# Patient Record
Sex: FEMALE | Race: WHITE | NOT HISPANIC OR LATINO | Employment: FULL TIME | URBAN - METROPOLITAN AREA
[De-identification: names, ages, dates, MRNs, and addresses within clinical notes are randomized per-mention and may not be internally consistent; named-entity substitution may affect disease eponyms.]

---

## 2019-04-02 ENCOUNTER — TRANSFERRED RECORDS (OUTPATIENT)
Dept: HEALTH INFORMATION MANAGEMENT | Facility: CLINIC | Age: 29
End: 2019-04-02

## 2019-04-02 LAB
ALT SERPL-CCNC: 117 U/L (ref 5–25)
AST SERPL-CCNC: 262 U/L (ref 13–39)
CREAT SERPL-MCNC: 0.4 MG/DL (ref 0.6–1.2)
GFR SERPL CREATININE-BSD FRML MDRD: >=60 ML/MIN (ref 60–120)
GLUCOSE SERPL-MCNC: 134 MG/DL (ref 70–99)
POTASSIUM SERPL-SCNC: 2.4 MMOL/L (ref 3.5–5.1)

## 2019-04-04 ENCOUNTER — APPOINTMENT (OUTPATIENT)
Age: 29
Setting detail: DERMATOLOGY
End: 2019-04-15

## 2019-04-04 DIAGNOSIS — A63.0 ANOGENITAL (VENEREAL) WARTS: ICD-10-CM

## 2019-04-04 DIAGNOSIS — D22 MELANOCYTIC NEVI: ICD-10-CM

## 2019-04-04 PROBLEM — D48.5 NEOPLASM OF UNCERTAIN BEHAVIOR OF SKIN: Status: ACTIVE | Noted: 2019-04-04

## 2019-04-04 PROBLEM — D22.5 MELANOCYTIC NEVI OF TRUNK: Status: ACTIVE | Noted: 2019-04-04

## 2019-04-04 PROCEDURE — OTHER MIPS QUALITY: OTHER

## 2019-04-04 PROCEDURE — OTHER BIOPSY BY SHAVE METHOD: OTHER

## 2019-04-04 PROCEDURE — OTHER COUNSELING: OTHER

## 2019-04-04 PROCEDURE — 56605 BIOPSY OF VULVA/PERINEUM: CPT

## 2019-04-04 PROCEDURE — 99201: CPT | Mod: 25

## 2019-04-04 ASSESSMENT — LOCATION ZONE DERM: LOCATION ZONE: VULVA

## 2019-04-04 ASSESSMENT — LOCATION SIMPLE DESCRIPTION DERM: LOCATION SIMPLE: VULVA

## 2019-04-04 ASSESSMENT — LOCATION DETAILED DESCRIPTION DERM
LOCATION DETAILED: PREPUCE
LOCATION DETAILED: MONS

## 2019-04-04 NOTE — PROCEDURE: MIPS QUALITY
Quality 131: Pain Assessment And Follow-Up: Pain assessment using a standardized tool is documented as negative, no follow-up plan required
Detail Level: Detailed
Quality 130: Documentation Of Current Medications In The Medical Record: Current Medications Documented
Quality 265: Biopsy Follow-Up: Biopsy results reviewed, communicated, tracked, and documented
Quality 110: Preventive Care And Screening: Influenza Immunization: Influenza immunization was not ordered or administered, reason not given
Quality 431: Preventive Care And Screening: Unhealthy Alcohol Use - Screening: Patient screened for unhealthy alcohol use using a single question and scores less than 2 times per year
Quality 226: Preventive Care And Screening: Tobacco Use: Screening And Cessation Intervention: Patient screened for tobacco and is a smoker AND received Cessation Counseling

## 2019-04-04 NOTE — PROCEDURE: COUNSELING
Detail Level: Detailed
Patient Specific Counseling (Will Not Stick From Patient To Patient): Patient instructed to speak with sexual partner about self examination for potential sexually transmitted lesions and to follow up with primary care provider or our clinic.

## 2019-04-04 NOTE — PROCEDURE: BIOPSY BY SHAVE METHOD
Depth Of Biopsy: dermis
Type Of Destruction Used: Electrodesiccation
Electrodesiccation And Curettage Text: The wound bed was treated with electrodesiccation and curettage after the biopsy was performed.
Electrodesiccation Text: The wound bed was treated with electrodesiccation after the biopsy was performed.
Bill [id number] For Specimen Handling/Conveyance To Laboratory?: no
Biopsy Method: double edge Personna blade
Notification Instructions: Patient will be notified of biopsy results. However, patient instructed to call the office if not contacted within 2 weeks.
Post-Care Instructions: I verbally reviewed with the patient in detail post-care instructions. Patient is to keep the biopsy site dry overnight, and then apply H2O2, Vaseline and a bandage daily until healed.
Anesthesia Type: 1% lidocaine with epinephrine and a 1:10 solution of 8.4% sodium bicarbonate
Anesthesia Volume In Cc (Will Not Render If 0): 0.5
Additional Anesthesia Volume In Cc (Will Not Render If 0): 0
Detail Level: Detailed
Cryotherapy Text: The wound bed was treated with cryotherapy after the biopsy was performed.
Biopsy Type: H and E
Hemostasis: Electrocautery
Consent: Verbal consent was obtained and risks were reviewed including but not limited to scarring, infection, bleeding, scabbing, incomplete removal, nerve damage and allergy to anesthesia.
Curettage Text: The wound bed was treated with curettage after the biopsy was performed.
Wound Care: Petrolatum
Size Of Lesion In Cm: 0.6
Was A Bandage Applied: Yes
Silver Nitrate Text: The wound bed was treated with silver nitrate after the biopsy was performed.
Billing Type: Third-Party Bill
Dressing: no dressing applied
X Size Of Lesion In Cm: 0.3

## 2019-04-24 ENCOUNTER — APPOINTMENT (OUTPATIENT)
Age: 29
Setting detail: DERMATOLOGY
End: 2019-06-17

## 2019-04-24 PROBLEM — D07.1 CARCINOMA IN SITU OF VULVA: Status: ACTIVE | Noted: 2019-04-24

## 2019-04-24 PROCEDURE — OTHER MEDICATION COUNSELING: OTHER

## 2019-04-24 PROCEDURE — OTHER COUNSELING: OTHER

## 2019-04-24 PROCEDURE — OTHER MIPS QUALITY: OTHER

## 2019-04-24 PROCEDURE — OTHER PRESCRIPTION: OTHER

## 2019-04-24 PROCEDURE — 99214 OFFICE O/P EST MOD 30 MIN: CPT

## 2019-04-24 RX ORDER — IMIQUIMOD 50 MG/G
5% CREAM TOPICAL
Qty: 1 | Refills: 0 | Status: ERX | COMMUNITY
Start: 2019-04-24

## 2019-04-24 NOTE — PROCEDURE: MIPS QUALITY
Quality 226: Preventive Care And Screening: Tobacco Use: Screening And Cessation Intervention: Patient screened for tobacco and is a smoker AND received Cessation Counseling
Quality 110: Preventive Care And Screening: Influenza Immunization: Influenza immunization was not ordered or administered, reason not given
Quality 143: Oncology: Medical And Radiation- Pain Intensity Quantified: Pain severity quantified, no pain present
Detail Level: Detailed
Quality 130: Documentation Of Current Medications In The Medical Record: Current Medications Documented
Quality 431: Preventive Care And Screening: Unhealthy Alcohol Use - Screening: Patient screened for unhealthy alcohol use using a single question and scores less than 2 times per year
Quality 131: Pain Assessment And Follow-Up: Pain assessment using a standardized tool is documented as negative, no follow-up plan required

## 2019-04-24 NOTE — PROCEDURE: COUNSELING
Patient Specific Counseling (Will Not Stick From Patient To Patient): Discussed treatment options with the patient, including Mohs surgical excision versus topical destructive agents (Aldara versus Veregen).  She opts to begin a course of imiquimod and RTC for evaluation of her treatment response.  \\n\\nPatient counseled to avoid sexual intercourse while using Aldara. \\n\\nPrescription printed and GoodRx card given at appointment. Given information about [facility].  \\n\\n
Detail Level: Detailed

## 2019-04-24 NOTE — PROCEDURE: MEDICATION COUNSELING
Drysol Pregnancy And Lactation Text: This medication is considered safe during pregnancy and breast feeding.
Detail Level: Simple
Bactrim Pregnancy And Lactation Text: This medication is Pregnancy Category D and is known to cause fetal risk.  It is also excreted in breast milk.
Oxybutynin Pregnancy And Lactation Text: This medication is Pregnancy Category B and is considered safe during pregnancy. It is unknown if it is excreted in breast milk.
Gabapentin Counseling: I discussed with the patient the risks of gabapentin including but not limited to dizziness, somnolence, fatigue and ataxia.
Azithromycin Pregnancy And Lactation Text: This medication is considered safe during pregnancy and is also secreted in breast milk.
Benzoyl Peroxide Counseling: Patient counseled that medicine may cause skin irritation and bleach clothing.  In the event of skin irritation, the patient was advised to reduce the amount of the drug applied or use it less frequently.   The patient verbalized understanding of the proper use and possible adverse effects of benzoyl peroxide.  All of the patient's questions and concerns were addressed.
Dapsone Counseling: I discussed with the patient the risks of dapsone including but not limited to hemolytic anemia, agranulocytosis, rashes, methemoglobinemia, kidney failure, peripheral neuropathy, headaches, GI upset, and liver toxicity.  Patients who start dapsone require monitoring including baseline LFTs and weekly CBCs for the first month, then every month thereafter.  The patient verbalized understanding of the proper use and possible adverse effects of dapsone.  All of the patient's questions and concerns were addressed.
Azathioprine Counseling:  I discussed with the patient the risks of azathioprine including but not limited to myelosuppression, immunosuppression, hepatotoxicity, lymphoma, and infections.  The patient understands that monitoring is required including baseline LFTs, Creatinine, possible TPMP genotyping and weekly CBCs for the first month and then every 2 weeks thereafter.  The patient verbalized understanding of the proper use and possible adverse effects of azathioprine.  All of the patient's questions and concerns were addressed.
Methotrexate Pregnancy And Lactation Text: This medication is Pregnancy Category X and is known to cause fetal harm. This medication is excreted in breast milk.
Elidel Counseling: Patient may experience a mild burning sensation during topical application. Elidel is not approved in children less than 2 years of age. There have been case reports of hematologic and skin malignancies in patients using topical calcineurin inhibitors although causality is questionable.
Odomzo Pregnancy And Lactation Text: This medication is Pregnancy Category X and is absolutely contraindicated during pregnancy. It is unknown if it is excreted in breast milk.
Stelara Pregnancy And Lactation Text: This medication is Pregnancy Category B and is considered safe during pregnancy. It is unknown if this medication is excreted in breast milk.
Eucrisa Counseling: Patient may experience a mild burning sensation during topical application. Eucrisa is not approved in children less than 2 years of age.
Wartpeel Pregnancy And Lactation Text: This medication is Pregnancy Category X and contraindicated in pregnancy and in women who may become pregnant. It is unknown if this medication is excreted in breast milk.
Mirvaso Pregnancy And Lactation Text: This medication has not been assigned a Pregnancy Risk Category. It is unknown if the medication is excreted in breast milk.
Terbinafine Counseling: Patient counseling regarding adverse effects of terbinafine including but not limited to headache, diarrhea, rash, upset stomach, liver function test abnormalities, itching, taste/smell disturbance, nausea, abdominal pain, and flatulence.  There is a rare possibility of liver failure that can occur when taking terbinafine.  The patient understands that a baseline LFT and kidney function test may be required. The patient verbalized understanding of the proper use and possible adverse effects of terbinafine.  All of the patient's questions and concerns were addressed.
Xolair Pregnancy And Lactation Text: This medication is Pregnancy Category B and is considered safe during pregnancy. This medication is excreted in breast milk.
Griseofulvin Counseling:  I discussed with the patient the risks of griseofulvin including but not limited to photosensitivity, cytopenia, liver damage, nausea/vomiting and severe allergy.  The patient understands that this medication is best absorbed when taken with a fatty meal (e.g., ice cream or french fries).
Topical Sulfur Applications Pregnancy And Lactation Text: This medication is Pregnancy Category C and has an unknown safety profile during pregnancy. It is unknown if this topical medication is excreted in breast milk.
Acitretin Pregnancy And Lactation Text: This medication is Pregnancy Category X and should not be given to women who are pregnant or may become pregnant in the future. This medication is excreted in breast milk.
Taltz Counseling: I discussed with the patient the risks of ixekizumab including but not limited to immunosuppression, serious infections, worsening of inflammatory bowel disease and drug reactions.  The patient understands that monitoring is required including a PPD at baseline and must alert us or the primary physician if symptoms of infection or other concerning signs are noted.
Hydroquinone Counseling:  Patient advised that medication may result in skin irritation, lightening (hypopigmentation), dryness, and burning.  In the event of skin irritation, the patient was advised to reduce the amount of the drug applied or use it less frequently.  Rarely, spots that are treated with hydroquinone can become darker (pseudoochronosis).  Should this occur, patient instructed to stop medication and call the office. The patient verbalized understanding of the proper use and possible adverse effects of hydroquinone.  All of the patient's questions and concerns were addressed.
Niacinamide Counseling: I recommended taking niacin or niacinamide, also know as vitamin B3, twice daily. Recent evidence suggests that taking vitamin B3 (500 mg twice daily) can reduce the risk of actinic keratoses and non-melanoma skin cancers. Side effects of vitamin B3 include flushing and headache.
Thalidomide Counseling: I discussed with the patient the risks of thalidomide including but not limited to birth defects, anxiety, weakness, chest pain, dizziness, cough and severe allergy.
Terbinafine Pregnancy And Lactation Text: This medication is Pregnancy Category B and is considered safe during pregnancy. It is also excreted in breast milk and breast feeding isn't recommended.
Wartpeel Counseling:  I discussed with the patient the risks of Wartpeel including but not limited to erythema, scaling, itching, weeping, crusting, and pain.
Xelmistyz Pregnancy And Lactation Text: This medication is Pregnancy Category D and is not considered safe during pregnancy.  The risk during breast feeding is also uncertain.
Elidel Pregnancy And Lactation Text: This medication is Pregnancy Category C. It is unknown if this medication is excreted in breast milk.
Xeljanz Counseling: I discussed with the patient the risks of Xeljanz therapy including increased risk of infection, liver issues, headache, diarrhea, or cold symptoms. Live vaccines should be avoided. They were instructed to call if they have any problems.
Benzoyl Peroxide Pregnancy And Lactation Text: This medication is Pregnancy Category C. It is unknown if benzoyl peroxide is excreted in breast milk.
Fluconazole Pregnancy And Lactation Text: This medication is Pregnancy Category C and it isn't know if it is safe during pregnancy. It is also excreted in breast milk.
Taltz Pregnancy And Lactation Text: The risk during pregnancy and breastfeeding is uncertain with this medication.
Cimzia Pregnancy And Lactation Text: This medication crosses the placenta but can be considered safe in certain situations. Cimzia may be excreted in breast milk.
Fluconazole Counseling:  Patient counseled regarding adverse effects of fluconazole including but not limited to headache, diarrhea, nausea, upset stomach, liver function test abnormalities, taste disturbance, and stomach pain.  There is a rare possibility of liver failure that can occur when taking fluconazole.  The patient understands that monitoring of LFTs and kidney function test may be required, especially at baseline. The patient verbalized understanding of the proper use and possible adverse effects of fluconazole.  All of the patient's questions and concerns were addressed.
Carac Counseling:  I discussed with the patient the risks of Carac including but not limited to erythema, scaling, itching, weeping, crusting, and pain.
Albendazole Counseling:  I discussed with the patient the risks of albendazole including but not limited to cytopenia, kidney damage, nausea/vomiting and severe allergy.  The patient understands that this medication is being used in an off-label manner.
Otezla Pregnancy And Lactation Text: This medication is Pregnancy Category C and it isn't known if it is safe during pregnancy. It is unknown if it is excreted in breast milk.
Humira Counseling:  I discussed with the patient the risks of adalimumab including but not limited to myelosuppression, immunosuppression, autoimmune hepatitis, demyelinating diseases, lymphoma, and serious infections.  The patient understands that monitoring is required including a PPD at baseline and must alert us or the primary physician if symptoms of infection or other concerning signs are noted.
Cimzia Counseling:  I discussed with the patient the risks of Cimzia including but not limited to immunosuppression, allergic reactions and infections.  The patient understands that monitoring is required including a PPD at baseline and must alert us or the primary physician if symptoms of infection or other concerning signs are noted.
Topical Sulfur Applications Counseling: Topical Sulfur Counseling: Patient counseled that this medication may cause skin irritation or allergic reactions.  In the event of skin irritation, the patient was advised to reduce the amount of the drug applied or use it less frequently.   The patient verbalized understanding of the proper use and possible adverse effects of topical sulfur application.  All of the patient's questions and concerns were addressed.
Oxybutynin Counseling:  I discussed with the patient the risks of oxybutynin including but not limited to skin rash, drowsiness, dry mouth, difficulty urinating, and blurred vision.
Include Pregnancy/Lactation Warning?: No
Siliq Counseling:  I discussed with the patient the risks of Siliq including but not limited to new or worsening depression, suicidal thoughts and behavior, immunosuppression, malignancy, posterior leukoencephalopathy syndrome, and serious infections.  The patient understands that monitoring is required including a PPD at baseline and must alert us or the primary physician if symptoms of infection or other concerning signs are noted. There is also a special program designed to monitor depression which is required with Siliq.
Quinolones Counseling:  I discussed with the patient the risks of fluoroquinolones including but not limited to GI upset, allergic reaction, drug rash, diarrhea, dizziness, photosensitivity, yeast infections, liver function test abnormalities, tendonitis/tendon rupture.
Prednisone Counseling:  I discussed with the patient the risks of prolonged use of prednisone including but not limited to weight gain, insomnia, osteoporosis, mood changes, diabetes, susceptibility to infection, glaucoma and high blood pressure.  In cases where prednisone use is prolonged, patients should be monitored with blood pressure checks, serum glucose levels and an eye exam.  Additionally, the patient may need to be placed on GI prophylaxis, PCP prophylaxis, and calcium and vitamin D supplementation and/or a bisphosphonate.  The patient verbalized understanding of the proper use and the possible adverse effects of prednisone.  All of the patient's questions and concerns were addressed.
Minoxidil Counseling: Minoxidil is a topical medication which can increase blood flow where it is applied. It is uncertain how this medication increases hair growth. Side effects are uncommon and include stinging and allergic reactions.
Clofazimine Pregnancy And Lactation Text: This medication is Pregnancy Category C and isn't considered safe during pregnancy. It is excreted in breast milk.
Topical Clindamycin Counseling: Patient counseled that this medication may cause skin irritation or allergic reactions.  In the event of skin irritation, the patient was advised to reduce the amount of the drug applied or use it less frequently.   The patient verbalized understanding of the proper use and possible adverse effects of clindamycin.  All of the patient's questions and concerns were addressed.
Tetracycline Pregnancy And Lactation Text: This medication is Pregnancy Category D and not consider safe during pregnancy. It is also excreted in breast milk.
Ketoconazole Counseling:   Patient counseled regarding improving absorption with orange juice.  Adverse effects include but are not limited to breast enlargement, headache, diarrhea, nausea, upset stomach, liver function test abnormalities, taste disturbance, and stomach pain.  There is a rare possibility of liver failure that can occur when taking ketoconazole. The patient understands that monitoring of LFTs may be required, especially at baseline. The patient verbalized understanding of the proper use and possible adverse effects of ketoconazole.  All of the patient's questions and concerns were addressed.
Minocycline Counseling: Patient advised regarding possible photosensitivity and discoloration of the teeth, skin, lips, tongue and gums.  Patient instructed to avoid sunlight, if possible.  When exposed to sunlight, patients should wear protective clothing, sunglasses, and sunscreen.  The patient was instructed to call the office immediately if the following severe adverse effects occur:  hearing changes, easy bruising/bleeding, severe headache, or vision changes.  The patient verbalized understanding of the proper use and possible adverse effects of minocycline.  All of the patient's questions and concerns were addressed.
Odomzo Counseling- I discussed with the patient the risks of Odomzo including but not limited to nausea, vomiting, diarrhea, constipation, weight loss, changes in the sense of taste, decreased appetite, muscle spasms, and hair loss.  The patient verbalized understanding of the proper use and possible adverse effects of Odomzo.  All of the patient's questions and concerns were addressed.
Hydroquinone Pregnancy And Lactation Text: This medication has not been assigned a Pregnancy Risk Category but animal studies failed to show danger with the topical medication. It is unknown if the medication is excreted in breast milk.
Mirvaso Counseling: Mirvaso is a topical medication which can decrease superficial blood flow where applied. Side effects are uncommon and include stinging, redness and allergic reactions.
Niacinamide Pregnancy And Lactation Text: These medications are considered safe during pregnancy.
Hydroxychloroquine Counseling:  I discussed with the patient that a baseline ophthalmologic exam is needed at the start of therapy and every year thereafter while on therapy. A CBC may also be warranted for monitoring.  The side effects of this medication were discussed with the patient, including but not limited to agranulocytosis, aplastic anemia, seizures, rashes, retinopathy, and liver toxicity. Patient instructed to call the office should any adverse effect occur.  The patient verbalized understanding of the proper use and possible adverse effects of Plaquenil.  All the patient's questions and concerns were addressed.
Rifampin Pregnancy And Lactation Text: This medication is Pregnancy Category C and it isn't know if it is safe during pregnancy. It is also excreted in breast milk and should not be used if you are breast feeding.
Dupixent Counseling: I discussed with the patient the risks of dupilumab including but not limited to eye infection and irritation, cold sores, injection site reactions, worsening of asthma, allergic reactions and increased risk of parasitic infection.  Live vaccines should be avoided while taking dupilumab. Dupilumab will also interact with certain medications such as warfarin and cyclosporine. The patient understands that monitoring is required and they must alert us or the primary physician if symptoms of infection or other concerning signs are noted.
Enbrel Counseling:  I discussed with the patient the risks of etanercept including but not limited to myelosuppression, immunosuppression, autoimmune hepatitis, demyelinating diseases, lymphoma, and infections.  The patient understands that monitoring is required including a PPD at baseline and must alert us or the primary physician if symptoms of infection or other concerning signs are noted.
Dapsone Pregnancy And Lactation Text: This medication is Pregnancy Category C and is not considered safe during pregnancy or breast feeding.
Isotretinoin Counseling: Patient should get monthly blood tests, not donate blood, not drive at night if vision affected, not share medication, and not undergo elective surgery for 6 months after tx completed. Side effects reviewed, pt to contact office should one occur.
Drysol Counseling:  I discussed with the patient the risks of drysol/aluminum chloride including but not limited to skin rash, itching, irritation, burning.
Bexarotene Pregnancy And Lactation Text: This medication is Pregnancy Category X and should not be given to women who are pregnant or may become pregnant. This medication should not be used if you are breast feeding.
Bactrim Counseling:  I discussed with the patient the risks of sulfa antibiotics including but not limited to GI upset, allergic reaction, drug rash, diarrhea, dizziness, photosensitivity, and yeast infections.  Rarely, more serious reactions can occur including but not limited to aplastic anemia, agranulocytosis, methemoglobinemia, blood dyscrasias, liver or kidney failure, lung infiltrates or desquamative/blistering drug rashes.
Spironolactone Counseling: Patient advised regarding risks of diarrhea, abdominal pain, hyperkalemia, birth defects (for female patients), liver toxicity and renal toxicity. The patient may need blood work to monitor liver and kidney function and potassium levels while on therapy. The patient verbalized understanding of the proper use and possible adverse effects of spironolactone.  All of the patient's questions and concerns were addressed.
Birth Control Pills Pregnancy And Lactation Text: This medication should be avoided if pregnant and for the first 30 days post-partum.
Doxepin Counseling:  Patient advised that the medication is sedating and not to drive a car after taking this medication. Patient informed of potential adverse effects including but not limited to dry mouth, urinary retention, and blurry vision.  The patient verbalized understanding of the proper use and possible adverse effects of doxepin.  All of the patient's questions and concerns were addressed.
Spironolactone Pregnancy And Lactation Text: This medication can cause feminization of the male fetus and should be avoided during pregnancy. The active metabolite is also found in breast milk.
Cellcept Pregnancy And Lactation Text: This medication is Pregnancy Category D and isn't considered safe during pregnancy. It is unknown if this medication is excreted in breast milk.
Tetracycline Counseling: Patient counseled regarding possible photosensitivity and increased risk for sunburn.  Patient instructed to avoid sunlight, if possible.  When exposed to sunlight, patients should wear protective clothing, sunglasses, and sunscreen.  The patient was instructed to call the office immediately if the following severe adverse effects occur:  hearing changes, easy bruising/bleeding, severe headache, or vision changes.  The patient verbalized understanding of the proper use and possible adverse effects of tetracycline.  All of the patient's questions and concerns were addressed. Patient understands to avoid pregnancy while on therapy due to potential birth defects.
Glycopyrrolate Counseling:  I discussed with the patient the risks of glycopyrrolate including but not limited to skin rash, drowsiness, dry mouth, difficulty urinating, and blurred vision.
Cimetidine Counseling:  I discussed with the patient the risks of Cimetidine including but not limited to gynecomastia, headache, diarrhea, nausea, drowsiness, arrhythmias, pancreatitis, skin rashes, psychosis, bone marrow suppression and kidney toxicity.
Doxycycline Counseling:  Patient counseled regarding possible photosensitivity and increased risk for sunburn.  Patient instructed to avoid sunlight, if possible.  When exposed to sunlight, patients should wear protective clothing, sunglasses, and sunscreen.  The patient was instructed to call the office immediately if the following severe adverse effects occur:  hearing changes, easy bruising/bleeding, severe headache, or vision changes.  The patient verbalized understanding of the proper use and possible adverse effects of doxycycline.  All of the patient's questions and concerns were addressed.
Cyclophosphamide Pregnancy And Lactation Text: This medication is Pregnancy Category D and it isn't considered safe during pregnancy. This medication is excreted in breast milk.
Zyclara Counseling:  I discussed with the patient the risks of imiquimod including but not limited to erythema, scaling, itching, weeping, crusting, and pain.  Patient understands that the inflammatory response to imiquimod is variable from person to person and was educated regarded proper titration schedule.  If flu-like symptoms develop, patient knows to discontinue the medication and contact us.
Ivermectin Pregnancy And Lactation Text: This medication is Pregnancy Category C and it isn't known if it is safe during pregnancy. It is also excreted in breast milk.
Hydroxyzine Counseling: Patient advised that the medication is sedating and not to drive a car after taking this medication.  Patient informed of potential adverse effects including but not limited to dry mouth, urinary retention, and blurry vision.  The patient verbalized understanding of the proper use and possible adverse effects of hydroxyzine.  All of the patient's questions and concerns were addressed.
High Dose Vitamin A Pregnancy And Lactation Text: High dose vitamin A therapy is contraindicated during pregnancy and breast feeding.
Tazorac Counseling:  Patient advised that medication is irritating and drying.  Patient may need to apply sparingly and wash off after an hour before eventually leaving it on overnight.  The patient verbalized understanding of the proper use and possible adverse effects of tazorac.  All of the patient's questions and concerns were addressed.
Cyclosporine Counseling:  I discussed with the patient the risks of cyclosporine including but not limited to hypertension, gingival hyperplasia,myelosuppression, immunosuppression, liver damage, kidney damage, neurotoxicity, lymphoma, and serious infections. The patient understands that monitoring is required including baseline blood pressure, CBC, CMP, lipid panel and uric acid, and then 1-2 times monthly CMP and blood pressure.
Birth Control Pills Counseling: Birth Control Pill Counseling: I discussed with the patient the potential side effects of OCPs including but not limited to increased risk of stroke, heart attack, thrombophlebitis, deep venous thrombosis, hepatic adenomas, breast changes, GI upset, headaches, and depression.  The patient verbalized understanding of the proper use and possible adverse effects of OCPs. All of the patient's questions and concerns were addressed.
Xolair Counseling:  Patient informed of potential adverse effects including but not limited to fever, muscle aches, rash and allergic reactions.  The patient verbalized understanding of the proper use and possible adverse effects of Xolair.  All of the patient's questions and concerns were addressed.
Erythromycin Pregnancy And Lactation Text: This medication is Pregnancy Category B and is considered safe during pregnancy. It is also excreted in breast milk.
Nsaids Pregnancy And Lactation Text: These medications are considered safe up to 30 weeks gestation. It is excreted in breast milk.
Methotrexate Counseling:  Patient counseled regarding adverse effects of methotrexate including but not limited to nausea, vomiting, abnormalities in liver function tests. Patients may develop mouth sores, rash, diarrhea, and abnormalities in blood counts. The patient understands that monitoring is required including LFT's and blood counts.  There is a rare possibility of scarring of the liver and lung problems that can occur when taking methotrexate. Persistent nausea, loss of appetite, pale stools, dark urine, cough, and shortness of breath should be reported immediately. Patient advised to discontinue methotrexate treatment at least three months before attempting to become pregnant.  I discussed the need for folate supplements while taking methotrexate.  These supplements can decrease side effects during methotrexate treatment. The patient verbalized understanding of the proper use and possible adverse effects of methotrexate.  All of the patient's questions and concerns were addressed.
Topical Retinoid counseling:  Patient advised to apply a pea-sized amount only at bedtime and wait 30 minutes after washing their face before applying.  If too drying, patient may add a non-comedogenic moisturizer. The patient verbalized understanding of the proper use and possible adverse effects of retinoids.  All of the patient's questions and concerns were addressed.
Colchicine Counseling:  Patient counseled regarding adverse effects including but not limited to stomach upset (nausea, vomiting, stomach pain, or diarrhea).  Patient instructed to limit alcohol consumption while taking this medication.  Colchicine may reduce blood counts especially with prolonged use.  The patient understands that monitoring of kidney function and blood counts may be required, especially at baseline. The patient verbalized understanding of the proper use and possible adverse effects of colchicine.  All of the patient's questions and concerns were addressed.
Tremfya Counseling: I discussed with the patient the risks of guselkumab including but not limited to immunosuppression, serious infections, worsening of inflammatory bowel disease and drug reactions.  The patient understands that monitoring is required including a PPD at baseline and must alert us or the primary physician if symptoms of infection or other concerning signs are noted.
Rituxan Counseling:  I discussed with the patient the risks of Rituxan infusions. Side effects can include infusion reactions, severe drug rashes including mucocutaneous reactions, reactivation of latent hepatitis and other infections and rarely progressive multifocal leukoencephalopathy.  All of the patient's questions and concerns were addressed.
Cellcept Counseling:  I discussed with the patient the risks of mycophenolate mofetil including but not limited to infection/immunosuppression, GI upset, hypokalemia, hypercholesterolemia, bone marrow suppression, lymphoproliferative disorders, malignancy, GI ulceration/bleed/perforation, colitis, interstitial lung disease, kidney failure, progressive multifocal leukoencephalopathy, and birth defects.  The patient understands that monitoring is required including a baseline creatinine and regular CBC testing. In addition, patient must alert us immediately if symptoms of infection or other concerning signs are noted.
Cosentyx Counseling:  I discussed with the patient the risks of Cosentyx including but not limited to worsening of Crohn's disease, immunosuppression, allergic reactions and infections.  The patient understands that monitoring is required including a PPD at baseline and must alert us or the primary physician if symptoms of infection or other concerning signs are noted.
Infliximab Counseling:  I discussed with the patient the risks of infliximab including but not limited to myelosuppression, immunosuppression, autoimmune hepatitis, demyelinating diseases, lymphoma, and serious infections.  The patient understands that monitoring is required including a PPD at baseline and must alert us or the primary physician if symptoms of infection or other concerning signs are noted.
Clindamycin Counseling: I counseled the patient regarding use of clindamycin as an antibiotic for prophylactic and/or therapeutic purposes. Clindamycin is active against numerous classes of bacteria, including skin bacteria. Side effects may include nausea, diarrhea, gastrointestinal upset, rash, hives, yeast infections, and in rare cases, colitis.
Cyclophosphamide Counseling:  I discussed with the patient the risks of cyclophosphamide including but not limited to hair loss, hormonal abnormalities, decreased fertility, abdominal pain, diarrhea, nausea and vomiting, bone marrow suppression and infection. The patient understands that monitoring is required while taking this medication.
Valtrex Counseling: I discussed with the patient the risks of valacyclovir including but not limited to kidney damage, nausea, vomiting and severe allergy.  The patient understands that if the infection seems to be worsening or is not improving, they are to call.
Hydroxychloroquine Pregnancy And Lactation Text: This medication has been shown to cause fetal harm but it isn't assigned a Pregnancy Risk Category. There are small amounts excreted in breast milk.
Protopic Pregnancy And Lactation Text: This medication is Pregnancy Category C. It is unknown if this medication is excreted in breast milk when applied topically.
Imiquimod Counseling:  I discussed with the patient the risks of imiquimod including but not limited to erythema, scaling, itching, weeping, crusting, and pain.  Patient understands that the inflammatory response to imiquimod is variable from person to person and was educated regarded proper titration schedule.  If flu-like symptoms develop, patient knows to discontinue the medication and contact us.
SSKI Counseling:  I discussed with the patient the risks of SSKI including but not limited to thyroid abnormalities, metallic taste, GI upset, fever, headache, acne, arthralgias, paraesthesias, lymphadenopathy, easy bleeding, arrhythmias, and allergic reaction.
Tazorac Pregnancy And Lactation Text: This medication is not safe during pregnancy. It is unknown if this medication is excreted in breast milk.
Griseofulvin Pregnancy And Lactation Text: This medication is Pregnancy Category X and is known to cause serious birth defects. It is unknown if this medication is excreted in breast milk but breast feeding should be avoided.
Rhofade Counseling: Rhofade is a topical medication which can decrease superficial blood flow where applied. Side effects are uncommon and include stinging, redness and allergic reactions.
Dupixent Pregnancy And Lactation Text: This medication likely crosses the placenta but the risk for the fetus is uncertain. This medication is excreted in breast milk.
Opioid Counseling: I discussed with the patient the potential side effects of opioids including but not limited to addiction, altered mental status, and depression. I stressed avoiding alcohol, benzodiazepines, muscle relaxants and sleep aids unless specifically okayed by a physician. The patient verbalized understanding of the proper use and possible adverse effects of opioids. All of the patient's questions and concerns were addressed. They were instructed to flush the remaining pills down the toilet if they did not need them for pain.
5-Fu Counseling: 5-Fluorouracil Counseling:  I discussed with the patient the risks of 5-fluorouracil including but not limited to erythema, scaling, itching, weeping, crusting, and pain.
Simponi Counseling:  I discussed with the patient the risks of golimumab including but not limited to myelosuppression, immunosuppression, autoimmune hepatitis, demyelinating diseases, lymphoma, and serious infections.  The patient understands that monitoring is required including a PPD at baseline and must alert us or the primary physician if symptoms of infection or other concerning signs are noted.
Opioid Pregnancy And Lactation Text: These medications can lead to premature delivery and should be avoided during pregnancy. These medications are also present in breast milk in small amounts.
Metronidazole Counseling:  I discussed with the patient the risks of metronidazole including but not limited to seizures, nausea/vomiting, a metallic taste in the mouth, nausea/vomiting and severe allergy.
Stelara Counseling:  I discussed with the patient the risks of ustekinumab including but not limited to immunosuppression, malignancy, posterior leukoencephalopathy syndrome, and serious infections.  The patient understands that monitoring is required including a PPD at baseline and must alert us or the primary physician if symptoms of infection or other concerning signs are noted.
Ivermectin Counseling:  Patient instructed to take medication on an empty stomach with a full glass of water.  Patient informed of potential adverse effects including but not limited to nausea, diarrhea, dizziness, itching, and swelling of the extremities or lymph nodes.  The patient verbalized understanding of the proper use and possible adverse effects of ivermectin.  All of the patient's questions and concerns were addressed.
Rifampin Counseling: I discussed with the patient the risks of rifampin including but not limited to liver damage, kidney damage, red-orange body fluids, nausea/vomiting and severe allergy.
Bexarotene Counseling:  I discussed with the patient the risks of bexarotene including but not limited to hair loss, dry lips/skin/eyes, liver abnormalities, hyperlipidemia, pancreatitis, depression/suicidal ideation, photosensitivity, drug rash/allergic reactions, hypothyroidism, anemia, leukopenia, infection, cataracts, and teratogenicity.  Patient understands that they will need regular blood tests to check lipid profile, liver function tests, white blood cell count, thyroid function tests and pregnancy test if applicable.
Glycopyrrolate Pregnancy And Lactation Text: This medication is Pregnancy Category B and is considered safe during pregnancy. It is unknown if it is excreted breast milk.
Hydroxyzine Pregnancy And Lactation Text: This medication is not safe during pregnancy and should not be taken. It is also excreted in breast milk and breast feeding isn't recommended.
Valtrex Pregnancy And Lactation Text: this medication is Pregnancy Category B and is considered safe during pregnancy. This medication is not directly found in breast milk but it's metabolite acyclovir is present.
Clofazimine Counseling:  I discussed with the patient the risks of clofazimine including but not limited to skin and eye pigmentation, liver damage, nausea/vomiting, gastrointestinal bleeding and allergy.
Erivedge Counseling- I discussed with the patient the risks of Erivedge including but not limited to nausea, vomiting, diarrhea, constipation, weight loss, changes in the sense of taste, decreased appetite, muscle spasms, and hair loss.  The patient verbalized understanding of the proper use and possible adverse effects of Erivedge.  All of the patient's questions and concerns were addressed.
Solaraze Pregnancy And Lactation Text: This medication is Pregnancy Category B and is considered safe. There is some data to suggest avoiding during the third trimester. It is unknown if this medication is excreted in breast milk.
Ketoconazole Pregnancy And Lactation Text: This medication is Pregnancy Category C and it isn't know if it is safe during pregnancy. It is also excreted in breast milk and breast feeding isn't recommended.
Nsaids Counseling: NSAID Counseling: I discussed with the patient that NSAIDs should be taken with food. Prolonged use of NSAIDs can result in the development of stomach ulcers.  Patient advised to stop taking NSAIDs if abdominal pain occurs.  The patient verbalized understanding of the proper use and possible adverse effects of NSAIDs.  All of the patient's questions and concerns were addressed.
Acitretin Counseling:  I discussed with the patient the risks of acitretin including but not limited to hair loss, dry lips/skin/eyes, liver damage, hyperlipidemia, depression/suicidal ideation, photosensitivity.  Serious rare side effects can include but are not limited to pancreatitis, pseudotumor cerebri, bony changes, clot formation/stroke/heart attack.  Patient understands that alcohol is contraindicated since it can result in liver toxicity and significantly prolong the elimination of the drug by many years.
Azithromycin Counseling:  I discussed with the patient the risks of azithromycin including but not limited to GI upset, allergic reaction, drug rash, diarrhea, and yeast infections.
Clindamycin Pregnancy And Lactation Text: This medication can be used in pregnancy if certain situations. Clindamycin is also present in breast milk.
Doxepin Pregnancy And Lactation Text: This medication is Pregnancy Category C and it isn't known if it is safe during pregnancy. It is also excreted in breast milk and breast feeding isn't recommended.
High Dose Vitamin A Counseling: Side effects reviewed, pt to contact office should one occur.
Cyclosporine Pregnancy And Lactation Text: This medication is Pregnancy Category C and it isn't know if it is safe during pregnancy. This medication is excreted in breast milk.
Ilumya Counseling: I discussed with the patient the risks of tildrakizumab including but not limited to immunosuppression, malignancy, posterior leukoencephalopathy syndrome, and serious infections.  The patient understands that monitoring is required including a PPD at baseline and must alert us or the primary physician if symptoms of infection or other concerning signs are noted.
Rituxan Pregnancy And Lactation Text: This medication is Pregnancy Category C and it isn't know if it is safe during pregnancy. It is unknown if this medication is excreted in breast milk but similar antibodies are known to be excreted.
Solaraze Counseling:  I discussed with the patient the risks of Solaraze including but not limited to erythema, scaling, itching, weeping, crusting, and pain.
Protopic Counseling: Patient may experience a mild burning sensation during topical application. Protopic is not approved in children less than 2 years of age. There have been case reports of hematologic and skin malignancies in patients using topical calcineurin inhibitors although causality is questionable.
Metronidazole Pregnancy And Lactation Text: This medication is Pregnancy Category B and considered safe during pregnancy.  It is also excreted in breast milk.
Erythromycin Counseling:  I discussed with the patient the risks of erythromycin including but not limited to GI upset, allergic reaction, drug rash, diarrhea, increase in liver enzymes, and yeast infections.
Isotretinoin Pregnancy And Lactation Text: This medication is Pregnancy Category X and is considered extremely dangerous during pregnancy. It is unknown if it is excreted in breast milk.
Itraconazole Counseling:  I discussed with the patient the risks of itraconazole including but not limited to liver damage, nausea/vomiting, neuropathy, and severe allergy.  The patient understands that this medication is best absorbed when taken with acidic beverages such as non-diet cola or ginger ale.  The patient understands that monitoring is required including baseline LFTs and repeat LFTs at intervals.  The patient understands that they are to contact us or the primary physician if concerning signs are noted.
Doxycycline Pregnancy And Lactation Text: This medication is Pregnancy Category D and not consider safe during pregnancy. It is also excreted in breast milk but is considered safe for shorter treatment courses.
Picato Counseling:  I discussed with the patient the risks of Picato including but not limited to erythema, scaling, itching, weeping, crusting, and pain.
Arava Counseling:  Patient counseled regarding adverse effects of Arava including but not limited to nausea, vomiting, abnormalities in liver function tests. Patients may develop mouth sores, rash, diarrhea, and abnormalities in blood counts. The patient understands that monitoring is required including LFTs and blood counts.  There is a rare possibility of scarring of the liver and lung problems that can occur when taking methotrexate. Persistent nausea, loss of appetite, pale stools, dark urine, cough, and shortness of breath should be reported immediately. Patient advised to discontinue Arava treatment and consult with a physician prior to attempting conception. The patient will have to undergo a treatment to eliminate Arava from the body prior to conception.
Cephalexin Pregnancy And Lactation Text: This medication is Pregnancy Category B and considered safe during pregnancy.  It is also excreted in breast milk but can be used safely for shorter doses.
Sski Pregnancy And Lactation Text: This medication is Pregnancy Category D and isn't considered safe during pregnancy. It is excreted in breast milk.
Cephalexin Counseling: I counseled the patient regarding use of cephalexin as an antibiotic for prophylactic and/or therapeutic purposes. Cephalexin (commonly prescribed under brand name Keflex) is a cephalosporin antibiotic which is active against numerous classes of bacteria, including most skin bacteria. Side effects may include nausea, diarrhea, gastrointestinal upset, rash, hives, yeast infections, and in rare cases, hepatitis, kidney disease, seizures, fever, confusion, neurologic symptoms, and others. Patients with severe allergies to penicillin medications are cautioned that there is about a 10% incidence of cross-reactivity with cephalosporins. When possible, patients with penicillin allergies should use alternatives to cephalosporins for antibiotic therapy.
Otezla Counseling: The side effects of Otezla were discussed with the patient, including but not limited to worsening or new depression, weight loss, diarrhea, nausea, upper respiratory tract infection, and headache. Patient instructed to call the office should any adverse effect occur.  The patient verbalized understanding of the proper use and possible adverse effects of Otezla.  All the patient's questions and concerns were addressed.

## 2019-05-08 ENCOUNTER — HOSPITAL ENCOUNTER (INPATIENT)
Facility: CLINIC | Age: 29
LOS: 1 days | Discharge: HOME | DRG: 897 | End: 2019-05-09
Attending: EMERGENCY MEDICINE | Admitting: INTERNAL MEDICINE
Payer: COMMERCIAL

## 2019-05-08 ENCOUNTER — APPOINTMENT (OUTPATIENT)
Dept: CT IMAGING | Facility: CLINIC | Age: 29
DRG: 897 | End: 2019-05-08
Attending: EMERGENCY MEDICINE
Payer: COMMERCIAL

## 2019-05-08 DIAGNOSIS — E87.6 HYPOKALEMIA: ICD-10-CM

## 2019-05-08 DIAGNOSIS — F10.930 ALCOHOL WITHDRAWAL SYNDROME WITHOUT COMPLICATION (H): ICD-10-CM

## 2019-05-08 DIAGNOSIS — E87.20 LACTIC ACIDOSIS: ICD-10-CM

## 2019-05-08 DIAGNOSIS — R41.82 ALTERED MENTAL STATUS: Primary | ICD-10-CM

## 2019-05-08 DIAGNOSIS — F10.221 ALCOHOL DEPENDENCE WITH INTOXICATION DELIRIUM (H): ICD-10-CM

## 2019-05-08 DIAGNOSIS — R56.9 ALCOHOL RELATED SEIZURE (H): ICD-10-CM

## 2019-05-08 PROBLEM — F10.939 ALCOHOL WITHDRAWAL (H): Status: ACTIVE | Noted: 2019-05-08

## 2019-05-08 LAB
ALBUMIN SERPL-MCNC: 4 G/DL (ref 3.4–5)
ALP SERPL-CCNC: 115 U/L (ref 40–150)
ALT SERPL W P-5'-P-CCNC: 28 U/L (ref 0–50)
AMPHETAMINES UR QL SCN: NEGATIVE
ANION GAP SERPL CALCULATED.3IONS-SCNC: 11 MMOL/L (ref 3–14)
APAP SERPL-MCNC: <2 MG/L (ref 10–20)
AST SERPL W P-5'-P-CCNC: 28 U/L (ref 0–45)
BARBITURATES UR QL: NEGATIVE
BASE EXCESS BLDV CALC-SCNC: 5.1 MMOL/L
BASOPHILS # BLD AUTO: 0 10E9/L (ref 0–0.2)
BASOPHILS NFR BLD AUTO: 0.7 %
BENZODIAZ UR QL: NEGATIVE
BILIRUB SERPL-MCNC: 0.4 MG/DL (ref 0.2–1.3)
BUN SERPL-MCNC: 6 MG/DL (ref 7–30)
CALCIUM SERPL-MCNC: 9.2 MG/DL (ref 8.5–10.1)
CANNABINOIDS UR QL SCN: NEGATIVE
CHLORIDE SERPL-SCNC: 100 MMOL/L (ref 94–109)
CO2 SERPL-SCNC: 30 MMOL/L (ref 20–32)
COCAINE UR QL: NEGATIVE
CREAT SERPL-MCNC: 0.49 MG/DL (ref 0.52–1.04)
DIFFERENTIAL METHOD BLD: NORMAL
EOSINOPHIL # BLD AUTO: 0 10E9/L (ref 0–0.7)
EOSINOPHIL NFR BLD AUTO: 0.2 %
ERYTHROCYTE [DISTWIDTH] IN BLOOD BY AUTOMATED COUNT: 12.8 % (ref 10–15)
ETHANOL SERPL-MCNC: 0.15 G/DL
GFR SERPL CREATININE-BSD FRML MDRD: >90 ML/MIN/{1.73_M2}
GLUCOSE SERPL-MCNC: 130 MG/DL (ref 70–99)
HCO3 BLDV-SCNC: 30 MMOL/L (ref 21–28)
HCT VFR BLD AUTO: 38.5 % (ref 35–47)
HGB BLD-MCNC: 13.5 G/DL (ref 11.7–15.7)
IMM GRANULOCYTES # BLD: 0 10E9/L (ref 0–0.4)
IMM GRANULOCYTES NFR BLD: 0.2 %
INTERPRETATION ECG - MUSE: NORMAL
INTERPRETATION ECG - MUSE: NORMAL
LACTATE BLD-SCNC: 3.2 MMOL/L (ref 0.7–2)
LACTATE SERPL-SCNC: 2.8 MMOL/L (ref 0.4–2)
LYMPHOCYTES # BLD AUTO: 2.1 10E9/L (ref 0.8–5.3)
LYMPHOCYTES NFR BLD AUTO: 45 %
MAGNESIUM SERPL-MCNC: 2.4 MG/DL (ref 1.6–2.3)
MCH RBC QN AUTO: 32.7 PG (ref 26.5–33)
MCHC RBC AUTO-ENTMCNC: 35.1 G/DL (ref 31.5–36.5)
MCV RBC AUTO: 93 FL (ref 78–100)
MONOCYTES # BLD AUTO: 0.4 10E9/L (ref 0–1.3)
MONOCYTES NFR BLD AUTO: 9.1 %
NEUTROPHILS # BLD AUTO: 2.1 10E9/L (ref 1.6–8.3)
NEUTROPHILS NFR BLD AUTO: 44.8 %
NRBC # BLD AUTO: 0 10*3/UL
NRBC BLD AUTO-RTO: 0 /100
OPIATES UR QL SCN: NEGATIVE
PCO2 BLDV: 44 MM HG (ref 40–50)
PCP UR QL SCN: NEGATIVE
PH BLDV: 7.44 PH (ref 7.32–7.43)
PLATELET # BLD AUTO: 198 10E9/L (ref 150–450)
PO2 BLDV: 42 MM HG (ref 25–47)
POTASSIUM SERPL-SCNC: 2.7 MMOL/L (ref 3.4–5.3)
PROT SERPL-MCNC: 7.2 G/DL (ref 6.8–8.8)
RBC # BLD AUTO: 4.13 10E12/L (ref 3.8–5.2)
SALICYLATES SERPL-MCNC: <2 MG/DL
SODIUM SERPL-SCNC: 141 MMOL/L (ref 133–144)
WBC # BLD AUTO: 4.6 10E9/L (ref 4–11)

## 2019-05-08 PROCEDURE — 25800030 ZZH RX IP 258 OP 636: Performed by: INTERNAL MEDICINE

## 2019-05-08 PROCEDURE — 25000125 ZZHC RX 250: Performed by: EMERGENCY MEDICINE

## 2019-05-08 PROCEDURE — 80053 COMPREHEN METABOLIC PANEL: CPT | Performed by: EMERGENCY MEDICINE

## 2019-05-08 PROCEDURE — 99285 EMERGENCY DEPT VISIT HI MDM: CPT | Mod: 25

## 2019-05-08 PROCEDURE — 25800030 ZZH RX IP 258 OP 636: Performed by: EMERGENCY MEDICINE

## 2019-05-08 PROCEDURE — 25000128 H RX IP 250 OP 636: Performed by: EMERGENCY MEDICINE

## 2019-05-08 PROCEDURE — 80329 ANALGESICS NON-OPIOID 1 OR 2: CPT | Performed by: EMERGENCY MEDICINE

## 2019-05-08 PROCEDURE — 96375 TX/PRO/DX INJ NEW DRUG ADDON: CPT

## 2019-05-08 PROCEDURE — 82803 BLOOD GASES ANY COMBINATION: CPT | Performed by: EMERGENCY MEDICINE

## 2019-05-08 PROCEDURE — 96365 THER/PROPH/DIAG IV INF INIT: CPT

## 2019-05-08 PROCEDURE — 93005 ELECTROCARDIOGRAM TRACING: CPT | Mod: 76

## 2019-05-08 PROCEDURE — 12000000 ZZH R&B MED SURG/OB

## 2019-05-08 PROCEDURE — 25000128 H RX IP 250 OP 636

## 2019-05-08 PROCEDURE — 96361 HYDRATE IV INFUSION ADD-ON: CPT

## 2019-05-08 PROCEDURE — 96376 TX/PRO/DX INJ SAME DRUG ADON: CPT

## 2019-05-08 PROCEDURE — 85025 COMPLETE CBC W/AUTO DIFF WBC: CPT | Performed by: EMERGENCY MEDICINE

## 2019-05-08 PROCEDURE — 80320 DRUG SCREEN QUANTALCOHOLS: CPT | Performed by: EMERGENCY MEDICINE

## 2019-05-08 PROCEDURE — 25000128 H RX IP 250 OP 636: Performed by: INTERNAL MEDICINE

## 2019-05-08 PROCEDURE — 93005 ELECTROCARDIOGRAM TRACING: CPT

## 2019-05-08 PROCEDURE — 99223 1ST HOSP IP/OBS HIGH 75: CPT | Mod: AI | Performed by: INTERNAL MEDICINE

## 2019-05-08 PROCEDURE — 83605 ASSAY OF LACTIC ACID: CPT | Performed by: EMERGENCY MEDICINE

## 2019-05-08 PROCEDURE — 80307 DRUG TEST PRSMV CHEM ANLYZR: CPT | Performed by: EMERGENCY MEDICINE

## 2019-05-08 PROCEDURE — 96367 TX/PROPH/DG ADDL SEQ IV INF: CPT

## 2019-05-08 PROCEDURE — 70450 CT HEAD/BRAIN W/O DYE: CPT

## 2019-05-08 PROCEDURE — 83735 ASSAY OF MAGNESIUM: CPT | Performed by: EMERGENCY MEDICINE

## 2019-05-08 PROCEDURE — HZ2ZZZZ DETOXIFICATION SERVICES FOR SUBSTANCE ABUSE TREATMENT: ICD-10-PCS | Performed by: INTERNAL MEDICINE

## 2019-05-08 RX ORDER — POTASSIUM CL/LIDO/0.9 % NACL 10MEQ/0.1L
10 INTRAVENOUS SOLUTION, PIGGYBACK (ML) INTRAVENOUS ONCE
Status: COMPLETED | OUTPATIENT
Start: 2019-05-08 | End: 2019-05-08

## 2019-05-08 RX ORDER — LORAZEPAM 2 MG/ML
INJECTION INTRAMUSCULAR
Status: COMPLETED
Start: 2019-05-08 | End: 2019-05-08

## 2019-05-08 RX ORDER — LORAZEPAM 2 MG/ML
2 INJECTION INTRAMUSCULAR
Status: DISCONTINUED | OUTPATIENT
Start: 2019-05-08 | End: 2019-05-09 | Stop reason: HOSPADM

## 2019-05-08 RX ORDER — AMOXICILLIN 250 MG
2 CAPSULE ORAL 2 TIMES DAILY PRN
Status: DISCONTINUED | OUTPATIENT
Start: 2019-05-08 | End: 2019-05-09 | Stop reason: HOSPADM

## 2019-05-08 RX ORDER — LORAZEPAM 2 MG/ML
0.5 INJECTION INTRAMUSCULAR
Status: COMPLETED | OUTPATIENT
Start: 2019-05-08 | End: 2019-05-08

## 2019-05-08 RX ORDER — LORAZEPAM 1 MG/1
1-2 TABLET ORAL EVERY 30 MIN PRN
Status: DISCONTINUED | OUTPATIENT
Start: 2019-05-08 | End: 2019-05-09 | Stop reason: HOSPADM

## 2019-05-08 RX ORDER — BISACODYL 10 MG
10 SUPPOSITORY, RECTAL RECTAL DAILY PRN
Status: DISCONTINUED | OUTPATIENT
Start: 2019-05-08 | End: 2019-05-09 | Stop reason: HOSPADM

## 2019-05-08 RX ORDER — LANOLIN ALCOHOL/MO/W.PET/CERES
100 CREAM (GRAM) TOPICAL DAILY
Status: DISCONTINUED | OUTPATIENT
Start: 2019-05-09 | End: 2019-05-09 | Stop reason: HOSPADM

## 2019-05-08 RX ORDER — LORAZEPAM 2 MG/ML
1-2 INJECTION INTRAMUSCULAR EVERY 30 MIN PRN
Status: DISCONTINUED | OUTPATIENT
Start: 2019-05-08 | End: 2019-05-09 | Stop reason: HOSPADM

## 2019-05-08 RX ORDER — POTASSIUM CHLORIDE 7.45 MG/ML
10 INJECTION INTRAVENOUS
Status: DISCONTINUED | OUTPATIENT
Start: 2019-05-08 | End: 2019-05-09 | Stop reason: HOSPADM

## 2019-05-08 RX ORDER — QUETIAPINE FUMARATE 25 MG/1
25-50 TABLET, FILM COATED ORAL EVERY 6 HOURS PRN
Status: DISCONTINUED | OUTPATIENT
Start: 2019-05-08 | End: 2019-05-09 | Stop reason: HOSPADM

## 2019-05-08 RX ORDER — FOLIC ACID 1 MG/1
1 TABLET ORAL DAILY
Status: DISCONTINUED | OUTPATIENT
Start: 2019-05-09 | End: 2019-05-09 | Stop reason: HOSPADM

## 2019-05-08 RX ORDER — POTASSIUM CL/LIDO/0.9 % NACL 10MEQ/0.1L
10 INTRAVENOUS SOLUTION, PIGGYBACK (ML) INTRAVENOUS
Status: DISCONTINUED | OUTPATIENT
Start: 2019-05-08 | End: 2019-05-09 | Stop reason: HOSPADM

## 2019-05-08 RX ORDER — SODIUM CHLORIDE 9 MG/ML
INJECTION, SOLUTION INTRAVENOUS CONTINUOUS
Status: DISCONTINUED | OUTPATIENT
Start: 2019-05-08 | End: 2019-05-09

## 2019-05-08 RX ORDER — LORAZEPAM 2 MG/ML
0.5 INJECTION INTRAMUSCULAR ONCE
Status: COMPLETED | OUTPATIENT
Start: 2019-05-08 | End: 2019-05-08

## 2019-05-08 RX ORDER — NALOXONE HYDROCHLORIDE 0.4 MG/ML
.1-.4 INJECTION, SOLUTION INTRAMUSCULAR; INTRAVENOUS; SUBCUTANEOUS
Status: DISCONTINUED | OUTPATIENT
Start: 2019-05-08 | End: 2019-05-09 | Stop reason: HOSPADM

## 2019-05-08 RX ORDER — MULTIPLE VITAMINS W/ MINERALS TAB 9MG-400MCG
1 TAB ORAL DAILY
Status: DISCONTINUED | OUTPATIENT
Start: 2019-05-09 | End: 2019-05-09 | Stop reason: HOSPADM

## 2019-05-08 RX ORDER — AMOXICILLIN 250 MG
1 CAPSULE ORAL 2 TIMES DAILY PRN
Status: DISCONTINUED | OUTPATIENT
Start: 2019-05-08 | End: 2019-05-09 | Stop reason: HOSPADM

## 2019-05-08 RX ORDER — POTASSIUM CHLORIDE 1.5 G/1.58G
20-40 POWDER, FOR SOLUTION ORAL
Status: DISCONTINUED | OUTPATIENT
Start: 2019-05-08 | End: 2019-05-09 | Stop reason: HOSPADM

## 2019-05-08 RX ORDER — MAGNESIUM SULFATE HEPTAHYDRATE 40 MG/ML
4 INJECTION, SOLUTION INTRAVENOUS EVERY 4 HOURS PRN
Status: DISCONTINUED | OUTPATIENT
Start: 2019-05-08 | End: 2019-05-09 | Stop reason: HOSPADM

## 2019-05-08 RX ORDER — POTASSIUM CHLORIDE 29.8 MG/ML
20 INJECTION INTRAVENOUS
Status: DISCONTINUED | OUTPATIENT
Start: 2019-05-08 | End: 2019-05-09 | Stop reason: HOSPADM

## 2019-05-08 RX ORDER — POTASSIUM CHLORIDE 1500 MG/1
20-40 TABLET, EXTENDED RELEASE ORAL
Status: DISCONTINUED | OUTPATIENT
Start: 2019-05-08 | End: 2019-05-09 | Stop reason: HOSPADM

## 2019-05-08 RX ORDER — POLYETHYLENE GLYCOL 3350 17 G/17G
17 POWDER, FOR SOLUTION ORAL DAILY PRN
Status: DISCONTINUED | OUTPATIENT
Start: 2019-05-08 | End: 2019-05-09 | Stop reason: HOSPADM

## 2019-05-08 RX ADMIN — LORAZEPAM 0.5 MG: 2 INJECTION INTRAMUSCULAR at 14:41

## 2019-05-08 RX ADMIN — Medication 10 MEQ: at 23:46

## 2019-05-08 RX ADMIN — Medication 10 MEQ: at 15:14

## 2019-05-08 RX ADMIN — SODIUM CHLORIDE 1000 ML: 9 INJECTION, SOLUTION INTRAVENOUS at 12:00

## 2019-05-08 RX ADMIN — Medication 10 MEQ: at 14:05

## 2019-05-08 RX ADMIN — LORAZEPAM 0.5 MG: 2 INJECTION INTRAMUSCULAR; INTRAVENOUS at 14:05

## 2019-05-08 RX ADMIN — SODIUM CHLORIDE: 9 INJECTION, SOLUTION INTRAVENOUS at 18:06

## 2019-05-08 RX ADMIN — LORAZEPAM 0.5 MG: 2 INJECTION INTRAMUSCULAR; INTRAVENOUS at 14:41

## 2019-05-08 RX ADMIN — SODIUM CHLORIDE 1000 ML: 9 INJECTION, SOLUTION INTRAVENOUS at 14:02

## 2019-05-08 RX ADMIN — Medication 10 MEQ: at 21:18

## 2019-05-08 RX ADMIN — Medication 10 MEQ: at 22:29

## 2019-05-08 RX ADMIN — FOLIC ACID: 5 INJECTION, SOLUTION INTRAMUSCULAR; INTRAVENOUS; SUBCUTANEOUS at 12:55

## 2019-05-08 RX ADMIN — Medication 10 MEQ: at 19:45

## 2019-05-08 ASSESSMENT — MIFFLIN-ST. JEOR: SCORE: 1286.06

## 2019-05-08 ASSESSMENT — ACTIVITIES OF DAILY LIVING (ADL)
ADLS_ACUITY_SCORE: 13
FALL_HISTORY_WITHIN_LAST_SIX_MONTHS: NO

## 2019-05-08 NOTE — ED NOTES
"[facility]  ED Nurse Handoff Report    ED Chief complaint: Altered Mental Status      ED Diagnosis:   Final diagnoses:   Altered mental status       Code Status: Full Code    Allergies: Allergies not on file    Activity level - Baseline/Home:  Independent    Activity Level - Current:   Stand with Assist     Needed?: No    Isolation: No  Infection: Not Applicable  Bariatric?: No    Vital Signs:   Vitals:    05/08/19 1400 05/08/19 1415 05/08/19 1445 05/08/19 1456   BP: (!) 159/114 (!) 143/102 (!) 156/100    Pulse: 137 106 122    Resp:    22   Temp:       TempSrc:       SpO2: 93% 98%         Cardiac Rhythm: ,   Cardiac  Cardiac Rhythm: Normal sinus rhythm    Pain level: 0-10 Pain Scale: 0    Is this patient confused?: Yes   Does this patient have a guardian?  Yes         If yes, is there guardianship documents in the Epic \"Code/ACP\" activity?  N/A         Guardian Notified?  N/A  [place] - Suicide Severity Rating Scale Completed?  No, secondary to alterered level of consciousness  If yes, what color did the patient score?  N/A    Patient Report: Initial Complaint: Unresponsive  Focused Assessment: Pt comes from apartment with known alcoholism. Pt was noted to be hallucinating per boyfriend and was incontinent of bowel and bladder at home. She was barely responsive to painful stimuli but was protecting her airway. She was initially rhythmically twitching off and on. Her limbs and jaw were still. Pt became more agitated and combative and was given 2 doses of Ativan. She has since used the bedside commode with assist x1 after being incontinent of urine x2. Her boyfriend states similar episode 1 year ago in [place].   Tests Performed:   Labs Ordered and Resulted from Time of ED Arrival Up to the Time of Departure from the ED   BLOOD GAS VENOUS - Abnormal; Notable for the following components:       Result Value    Ph Venous 7.44 (*)     Bicarbonate Venous 30 (*)     All other components " within normal limits   LACTIC ACID WHOLE BLOOD - Abnormal; Notable for the following components:    Lactic Acid 3.2 (*)     All other components within normal limits   COMPREHENSIVE METABOLIC PANEL - Abnormal; Notable for the following components:    Potassium 2.7 (*)     Glucose 130 (*)     Urea Nitrogen 6 (*)     Creatinine 0.49 (*)     All other components within normal limits   ALCOHOL ETHYL - Abnormal; Notable for the following components:    Ethanol g/dL 0.15 (*)     All other components within normal limits   MAGNESIUM - Abnormal; Notable for the following components:    Magnesium 2.4 (*)     All other components within normal limits   LACTIC ACID - Abnormal; Notable for the following components:    Lactic Acid 2.8 (*)     All other components within normal limits   DRUG ABUSE SCREEN 77 URINE (FL, RH, SH)   CBC WITH PLATELETS DIFFERENTIAL   ACETAMINOPHEN LEVEL   SALICYLATE LEVEL   CARDIAC CONTINUOUS MONITORING   PERIPHERAL IV CATHETER   PULSE OXIMETRY NURSING         Treatments provided: Potassium/banana bag/2 L NS    Family Comments: Boyfriend at bedside    OBS brochure/video discussed/provided to patient/family: N/A              Name of person given brochure if not patient: NA      ED Medications:   Medications   potassium chloride 10 mEq in 100 mL intermittent infusion with 10 mg lidocaine (10 mEq Intravenous New Bag 5/8/19 1514)   0.9% sodium chloride BOLUS (0 mLs Intravenous Stopped 5/8/19 1255)   sodium chloride 0.9 % 1,000 mL with INFUVITE ADULT 10 mL, thiamine 100 mg, folic acid 1 mg infusion ( Intravenous Stopped 5/8/19 1351)   potassium chloride 10 mEq in 100 mL intermittent infusion with 10 mg lidocaine (0 mEq Intravenous Stopped 5/8/19 1514)   0.9% sodium chloride BOLUS (0 mLs Intravenous Stopped 5/8/19 1513)   LORazepam (ATIVAN) injection 0.5 mg (0.5 mg Intravenous Given 5/8/19 1441)   LORazepam (ATIVAN) injection 0.5 mg (0.5 mg Intravenous Given 5/8/19 1405)       Drips infusing?:  Yes    For  the majority of the shift this patient was Yellow.   Interventions performed were NA - pt becoming more alert.    Severe Sepsis OR Septic Shock Diagnosis Present: No    To be done/followed up on inpatient unit:  Repeat lactic acid?    ED NURSE PHONE NUMBER: [phone]

## 2019-05-08 NOTE — PROGRESS NOTES
RECEIVING UNIT ED HANDOFF REVIEW    ED Nurse Handoff Report was reviewed by: Tiff Trevino on May 8, 2019 at 4:27 PM

## 2019-05-08 NOTE — ED NOTES
Bed: ED17  Expected date:   Expected time:   Means of arrival:   Comments:  [facility] [place] - 448 - 30 F etoh eta 1138

## 2019-05-08 NOTE — PHARMACY-ADMISSION MEDICATION HISTORY
Admission medication history interview status for the 5/8/2019  admission is complete. See EPIC admission navigator for prior to admission medications     Medication history source reliability:Moderate - List per significant other who lives with the patient.     Actions taken by pharmacist (provider contacted, etc):None     Additional medication history information not noted on PTA med list :None    Medication reconciliation/reorder completed by provider prior to medication history? No    Time spent in this activity: 10 min    Prior to Admission medications    Medication Sig Last Dose Taking? Auth Provider   ranitidine (ZANTAC) 75 MG tablet Take 75 mg by mouth 2 times daily as needed for heartburn  Yes Unknown, Entered By History     '

## 2019-05-08 NOTE — ED TRIAGE NOTES
Pt lives with boyfriend in apartment. States pt acting strange with facial twitch and hallucinating. He states she is an alcoholic but has not drank since last night. He was getting ready to take her to the hospital when she laid on the floor and would not get up. He feels she was hallucinating and deficated on the living room floor. Also vomited at home. 4 mg zofran given by medics.

## 2019-05-08 NOTE — ED PROVIDER NOTES
History     Chief Complaint:  Altered Mental Status      The history is provided by the EMS personnel.      Maddie Orourke is a 29 year old female who presents via EMS from her home where she lives with her boyfriend due to altered mental status. Per boyfriend's report, the patient drinks alcohol daily with last drink sometime last night. She was altered at home this morning, seemingly hallucinating and agitated. She defecated on the floor so her boyfriend tried to get her to the shower to clean her up. She has had vomiting as well. However, he reports she laid on the bathroom floor and then he could not rouse her, prompting call to EMS. There are no known falls or witnessed generalized seizure per boyfriend.     Notably, patient has had a single alcohol withdrawal seizure about 1 year ago in [place] per boyfriend.     Allergies:  No known drug allergies.     Medications:    The patient is currently on no regular medications.     Past Medical History:    Alcohol abuse    Past Surgical History:    History reviewed. No pertinent past surgical history.     Family History:    History reviewed. No pertinent family history.     Social History:  The patient lives with her boyfriend but previously lived in [place].  She drinks daily.     Review of Systems   Unable to perform ROS: Patient unresponsive   Gastrointestinal: Positive for vomiting.   Neurological: Negative for seizures.   Psychiatric/Behavioral: Positive for agitation, confusion and hallucinations (per boyfriend).     Physical Exam     Patient Vitals for the past 24 hrs:   BP Temp Temp src Pulse Heart Rate Resp SpO2   05/08/19 1300 127/86 -- -- 84 101 15 97 %   05/08/19 1245 133/84 -- -- 85 79 16 99 %   05/08/19 1230 -- -- -- -- 102 (!) 31 99 %   05/08/19 1215 (!) 146/95 -- -- 101 104 17 100 %   05/08/19 1208 135/90 97.7  F (36.5  C) Oral -- 102 16 99 %      Physical Exam  General: Well-developed and well-nourished. Ill appearing young   woman. Unresponsive.  Head:  Atraumatic.  Eyes:  Conjunctivae, lids, and sclerae are normal.  ENT:    Normal nose.   Neck:  Supple. Normal range of motion.  CV:  Regular rate and rhythm. Normal heart sounds with no murmurs, rubs, or gallops detected.  Resp:  No respiratory distress. Clear to auscultation bilaterally without decreased breath sounds, wheezing, rales, or rhonchi.  GI:  Soft. Non-distended. Non-tender.    MS:  Normal ROM. No bilateral lower extremity edema.  Skin:  Warm. Non-diaphoretic. No pallor.  Neuro: Unresponsive to verbal or physical stimuli, comatose.    No tremor or seizure activity.    No facial droop or discernible focal weakness.  Psych:  Unable to assess.  Vitals reviewed.    Emergency Department Course     EKG #1  Indication: Altered Mental Status  Time: 1231  Rate 82 bpm. WI interval 146. QRS duration 94. QT/QTc 426/497.   Normal sinus rhythm with sinus arrhythmia. Cannot rule out anterior infarct age undetermined. Abnormal EKG.    No acute ST changes.   No prior EKG for comparison.     EKG #2  Indication: Altered mental status   Time: 1340  Rate 154 bpm. QRS duration 96. QT/QTc 336/538.   Supraventricular tachycardia. Inferior infarct, age undetermined. Anterolateral infarct, age undetermined.   No acute ST changes.  Tachycardia replaces sinus rhythm as compared to prior from today.     Imaging:  CT head w/o contrast:  No acute pathology. No bleed, mass, or infarcts are seen.  Results as read by radiology.     Laboratory:  Blood gas venous: pH Venous 7.44, PCO2 Venous 44, PO2 Venous 42, Bicarbonate 30, Base Deficit 5.1.    Drug abuse screen 77 urine: Negative  CMP: K 2.7, Glucose 130, BUN 6, Creatinine 0.49, otherwise normal   CBC: WNL   Alcohol ethyl 0.15 @ 1210  Magnesium 2.4 @ 1210   Lactate 3.2 (H) @ 1200  Lactate 2.8 (H) @  1440   Acetaminophen level: <2  Salicylate: <2    Interventions:  1200: NS 1L IV Bolus  Banana bag 1L  1402: NS 1L IV Bolus   1405: Ativan 0.5 mg IV  1405:  Potassium chloride 10 mEq in 100 mL intermittent infusion with 10 mg lidocaine IV infusion  1441: Ativan 0.5 mg IV  1514: Potassium chloride 10 mEq in 100 mL intermittent infusion with 10 mg lidocaine IV infusion    Emergency Department Course:  Past medical records, nursing notes, and vitals reviewed.  1207: I performed an exam of the patient and obtained history, as documented above.     1210: IV was inserted and blood was drawn for laboratory testing, results above.  1222: The patient was sent for a CT while in the emergency department, results above.   1323: I rechecked the patient. She is unchanged from when I last saw her, unresponsive. Her boyfriend at bedside.   1402: I recheck the patient. She is awake, confused, agitated, and tachycardic in the 140s.  1412: The patient has received Ativan and is no longer as agitated. HR improved to 100s. Boyfriend understands plan for admission.   1436: The patient slightly more agitated. She will receive more Ativan.   Discussed the patient's case with Dr. Inman who accepts admission.     Impression & Plan      CMS Diagnoses: The Lactic acid level is elevated due to Alcohol, possible seizure, at this time there is no sign of severe sepsis or septic shock.    Medical Decision Making:  Maddie is a 29-year-old female daily drinker who presents with altered mental status.  Patient appears ill on arrival as she is comatose and not responsive to physical or verbal stimuli.  She has no external signs of trauma and no discernible focal neurologic deficit.  Initial EKG is reassuring without acute ST changes or arrhythmias.  CT of the head reveals no acute intracranial pathology including hemorrhage or skull fracture.  Initial lactic acid is elevated at 3.2.  I have some suspicion she may have had seizure at home which may be causing lactic acidosis although this could simply be related to her alcohol use and dehydration alone.  After 1 L of IV fluids and a banana bag this  improved to 2.8.  Alcohol level is 0.15 and there is no evidence of coingestions with a negative salicylate, acetaminophen, and urine drug screen.  Fortunately, she has no leukocytosis or anemia.  She has no transaminitis or hyperbilirubinemia and no kidney injury.  She does have hypokalemia to 2.7 and repletion was initiated with 20 mEq of potassium chloride IV.    While completing her work-up, the patient awakened such that she was agitated and confused.  Her boyfriend was at bedside and provided history that she was agitated and hallucinating at home prior to onset of her unresponsive status.  She did not have witnessed seizure or trauma.  Boyfriend does report the patient has a single prior episode of alcohol withdrawal seizure in April 2018.  Records from this hospital stay in [place] were obtained.  It appears she had a witnessed tonic-clonic seizure at that time and hypokalemia.  Today she became very tachycardic when she was agitated with heart rates in the 150s.  EKG was performed as above which shows either sinus tachycardia or possibly SVT.  I favor sinus tachycardia.  Another liter of fluids were initiated. She was given Ativan and had resolution of her agitation and tachycardia though she did require a second dose.  Symptoms at this time are most consistent with alcohol related delirium, possibly post-ictal state, and likely early withdrawal.  She would clearly require admission for further close monitoring and treatment of probable seizure as well as encephalopathy and hypokalemia. I suspect alcohol withdrawal-type symptoms will worsen.  I have discussed the plan for admission with the patient's boyfriend and answered all his questions.  He verbalized understanding and is amenable.  I discussed patient's case with Dr. Inman, hospitalist, who accepts admission and has no further orders.    Diagnosis:    ICD-10-CM    1. Altered mental status R41.82 Acetaminophen level   2. Alcohol dependence with  intoxication delirium (H) F10.221    3. Hypokalemia E87.6    4. Lactic acidosis E87.2      Disposition:  Admitted    I, Ania Sunshine, am serving as a scribe at 12:14 PM on 5/8/2019 to document services personally performed by Asiya Pacheco MD based on my observations and the provider's statements to me.       EMERGENCY DEPARTMENT       Asiya Pacheco MD  05/09/19 3205

## 2019-05-08 NOTE — ED NOTES
Pt became restless and somewhat agitated. Her HR jumped to 157 bpm and blood pressure also sybil. Repeat EKG done and MD aware. Attends were changed due to urinary incontinence. SO at bedside talking with pt.

## 2019-05-08 NOTE — H&P
Admitted:     05/08/2019      PRIMARY CARE PHYSICIAN:  No one at this point.      CHIEF COMPLAINT:  Confusion and intoxication.        HISTORY OF PRESENT ILLNESS:  Ms. Orourke is a 29-year-old female with a past medical history significant for possible seizure disorder, alcohol abuse and heartburn who presents to the Emergency Department with her boyfriend for concerns of possible seizure, as well as alcohol intoxication.  History is obtained through discussion with the ED physician, the patient's boyfriend and the patient somewhat, though she is currently unable to participate heavily in the conversation.  Per report, the patient woke up this morning with her boyfriend at approximately 6:00, and the boyfriend noted that she was confused, asking why he was not at work yet.  He noted he was not supposed to go to work until approximately 6 hours later at noon.  She seemed to be really unsure what time it was and not herself.  Approximately 30 minutes later he had noted that she was having some shaking and apparently had loss of control of her bowels.  She was also having some sort of confusion, trying to shut a door that was not there.  He tried to get her into the shower, but she became agitated and belligerent.  Eventually, she became unresponsive and so EMS was summoned.  The boyfriend notes that she had a similar episode at some point in the recent past.  We did obtain medical records from [place].  She presented to the ED in 04/2018 with a witnessed seizure, thought to be due to alcohol, but refused admission or further workup at that point.      The patient presented to the Emergency Department where she has been at times altered, agitated and awake.  She did have a head CT which was negative.  Ethanol level noted to be 0.15.  She is hypokalemic.  At some point during her ED stay she went from being somnolent to all of a sudden awake and agitated with a heart rate in the 150s and became quite tremulous.  It  was thought that she was potentially going through alcohol withdrawal, so received some Ativan and actually became much improved with better heart rate, though did become a little somnolent.  All drug of abuse screen is negative.  Acetaminophen and salicylate levels also negative.  Admission is requested for further treatment.  The patient did receive a dose of IV vitamins.      In discussion with the boyfriend, the patient leading up to this has actually been in a decent state of health without any complaints recently of a cough, chest pain, shortness of breath, fever or any infectious type symptoms.  She is from [place] but currently lives here with her boyfriend.  They have known each other for approximately 3 years and the boyfriend notes that she has been drinking heavily ever since he has known her.  He describes this as typically at least 7 shots of alcohol per day.  I did note in the previous records that we obtained from [place], the family, namely her parents have been quite concerned at that point about her level of drinking.  The last known drink was sometime early this morning at approximately 2 o'clock.      PAST MEDICAL HISTORY:   1.  Alcohol abuse.   2.  Possible seizure, possibly from alcohol use or withdrawal.   3.  GERD.      MEDICATIONS:  Zantac p.r.n.      SOCIAL HISTORY:  The patient lives with her boyfriend, smokes about half pack cigarettes per day and has been drinking reported 7 shots daily for many years.      FAMILY HISTORY:  Father with hypertension.  Mother had hyperthyroidism.      ALLERGIES:  No known drug allergies.      REVIEW OF SYSTEMS:  The complete review of systems reviewed and negative, save for the pertinent positives, which are recorded in the HPI.      PHYSICAL EXAMINATION:   VITAL SIGNS:  Show blood pressure of 156/100, heart rate in the 120s-140s, respirations 20, satting 98% on room air, temperature 97.7 degrees Fahrenheit.   GENERAL:  The patient is lying in  bed and will open eyes to voice and intermittently participate in the conversation and exam.   HEENT:  Pupils equal and reactive to light, extraocular muscle function intact.  No scleral icterus.  Oropharynx is clear.   NECK:  No lymphadenopathy or thyromegaly.   CARDIOVASCULAR:  Heart is regular rate and rhythm without any murmur, rub or gallop.   PULMONARY:  Lungs are clear to auscultation bilaterally without wheeze or rhonchi.   GASTROINTESTINAL:  Positive bowel sounds, soft, nontender and nondistended.   SKIN:  No rashes or lesions.   LYMPHATICS:  No peripheral edema.   PSYCHIATRIC:  The patient is somnolent but arouses to voice.  She does follow commands, albeit somewhat grudgingly and slowly.   NEUROLOGIC:  The patient moves all extremities, but is unable to fully participate in neuro exam.  She does appear possibly slightly postictal.      LABORATORY DATA:  CBC unremarkable.  BMP and LFTs are unremarkable save for a potassium of 2.7.  Lactic acid was 3.2, improving to 2.8 on repeat.  Drug abuse screen negative and ethanol level was 0.15.      Head CT is negative.      EKG shows what appears to be sinus rhythm.      ASSESSMENT AND PLAN:  Ms. Orourke is a 29-year-old female with a past medical history significant for alcohol abuse, possible alcohol associated seizure who presents to the Emergency Department with possible seizure activity, altered mental status, and alcohol intoxication.   1.  Toxic encephalopathy:  Due to alcohol use and possibly postictal state.  Head CT is negative, and will need to monitor neuros and watch for any recurrent seizure.  Will optimize her electrolytes and monitor for alcohol withdrawal.  Neurology consult has been ordered.   2.  Possible seizure activity:  Probably not an alcohol-related seizure, given that her alcohol level was 0.15, though unclear what her baseline is.  She has had this at least once before, again under uncertain circumstances.  I am going to obtain an EEG,  have on seizure precautions and Neurology consultation to determine if any further workup may be warranted at this point.   3.  Alcohol abuse with intoxication:  She is at high risk of going through alcohol withdrawal and may have already begun.  Will have on CIWA protocol with Ativan as needed.  Will continue with telemetry, multivitamin, thiamine and folate as well as IV fluids.  When she wakes up, may need to consider chemical dependency consult versus Psychiatry consult.   4.  Severe hypokalemia:  Will be monitored on telemetry and will be on replacement protocol.   5.  Gastroesophageal reflux disease.  Continue with p.r.n.  Zantac.   6.  Elevated lactic acid:  Likely due to alcohol intoxication, possibly dehydration, and if she did have a seizure this could certainly cause elevated lactate.  Continue with IV fluids.  No evidence of infection at this point.   7.  Deep venous thrombosis prophylaxis:  SCDs.         NENA GRIMALDO DO             D: 2019   T: 2019   MT: CRISTIAN      Name:     ALFONZO MARITN   MRN:      [id number]        Account:      [id number]   :      1990        Admitted:     2019                   Document: [id number]

## 2019-05-09 VITALS
HEART RATE: 122 BPM | WEIGHT: 117.6 LBS | SYSTOLIC BLOOD PRESSURE: 146 MMHG | RESPIRATION RATE: 16 BRPM | DIASTOLIC BLOOD PRESSURE: 104 MMHG | TEMPERATURE: 98.8 F | BODY MASS INDEX: 18.46 KG/M2 | OXYGEN SATURATION: 99 % | HEIGHT: 67 IN

## 2019-05-09 LAB
ANION GAP SERPL CALCULATED.3IONS-SCNC: 9 MMOL/L (ref 3–14)
BUN SERPL-MCNC: 2 MG/DL (ref 7–30)
CALCIUM SERPL-MCNC: 8.3 MG/DL (ref 8.5–10.1)
CHLORIDE SERPL-SCNC: 109 MMOL/L (ref 94–109)
CO2 SERPL-SCNC: 24 MMOL/L (ref 20–32)
CREAT SERPL-MCNC: 0.5 MG/DL (ref 0.52–1.04)
ERYTHROCYTE [DISTWIDTH] IN BLOOD BY AUTOMATED COUNT: 13 % (ref 10–15)
GFR SERPL CREATININE-BSD FRML MDRD: >90 ML/MIN/{1.73_M2}
GLUCOSE SERPL-MCNC: 89 MG/DL (ref 70–99)
HCT VFR BLD AUTO: 36.2 % (ref 35–47)
HGB BLD-MCNC: 12 G/DL (ref 11.7–15.7)
MCH RBC QN AUTO: 31.5 PG (ref 26.5–33)
MCHC RBC AUTO-ENTMCNC: 33.1 G/DL (ref 31.5–36.5)
MCV RBC AUTO: 95 FL (ref 78–100)
PLATELET # BLD AUTO: 256 10E9/L (ref 150–450)
POTASSIUM SERPL-SCNC: 2.5 MMOL/L (ref 3.4–5.3)
POTASSIUM SERPL-SCNC: 2.8 MMOL/L (ref 3.4–5.3)
RBC # BLD AUTO: 3.81 10E12/L (ref 3.8–5.2)
SODIUM SERPL-SCNC: 142 MMOL/L (ref 133–144)
WBC # BLD AUTO: 5.4 10E9/L (ref 4–11)

## 2019-05-09 PROCEDURE — 99239 HOSP IP/OBS DSCHRG MGMT >30: CPT | Performed by: INTERNAL MEDICINE

## 2019-05-09 PROCEDURE — 99221 1ST HOSP IP/OBS SF/LOW 40: CPT | Performed by: NURSE PRACTITIONER

## 2019-05-09 PROCEDURE — 80048 BASIC METABOLIC PNL TOTAL CA: CPT | Performed by: INTERNAL MEDICINE

## 2019-05-09 PROCEDURE — 25000132 ZZH RX MED GY IP 250 OP 250 PS 637: Performed by: INTERNAL MEDICINE

## 2019-05-09 PROCEDURE — 85027 COMPLETE CBC AUTOMATED: CPT | Performed by: INTERNAL MEDICINE

## 2019-05-09 PROCEDURE — 25800030 ZZH RX IP 258 OP 636: Performed by: INTERNAL MEDICINE

## 2019-05-09 PROCEDURE — 84132 ASSAY OF SERUM POTASSIUM: CPT | Performed by: INTERNAL MEDICINE

## 2019-05-09 PROCEDURE — 95816 EEG AWAKE AND DROWSY: CPT

## 2019-05-09 PROCEDURE — 25000128 H RX IP 250 OP 636: Performed by: INTERNAL MEDICINE

## 2019-05-09 PROCEDURE — 40000061 ZZH STATISTIC EEG TIME EA 10 MIN

## 2019-05-09 PROCEDURE — 36415 COLL VENOUS BLD VENIPUNCTURE: CPT | Performed by: INTERNAL MEDICINE

## 2019-05-09 PROCEDURE — 25000125 ZZHC RX 250: Performed by: INTERNAL MEDICINE

## 2019-05-09 RX ORDER — SODIUM CHLORIDE AND POTASSIUM CHLORIDE 150; 900 MG/100ML; MG/100ML
INJECTION, SOLUTION INTRAVENOUS CONTINUOUS
Status: DISCONTINUED | OUTPATIENT
Start: 2019-05-09 | End: 2019-05-09 | Stop reason: HOSPADM

## 2019-05-09 RX ORDER — MULTIPLE VITAMINS W/ MINERALS TAB 9MG-400MCG
1 TAB ORAL DAILY
Status: ON HOLD | COMMUNITY
Start: 2019-05-10 | End: 2020-06-24

## 2019-05-09 RX ORDER — LANOLIN ALCOHOL/MO/W.PET/CERES
100 CREAM (GRAM) TOPICAL DAILY
Qty: 30 TABLET | Refills: 0 | Status: ON HOLD | OUTPATIENT
Start: 2019-05-10 | End: 2020-06-24

## 2019-05-09 RX ORDER — CLONIDINE HYDROCHLORIDE 0.1 MG/1
0.1 TABLET ORAL EVERY 4 HOURS PRN
Status: DISCONTINUED | OUTPATIENT
Start: 2019-05-09 | End: 2019-05-09 | Stop reason: HOSPADM

## 2019-05-09 RX ORDER — FOLIC ACID 1 MG/1
1 TABLET ORAL DAILY
Qty: 30 TABLET | Refills: 0 | Status: SHIPPED | OUTPATIENT
Start: 2019-05-10 | End: 2020-08-22

## 2019-05-09 RX ADMIN — MULTIPLE VITAMINS W/ MINERALS TAB 1 TABLET: TAB at 08:11

## 2019-05-09 RX ADMIN — POTASSIUM CHLORIDE 40 MEQ: 1500 TABLET, EXTENDED RELEASE ORAL at 10:09

## 2019-05-09 RX ADMIN — Medication 10 MEQ: at 04:38

## 2019-05-09 RX ADMIN — Medication 10 MEQ: at 07:30

## 2019-05-09 RX ADMIN — POTASSIUM CHLORIDE AND SODIUM CHLORIDE: 900; 150 INJECTION, SOLUTION INTRAVENOUS at 04:26

## 2019-05-09 RX ADMIN — Medication 100 MG: at 08:11

## 2019-05-09 RX ADMIN — FOLIC ACID 1 MG: 1 TABLET ORAL at 08:11

## 2019-05-09 RX ADMIN — POTASSIUM CHLORIDE 40 MEQ: 1500 TABLET, EXTENDED RELEASE ORAL at 12:12

## 2019-05-09 RX ADMIN — Medication 10 MEQ: at 05:44

## 2019-05-09 ASSESSMENT — ENCOUNTER SYMPTOMS
VOMITING: 1
AGITATION: 1
CONFUSION: 1
HALLUCINATIONS: 1
SEIZURES: 0

## 2019-05-09 ASSESSMENT — ACTIVITIES OF DAILY LIVING (ADL)
ADLS_ACUITY_SCORE: 13
ADLS_ACUITY_SCORE: 12

## 2019-05-09 NOTE — PLAN OF CARE
Discharge    Patient discharged to home via Boyfriend  Care plan note    Listed belongings gathered and returned to patient. Yes  Care Plan and Patient education resolved: Yes  Prescriptions if needed, hard copies sent with patient  Yes  Home and hospital acquired medications returned to patient: NA  Medication Bin checked and emptied on discharge Yes  Follow up appointment made for patient: No

## 2019-05-09 NOTE — PROGRESS NOTES
Routine EEG  completed at patient's bedside. Procedure explained to patient prior to testing.   Ordered by Dr Inman

## 2019-05-09 NOTE — PROGRESS NOTES
MD Notification    Notified Person: MD Buck    Notified Person Name: MD Buck    Notification Date/Time: 05/08/2019 at 2040    Notification Interaction: Face to face    Purpose of Notification: Diet order    Orders Received: Clear liq diet, advance as tolerated.    Comments:

## 2019-05-09 NOTE — PROGRESS NOTES
MD Notification    Notified Person: MD    Notified Person Name: Dr. Rivas    Notification Date/Time: 5/9/19 1320    Notification Interaction: Webpage system     Purpose of Notification: High BP     Orders Received: PRN medication >160

## 2019-05-09 NOTE — PROCEDURES
Procedure Date: 2019      PORTABLE ELECTROENCEPHALOGRAM       EEG # [id number]      DATE OF STUDY:  2019      CLINICAL SUMMARY:  The patient is a 29-year-old female with history of possible epilepsy and alcohol abuse who presented with seizure and alcohol intoxication.  EEG was performed to evaluate for seizures.     TECHNICAL SUMMARY:  This EEG monitoring was performed with 23 scalp electrodes in the 10-20 system of placement and additional scalp, precordial and other surface electrodes were used for electrical referencing and artifact detection.      INTERICTAL ACTIVITY:  During quiet wakefulness, there was 9 Hz alpha activity over the posterior head regions which was symmetric and attenuated by eye opening.  Drowsiness was manifested as dropout of the posterior dominant rhythm and diffuse theta activity and horizontal eye movements.  Excess fast beta activity was present throughout the recording.  No epileptiform discharges were present.      ACTIVATION MANEUVERS: Photic stimulation induced symmetric photic driving response at 10-25 Hz.  Hyperventilation induced diffuse theta, delta slowing, but no epileptiform discharges.      CLINICAL AND ICTAL EVENTS:  No electrographic or clinical seizures were recorded.      IMPRESSION:  Normal portable EEG.  No epileptiform discharges, electrographic seizures or target clinical events were recorded.  Clinical correlation advised.         MARY MCKINNON MD             D: 2019   T: 2019   MT: BIANKA      Name:     ALFONZO MARTIN   MRN:      [id number]        Account:        [id number]   :      1990           Procedure Date: 2019      Document: [id number]

## 2019-05-09 NOTE — PLAN OF CARE
DATE & TIME: 5/08/2019 3524-2338  ORIENTATION: A&OX3, disoriented to time.  BEHAVIOR & AGGRESSION TOOL COLOR: Green  CIWA SCORE: 1, 0  ABNL VS/O2: VSS on RA, elevated /93  MOBILITY: SBA  PAIN MANAGMENT: Denies  DIET: Clear, advance as tolerated.  BOWEL/BLADDER: Continent  ABNL LAB/BG: Potassium 2.7, being replaced. Lactic 2.8, MD aware.   DRAIN/DEVICES: IVF  TELEMETRY RHYTHM: NSR  SKIN: Intact, bruised.  TESTS/PROCEDURES:  D/C DAY/GOALS/PLACE: Pending   OTHER IMPORTANT INFO: Seizure precautions. q4 neuro checks.

## 2019-05-09 NOTE — DISCHARGE SUMMARY
[facility]    Discharge Summary  Hospitalist    Date of Admission:  5/8/2019  Date of Discharge:  5/9/2019  Discharging Provider: Ashanti Rivsa MD    Discharge Diagnoses   Possible withdrawal seizures   Alcoholism with withdrawal     History of Present Illness   Ms. Orourke is a 29-year-old female with a past medical history significant for possible seizure disorder, alcohol abuse and heartburn who presents to the Emergency Department with her boyfriend for concerns of possible seizure, as well as alcohol intoxication.  History is obtained through discussion with the ED physician, the patient's boyfriend and the patient somewhat, though she is currently unable to participate heavily in the conversation.  Per report, the patient woke up this morning with her boyfriend at approximately 6:00, and the boyfriend noted that she was confused, asking why he was not at work yet.  He noted he was not supposed to go to work until approximately 6 hours later at noon.  She seemed to be really unsure what time it was and not herself.  Approximately 30 minutes later he had noted that she was having some shaking and apparently had loss of control of her bowels.  She was also having some sort of confusion, trying to shut a door that was not there.  He tried to get her into the shower, but she became agitated and belligerent.  Eventually, she became unresponsive and so EMS was summoned.  The boyfriend notes that she had a similar episode at some point in the recent past.  We did obtain medical records from [place].  She presented to the ED in 04/2018 with a witnessed seizure, thought to be due to alcohol, but refused admission or further workup at that point.      The patient presented to the Emergency Department where she has been at times altered, agitated and awake.  She did have a head CT which was negative.  Ethanol level noted to be 0.15.  She is hypokalemic.  At some point during her ED stay she went  from being somnolent to all of a sudden awake and agitated with a heart rate in the 150s and became quite tremulous.  It was thought that she was potentially going through alcohol withdrawal, so received some Ativan and actually became much improved with better heart rate, though did become a little somnolent.  All drug of abuse screen is negative.  Acetaminophen and salicylate levels also negative.  Admission is requested for further treatment.  The patient did receive a dose of IV vitamins.         Hospital Course   Maddie Orourke was admitted on 5/8/2019.  The following problems were addressed during her hospitalization:    Active Problems:    Alcohol withdrawal (H)  Following admission the patient was monitored on telemetry floor, she was placed on CIWA protocol.  She was also placed on seizure precautions, she did not have any seizures on since admission.  She was seen by neurology the next day and an EEG was obtained.  She also returned to her baseline mental status the next day morning.  She had a CT head which was negative and as per neurology recommendations they thought her prior seizure was also related to alcoholism.  She was notified not to drive for 3 months as per [place] state law, no AEDs was initiated as it was considered secondary to alcohol withdrawal.  She was recommended to abstain from alcohol and resources were provided for her to pursue alcohol Anonymous and choice was given whether she would like to do chemical dependency treatment and she suggested that she can stop drinking alcohol by herself.      Ashanti Rivas MD    Significant Results and Procedures       Pending Results     Unresulted Labs Ordered in the Past 30 Days of this Admission     No orders found for last 61 day(s).          Code Status   Full code        Primary Care Physician   Physician No Ref-Primary    Physical Exam   Temp: 98.8  F (37.1  C) Temp src: Oral BP: (!) 146/104 Pulse: 122 Heart Rate: 89 Resp: 16 SpO2: 99  % O2 Device: None (Room air)    Vitals:    05/08/19 1700   Weight: 53.3 kg (117 lb 9.6 oz)     Vital Signs with Ranges  Temp:  [97.5  F (36.4  C)-99.2  F (37.3  C)] 98.8  F (37.1  C)  Pulse:  [106-122] 122  Heart Rate:  [63-91] 89  Resp:  [16-22] 16  BP: (129-158)/() 146/104  SpO2:  [98 %-99 %] 99 %  I/O last 3 completed shifts:  In: 100 [IV Piggyback:100]  Out: -     The patient was examined on the day of discharge.    Discharge Disposition   Discharged to home  Condition at discharge: Stable    Consultations This Hospital Stay   NEUROLOGY IP CONSULT    Time Spent on this Encounter   IAshanti, personally saw the patient today and spent greater than 30 minutes discharging this patient.    Discharge Orders      Reason for your hospital stay    Seizure following alcohol withdrawal     Follow-up and recommended labs and tests     Follow up with primary care provider, Physician No Ref-Primary, within 7 days for hospital follow- up.  No follow up labs or test are needed.     Activity    Your activity upon discharge: activity as tolerated     Discharge Instructions    Follow up with primary care physician, alcohol cessation, driving restrictions for 3 months due to seizure .     Diet    Follow this diet upon discharge: Orders Placed This Encounter      Regular Diet Adult     Discharge Medications   Current Discharge Medication List      START taking these medications    Details   folic acid (FOLVITE) 1 MG tablet Take 1 tablet (1 mg) by mouth daily  Qty: 30 tablet, Refills: 0    Associated Diagnoses: Alcohol withdrawal syndrome without complication (H)      multivitamin w/minerals (THERA-VIT-M) tablet Take 1 tablet by mouth daily      vitamin B1 (THIAMINE) 100 MG tablet Take 1 tablet (100 mg) by mouth daily  Qty: 30 tablet, Refills: 0    Associated Diagnoses: Alcohol withdrawal syndrome without complication (H)         CONTINUE these medications which have NOT CHANGED    Details   ranitidine (ZANTAC) 75 MG  tablet Take 75 mg by mouth 2 times daily as needed for heartburn            Allergies   No Known Allergies  Data   Most Recent 3 CBC's:  Recent Labs   Lab Test 05/09/19  0934 05/08/19  1210   WBC 5.4 4.6   HGB 12.0 13.5   MCV 95 93    198      Most Recent 3 BMP's:  Recent Labs   Lab Test 05/09/19  0934 05/09/19  0317 05/08/19  1210     --  141   POTASSIUM 2.8* 2.5* 2.7*   CHLORIDE 109  --  100   CO2 24  --  30   BUN 2*  --  6*   CR 0.50*  --  0.49*   ANIONGAP 9  --  11   NISHI 8.3*  --  9.2   GLC 89  --  130*     Most Recent 2 LFT's:  Recent Labs   Lab Test 05/08/19  1210   AST 28   ALT 28   ALKPHOS 115   BILITOTAL 0.4     Most Recent INR's and Anticoagulation Dosing History:  Anticoagulation Dose History     There is no flowsheet data to display.        Most Recent 3 Troponin's:No lab results found.  Most Recent Cholesterol Panel:No lab results found.  Most Recent 6 Bacteria Isolates From Any Culture (See EPIC Reports for Culture Details):No lab results found.  Most Recent TSH, T4 and A1c Labs:No lab results found.  Results for orders placed or performed during the hospital encounter of 05/08/19   CT Head w/o Contrast    Narrative    CT SCAN OF THE HEAD WITHOUT CONTRAST   5/8/2019 12:26 PM     HISTORY: Altered mental status.    TECHNIQUE:  Axial images of the head and coronal reformations without  IV contrast material. Radiation dose for this scan was reduced using  automated exposure control, adjustment of the mA and/or kV according  to patient size, or iterative reconstruction technique.    COMPARISON: None.    FINDINGS:     Intracranial contents: The ventricles are normal in size, shape and  configuration.  The brain parenchyma and subarachnoid spaces are  normal. There is no evidence of intracranial hemorrhage, mass, acute  infarct or anomaly.    Visualized orbits/sinuses/mastoids:  The visualized portions of the  sinuses and mastoids appear normal.    Osseous structures/soft tissues:  There is no  evidence of trauma.      Impression    IMPRESSION: No acute pathology. No bleed, mass, or infarcts are seen.      AIME ACOSTA MD

## 2019-05-09 NOTE — PROGRESS NOTES
Admission    Patient arrives to room 635-2 via cart from ED.  Care plan note: Patient not responding to staff. VSS on RA, elevated BP. NPO. Up with SBA    Inpatient nursing criteria listed below were met:    PCD's Documented: Yes  Skin issues/needs documented :NA  Isolation education started/completed NA  Patient allergies verified with patient: Yes  Verified completion of [facility] Risk Assessment Tool:  Yes  Verified completion of Guardianship screening tool: Yes  Fall Prevention: Care plan updated, Education given and documented Yes  Care Plan initiated: Yes  Home medications documented in belongings flowsheet: NA  Patient belongings documented in belongings flowsheet: Yes  Reminder note (belongings/ medications) placed in discharge instructions:NA  Admission profile/ required documentation complete: Yes

## 2019-05-09 NOTE — PLAN OF CARE
DATE & TIME: 5/09/2019 0600  ORIENTATION: A&OX4.  BEHAVIOR & AGGRESSION TOOL COLOR: Green  CIWA SCORE: 1, 1  ABNL VS/O2: VSS on RA  MOBILITY: SBA  PAIN MANAGMENT: Denies  DIET: Clear, advance as tolerated.  BOWEL/BLADDER: Continent  ABNL LAB/BG: Potassium 2.7, replaced, recheck at 0300 was 2.5 replacing again. Lactic 2.8, MD aware.   DRAIN/DEVICES: IVF  TELEMETRY RHYTHM: NSR  SKIN: Intact, bruised.  TESTS/PROCEDURES: Possible EEG today  D/C DAY/GOALS/PLACE: Pending   OTHER IMPORTANT INFO: Seizure precautions. q4 neuro checks.

## 2019-05-09 NOTE — PROVIDER NOTIFICATION
MD Notification    Notified Person: MD    Notified Person Name: Dr Olivares    Notification Date/Time: 5/9/19 0400    Notification Interaction: Phone    Purpose of Notification: K+ 2.5 after replacement    Orders Received: Continue with potassium replacement and fluids switched to NS +20KCl @ 125mL/hr    Comments:

## 2019-05-09 NOTE — PLAN OF CARE
DATE & TIME: 5/9/19 0390-7812   ORIENTATION: A/O x 4  BEHAVIOR & AGGRESSION TOOL COLOR: Green   CIWA SCORE: 0/0  ABNL VS/O2: BP elevated, MD notified, PRN available.   MOBILITY: Independent   PAIN MANAGMENT: Pt denies pain, N/V and SOB.   DIET: Regular, tolerating, good appetite   BOWEL/BLADDER: +BS passing flatus, voiding adequately.   ABNL LAB/BG: K+ 2.8. Spoke with Hospitalist, begin PO K+ and recheck per MAR. Ok to not finish hanging K+ bags per MD.   DRAIN/DEVICES: PIV infusing   TELEMETRY RHYTHM: NSR   SKIN: Intact  TESTS/PROCEDURES: EEG completed, pending results.   D/C DAY/GOALS/PLACE: Pending EEG results.

## 2019-05-09 NOTE — CONSULTS
[facility]    Neurology Consultation Note     Maddie Orourke MRN# [id number]   YOB: 1990 Age: 29 year old    Code Status:Full Code   Date of Admission: 5/8/2019  Date of Consult: 05/09/2019    _________________________________   Primary Care Physician   Physician No Ref-Primary      ______________________________________________         Assessment & Plan     Reason for consult: I was asked by Dr. Inman to evaluate this patient for seizure        ______________________________________________  #. (R41.82) Altered mental status  (primary encounter diagnosis)  --likely related to seizure  --previous episode of probable alcohol related seizure  --CT head negative for acute abnormality   #. (R56.9) Alcohol related seizure (H)  --previous episode of probable alcohol related seizure in [place]  --EEG negative for seizure activity  --recent alcohol intake would not suggest alcohol withdrawal seizure, but instead alcohol related seizure  ------long discussion with patient regarding the importance of alcohol abstinence to prevent further seizures and injury  ------NO DRIVING for 3 months per [place] state law, discussed with patient and boyfriend  ------no AED at this time due to alcohol related seizures - if she abstains from alcohol, she will likely not have another seizure  #. (F10.221) Alcohol dependence with intoxication delirium (H)  --management per primary service  #. DVT Prophylaxis  --per primary service  #. PT/OT/Speech  --evaluations as able  #. Nutrition / GI Prophylaxis  --Per recommendations of speech therapy      #. Code Status: Full Code  No further neurological workup recommended at this time. Will sign off.     Chief Complaint   ______________________________________________  Confusion and intoxication   History is obtained from the patient and electronic health record    History of Present Illness   ______________________________________________  Maddie Orourke is a  "29 year old female who presented with confusion and intoxication, with concern for possible seizure. History of possible seizure disorder and alcohol abuse. Patient woke up confused, unsure of what time it was, not acting herself. Approximately 30 minutes later noted to be having some shaking and loss of control of bowels and had some sort of confusion, trying to shut a door that was not there. She became unresponsive and EMS was called. She had a recent episode in [place] with a witnessed seizure, thought to be related to alcohol, but refused admission or further workup at that point. Per patient's boyfriend, patient has approximately 7 shots of alcohol daily.    On exam, no focal neurological deficit. She reports mild headache. She doesn't remember much about yesterday until the evening. She reports she remembers more about the previous episode in [place]. She starts drinking when she gets home from work around 8pm until about 2am every night. She reports \"heavy\" drinking but does not give a number of drinks. She reports that she stopped drinking after the [place] episode for a week and a half and when asked if she is willing to quit again states \"sure\".  Past Medical History    ______________________________________________  No past medical history on file.  Past Surgical History   ______________________________________________  No past surgical history on file.  Prior to Admission Medications   ______________________________________________  Prior to Admission Medications   Prescriptions Last Dose Informant Patient Reported? Taking?   ranitidine (ZANTAC) 75 MG tablet  Spouse/Significant Other Yes Yes   Sig: Take 75 mg by mouth 2 times daily as needed for heartburn       Facility-Administered Medications: None     Allergies   No Known Allergies    Social History   ______________________________________________  Social History     Socioeconomic History     Marital status: Single     Spouse name: Not " on file     Number of children: Not on file     Years of education: Not on file     Highest education level: Not on file   Occupational History     Not on file   Social Needs     Financial resource strain: Not on file     Food insecurity:     Worry: Not on file     Inability: Not on file     Transportation needs:     Medical: Not on file     Non-medical: Not on file   Tobacco Use     Smoking status: Not on file   Substance and Sexual Activity     Alcohol use: Yes     Drug use: Not on file     Sexual activity: Not on file   Lifestyle     Physical activity:     Days per week: Not on file     Minutes per session: Not on file     Stress: Not on file   Relationships     Social connections:     Talks on phone: Not on file     Gets together: Not on file     Attends [employer] service: Not on file     Active member of club or organization: Not on file     Attends meetings of clubs or organizations: Not on file     Relationship status: Not on file     Intimate partner violence:     Fear of current or ex partner: Not on file     Emotionally abused: Not on file     Physically abused: Not on file     Forced sexual activity: Not on file   Other Topics Concern     Not on file   Social History Narrative     Not on file     Reports she smokes 1/4 pack of cigarettes daily, heavy alcohol use daily for years    Family History   ______________________________________________  Reviewed and not felt to be contributory.     Review of Systems   ______________________________________________  A comprehensive review of  10 systems was performed and found to be negative except as described in this note  CONSTITUTIONAL: negative for fever, chills, change in weight  INTEGUMENTARY/SKIN: no rash or obvious new lesions  ENT/MOUTH: no sore throat, new sinus pain or nasal drainage, no neck mass noted  RESP: No pleuretic pain, No cough, no hemoptysis, No SOB   CV: negative for chest pain, palpitations or peripheral edema  GI: no nausea, vomiting,  "change in stools  : no dysuria or hematuria  MUSCULOSKELETAL: no myalgias, arthralgias or join efffusion  ENDOCRINE: no history of polyuria, polydyspsia or symptoms of thyroid dysfunction  PSYCHIATRIC: no change in mood stable  LYMPHATIC: no new lymphadenopathy  HEME: no bleeding or easy bruisability  NEURO: see HPI    Physical Exam   ______________________________________________  Weight:117 lbs 9.6 oz; Height:5' 7\"  Temp: 98.8  F (37.1  C) Temp src: Oral BP: (!) 144/111 Pulse: 122 Heart Rate: 89 Resp: 16 SpO2: 99 % O2 Device: None (Room air)    General Appearance:  No acute distress  Neuro:       Mental Status Exam:   Awake, alert, oriented X3. Speech and language are intact. Mental status is normal       Cranial Nerves:  Pupils 3 mm, reactive. EOMI. Face sensation is normal. Face is symmetric. Tongue and uvula are midline. Other CN are normal           Motor:  5/5 X 4. Tone and bulk are normal           Reflexes:  Normal DTR. Toes downgoing.        Sensory:  Normal to light touch               Coordination:   Intact finger-to-nose        Gait:  No significant difficulties  Neck: no nuchal rigidity, normal thyroid. No carotid bruits.    Cardiovascular: Regular rate and rhythm, no m/r/g  Lungs: Clear to auscultation  Abdomen: Soft, not tender, not distended  Extremities: No clubbing, no cyanosis, no edema    Data   ______________________________________________  All Data personally reviewed:       Labs:   CBC RESULTS:     Recent Labs   Lab 05/09/19  0934 05/08/19  1210   WBC 5.4 4.6   RBC 3.81 4.13   HGB 12.0 13.5   HCT 36.2 38.5    198     Basic Metabolic Panel:   Recent Labs   Lab Test 05/09/19  0934 05/09/19  0317 05/08/19  1210     --  141   POTASSIUM 2.8* 2.5* 2.7*   CHLORIDE 109  --  100   CO2 24  --  30   BUN 2*  --  6*   CR 0.50*  --  0.49*   GLC 89  --  130*   NISHI 8.3*  --  9.2     Liver panel:  Recent Labs   Lab Test 05/08/19  1210   PROTTOTAL 7.2   ALBUMIN 4.0   BILITOTAL 0.4   ALKPHOS 115 "   AST 28   ALT 28     Drug Screen:   Recent Labs   Lab Test 05/08/19  1243   UAMP Negative   UBARB Negative   BENZODIAZEUR Negative   UCANN Negative   UCOC Negative   OPIT Negative   UPCP Negative     Alcohol level:  Recent Labs   Lab Test 05/08/19  1210   ETOH 0.15*          Cardiac US:   --       Neurophysiology:   EEG:  This is a normal portable EEG.  No epileptiform discharges, electrographic seizures or target clinical events were recorded.  Clinical correlation advised.          Imaging:   All imaging studies were reviewed personally  CT head 5/8/19:   No acute pathology. No bleed, mass, or infarcts are seen.        Text Page    Sangeetha Felder, MEGAN, FNP-BC, RN CNRN SCRN

## 2020-06-23 ENCOUNTER — TELEPHONE (OUTPATIENT)
Dept: BEHAVIORAL HEALTH | Facility: CLINIC | Age: 30
End: 2020-06-23

## 2020-06-23 ENCOUNTER — HOSPITAL ENCOUNTER (INPATIENT)
Facility: CLINIC | Age: 30
LOS: 1 days | Discharge: HOME | DRG: 897 | End: 2020-06-24
Attending: EMERGENCY MEDICINE | Admitting: PSYCHIATRY & NEUROLOGY

## 2020-06-23 DIAGNOSIS — R45.851 SUICIDAL IDEATION: ICD-10-CM

## 2020-06-23 DIAGNOSIS — F10.929 ALCOHOLIC INTOXICATION WITH COMPLICATION (H): ICD-10-CM

## 2020-06-23 LAB
ALBUMIN SERPL-MCNC: 3.7 G/DL (ref 3.4–5)
ALP SERPL-CCNC: 285 U/L (ref 40–150)
ALT SERPL W P-5'-P-CCNC: 154 U/L (ref 0–50)
AMPHETAMINES UR QL SCN: NEGATIVE
ANION GAP SERPL CALCULATED.3IONS-SCNC: 12 MMOL/L (ref 3–14)
AST SERPL W P-5'-P-CCNC: 196 U/L (ref 0–45)
BARBITURATES UR QL: NEGATIVE
BASOPHILS # BLD AUTO: 0 10E9/L (ref 0–0.2)
BASOPHILS NFR BLD AUTO: 0.4 %
BENZODIAZ UR QL: POSITIVE
BILIRUB SERPL-MCNC: 0.5 MG/DL (ref 0.2–1.3)
BUN SERPL-MCNC: 6 MG/DL (ref 7–30)
CALCIUM SERPL-MCNC: 8.1 MG/DL (ref 8.5–10.1)
CANNABINOIDS UR QL SCN: NEGATIVE
CHLORIDE SERPL-SCNC: 102 MMOL/L (ref 94–109)
CO2 SERPL-SCNC: 25 MMOL/L (ref 20–32)
COCAINE UR QL: NEGATIVE
CREAT SERPL-MCNC: 0.38 MG/DL (ref 0.52–1.04)
DIFFERENTIAL METHOD BLD: NORMAL
EOSINOPHIL # BLD AUTO: 0 10E9/L (ref 0–0.7)
EOSINOPHIL NFR BLD AUTO: 0 %
ERYTHROCYTE [DISTWIDTH] IN BLOOD BY AUTOMATED COUNT: 14.2 % (ref 10–15)
GFR SERPL CREATININE-BSD FRML MDRD: >90 ML/MIN/{1.73_M2}
GLUCOSE SERPL-MCNC: 115 MG/DL (ref 70–99)
HCT VFR BLD AUTO: 42.1 % (ref 35–47)
HGB BLD-MCNC: 14.5 G/DL (ref 11.7–15.7)
IMM GRANULOCYTES # BLD: 0 10E9/L (ref 0–0.4)
IMM GRANULOCYTES NFR BLD: 0 %
LYMPHOCYTES # BLD AUTO: 2 10E9/L (ref 0.8–5.3)
LYMPHOCYTES NFR BLD AUTO: 42.7 %
MCH RBC QN AUTO: 32.4 PG (ref 26.5–33)
MCHC RBC AUTO-ENTMCNC: 34.4 G/DL (ref 31.5–36.5)
MCV RBC AUTO: 94 FL (ref 78–100)
MONOCYTES # BLD AUTO: 0.3 10E9/L (ref 0–1.3)
MONOCYTES NFR BLD AUTO: 6.7 %
NEUTROPHILS # BLD AUTO: 2.3 10E9/L (ref 1.6–8.3)
NEUTROPHILS NFR BLD AUTO: 50.2 %
NRBC # BLD AUTO: 0 10*3/UL
NRBC BLD AUTO-RTO: 0 /100
OPIATES UR QL SCN: NEGATIVE
PCP UR QL SCN: NEGATIVE
PLATELET # BLD AUTO: 266 10E9/L (ref 150–450)
POTASSIUM SERPL-SCNC: 2.6 MMOL/L (ref 3.4–5.3)
POTASSIUM SERPL-SCNC: 2.8 MMOL/L (ref 3.4–5.3)
POTASSIUM SERPL-SCNC: 3.4 MMOL/L (ref 3.4–5.3)
PROT SERPL-MCNC: 7.1 G/DL (ref 6.8–8.8)
RBC # BLD AUTO: 4.47 10E12/L (ref 3.8–5.2)
SARS-COV-2 PCR COMMENT: NORMAL
SARS-COV-2 RNA SPEC QL NAA+PROBE: NEGATIVE
SARS-COV-2 RNA SPEC QL NAA+PROBE: NORMAL
SODIUM SERPL-SCNC: 139 MMOL/L (ref 133–144)
SPECIMEN SOURCE: NORMAL
SPECIMEN SOURCE: NORMAL
TSH SERPL DL<=0.005 MIU/L-ACNC: 3.64 MU/L (ref 0.4–4)
WBC # BLD AUTO: 4.7 10E9/L (ref 4–11)

## 2020-06-23 PROCEDURE — 84443 ASSAY THYROID STIM HORMONE: CPT | Performed by: PSYCHIATRY & NEUROLOGY

## 2020-06-23 PROCEDURE — 84132 ASSAY OF SERUM POTASSIUM: CPT | Performed by: PSYCHIATRY & NEUROLOGY

## 2020-06-23 PROCEDURE — 84443 ASSAY THYROID STIM HORMONE: CPT | Performed by: EMERGENCY MEDICINE

## 2020-06-23 PROCEDURE — 80307 DRUG TEST PRSMV CHEM ANLYZR: CPT | Performed by: EMERGENCY MEDICINE

## 2020-06-23 PROCEDURE — 36415 COLL VENOUS BLD VENIPUNCTURE: CPT | Performed by: PSYCHIATRY & NEUROLOGY

## 2020-06-23 PROCEDURE — 25000132 ZZH RX MED GY IP 250 OP 250 PS 637: Performed by: PSYCHIATRY & NEUROLOGY

## 2020-06-23 PROCEDURE — 25000132 ZZH RX MED GY IP 250 OP 250 PS 637: Performed by: EMERGENCY MEDICINE

## 2020-06-23 PROCEDURE — 85025 COMPLETE CBC W/AUTO DIFF WBC: CPT | Performed by: EMERGENCY MEDICINE

## 2020-06-23 PROCEDURE — 90791 PSYCH DIAGNOSTIC EVALUATION: CPT

## 2020-06-23 PROCEDURE — 99285 EMERGENCY DEPT VISIT HI MDM: CPT | Mod: 25

## 2020-06-23 PROCEDURE — 80053 COMPREHEN METABOLIC PANEL: CPT | Performed by: EMERGENCY MEDICINE

## 2020-06-23 PROCEDURE — U0003 INFECTIOUS AGENT DETECTION BY NUCLEIC ACID (DNA OR RNA); SEVERE ACUTE RESPIRATORY SYNDROME CORONAVIRUS 2 (SARS-COV-2) (CORONAVIRUS DISEASE [COVID-19]), AMPLIFIED PROBE TECHNIQUE, MAKING USE OF HIGH THROUGHPUT TECHNOLOGIES AS DESCRIBED BY CMS-2020-01-R: HCPCS | Performed by: EMERGENCY MEDICINE

## 2020-06-23 PROCEDURE — 12400000 ZZH R&B MH

## 2020-06-23 RX ORDER — DIAZEPAM 5 MG
10 TABLET ORAL EVERY 30 MIN PRN
Status: DISCONTINUED | OUTPATIENT
Start: 2020-06-23 | End: 2020-06-24 | Stop reason: HOSPADM

## 2020-06-23 RX ORDER — ACETAMINOPHEN 325 MG/1
650 TABLET ORAL EVERY 4 HOURS PRN
Status: DISCONTINUED | OUTPATIENT
Start: 2020-06-23 | End: 2020-06-24 | Stop reason: HOSPADM

## 2020-06-23 RX ORDER — FOLIC ACID 1 MG/1
1 TABLET ORAL DAILY
Status: DISCONTINUED | OUTPATIENT
Start: 2020-06-23 | End: 2020-06-24 | Stop reason: HOSPADM

## 2020-06-23 RX ORDER — TRAZODONE HYDROCHLORIDE 50 MG/1
50 TABLET, FILM COATED ORAL
Status: DISCONTINUED | OUTPATIENT
Start: 2020-06-23 | End: 2020-06-24 | Stop reason: HOSPADM

## 2020-06-23 RX ORDER — DIAZEPAM 10 MG/2ML
5-10 INJECTION, SOLUTION INTRAMUSCULAR; INTRAVENOUS EVERY 30 MIN PRN
Status: DISCONTINUED | OUTPATIENT
Start: 2020-06-23 | End: 2020-06-24 | Stop reason: HOSPADM

## 2020-06-23 RX ORDER — OLANZAPINE 10 MG/2ML
10 INJECTION, POWDER, FOR SOLUTION INTRAMUSCULAR
Status: DISCONTINUED | OUTPATIENT
Start: 2020-06-23 | End: 2020-06-24 | Stop reason: HOSPADM

## 2020-06-23 RX ORDER — POTASSIUM CHLORIDE 1500 MG/1
20-40 TABLET, EXTENDED RELEASE ORAL
Status: DISCONTINUED | OUTPATIENT
Start: 2020-06-23 | End: 2020-06-24

## 2020-06-23 RX ORDER — OLANZAPINE 10 MG/1
10 TABLET ORAL
Status: DISCONTINUED | OUTPATIENT
Start: 2020-06-23 | End: 2020-06-24 | Stop reason: HOSPADM

## 2020-06-23 RX ORDER — POTASSIUM CHLORIDE 29.8 MG/ML
20 INJECTION INTRAVENOUS
Status: DISCONTINUED | OUTPATIENT
Start: 2020-06-23 | End: 2020-06-23 | Stop reason: CLARIF

## 2020-06-23 RX ORDER — POTASSIUM CHLORIDE 1.5 G/1.58G
20-40 POWDER, FOR SOLUTION ORAL
Status: DISCONTINUED | OUTPATIENT
Start: 2020-06-23 | End: 2020-06-24

## 2020-06-23 RX ORDER — HYDROXYZINE HYDROCHLORIDE 25 MG/1
25 TABLET, FILM COATED ORAL EVERY 4 HOURS PRN
Status: DISCONTINUED | OUTPATIENT
Start: 2020-06-23 | End: 2020-06-24 | Stop reason: HOSPADM

## 2020-06-23 RX ORDER — MULTIPLE VITAMINS W/ MINERALS TAB 9MG-400MCG
1 TAB ORAL DAILY
Status: DISCONTINUED | OUTPATIENT
Start: 2020-06-23 | End: 2020-06-24 | Stop reason: HOSPADM

## 2020-06-23 RX ORDER — BISACODYL 10 MG
10 SUPPOSITORY, RECTAL RECTAL DAILY PRN
Status: DISCONTINUED | OUTPATIENT
Start: 2020-06-23 | End: 2020-06-24 | Stop reason: HOSPADM

## 2020-06-23 RX ORDER — POTASSIUM CL/LIDO/0.9 % NACL 10MEQ/0.1L
10 INTRAVENOUS SOLUTION, PIGGYBACK (ML) INTRAVENOUS
Status: DISCONTINUED | OUTPATIENT
Start: 2020-06-23 | End: 2020-06-24

## 2020-06-23 RX ORDER — POTASSIUM CHLORIDE 1.5 G/1.58G
40 POWDER, FOR SOLUTION ORAL ONCE
Status: COMPLETED | OUTPATIENT
Start: 2020-06-23 | End: 2020-06-23

## 2020-06-23 RX ORDER — LANOLIN ALCOHOL/MO/W.PET/CERES
100 CREAM (GRAM) TOPICAL DAILY
Status: DISCONTINUED | OUTPATIENT
Start: 2020-06-23 | End: 2020-06-24 | Stop reason: HOSPADM

## 2020-06-23 RX ORDER — ALUMINA, MAGNESIA, AND SIMETHICONE 2400; 2400; 240 MG/30ML; MG/30ML; MG/30ML
30 SUSPENSION ORAL EVERY 4 HOURS PRN
Status: DISCONTINUED | OUTPATIENT
Start: 2020-06-23 | End: 2020-06-24 | Stop reason: HOSPADM

## 2020-06-23 RX ORDER — POTASSIUM CHLORIDE 1500 MG/1
40 TABLET, EXTENDED RELEASE ORAL ONCE
Status: DISCONTINUED | OUTPATIENT
Start: 2020-06-23 | End: 2020-06-23

## 2020-06-23 RX ORDER — POTASSIUM CHLORIDE 7.45 MG/ML
10 INJECTION INTRAVENOUS
Status: DISCONTINUED | OUTPATIENT
Start: 2020-06-23 | End: 2020-06-24

## 2020-06-23 RX ADMIN — POTASSIUM CHLORIDE 40 MEQ: 1.5 POWDER, FOR SOLUTION ORAL at 11:33

## 2020-06-23 RX ADMIN — MULTIPLE VITAMINS W/ MINERALS TAB 1 TABLET: TAB at 13:25

## 2020-06-23 RX ADMIN — DIAZEPAM 10 MG: 5 TABLET ORAL at 14:40

## 2020-06-23 RX ADMIN — POTASSIUM CHLORIDE 40 MEQ: 1.5 POWDER, FOR SOLUTION ORAL at 17:28

## 2020-06-23 RX ADMIN — FOLIC ACID 1 MG: 1 TABLET ORAL at 13:25

## 2020-06-23 RX ADMIN — Medication 100 MG: at 13:25

## 2020-06-23 ASSESSMENT — ACTIVITIES OF DAILY LIVING (ADL)
SWALLOWING: 0-->SWALLOWS FOODS/LIQUIDS WITHOUT DIFFICULTY
AMBULATION: 0-->INDEPENDENT
RETIRED_COMMUNICATION: 0-->UNDERSTANDS/COMMUNICATES WITHOUT DIFFICULTY
DRESS: SCRUBS (BEHAVIORAL HEALTH)
DRESS: 0-->INDEPENDENT
BATHING: 0-->INDEPENDENT
ORAL_HYGIENE: INDEPENDENT
LAUNDRY: UNABLE TO COMPLETE
DRESS: SCRUBS (BEHAVIORAL HEALTH)
TOILETING: 0-->INDEPENDENT
ORAL_HYGIENE: INDEPENDENT
COGNITION: 0 - NO COGNITION ISSUES REPORTED
RETIRED_EATING: 0-->INDEPENDENT
HYGIENE/GROOMING: INDEPENDENT
TRANSFERRING: 0-->INDEPENDENT
FALL_HISTORY_WITHIN_LAST_SIX_MONTHS: NO
HYGIENE/GROOMING: INDEPENDENT

## 2020-06-23 ASSESSMENT — ENCOUNTER SYMPTOMS
SHORTNESS OF BREATH: 0
VOMITING: 0
FEVER: 0
ABDOMINAL PAIN: 0

## 2020-06-23 ASSESSMENT — MIFFLIN-ST. JEOR
SCORE: 1291.04
SCORE: 1337.31

## 2020-06-23 NOTE — ED NOTES
Pt remains hypertensive in ED. Pt tachycardic in the 100s. Pt not tremulous or diaphoretic. Pt denies hallucinations. Pt is not agitated. Pt denies alcohol withdrawal seizures. MD aware of vital signs.

## 2020-06-23 NOTE — ED NOTES
Tele-DEC in progress at this time.  Stacy Alvarado RN,.......................................... 6/23/2020   5:45 AM

## 2020-06-23 NOTE — ED NOTES
Pt more calm and cooperative with nursing staff now. covid swab done and sent to lab. Will continue to monitor.

## 2020-06-23 NOTE — ED PROVIDER NOTES
"  History     Chief Complaint:  Suicidal    HPI   Maddie Orourke is a 30 year old female who presents with suicidal ideations. The patient lives with her ex-boyfriend, they broke up a few months ago but still have a lease together. Today the patient had been drinking when she got into an argument with her ex-boyfriend. When he tried to leave she got on the outside railing of their balcony and threatened to jump, which she denies doing.       Allergies:  No Known Drug Allergies     Medications:    Folvite  Zantac  Thiamine    Past Medical History:    Alcohol withdrawal    Past Surgical History:    History reviewed. No pertinent surgical history.    Family History:    History reviewed. No pertinent family history.     Social History:  Smoking status: No  Alcohol use: Yes  Drug use: No  Patient presents with alone.  PCP: No Ref-Primary, Physician   Marital Status:  Single      Review of Systems   Constitutional: Negative for fever.   Respiratory: Negative for shortness of breath.    Cardiovascular: Negative for chest pain.   Gastrointestinal: Negative for abdominal pain and vomiting.   Psychiatric/Behavioral: Positive for suicidal ideas.   All other systems reviewed and are negative.    Physical Exam     Patient Vitals for the past 24 hrs:   BP Temp Temp src Heart Rate Resp SpO2 Height Weight   06/23/20 0408 (!) 153/141 98.6  F (37  C) Oral 111 20 99 % 1.702 m (5' 7\") 59 kg (130 lb)     Physical Exam  General: Appears somewhat intoxicated  Head: No signs of trauma.   CV: Normal rate and regular rhythm.    Resp: Effort normal and breath sounds normal. No respiratory distress.   GI: Soft. There is no tenderness.  No rebound or guarding.  Normal bowel sounds.  No CVA tenderness.  MSK: Normal range of motion.   Neuro: The patient is alert and oriented. Sensation normal. Speech normal.  GCS 15  Skin: Skin is warm and dry. No rash noted.   Psych: tearful      Emergency Department Course   Emergency Department " Course:  0407 Nursing notes and vitals reviewed. I performed an exam of the patient as documented above.     Findings and plan explained to the Patient. Patient will be signed out to my partner Dr. Penaloza.    Impression & Plan    Medical Decision Making:  Maddie Orourke is a 30-year-old woman who presents due to concern for suicidal ideation.  Patient apparently is living with her ex-boyfriend until they can get out of the lease.  They had an argument this evening.  Patient denies any thoughts of self-harm or any behaviors that would lead to this, but apparently the ex-boyfriend called EMS as the patient was on the balcony saying that she was going to jump off.  EMS did report that the patient was on the outside of the railing and did make these statements.  Patient did endorse alcohol use and apparently drinks fairly regularly.  Patient will be signed out to Dr. Penaloza with DEC evaluation pending.    Diagnosis:    ICD-10-CM    1. Suicidal ideation  R45.851 Comprehensive metabolic panel   2. Alcoholic intoxication with complication (H)  F10.929        Disposition:  Signed out to Dr. Penaloza.    Sindy Capone  6/23/2020    EMERGENCY DEPARTMENT    Scribe Disclosure:  I, Sindy Capone, am serving as a scribe at 4:09 AM on 6/23/2020 to document services personally performed by Suleman Dia MD based on my observations and the provider's statements to me.                    Suleman Dia MD  06/23/20 1983

## 2020-06-23 NOTE — ED NOTES
"[facility] ED Behavioral Health Handoff Note:       Brief HPI:  This is a 30 year old female signed out to me by Dr. Campos .  See initial ED Provider note for details of the presentation. I spoke to DEC regarding patient per the boyfriend of the patient reports she is not telling the truth and is having extremely risky behavior while drinking. Concerning for successful completion of suicidal behavior while drunk.     Patient is medically cleared for admission to a Behavioral Health unit.      The patient is on a hold.  The type of hold is BETH.      The patient has not required medication for agitation.      Exam:   Patient Vitals for the past 24 hrs:   BP Temp Temp src Heart Rate Resp SpO2 Height Weight   06/23/20 0631 (!) 146/114 -- -- -- 16 98 % -- --   06/23/20 0408 (!) 153/141 98.6  F (37  C) Oral 111 20 99 % 1.702 m (5' 7\") 59 kg (130 lb)     ED Course:  The patient is denying suicidal ideation during my evaluation.  DEC feels the patient is not telling a truth regarding previous situations and is recommending admission.  The patient denied remembering anything to do climbing over the [employer] railing.       Impression:    ICD-10-CM    1. Suicidal ideation  R45.851 Drug abuse screen urine     Asymptomatic COVID-19 Virus (Coronavirus) by PCR     Comprehensive metabolic panel     SARS-CoV-2 COVID-19 Virus (Coronavirus) RT-PCR     SARS-CoV-2 COVID-19 Virus (Coronavirus) RT-PCR Nasopharyngeal     Potassium     TSH with free T4 reflex     TSH with free T4 reflex     Potassium     Comprehensive metabolic panel     HCG qualitative     HCG qualitative     Magnesium     Magnesium     CANCELED: TSH with free T4 reflex and/or T3 as indicated     CANCELED: Potassium     CANCELED: HCG qualitative Blood     CANCELED: Magnesium   2. Alcoholic intoxication with complication (H)  F10.929        Plan:    1. Await Transfer to Mental Health Facility      RESULTS:   Results for orders placed or performed during the hospital encounter " of 06/23/20 (from the past 24 hour(s))   CBC with platelets + differential     Status: None    Collection Time: 06/23/20  6:44 AM   Result Value Ref Range    WBC 4.7 4.0 - 11.0 10e9/L    RBC Count 4.47 3.8 - 5.2 10e12/L    Hemoglobin 14.5 11.7 - 15.7 g/dL    Hematocrit 42.1 35.0 - 47.0 %    MCV 94 78 - 100 fl    MCH 32.4 26.5 - 33.0 pg    MCHC 34.4 31.5 - 36.5 g/dL    RDW 14.2 10.0 - 15.0 %    Platelet Count 266 150 - 450 10e9/L    Diff Method Automated Method     % Neutrophils 50.2 %    % Lymphocytes 42.7 %    % Monocytes 6.7 %    % Eosinophils 0.0 %    % Basophils 0.4 %    % Immature Granulocytes 0.0 %    Nucleated RBCs 0 0 /100    Absolute Neutrophil 2.3 1.6 - 8.3 10e9/L    Absolute Lymphocytes 2.0 0.8 - 5.3 10e9/L    Absolute Monocytes 0.3 0.0 - 1.3 10e9/L    Absolute Eosinophils 0.0 0.0 - 0.7 10e9/L    Absolute Basophils 0.0 0.0 - 0.2 10e9/L    Abs Immature Granulocytes 0.0 0 - 0.4 10e9/L    Absolute Nucleated RBC 0.0        Luis Antonio Penaloza, Luis Antonio García MD  06/24/20 101     Verbalized Understanding/Simple: Patient demonstrates quick and easy understanding

## 2020-06-23 NOTE — TELEPHONE ENCOUNTER
S: 31 y/o female presented to the [facility] ED with SI and intoxication.    B: Hx alcohol withdrawal, intoxication delirium.  Pt made suicidal statements to her boyfriend and asked him to kill her.  When he left the apartment, he saw the pt on the balcony attempting to jump and had to pull her to safety.  Per , pt does not recall the incident as her boyfriend described.  Pt also denied any hx of MH, current MH sx or CD hx.  Pt was admitted to medical in May 2019 with altered mental status and alcohol withdrawal and declined CD tx at that time.  Pt reported she drinks 5 shots daily but boyfriend reported pt is underreporting how much she actually drinks and minimizing her behavior.  Denied psychosis or HI.  Pt admits to using marijuana.    A: [place]  No acute medical concerns.  COVID in process  Drug screen and ABT have been ordered.  CBC results unremarkable    R: Additional labs pending

## 2020-06-23 NOTE — TELEPHONE ENCOUNTER
R: author called Joanna in dec at 7:54 am and asked if pt wants detox. She said she will check and will call back. aleksandr  Per Joanna, she spoke with the ED MD who feels pt is unsafe to be d/c'ed and wants pt admitted saying he is concerned pt is not telling the truth about her SI. aleksandr  Paged Maria C at 8:05 am. aleksandr  Author discussed w/ Maria C who said she does not feel pt is appropriate for [facility] and recommends pt admit to a mh unit. aleksandr  Paged Jaime at 8:41 am. aleksandr  #77/alexandra; jaime accepted for himself on stepdown                Unit informed at 9:18 am, er informed at 9:19 am                   aleksandr

## 2020-06-23 NOTE — PROGRESS NOTES
"Pt was admitted on a health officer hold for suicidal ideation. Per chart review, \"drinking when she got into an argument with her ex-boyfriend. When he tried to leave she got on the outside railing of their balcony and threatened to jump, which she denies doing.\" Upon admission pt denies being suicidal, denies ever being suicidal or a history of suicide attempts.  Reports that her ex boyfriend made it up. Verbally contracts for safety while on the unit.  Pt is hopeful to discharge soon.  Primary stressor is that she moved to [place] for her boyfriend 2 years ago and he broke up with her when the pandemic started.  Pleasant and cooperative. Withdrawn. Sad, anxious, depressed.    Reports drinking daily approx 5 drinks per day.  CIWA ordered.  Slight tremor felt in her finger tips with arms extended. Pt was shaking but says it was because she is cold and it when away with a blanket.  HA rated 3/10 that she describes as \"annoying.\" Denies sweating, nausea, vomiting. Will continue to monitor.  Hypertensive /98. HR was down to 89.  K+ was 2.6 at 0644, replaced with 40 at 1115.  Pt was still drinking her K+ upon admission. K+ replacement protocol order, recheck ordered for 1600 and in the morning.    Pt denies any other physical complaints and states she is normally positive. Per chart review, boyfriend reports that patient is minimizing.    Nursing assessment complete including patient and medication profiles. Risk assessments completed addressing suicide,fall,skin,nutrition and safety issues. Care plan initiated. Assessments reviewed with physician and admit orders received. Video monitoring in progress, Patient Informed.  Welcome packet reviewed with patient. Information reviewed includes getting emergency help, preventing infections, understanding your care, using medication safely, reducing falls, preventing pressure ulcers, smoking cessation, powerful choices and Patients Bill of Rights. Pt. given tour of the unit " and instruction on use of facility including emergency call light. Program schedule reviewed with patient. Questions regarding the unit addressed. Pt. Search completed and belongings inventoried.

## 2020-06-23 NOTE — ED TRIAGE NOTES
pt biba from home - got into fight with boyfriend and made suicidal comments, threatened to jump off [employer] and stood on the outside part of [employer]. pt is cooperative here in ED

## 2020-06-23 NOTE — ED NOTES
"RN introducing self to pt. Pt requesting ice water. RN gave pt fresh ice water. Pt requesting to speak with MD. Pt reports, \"I just want to go home.\" MD aware. Pt remains on BETH and security watch. Will continue to monitor.   "

## 2020-06-23 NOTE — ED NOTES
Bed: BH3  Expected date:   Expected time:   Means of arrival:   Comments:  E2  30F Suicidal/Hold/Uncooperative  0353

## 2020-06-23 NOTE — PROGRESS NOTES
Initial Psychosocial Assessment    I have reviewed the chart, met with the patient, and developed Care Plan. Information for assessment was obtained from: Pt, medical record    *This assessment was not completed because patient was here less than 24 hours, at time of discharge.    Presenting Problem:  Admitted to [address], [facility] due to abusing alcohol and threatening to jump from the balcony of her 3rd story apartment.      History of Mental Health and Chemical Dependency:  This is pt's first psych admit within the [facility] system.  Pt was hospitalized here at [facility] May of 2019, on a medical floor due to alcohol abuse/intoxication/withdrawal.  She declined CD tx at that time.      Significant Life Events   (Illness, Abuse, Trauma, Death):      Living Situation:   Pt lives in an apartment in [place]    Educational Background:      Financial Status: no medical insurance    Legal Issues:      Ethnic/Cultural Issues:      Spiritual Orientation:       Service History:    Social Functioning (organization, interests):    Current Treatment Providers are:  None known      Social Service Assessment/Plan:   -Offer Rule 25 evaluation and subsequent chemical dependency treatment, as recommended by the .  -Contact the business office and request they assist in helping patient with completing and submitting a Medical Assistance application.

## 2020-06-23 NOTE — PROGRESS NOTES
06/23/20 1206   Valuables   Patient Belongings locker   Patient Belongings Put in Hospital Secure Location (Security or Locker, etc.) other (see comments)   Did you bring any home meds/supplements to the hospital?  No     Hoodie with strings  Leggings  Bra  Shirt with strings  Pack of cigarettes   Keys x 2  Lighters x 2    A               Admission:  I am responsible for any personal items that are not sent to the safe or pharmacy.  [facility] is not responsible for loss, theft or damage of any property in my possession.    Signature:  _________________________________ Date: _______  Time: _____                                              Staff Signature:  ____________________________ Date: ________  Time: _____      2nd Staff person, if patient is unable/unwilling to sign:    Signature: ________________________________ Date: ________  Time: _____     Discharge:  [facility] has returned all of my personal belongings:    Signature: _________________________________ Date: ________  Time: _____                                          Staff Signature:  ____________________________ Date: ________  Time: _____

## 2020-06-23 NOTE — ED NOTES
Pt has had an emesis episode on the floor. Writer asked pt if she was feeling unwell. Pt declined to answer or look at writer.  Stacy Alvarado RN,.......................................... 6/23/2020   5:30 AM

## 2020-06-24 VITALS
SYSTOLIC BLOOD PRESSURE: 154 MMHG | HEIGHT: 67 IN | WEIGHT: 119.8 LBS | HEART RATE: 100 BPM | RESPIRATION RATE: 16 BRPM | DIASTOLIC BLOOD PRESSURE: 104 MMHG | OXYGEN SATURATION: 100 % | TEMPERATURE: 98.4 F | BODY MASS INDEX: 18.8 KG/M2

## 2020-06-24 LAB
ALBUMIN SERPL-MCNC: 4.1 G/DL (ref 3.4–5)
ALP SERPL-CCNC: 280 U/L (ref 40–150)
ALT SERPL W P-5'-P-CCNC: 124 U/L (ref 0–50)
ANION GAP SERPL CALCULATED.3IONS-SCNC: 8 MMOL/L (ref 3–14)
AST SERPL W P-5'-P-CCNC: 154 U/L (ref 0–45)
BILIRUB SERPL-MCNC: 1.7 MG/DL (ref 0.2–1.3)
BUN SERPL-MCNC: 4 MG/DL (ref 7–30)
CALCIUM SERPL-MCNC: 9 MG/DL (ref 8.5–10.1)
CHLORIDE SERPL-SCNC: 100 MMOL/L (ref 94–109)
CO2 SERPL-SCNC: 29 MMOL/L (ref 20–32)
CREAT SERPL-MCNC: 0.49 MG/DL (ref 0.52–1.04)
GFR SERPL CREATININE-BSD FRML MDRD: >90 ML/MIN/{1.73_M2}
GLUCOSE SERPL-MCNC: 111 MG/DL (ref 70–99)
HCG SERPL QL: NEGATIVE
MAGNESIUM SERPL-MCNC: 1.8 MG/DL (ref 1.6–2.3)
POTASSIUM SERPL-SCNC: 2.7 MMOL/L (ref 3.4–5.3)
PROT SERPL-MCNC: 7.4 G/DL (ref 6.8–8.8)
SODIUM SERPL-SCNC: 137 MMOL/L (ref 133–144)

## 2020-06-24 PROCEDURE — 84703 CHORIONIC GONADOTROPIN ASSAY: CPT | Performed by: PHYSICIAN ASSISTANT

## 2020-06-24 PROCEDURE — 83735 ASSAY OF MAGNESIUM: CPT | Performed by: PHYSICIAN ASSISTANT

## 2020-06-24 PROCEDURE — 99236 HOSP IP/OBS SAME DATE HI 85: CPT | Mod: 95 | Performed by: PSYCHIATRY & NEUROLOGY

## 2020-06-24 PROCEDURE — 99222 1ST HOSP IP/OBS MODERATE 55: CPT | Performed by: PHYSICIAN ASSISTANT

## 2020-06-24 PROCEDURE — 80053 COMPREHEN METABOLIC PANEL: CPT | Performed by: PHYSICIAN ASSISTANT

## 2020-06-24 PROCEDURE — 25000132 ZZH RX MED GY IP 250 OP 250 PS 637: Performed by: PHYSICIAN ASSISTANT

## 2020-06-24 PROCEDURE — 25000132 ZZH RX MED GY IP 250 OP 250 PS 637: Performed by: PSYCHIATRY & NEUROLOGY

## 2020-06-24 PROCEDURE — 99207 ZZC CONSULT E&M CHANGED TO INITIAL LEVEL: CPT | Performed by: PHYSICIAN ASSISTANT

## 2020-06-24 PROCEDURE — 36415 COLL VENOUS BLD VENIPUNCTURE: CPT | Performed by: PHYSICIAN ASSISTANT

## 2020-06-24 RX ORDER — POTASSIUM CHLORIDE 7.45 MG/ML
10 INJECTION INTRAVENOUS
Status: DISCONTINUED | OUTPATIENT
Start: 2020-06-24 | End: 2020-06-24 | Stop reason: HOSPADM

## 2020-06-24 RX ORDER — POTASSIUM CHLORIDE 1.5 G/1.58G
20-40 POWDER, FOR SOLUTION ORAL
Status: DISCONTINUED | OUTPATIENT
Start: 2020-06-24 | End: 2020-06-24 | Stop reason: HOSPADM

## 2020-06-24 RX ORDER — MULTIVITAMIN WITH IRON
1 TABLET ORAL DAILY
COMMUNITY
End: 2020-08-22

## 2020-06-24 RX ORDER — POTASSIUM CL/LIDO/0.9 % NACL 10MEQ/0.1L
10 INTRAVENOUS SOLUTION, PIGGYBACK (ML) INTRAVENOUS
Status: DISCONTINUED | OUTPATIENT
Start: 2020-06-24 | End: 2020-06-24 | Stop reason: HOSPADM

## 2020-06-24 RX ORDER — POTASSIUM CHLORIDE 1500 MG/1
20-40 TABLET, EXTENDED RELEASE ORAL
Status: DISCONTINUED | OUTPATIENT
Start: 2020-06-24 | End: 2020-06-24 | Stop reason: HOSPADM

## 2020-06-24 RX ORDER — POTASSIUM CHLORIDE 29.8 MG/ML
20 INJECTION INTRAVENOUS
Status: DISCONTINUED | OUTPATIENT
Start: 2020-06-24 | End: 2020-06-24 | Stop reason: CLARIF

## 2020-06-24 RX ADMIN — POTASSIUM CHLORIDE 40 MEQ: 1500 TABLET, EXTENDED RELEASE ORAL at 14:06

## 2020-06-24 RX ADMIN — MULTIPLE VITAMINS W/ MINERALS TAB 1 TABLET: TAB at 08:23

## 2020-06-24 RX ADMIN — FOLIC ACID 1 MG: 1 TABLET ORAL at 08:23

## 2020-06-24 RX ADMIN — Medication 100 MG: at 08:23

## 2020-06-24 RX ADMIN — POTASSIUM CHLORIDE 40 MEQ: 1500 TABLET, EXTENDED RELEASE ORAL at 12:33

## 2020-06-24 NOTE — DISCHARGE INSTRUCTIONS
"Behavioral Discharge Planning and Instructions      Summary:  You were admitted to [address], [facility] due to abusing alcohol and threatening to jump from the balcony of your apartment.   You acknowledged chronic alcohol abuse.  You were under the care of Dr Augustine.  You are requesting to leave so Dr. Augustine is discharging you home today, to your apartment in [place].    Diagnoses:    Alcohol Use Disorder.    Mental Health Follow-Up:   No psychiatry appointment; no psych medication.  --------------------------------------------------    We do recommend you follow up with getting a Rule 25 evaluation and subsequent chemical dependency treatment, to help you deal with your alcohol addiction.    There are not many options regarding getting a no-charge Rule 25 evaluation (you have no medical insurance at this time.)    So, here are two places to try:    1) Michelle at [facility] in [place].   Phone:   [phone].    They do not schedule appointments.    This is a walk- in eval, first come, first served.  You should show up at 6:45am because they start seeing people at 7am.    2)  [facility] in [facility].       Phone:  [phone].      This would be a telehealth visit, not an in-person visit.   Their Rule 25  recently left the job so you should try calling them the week of Mon June 29th. To ask if they now have a new  who can do your Rule 25 evaluation.    ----------------------------------------------    We also referred your case to our business office at [facility], to assist you in applying for Medical Assistance through [facility].      We have asked them to call you at home.    If you do not receive a call by Friday June 19th, call \"Glen\" at [phone].      Attend all scheduled appointments with your outpatient providers. Call at least 24 hours in advance if you need to reschedule an appointment to ensure continued access to your " outpatient providers.     Major Treatments, Procedures and Findings:  You were provided with a psychiatric assessment, assessed for medical stability, medication evaluation and/or management and group therapy.    Symptoms to Report: feeling more aggressive, increased confusion, losing more sleep, mood getting worse or thoughts of suicide.    Early warning signs can include: increased depression or anxiety, sleep disturbances, increased thoughts or behaviors of suicide or self-harm,  increased unusual thinking such as paranoia or hearing voices.    Safety and Wellness:  Take all medicines as directed.  Make no changes unless your doctor suggests them.   Follow treatment recommendations.  Refrain from alcohol and non-prescribed drugs.  If there is a concern for safety, call 911.    Resources:   COPE (Community Outreach for Psychiatric Emergencies): 318.833.2151.  Available 24-hours a day, 7 days a week.  COPE professionals are available to go where you are. Telephone consultations are also available.    Crisis Intervention: 674.613.6113 or [phone] (TTY: 879.190.1347).  Call anytime for help.  Alcoholics Anonymous (www.alcoholics-anonymous.org): Check your phone book for your local chapter.  Suicide Awareness Voices of Education (SAVE) (www.save.org): 191-696-KZHA (8201)  National Suicide Prevention Line (www.mentalhealthmn.org): 953-018-QYXH (9637)     Thank you for choosing [facility].  The treatment team has appreciated the opportunity to work with you. If you have any questions or concerns, our unit number is [phone].

## 2020-06-24 NOTE — PHARMACY-ADMISSION MEDICATION HISTORY
Pharmacy Medication History  Admission medication history interview status for the 6/23/2020  admission is complete. See EPIC admission navigator for prior to admission medications     Medication history sources: Patient  Medication history source reliability: Good  Adherence assessment: Good    Significant changes made to the medication list:  Deleted thiamine, zantac, MVI. Added potassium, magnesium      Additional medication history information:   none    Medication reconciliation completed by provider prior to medication history? No    Time spent in this activity: 5 minutes      Prior to Admission medications    Medication Sig Last Dose Taking? Auth Provider   folic acid (FOLVITE) 1 MG tablet Take 1 tablet (1 mg) by mouth daily 6/23/2020 at Unknown time Yes Ashanti Rivas MD   magnesium 250 MG tablet Take 1 tablet by mouth daily 6/23/2020 at Unknown time Yes Unknown, Entered By History   potassium 99 MG TABS Take 1 tablet by mouth daily 6/23/2020 at Unknown time Yes Unknown, Entered By History

## 2020-06-24 NOTE — CONSULTS
"BEHAVIORAL HEALTH NUTRITION ASSESSMENT    REASON FOR ASSESSMENT:  Nutrition Admission Risk Screen - Unintentional weight loss of 10# or more in past 2 months  CURRENT DIET AND NOURISHMENT ORDER:  Information obtained from Chart Review.   - H/o alcohol dependence, alcohol related seizure, GERD.   - Recent social stressors likely impacting her ability to care for self.   - Noted diarrhea for 3 days PTA.     Diet: Regular     Current Intake/Tolerance: No intake recorded on the doc flowsheet. Patient not available/not appropriate to visit with at this time. Patient ordering adequate amounts of food and balanced meals as per review of menus.    ANTHROPOMETRICS:  Height: 5' 7\"  Weight: 54.3 kg  BMI: 18.76 kg/m2  IBW: 61.4 kg  %IBW: 88%  Weight History: No significant weight loss indicated in the past 2 months.   Wt Readings from Last 10 Encounters:   06/23/20 54.3 kg (119 lb 12.8 oz)   05/08/19 53.3 kg (117 lb 9.6 oz)     LABS:  Reviewed  K 2.7 (L)  BUN 4 (L), Cr 0.49 (L)    NUTRITION STATUS VALIDATION:  Weight status: Normal BMI --> Borderline Underweight   Unable to fully assess for malnutrition at this time.     INTERVENTION:    Nutrition Diagnosis:  No nutrition diagnosis at this time.    Implementation:   Nutrition education: Per MD order. Not appropriate at this time due to patient condition.     Follow Up/Monitoring:   No need for further follow-up unless another consult received.    Chana Vegas RD, LD  Pager: [phone]    "

## 2020-06-24 NOTE — PLAN OF CARE
"Pleasant, cooperative, she is teary eyed because she is here, she said, I have never being suicidal, my boyfriend is mean, with out a job and controlling and she called the  on me.  \" He is with out a job and gets angry very easily\"   She called her parents and plans to go back to [place].  She is glad she is going home.   Potassium is low 2.7 and she is being replaced.   Will discharge later this afternoon.  "

## 2020-06-24 NOTE — PLAN OF CARE
BEHAVIORAL TEAM DISCUSSION    Participants: dr sandoval, RN's, OT (gold strauss), CTC (nolan woods)  Progress: none yet; new admission.   Pt is here due to chronic alcohol abuse.    Just prior to admission, she and her ex-boyfriend argued and she threatened to jump from her 3rd story apartment.    Pt now denies feeling suicidal and says she was not suicidal the evening she argued with her boyfriend.    Anticipated length of stay: brief  Continued Stay Criteria/Rationale: offer aftercare assistance in relation to CD tx.  Medical/Physical: no new issues.  Precautions:   Behavioral Orders   Procedures    Code 1 - Restrict to Unit    Routine Programming     As clinically indicated    Status 15     Every 15 minutes.    Withdrawal precautions     Plan:  meds per dr sandoval.    Offer assistance with Rule 25 eval, CD treatment, and pt needs to complete an MA application.  Rationale for change in precautions or plan:    no change at this time.

## 2020-06-24 NOTE — PLAN OF CARE
"Patient has been visible on the unit and social with peers. Did not attend any unit programing, but spent the majority of the evening working on puzzles in the [place]. Patient's mood is sad and depressed with flat, but reactive affect. Mood brightened as evening progressed. Insight is appropriate. Patient denies any suicidal ideation and hallucinations. Scored a 4 and 1 on the CIWA protocol. K+ level was 2.6 at  1647. 40 mEq replacement was given per K+ replacement protocol. Patient states she is \"feeling better overall.\" Nursing will continue to monitor.  "

## 2020-06-24 NOTE — DISCHARGE SUMMARY
"[facility]  Combination psychiatric H&P and discharge summary      Patient name: Maddie Orourke   MRN: [id number]    Age: 30 year old    YOB: 1990    Identifying information:   The patient is a 30 year old  female who is employed and resides independently.    Chief complaint:  \" I would never do anything to hurt myself.  I was just drinking too much and said some things I did not mean.  I am not suicidal.\"    History of present illness: The patient has a history of alcohol use disorder who presented to the emergency room by EMS who were contacted by her boyfriend who was reporting concern for suicidal ideation.  She was placed on a health officer hold and transferred to our behavioral health unit.  In the emergency room, her breathalyzer test was 0.  She has not demonstrated alcohol withdrawal symptoms since being admitted to the behavioral health unit.  On examination today, the patient announced any likelihood that she was planning on committing suicide.  She admits to the likelihood of making comments that may have been interpreted that way in the context of alcohol intoxication while in the midst of a verbal conflict with her ex-boyfriend.  She goes on to explain that her ex-boyfriend broke up with her in March of this year however they continue to spend time with one another casually.  On the day of her admission, she was off of work and therefore began to consume alcohol earlier than she typically does, estimated to begin drinking around 3 PM.  She may have had somewhere between 5-8 cocktails, typically mixed with whiskey or vodka, and recalls her last alcoholic beverage a little after midnight.  She and her boyfriend were spending the evening with one another until he abruptly decided to leave at around 2 AM.  She questioned why he was leaving and was not satisfied with his answer which progressed to a verbal conflict and emotional reaction.  She recalls " that he had left the apartment and was downstairs in the parking lot at which point she was standing on the balcony to maintain conversation with him.  She denies any accusation that she was standing over the rails or attempting to climb up the rails.  She does admit to making comments indicating that she may jump however attributes these comments to alcohol intoxication and reports no actual intent to do so.  Her ex-boyfriend was concerned enough to call the police leading to her hospitalization today.  She adds that over the past few years, she has been consuming alcohol excessively.  She has had moments of sobriety however typically reintroduces alcohol and finds it difficult to reduce or stop her usage.  She drinks approximately 3-5 mixed beverages on a weekday and slightly more if she is not working.  She has noticed mild withdrawal symptoms such as shakes, nausea and mild malaise.  She recalls being hospitalized briefly 1 year ago to rule out the possibility of a withdrawal seizure.  She does admit that her alcohol use has been problematic and expressed some interest in pursuing treatment.  She denies any preceding mental health symptoms or history of mental health treatment.  Today, her treatment goals are to be discharged from the hospital so that she may return home to her kitten and resume her work schedule.  She does not desire continuing hospitalization voluntarily for any treatment related reason.    Psychiatric Review of Systems:    -- Depressive episode: Denied symptoms.  Denies suicidal thoughts.  Denies homicidal thoughts.  No vegetative symptoms.  Denied anhedonia.  -- Miriam:  denies symptoms  -- Psychosis:  denies symptoms  -- Anxiety: denies symptoms corresponding to ROSETTE or panic disorder  -- PTSD: denies symptoms  -- OCD: denies  symptoms  -- Eating disorder: denies symptoms    Psychiatric History:    No history of treatment for mental illness.  No prior suicide attempts.  No prior inpatient  "hospitalizations.    Substance Use History:    Drug of choice is alcohol with pattern of usage corresponding to dependence further reporting tolerance, progressive usage, loss of control over usage, and various negative consequences stemming from her usage.  She has been hospitalized to rule out withdrawal seizures and eventually determined that she did not experience a seizure and likely a mild withdrawal delirium in 2019.  She recalls briefly attending court ordered treatment in her early 20s after a public intoxication charge.  No other history of formal chemical dependency treatment reported.  She denied illicit substance usage.    Medical History: No active issues;    No current facility-administered medications on file prior to encounter.   folic acid (FOLVITE) 1 MG tablet, Take 1 tablet (1 mg) by mouth daily  magnesium 250 MG tablet, Take 1 tablet by mouth daily  potassium 99 MG TABS, Take 1 tablet by mouth daily         Social History:  Refer to the psychosocial assessment completed by our .  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  \"Fine\"     Family History:    The patient denied a family history of mental illnesses or suicide attempts    Medical review of systems: 10 systems were reviewed and positive for psychiatric symptoms as noted above otherwise negative    Physical Exam:    B/P: 154/104, T: 98.4, P: 100, R: 16  Estimated body mass index is 18.76 kg/m  as calculated from the following:    Height as of this encounter: 1.702 m (5' 7\").    Weight as of this encounter: 54.3 kg (119 lb 12.8 oz).    The rest of the physical examination was reviewed in the emergency room note completed by the emergency room physician.      Mental status examination:  Appearance:  Alert, fair hygiene, no acute distress  Attitude:  Attempts to be cooperative  Eye Contact: Fair  Mood: \"Fine\"  Affect: Mood congruent, full, appropriate.  She did not appear overtly depressed or " anxious.  Speech:  Normal rates, tone, latency, volume. Not pushed or pressured.  Psychomotor Behavior:  No psychomotor abnormalities noted  Thought Process: Linear and logical; not tangential or circumstantial or disorganized  Associations:  Logical; no loose associations Noted  Thought Content:  No obvious paranoia, delusions, ideas of reference, or grandiosity noted. Denies auditory or visual hallucinations. Denies suicidal Ideations. Denies homicidal ideations.  Insight:  Fair  Judgment:  Fair  Oriented to:  time, person, and place  Attention Span and Concentration:  Intact  Recent and Remote Memory: Intact based on interviewing and details provided  Language: Appropriate based on interviewing  Fund of Knowledge: Appropriate based on interviewing  Muscle Strength and Tone: Normal upon visual inspection  Gait and Station: Normal upon visual inspection            Diagnoses:    Substance-induced mood disorder (alcohol-related)  Alcohol use disorder, moderate  Relational conflict with intimate partner.  Hypokalemia    Hospital course (Refer to H&P, progress notes, and consult notes for details)  The patient was admitted to our behavioral health unit under a health officer hold for evaluation of her acute suicide risk.  After meeting with the patient, she did not demonstrate symptoms corresponding to a depressive episode and described the situation where she may have been experiencing a substance-induced mood disorder in the context of a conflict with her ex-boyfriend.  Now that she has regain sobriety, her affect appeared fairly normal and she adamantly denied suicidal thoughts or intent.  She was future oriented and expressed a desire to pursue sobriety from alcohol.  Barriers to doing so involved lack of health insurance and concern regarding the potential cost of treatment interventions.  She had the opportunity to meet with our  to gain resources on applying for medical assistance as well as  obtaining a rule 25 assessment to address these barriers.  The patient was determined not to meet criteria to be placed under an involuntary hold.  She was not interested in continuing her hospitalization voluntarily and was requesting to be discharged home.  Noting that the patient maintain the right to request discharge from the hospital, her request was granted.    Additional interventions involved opportunity to attend therapeutic programming on the unit, meeting with the internal medicine team for consultation who assisted in managing hypokalemia, in addition to utilizing social work consultation to gather additional resources to optimize her outpatient care plan.    Condition at discharge: Improved.  The patients acute suicide risk is low due to the following factors:  improved mood/anxiety symptoms.  Denies suicidal ideations. Denies psychotic symptoms.  Not actively intoxicated and plans to abstain from illicit substances and alcohol.  Denies access to guns.  Denies feeling hopeless or helpless. At the time of discharge Maddie Orourke was determined to not be an immediate danger to herself or others. The patient's acute risk will be higher if noncompliant with treatment plan, medications, follow-up or using illicit substances or alcohol.  These findings along with the risks of noncompliance with medications and treatment plan, which could potentially cause decompensation and increase the risk for suicide, were discussed with the patient.  The patients chronic suicide risk is moderate given the following factors:  white race; history of chemical dependency; Denied a family history of suicide.  Preventative factors include: social supports, stable housing, employment     Disposition: The patient is discharged home     Follow-up appointments: ( per social workers notes and after visit summary)   We do recommend you follow up with getting a Rule 25 evaluation and subsequent chemical dependency treatment, to  help you deal with your alcohol addiction.     There are not many options regarding getting a no-charge Rule 25 evaluation (you have no medical insurance at this time.)    So, here are two places to try:     1) Michelle at [facility] in [place].   Phone:   [phone].    They do not schedule appointments.    This is a walk- in eval, first come, first served.  You should show up at 6:45am because they start seeing people at 7am.     2)  [facility] in [facility].       Phone:  [phone].      This would be a telehealth visit, not an in-person visit.   Their Rule 25  recently left the job so you should try calling them the week of Mon June 29th. To ask if they now have a new  who can do your Rule 25 evaluation.      DISCHARGE MEDICATIONS:   Current Discharge Medication List      CONTINUE these medications which have NOT CHANGED    Details   folic acid (FOLVITE) 1 MG tablet Take 1 tablet (1 mg) by mouth daily  Qty: 30 tablet, Refills: 0    Associated Diagnoses: Alcohol withdrawal syndrome without complication (H)      magnesium 250 MG tablet Take 1 tablet by mouth daily      potassium 99 MG TABS Take 1 tablet by mouth daily              LABORATORY RESULTS: (past 14 days)  Recent Results (from the past 336 hour(s))   Asymptomatic COVID-19 Virus (Coronavirus) by PCR    Collection Time: 06/23/20  6:30 AM    Specimen: Nasopharyngeal   Result Value Ref Range    COVID-19 Virus PCR to [facility] - Source Nasopharyngeal     COVID-19 Virus PCR to [facility] - Result       Test received-See reflex to IDDL test SARS CoV2 (COVID-19) Virus RT-PCR   SARS-CoV-2 COVID-19 Virus (Coronavirus) RT-PCR Nasopharyngeal    Collection Time: 06/23/20  6:30 AM    Specimen: Nasopharyngeal   Result Value Ref Range    SARS-CoV-2 Virus Specimen Source Nasopharyngeal     SARS-CoV-2 PCR Result NEGATIVE     SARS-CoV-2 PCR Comment       Testing was performed using the [employer] COVID-19 Direct Assay on the [employer]  Satnam Klutch   instrument. Additional information about this Emergency Use Authorization (EUA) assay can   be found via the Lab Guide.     CBC with platelets + differential    Collection Time: 06/23/20  6:44 AM   Result Value Ref Range    WBC 4.7 4.0 - 11.0 10e9/L    RBC Count 4.47 3.8 - 5.2 10e12/L    Hemoglobin 14.5 11.7 - 15.7 g/dL    Hematocrit 42.1 35.0 - 47.0 %    MCV 94 78 - 100 fl    MCH 32.4 26.5 - 33.0 pg    MCHC 34.4 31.5 - 36.5 g/dL    RDW 14.2 10.0 - 15.0 %    Platelet Count 266 150 - 450 10e9/L    Diff Method Automated Method     % Neutrophils 50.2 %    % Lymphocytes 42.7 %    % Monocytes 6.7 %    % Eosinophils 0.0 %    % Basophils 0.4 %    % Immature Granulocytes 0.0 %    Nucleated RBCs 0 0 /100    Absolute Neutrophil 2.3 1.6 - 8.3 10e9/L    Absolute Lymphocytes 2.0 0.8 - 5.3 10e9/L    Absolute Monocytes 0.3 0.0 - 1.3 10e9/L    Absolute Eosinophils 0.0 0.0 - 0.7 10e9/L    Absolute Basophils 0.0 0.0 - 0.2 10e9/L    Abs Immature Granulocytes 0.0 0 - 0.4 10e9/L    Absolute Nucleated RBC 0.0    Comprehensive metabolic panel    Collection Time: 06/23/20  6:44 AM   Result Value Ref Range    Sodium 139 133 - 144 mmol/L    Potassium 2.6 (LL) 3.4 - 5.3 mmol/L    Chloride 102 94 - 109 mmol/L    Carbon Dioxide 25 20 - 32 mmol/L    Anion Gap 12 3 - 14 mmol/L    Glucose 115 (H) 70 - 99 mg/dL    Urea Nitrogen 6 (L) 7 - 30 mg/dL    Creatinine 0.38 (L) 0.52 - 1.04 mg/dL    GFR Estimate >90 >60 mL/min/[1.73_m2]    GFR Estimate If Black >90 >60 mL/min/[1.73_m2]    Calcium 8.1 (L) 8.5 - 10.1 mg/dL    Bilirubin Total 0.5 0.2 - 1.3 mg/dL    Albumin 3.7 3.4 - 5.0 g/dL    Protein Total 7.1 6.8 - 8.8 g/dL    Alkaline Phosphatase 285 (H) 40 - 150 U/L     (H) 0 - 50 U/L     (H) 0 - 45 U/L   TSH with free T4 reflex    Collection Time: 06/23/20  6:44 AM   Result Value Ref Range    TSH 3.64 0.40 - 4.00 mU/L   Potassium    Collection Time: 06/23/20  4:47 PM   Result Value Ref Range    Potassium 2.8 (L) 3.4 - 5.3  mmol/L   Drug abuse screen urine    Collection Time: 06/23/20  8:10 PM   Result Value Ref Range    Amphetamine Qual Urine Negative NEG^Negative    Barbiturates Qual Urine Negative NEG^Negative    Benzodiazepine Qual Urine Positive (A) NEG^Negative    Cannabinoids Qual Urine Negative NEG^Negative    Cocaine Qual Urine Negative NEG^Negative    Opiates Qualitative Urine Negative NEG^Negative    PCP Qual Urine Negative NEG^Negative   Potassium    Collection Time: 06/23/20  9:31 PM   Result Value Ref Range    Potassium 3.4 3.4 - 5.3 mmol/L   Comprehensive metabolic panel    Collection Time: 06/24/20  7:59 AM   Result Value Ref Range    Sodium 137 133 - 144 mmol/L    Potassium 2.7 (L) 3.4 - 5.3 mmol/L    Chloride 100 94 - 109 mmol/L    Carbon Dioxide 29 20 - 32 mmol/L    Anion Gap 8 3 - 14 mmol/L    Glucose 111 (H) 70 - 99 mg/dL    Urea Nitrogen 4 (L) 7 - 30 mg/dL    Creatinine 0.49 (L) 0.52 - 1.04 mg/dL    GFR Estimate >90 >60 mL/min/[1.73_m2]    GFR Estimate If Black >90 >60 mL/min/[1.73_m2]    Calcium 9.0 8.5 - 10.1 mg/dL    Bilirubin Total 1.7 (H) 0.2 - 1.3 mg/dL    Albumin 4.1 3.4 - 5.0 g/dL    Protein Total 7.4 6.8 - 8.8 g/dL    Alkaline Phosphatase 280 (H) 40 - 150 U/L     (H) 0 - 50 U/L     (H) 0 - 45 U/L   HCG qualitative    Collection Time: 06/24/20  7:59 AM   Result Value Ref Range    HCG Qualitative Serum Negative NEG^Negative   Magnesium    Collection Time: 06/24/20  7:59 AM   Result Value Ref Range    Magnesium 1.8 1.6 - 2.3 mg/dL           Visit/Communication Style   VIRTUAL (VIDEO) communication was used to evaluate Maddie due to the COVID pandemic.    Maddie consented to the use of video communication: yes  Video START time: 12 PM, 6/24/2020  Video STOP time: 12:30 PM 6/24/2020   Patient's location: [facility]   Provider's location during the visit: Home

## 2020-06-24 NOTE — CONSULTS
[facility]  Consult Note - Hospitalist Service     Date of Admission:  6/23/2020  Consult Requested by: Dr. Augustine  Reason for Consult: Medical H&P    PRIMARY CARE PROVIDER:    No Ref-Primary, Physician    Assessment & Plan   Maddie Orourke is a 30 year old female admitted on 6/23/2020.    Past medical history significant for alcohol dependence, alcohol related seizure and GERD who was admitted to inpatient psych due to suicidal ideation.      Suicidal ideation:  Patient broke up with her boyfriend a few months ago and continues to live with him.  On 6/22-23, patient had been drinking and got into an argument with her ex-boyfriend.  When [facility] to leave [facility] and threatened to jump.    TSH level WNL (3.64), HCG negative.    --Management per psych.      Alcohol dependence:  Patient consumes 5 alcohol beverages per day.  Previous admission in 5/2019 patient was counseled regarding cessation of alcohol use due to alcohol related seizures.    -Urine tox positive for benzo's but suspect this is due to patient receiving Valium based of CIWA prior to collection of urine.    --Multivitamin, thiamine and folic acid replacement daily.    --CIWA monitoring.    --Defer to psych, possible chem dep referral.      Alcohol induced seizure:  Patient with a history of seizure that occurred in [place] and thought to be related to alcohol use.  Previously admitted 5/2019 and underwent Neuro work-up in the setting of altered mental status.      Severe hypokalemia in setting of chronic hypokalemia:  Potassium level of 2.6 upon admit.  Appears patient takes potassium 99 mg tablet daily and reported a 3 day history of diarrhea.    -Magnesium level WNL at 1.8.    Recent Labs   Lab 06/24/20  0759 06/23/20  2131 06/23/20  1647 06/23/20  0644   POTASSIUM 2.7* 3.4 2.8* 2.6*   --Monitor.    --Hold PTA replacement.    --Replace per protocol, high intensity.         Suspected alcohol induced hepatitis:  Patient admits to drinking and has elevated LFT's (Alk Phos of 285, , ).  -Repeat labs shows improvement in LFT's.    --Monitor.    GERD:  Patient had previously been taking Zantac but stopped this medication on her own.    --Monitor.      Diet: Regular Diet Adult     DVT Prophylaxis: Ambulate every shift   Escamilla Catheter: not present  Code Status: Full Code     Disposition Plan    Expected discharge: Per Psych  Entered: Brian Ledesma PA-C 06/24/2020, 6:42 AM        The patient's care was discussed with the Patient.    The patient has been discussed with Dr. Ybarra, who agrees with the assessment and plan at this time. Dr. Ybarra will evaluate the patient independently.     At this time, the Hospitalist service will sign off.  Please reconsult if any questions or concerns arise.        Brian Ledesma PA-C  [facility]    ______________________________________________________________________    Chief Complaint   Suicidal Ideation.    History is obtained from the patient and EMR.      History of Present Illness   Maddie Orourke is a 30 year old female with a past medical history significant for alcohol dependence, alcohol related seizure and GERD who was admitted to inpatient psych due to suicidal ideation.      Patient was brought into [facility] ED via EMS after threatening to jump off her balcony.  She denied this but EMS reported that patient was found on the outside of [place] [employer] [place] and did hear the patient make the statements about jumping.      Patient broke up with her boyfriend a few months ago and continues to live with him.  On 6/22-23, patient had been drinking and got into an argument with her ex-boyfriend.  When he attempted to leave she went out to the balcony and climbed over the railing and threatened to jump.      Dr. Dia and Ca evaluated the patient.  Asymptomatic  COVID-19 PCR negative.  CMP with potassium of 2.6, creatinine of 0.38, GFR >90, BUN of 6, Calcium of 8.1, Alk Phos of 285, , , glucose of 115.  TSH within normal limits at 115.      Patient was seen in her hospital room.  She indicated that she occasionally has palpitations.  She has had diarrhea for the past 3 days.  She feels she bruises easily.  When asked about her mood she said it is up and down due to COVID.  Patient's last seizure was about a year ago.  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      She indicated that her boyfriend only comes to the apartment once a week to shower and get clothes.  She mentioned she has a 10-month old puppy.  When asked about alcohol use she was binge drinking over the weekend.  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  Attempted to quantify her alcohol use and she said it was variable.  Previous notes per ED and psych noted at least 5 drinks per day.  Patient was asked about her relationship and she said it was strained due to COVID.      Review of Systems   The 10 point Review of Systems is negative other than noted in the HPI.    Past Medical History    I have reviewed this patient's medical history and updated it with pertinent information if needed.   History reviewed. No pertinent past medical history.   Alcohol dependence  Alcohol related sseizure  GERD    Past Surgical History   I have reviewed this patient's surgical history and updated it with pertinent information if needed.  History reviewed. No pertinent surgical history.   None.      Social History   I have reviewed this patient's social history and updated it with pertinent information if needed.  Patient resides with her ex-boyfriend/boyfriend.  She smokes a quarter pack of cigarettes per day.  She consumes 5 alcohol drinks per day, usually 5 shots per previous notes.  Upon  questioning, she stated that she at least consumes 3 drinks per day.  She denied use of illicit drugs.  She does not utilize a cane or walker.    Social History     Tobacco Use     Smoking status: None   Substance Use Topics     Alcohol use: Yes     Drug use: None       Family History   I have reviewed this patient's family history and updated it with pertinent information if needed.   No family history on file.   Father:  HTN.    Mother:  Hyperthyroidism.      Medications   Prior to Admission Medications   Prescriptions Last Dose Informant Patient Reported? Taking?   folic acid (FOLVITE) 1 MG tablet   No No   Sig: Take 1 tablet (1 mg) by mouth daily   multivitamin w/minerals (THERA-VIT-M) tablet   Yes No   Sig: Take 1 tablet by mouth daily   ranitidine (ZANTAC) 75 MG tablet  Spouse/Significant Other Yes No   Sig: Take 75 mg by mouth 2 times daily as needed for heartburn    vitamin B1 (THIAMINE) 100 MG tablet   No No   Sig: Take 1 tablet (100 mg) by mouth daily      Facility-Administered Medications: None     Allergies   No Known Allergies    Physical Exam   Vital Signs: Temp: 98.2  F (36.8  C) Temp src: Oral BP: 133/89 Pulse: 101 Heart Rate: 99 Resp: 16 SpO2: 99 % O2 Device: None (Room air)    Weight: 119 lbs 12.8 oz    Constitutional: Awake, alert, cooperative, no apparent distress.    ENT: Normocephalic, without obvious abnormality, atraumatic, oral pharynx with moist mucus membranes, tonsils without erythema or exudates.  Eyes pupils are equal, round and reactive to light; extra occular movements intact.  Normal sclera.    Neck: Supple, symmetrical, trachea midline, no adenopathy.  Pulmonary: No increased work of breathing, good air exchange, clear to auscultation bilaterally, no crackles or wheezing.  Cardiovascular: Regular rate and rhythm, slightly tachy, normal S1 and S2, no S3 or S4, and no murmur noted.  GI: Normal bowel sounds, soft, non-distended, non-tender.    Skin/Integumen: Clear.  Neuro: CN II-XII  grossly intact.  Upper and lower extremities strength, coordination and sensation intact bilaterally.    Psych:  Alert and oriented x 3. Normal affect.  Extremities: No lower extremity edema noted, and calves are non-tender to palpation bilaterally.      Data   Results for orders placed or performed during the hospital encounter of 06/23/20 (from the past 24 hour(s))   Potassium   Result Value Ref Range    Potassium 2.8 (L) 3.4 - 5.3 mmol/L   Drug abuse screen urine   Result Value Ref Range    Amphetamine Qual Urine Negative NEG^Negative    Barbiturates Qual Urine Negative NEG^Negative    Benzodiazepine Qual Urine Positive (A) NEG^Negative    Cannabinoids Qual Urine Negative NEG^Negative    Cocaine Qual Urine Negative NEG^Negative    Opiates Qualitative Urine Negative NEG^Negative    PCP Qual Urine Negative NEG^Negative   Potassium   Result Value Ref Range    Potassium 3.4 3.4 - 5.3 mmol/L   Comprehensive metabolic panel   Result Value Ref Range    Sodium 137 133 - 144 mmol/L    Potassium 2.7 (L) 3.4 - 5.3 mmol/L    Chloride 100 94 - 109 mmol/L    Carbon Dioxide 29 20 - 32 mmol/L    Anion Gap 8 3 - 14 mmol/L    Glucose 111 (H) 70 - 99 mg/dL    Urea Nitrogen 4 (L) 7 - 30 mg/dL    Creatinine 0.49 (L) 0.52 - 1.04 mg/dL    GFR Estimate >90 >60 mL/min/[1.73_m2]    GFR Estimate If Black >90 >60 mL/min/[1.73_m2]    Calcium 9.0 8.5 - 10.1 mg/dL    Bilirubin Total 1.7 (H) 0.2 - 1.3 mg/dL    Albumin 4.1 3.4 - 5.0 g/dL    Protein Total 7.4 6.8 - 8.8 g/dL    Alkaline Phosphatase 280 (H) 40 - 150 U/L     (H) 0 - 50 U/L     (H) 0 - 45 U/L   HCG qualitative   Result Value Ref Range    HCG Qualitative Serum Negative NEG^Negative   Magnesium   Result Value Ref Range    Magnesium 1.8 1.6 - 2.3 mg/dL

## 2020-08-22 ENCOUNTER — HOSPITAL ENCOUNTER (INPATIENT)
Facility: CLINIC | Age: 30
LOS: 5 days | Discharge: HOME | DRG: 897 | End: 2020-08-27
Attending: STUDENT IN AN ORGANIZED HEALTH CARE EDUCATION/TRAINING PROGRAM | Admitting: INTERNAL MEDICINE
Payer: MEDICAID

## 2020-08-22 ENCOUNTER — APPOINTMENT (OUTPATIENT)
Dept: ULTRASOUND IMAGING | Facility: CLINIC | Age: 30
DRG: 897 | End: 2020-08-22
Attending: INTERNAL MEDICINE

## 2020-08-22 DIAGNOSIS — Z20.822 2019-NCOV NOT DETECTED: ICD-10-CM

## 2020-08-22 DIAGNOSIS — R62.7 FAILURE TO THRIVE IN ADULT: ICD-10-CM

## 2020-08-22 DIAGNOSIS — E87.6 HYPOKALEMIA: ICD-10-CM

## 2020-08-22 LAB
ALBUMIN SERPL-MCNC: 3.4 G/DL (ref 3.4–5)
ALCOHOL BREATH TEST: 0.2 (ref 0–0.01)
ALP SERPL-CCNC: 443 U/L (ref 40–150)
ALT SERPL W P-5'-P-CCNC: 193 U/L (ref 0–50)
ANION GAP SERPL CALCULATED.3IONS-SCNC: 12 MMOL/L (ref 3–14)
APAP SERPL-MCNC: <2 MG/L (ref 10–20)
AST SERPL W P-5'-P-CCNC: 361 U/L (ref 0–45)
BASOPHILS # BLD AUTO: 0 10E9/L (ref 0–0.2)
BASOPHILS NFR BLD AUTO: 0.3 %
BILIRUB SERPL-MCNC: 0.8 MG/DL (ref 0.2–1.3)
BUN SERPL-MCNC: 5 MG/DL (ref 7–30)
CALCIUM SERPL-MCNC: 6.5 MG/DL (ref 8.5–10.1)
CHLORIDE SERPL-SCNC: 99 MMOL/L (ref 94–109)
CO2 SERPL-SCNC: 31 MMOL/L (ref 20–32)
CREAT SERPL-MCNC: 0.42 MG/DL (ref 0.52–1.04)
DIFFERENTIAL METHOD BLD: ABNORMAL
EOSINOPHIL # BLD AUTO: 0 10E9/L (ref 0–0.7)
EOSINOPHIL NFR BLD AUTO: 0.2 %
ERYTHROCYTE [DISTWIDTH] IN BLOOD BY AUTOMATED COUNT: 14.4 % (ref 10–15)
GFR SERPL CREATININE-BSD FRML MDRD: >90 ML/MIN/{1.73_M2}
GLUCOSE SERPL-MCNC: 137 MG/DL (ref 70–99)
HCG SERPL QL: NEGATIVE
HCT VFR BLD AUTO: 39.8 % (ref 35–47)
HGB BLD-MCNC: 14.2 G/DL (ref 11.7–15.7)
IMM GRANULOCYTES # BLD: 0 10E9/L (ref 0–0.4)
IMM GRANULOCYTES NFR BLD: 0.3 %
LYMPHOCYTES # BLD AUTO: 3.4 10E9/L (ref 0.8–5.3)
LYMPHOCYTES NFR BLD AUTO: 58 %
MAGNESIUM SERPL-MCNC: 2 MG/DL (ref 1.6–2.3)
MCH RBC QN AUTO: 35.1 PG (ref 26.5–33)
MCHC RBC AUTO-ENTMCNC: 35.7 G/DL (ref 31.5–36.5)
MCV RBC AUTO: 99 FL (ref 78–100)
MONOCYTES # BLD AUTO: 0.4 10E9/L (ref 0–1.3)
MONOCYTES NFR BLD AUTO: 6.3 %
NEUTROPHILS # BLD AUTO: 2 10E9/L (ref 1.6–8.3)
NEUTROPHILS NFR BLD AUTO: 34.9 %
NRBC # BLD AUTO: 0 10*3/UL
NRBC BLD AUTO-RTO: 0 /100
PHOSPHATE SERPL-MCNC: 3.7 MG/DL (ref 2.5–4.5)
PLATELET # BLD AUTO: 264 10E9/L (ref 150–450)
POTASSIUM SERPL-SCNC: 2.1 MMOL/L (ref 3.4–5.3)
POTASSIUM SERPL-SCNC: 2.4 MMOL/L (ref 3.4–5.3)
PROT SERPL-MCNC: 6.6 G/DL (ref 6.8–8.8)
RBC # BLD AUTO: 4.04 10E12/L (ref 3.8–5.2)
SALICYLATES SERPL-MCNC: <2 MG/DL
SODIUM SERPL-SCNC: 142 MMOL/L (ref 133–144)
WBC # BLD AUTO: 5.8 10E9/L (ref 4–11)

## 2020-08-22 PROCEDURE — 80329 ANALGESICS NON-OPIOID 1 OR 2: CPT | Performed by: STUDENT IN AN ORGANIZED HEALTH CARE EDUCATION/TRAINING PROGRAM

## 2020-08-22 PROCEDURE — HZ2ZZZZ DETOXIFICATION SERVICES FOR SUBSTANCE ABUSE TREATMENT: ICD-10-PCS | Performed by: INTERNAL MEDICINE

## 2020-08-22 PROCEDURE — 96376 TX/PRO/DX INJ SAME DRUG ADON: CPT | Performed by: STUDENT IN AN ORGANIZED HEALTH CARE EDUCATION/TRAINING PROGRAM

## 2020-08-22 PROCEDURE — 25000128 H RX IP 250 OP 636: Performed by: INTERNAL MEDICINE

## 2020-08-22 PROCEDURE — 96361 HYDRATE IV INFUSION ADD-ON: CPT | Performed by: STUDENT IN AN ORGANIZED HEALTH CARE EDUCATION/TRAINING PROGRAM

## 2020-08-22 PROCEDURE — 76705 ECHO EXAM OF ABDOMEN: CPT

## 2020-08-22 PROCEDURE — 83735 ASSAY OF MAGNESIUM: CPT | Performed by: STUDENT IN AN ORGANIZED HEALTH CARE EDUCATION/TRAINING PROGRAM

## 2020-08-22 PROCEDURE — 25000132 ZZH RX MED GY IP 250 OP 250 PS 637: Performed by: INTERNAL MEDICINE

## 2020-08-22 PROCEDURE — 93005 ELECTROCARDIOGRAM TRACING: CPT | Performed by: STUDENT IN AN ORGANIZED HEALTH CARE EDUCATION/TRAINING PROGRAM

## 2020-08-22 PROCEDURE — 25000132 ZZH RX MED GY IP 250 OP 250 PS 637: Performed by: STUDENT IN AN ORGANIZED HEALTH CARE EDUCATION/TRAINING PROGRAM

## 2020-08-22 PROCEDURE — 99285 EMERGENCY DEPT VISIT HI MDM: CPT | Mod: 25 | Performed by: STUDENT IN AN ORGANIZED HEALTH CARE EDUCATION/TRAINING PROGRAM

## 2020-08-22 PROCEDURE — 90791 PSYCH DIAGNOSTIC EVALUATION: CPT

## 2020-08-22 PROCEDURE — 25000128 H RX IP 250 OP 636: Performed by: STUDENT IN AN ORGANIZED HEALTH CARE EDUCATION/TRAINING PROGRAM

## 2020-08-22 PROCEDURE — 36415 COLL VENOUS BLD VENIPUNCTURE: CPT | Performed by: INTERNAL MEDICINE

## 2020-08-22 PROCEDURE — U0003 INFECTIOUS AGENT DETECTION BY NUCLEIC ACID (DNA OR RNA); SEVERE ACUTE RESPIRATORY SYNDROME CORONAVIRUS 2 (SARS-COV-2) (CORONAVIRUS DISEASE [COVID-19]), AMPLIFIED PROBE TECHNIQUE, MAKING USE OF HIGH THROUGHPUT TECHNOLOGIES AS DESCRIBED BY CMS-2020-01-R: HCPCS | Performed by: STUDENT IN AN ORGANIZED HEALTH CARE EDUCATION/TRAINING PROGRAM

## 2020-08-22 PROCEDURE — 80053 COMPREHEN METABOLIC PANEL: CPT | Performed by: STUDENT IN AN ORGANIZED HEALTH CARE EDUCATION/TRAINING PROGRAM

## 2020-08-22 PROCEDURE — 25800030 ZZH RX IP 258 OP 636: Performed by: STUDENT IN AN ORGANIZED HEALTH CARE EDUCATION/TRAINING PROGRAM

## 2020-08-22 PROCEDURE — 25800030 ZZH RX IP 258 OP 636: Performed by: INTERNAL MEDICINE

## 2020-08-22 PROCEDURE — 85025 COMPLETE CBC W/AUTO DIFF WBC: CPT | Performed by: STUDENT IN AN ORGANIZED HEALTH CARE EDUCATION/TRAINING PROGRAM

## 2020-08-22 PROCEDURE — C9803 HOPD COVID-19 SPEC COLLECT: HCPCS | Performed by: STUDENT IN AN ORGANIZED HEALTH CARE EDUCATION/TRAINING PROGRAM

## 2020-08-22 PROCEDURE — 25000125 ZZHC RX 250: Performed by: INTERNAL MEDICINE

## 2020-08-22 PROCEDURE — 93010 ELECTROCARDIOGRAM REPORT: CPT | Mod: Z6 | Performed by: STUDENT IN AN ORGANIZED HEALTH CARE EDUCATION/TRAINING PROGRAM

## 2020-08-22 PROCEDURE — 84132 ASSAY OF SERUM POTASSIUM: CPT | Performed by: INTERNAL MEDICINE

## 2020-08-22 PROCEDURE — 84100 ASSAY OF PHOSPHORUS: CPT | Performed by: INTERNAL MEDICINE

## 2020-08-22 PROCEDURE — 96365 THER/PROPH/DIAG IV INF INIT: CPT | Performed by: STUDENT IN AN ORGANIZED HEALTH CARE EDUCATION/TRAINING PROGRAM

## 2020-08-22 PROCEDURE — 84703 CHORIONIC GONADOTROPIN ASSAY: CPT | Performed by: STUDENT IN AN ORGANIZED HEALTH CARE EDUCATION/TRAINING PROGRAM

## 2020-08-22 PROCEDURE — 82075 ASSAY OF BREATH ETHANOL: CPT | Performed by: STUDENT IN AN ORGANIZED HEALTH CARE EDUCATION/TRAINING PROGRAM

## 2020-08-22 PROCEDURE — 12000001 ZZH R&B MED SURG/OB UMMC

## 2020-08-22 PROCEDURE — 99223 1ST HOSP IP/OBS HIGH 75: CPT | Mod: AI | Performed by: INTERNAL MEDICINE

## 2020-08-22 PROCEDURE — 96375 TX/PRO/DX INJ NEW DRUG ADDON: CPT | Performed by: STUDENT IN AN ORGANIZED HEALTH CARE EDUCATION/TRAINING PROGRAM

## 2020-08-22 RX ORDER — POTASSIUM CHLORIDE 7.45 MG/ML
10 INJECTION INTRAVENOUS CONTINUOUS
Status: DISCONTINUED | OUTPATIENT
Start: 2020-08-22 | End: 2020-08-22 | Stop reason: ALTCHOICE

## 2020-08-22 RX ORDER — ONDANSETRON 2 MG/ML
4 INJECTION INTRAMUSCULAR; INTRAVENOUS ONCE
Status: COMPLETED | OUTPATIENT
Start: 2020-08-22 | End: 2020-08-22

## 2020-08-22 RX ORDER — LIDOCAINE 40 MG/G
CREAM TOPICAL
Status: DISCONTINUED | OUTPATIENT
Start: 2020-08-22 | End: 2020-08-27 | Stop reason: HOSPADM

## 2020-08-22 RX ORDER — POTASSIUM CHLORIDE 1.5 G/1.58G
20-40 POWDER, FOR SOLUTION ORAL
Status: DISCONTINUED | OUTPATIENT
Start: 2020-08-22 | End: 2020-08-23

## 2020-08-22 RX ORDER — NALOXONE HYDROCHLORIDE 0.4 MG/ML
.1-.4 INJECTION, SOLUTION INTRAMUSCULAR; INTRAVENOUS; SUBCUTANEOUS
Status: DISCONTINUED | OUTPATIENT
Start: 2020-08-22 | End: 2020-08-27 | Stop reason: HOSPADM

## 2020-08-22 RX ORDER — MAGNESIUM SULFATE HEPTAHYDRATE 40 MG/ML
2 INJECTION, SOLUTION INTRAVENOUS ONCE
Status: COMPLETED | OUTPATIENT
Start: 2020-08-22 | End: 2020-08-22

## 2020-08-22 RX ORDER — CALCIUM CARBONATE 500 MG/1
1000 TABLET, CHEWABLE ORAL 4 TIMES DAILY PRN
Status: DISCONTINUED | OUTPATIENT
Start: 2020-08-22 | End: 2020-08-27 | Stop reason: HOSPADM

## 2020-08-22 RX ORDER — POTASSIUM CL/LIDO/0.9 % NACL 10MEQ/0.1L
10 INTRAVENOUS SOLUTION, PIGGYBACK (ML) INTRAVENOUS
Status: DISCONTINUED | OUTPATIENT
Start: 2020-08-22 | End: 2020-08-23

## 2020-08-22 RX ORDER — POTASSIUM CHLORIDE 7.45 MG/ML
10 INJECTION INTRAVENOUS
Status: DISCONTINUED | OUTPATIENT
Start: 2020-08-22 | End: 2020-08-23

## 2020-08-22 RX ORDER — POTASSIUM CL/LIDO/0.9 % NACL 10MEQ/0.1L
10 INTRAVENOUS SOLUTION, PIGGYBACK (ML) INTRAVENOUS ONCE
Status: COMPLETED | OUTPATIENT
Start: 2020-08-22 | End: 2020-08-22

## 2020-08-22 RX ORDER — AMOXICILLIN 250 MG
2 CAPSULE ORAL 2 TIMES DAILY PRN
Status: DISCONTINUED | OUTPATIENT
Start: 2020-08-22 | End: 2020-08-27 | Stop reason: HOSPADM

## 2020-08-22 RX ORDER — ONDANSETRON 4 MG/1
4 TABLET, ORALLY DISINTEGRATING ORAL EVERY 6 HOURS PRN
Status: DISCONTINUED | OUTPATIENT
Start: 2020-08-22 | End: 2020-08-23

## 2020-08-22 RX ORDER — MAGNESIUM SULFATE HEPTAHYDRATE 40 MG/ML
2 INJECTION, SOLUTION INTRAVENOUS DAILY PRN
Status: DISCONTINUED | OUTPATIENT
Start: 2020-08-22 | End: 2020-08-26

## 2020-08-22 RX ORDER — ONDANSETRON 2 MG/ML
4 INJECTION INTRAMUSCULAR; INTRAVENOUS EVERY 6 HOURS PRN
Status: DISCONTINUED | OUTPATIENT
Start: 2020-08-22 | End: 2020-08-23

## 2020-08-22 RX ORDER — PROCHLORPERAZINE 25 MG
25 SUPPOSITORY, RECTAL RECTAL EVERY 12 HOURS PRN
Status: DISCONTINUED | OUTPATIENT
Start: 2020-08-22 | End: 2020-08-23

## 2020-08-22 RX ORDER — POTASSIUM CHLORIDE 750 MG/1
20-40 TABLET, EXTENDED RELEASE ORAL
Status: DISCONTINUED | OUTPATIENT
Start: 2020-08-22 | End: 2020-08-23

## 2020-08-22 RX ORDER — LANOLIN ALCOHOL/MO/W.PET/CERES
100 CREAM (GRAM) TOPICAL DAILY
Status: DISCONTINUED | OUTPATIENT
Start: 2020-08-23 | End: 2020-08-23

## 2020-08-22 RX ORDER — MULTIPLE VITAMINS W/ MINERALS TAB 9MG-400MCG
1 TAB ORAL DAILY
Status: DISCONTINUED | OUTPATIENT
Start: 2020-08-23 | End: 2020-08-27 | Stop reason: HOSPADM

## 2020-08-22 RX ORDER — AMOXICILLIN 250 MG
1 CAPSULE ORAL 2 TIMES DAILY PRN
Status: DISCONTINUED | OUTPATIENT
Start: 2020-08-22 | End: 2020-08-27 | Stop reason: HOSPADM

## 2020-08-22 RX ORDER — SODIUM CHLORIDE 9 MG/ML
INJECTION, SOLUTION INTRAVENOUS CONTINUOUS
Status: DISCONTINUED | OUTPATIENT
Start: 2020-08-22 | End: 2020-08-22

## 2020-08-22 RX ORDER — ATENOLOL 50 MG/1
50 TABLET ORAL DAILY PRN
Status: DISCONTINUED | OUTPATIENT
Start: 2020-08-22 | End: 2020-08-23

## 2020-08-22 RX ORDER — DIAZEPAM 5 MG
5-20 TABLET ORAL EVERY 30 MIN PRN
Status: DISCONTINUED | OUTPATIENT
Start: 2020-08-22 | End: 2020-08-26

## 2020-08-22 RX ORDER — PROCHLORPERAZINE MALEATE 5 MG
10 TABLET ORAL EVERY 6 HOURS PRN
Status: DISCONTINUED | OUTPATIENT
Start: 2020-08-22 | End: 2020-08-23

## 2020-08-22 RX ORDER — FOLIC ACID 1 MG/1
1 TABLET ORAL DAILY
Status: DISCONTINUED | OUTPATIENT
Start: 2020-08-23 | End: 2020-08-27 | Stop reason: HOSPADM

## 2020-08-22 RX ORDER — MAGNESIUM SULFATE HEPTAHYDRATE 40 MG/ML
4 INJECTION, SOLUTION INTRAVENOUS EVERY 4 HOURS PRN
Status: DISCONTINUED | OUTPATIENT
Start: 2020-08-22 | End: 2020-08-26

## 2020-08-22 RX ORDER — DIAZEPAM 10 MG
10 TABLET ORAL ONCE
Status: COMPLETED | OUTPATIENT
Start: 2020-08-22 | End: 2020-08-22

## 2020-08-22 RX ORDER — FAMOTIDINE 20 MG/1
20 TABLET, FILM COATED ORAL 2 TIMES DAILY
Status: DISCONTINUED | OUTPATIENT
Start: 2020-08-22 | End: 2020-08-26

## 2020-08-22 RX ORDER — POTASSIUM CHLORIDE 29.8 MG/ML
20 INJECTION INTRAVENOUS
Status: DISCONTINUED | OUTPATIENT
Start: 2020-08-22 | End: 2020-08-23

## 2020-08-22 RX ORDER — SODIUM CHLORIDE, SODIUM LACTATE, POTASSIUM CHLORIDE, CALCIUM CHLORIDE 600; 310; 30; 20 MG/100ML; MG/100ML; MG/100ML; MG/100ML
INJECTION, SOLUTION INTRAVENOUS CONTINUOUS
Status: DISCONTINUED | OUTPATIENT
Start: 2020-08-22 | End: 2020-08-24

## 2020-08-22 RX ADMIN — POTASSIUM BICARBONATE 25 MEQ: 978 TABLET, EFFERVESCENT ORAL at 15:51

## 2020-08-22 RX ADMIN — FAMOTIDINE 20 MG: 20 TABLET ORAL at 23:12

## 2020-08-22 RX ADMIN — CALCIUM GLUCONATE 2 G: 98 INJECTION, SOLUTION INTRAVENOUS at 21:24

## 2020-08-22 RX ADMIN — CALCIUM CARBONATE (ANTACID) CHEW TAB 500 MG 1000 MG: 500 CHEW TAB at 23:12

## 2020-08-22 RX ADMIN — SODIUM CHLORIDE 1000 ML: 9 INJECTION, SOLUTION INTRAVENOUS at 14:47

## 2020-08-22 RX ADMIN — MAGNESIUM SULFATE 2 G: 2 INJECTION INTRAVENOUS at 16:34

## 2020-08-22 RX ADMIN — ONDANSETRON 4 MG: 2 INJECTION INTRAMUSCULAR; INTRAVENOUS at 16:56

## 2020-08-22 RX ADMIN — PROCHLORPERAZINE EDISYLATE 10 MG: 5 INJECTION INTRAMUSCULAR; INTRAVENOUS at 23:18

## 2020-08-22 RX ADMIN — DIAZEPAM 10 MG: 10 TABLET ORAL at 19:03

## 2020-08-22 RX ADMIN — MAGNESIUM SULFATE 2 G: 2 INJECTION INTRAVENOUS at 23:11

## 2020-08-22 RX ADMIN — POTASSIUM CHLORIDE 10 MEQ: 7.46 INJECTION, SOLUTION INTRAVENOUS at 15:41

## 2020-08-22 RX ADMIN — SODIUM CHLORIDE 1000 ML: 9 INJECTION, SOLUTION INTRAVENOUS at 16:27

## 2020-08-22 RX ADMIN — SODIUM CHLORIDE, POTASSIUM CHLORIDE, SODIUM LACTATE AND CALCIUM CHLORIDE: 600; 310; 30; 20 INJECTION, SOLUTION INTRAVENOUS at 23:12

## 2020-08-22 RX ADMIN — FOLIC ACID: 5 INJECTION, SOLUTION INTRAMUSCULAR; INTRAVENOUS; SUBCUTANEOUS at 21:24

## 2020-08-22 RX ADMIN — ONDANSETRON 4 MG: 2 INJECTION INTRAMUSCULAR; INTRAVENOUS at 18:47

## 2020-08-22 RX ADMIN — DIAZEPAM 20 MG: 5 TABLET ORAL at 23:29

## 2020-08-22 RX ADMIN — Medication 10 MEQ: at 16:40

## 2020-08-22 ASSESSMENT — ENCOUNTER SYMPTOMS
ABDOMINAL PAIN: 0
HEADACHES: 0
BACK PAIN: 0
EYE PAIN: 0
CONSTIPATION: 0
NAUSEA: 0
DYSPHORIC MOOD: 1
WEAKNESS: 0
NECK PAIN: 0
DYSURIA: 0
AGITATION: 1
CHILLS: 0
EYE REDNESS: 0
WHEEZING: 0
NERVOUS/ANXIOUS: 1
HEMATURIA: 0
HYPERACTIVE: 0
WOUND: 0
SHORTNESS OF BREATH: 0
FLANK PAIN: 0
DIARRHEA: 0
DIZZINESS: 0
FACIAL SWELLING: 0
VOMITING: 0
SLEEP DISTURBANCE: 1
NUMBNESS: 0
EYE DISCHARGE: 0
FEVER: 0
SORE THROAT: 0
SEIZURES: 0
HALLUCINATIONS: 0
RHINORRHEA: 0

## 2020-08-22 NOTE — PHARMACY-ADMISSION MEDICATION HISTORY
Admission Medication History Completed by Pharmacy    See [facility] Admission Navigator for allergy information, preferred outpatient pharmacy, prior to admission medications and immunization status.     Medication History Sources:     Patient, Dispense report    Changes made to PTA medication list (reason):    Added: None    Deleted:   o Folic Acid (FOLVITE) 1 mg tablet: take 1 tab po daily  o Magnesium 250 mg tablet: take 1 tab po daily    Changed: None    Additional Information:    Patient was an okay historian.     Patient seemed a bit agitated, but she reports that the only thing she takes is Potassium.     Patient has not filled anything recently per dispense report    Prior to Admission medications    Medication Sig Last Dose Taking? Auth Provider   potassium 99 MG TABS Take 1 tablet by mouth daily Past Week at Unknown time Yes Unknown, Entered By History         Date completed: 08/22/20    Medication history completed by: Mary Day

## 2020-08-22 NOTE — ED PROVIDER NOTES
ED Provider Note  [facility]      History     Chief Complaint   Patient presents with     Altered Mental Status     police called by family: welfare check: not eating: drinking alot: told police: not eating in 5 days: dogs feces on floor: last month same stuff: ETOH abuse:      The history is provided by the patient.     Maddie Orourke is a 30 year old female with a medical history significant for alcohol abuse who presents to the Emergency Department via EMS for evaluation of alcohol abuse and a recent fall.  The patient's family called the police to the patient's house for a welfare check.  The patient has not eaten for the past 5 days and has not been drinking a lot of fluids besides alcohol.  The police put the patient on a hold and EMS brought the patient here for evaluation and management.    The patient reports that she had a fall on 8/15/2020, she denies any loss of consciousness during the fall.  She reports that this was a mechanical fall.  She denies being medically evaluated at that time.  The patient reports that since the fall she has been very tired and she has had a few episodes of vomiting.  She denies any headache and she reports that she denies any difficulty with walking.  The patient reports that she has been drinking alcohol, she is unable to tell us how much she is drinking daily, but she reports that she is drinking whiskey.  She denies any suicidal or homicidal ideation.  The patient notes a history of hypokalemia and that she is on a potassium supplement.  The patient reports that she has not eaten in the last 5 days because she does not feel that she has energy to get up and make herself food, which she reports is abnormal for her.  The patient states that she is not feeling depressed, but reports that she has had new stressors in her life recently.    The [facility]  saw the patient.    Past Medical History  History reviewed. No pertinent past medical  history.  History reviewed. No pertinent surgical history.  potassium 99 MG TABS  folic acid (FOLVITE) 1 MG tablet  magnesium 250 MG tablet      No Known Allergies  Family History  No family history on file.  Social History   Social History     Tobacco Use     Smoking status: None   Substance Use Topics     Alcohol use: Yes     Drug use: None      Past medical history, past surgical history, medications, allergies, family history, and social history were reviewed with the patient. No additional pertinent items.       Review of Systems   Constitutional: Negative for chills and fever.   HENT: Negative for ear pain, facial swelling, rhinorrhea and sore throat.    Eyes: Negative for pain, discharge and redness.   Respiratory: Negative for shortness of breath and wheezing.    Cardiovascular: Negative for chest pain.   Gastrointestinal: Negative for abdominal pain, constipation, diarrhea, nausea and vomiting.   Genitourinary: Negative for dysuria, flank pain, hematuria, vaginal bleeding and vaginal discharge.   Musculoskeletal: Negative for back pain and neck pain.   Skin: Negative for rash and wound.   Neurological: Negative for dizziness, seizures, weakness, numbness and headaches.   Psychiatric/Behavioral: Positive for agitation, behavioral problems, dysphoric mood and sleep disturbance. Negative for hallucinations, self-injury and suicidal ideas. The patient is nervous/anxious. The patient is not hyperactive.      A complete review of systems was performed with pertinent positives and negatives noted in the HPI, and all other systems negative.    Physical Exam   Pulse: 124  Temp: 97.6  F (36.4  C)  SpO2: 98 %  Physical Exam  Constitutional:       General: She is not in acute distress.     Appearance: Normal appearance. She is not diaphoretic.   HENT:      Head: Normocephalic and atraumatic.      Nose: Nose normal.      Mouth/Throat:      Mouth: Mucous membranes are moist.      Pharynx: Oropharynx is clear. No  oropharyngeal exudate.   Eyes:      General: Lids are normal. No scleral icterus.     Extraocular Movements: Extraocular movements intact.      Conjunctiva/sclera: Conjunctivae normal.      Pupils: Pupils are equal, round, and reactive to light.   Neck:      Musculoskeletal: Full passive range of motion without pain, normal range of motion and neck supple.   Cardiovascular:      Rate and Rhythm: Normal rate and regular rhythm.      Pulses: Normal pulses.      Heart sounds: Normal heart sounds. No murmur. No friction rub. No gallop.    Pulmonary:      Effort: Pulmonary effort is normal. No respiratory distress.      Breath sounds: Normal breath sounds. No stridor. No wheezing, rhonchi or rales.   Abdominal:      General: Bowel sounds are normal.      Palpations: Abdomen is soft.      Tenderness: There is no abdominal tenderness.   Musculoskeletal: Normal range of motion.         General: No tenderness.   Skin:     General: Skin is warm and dry.      Capillary Refill: Capillary refill takes less than 2 seconds.      Findings: No rash.   Neurological:      General: No focal deficit present.      Mental Status: She is oriented to person, place, and time. Mental status is at baseline.      GCS: GCS eye subscore is 4. GCS verbal subscore is 5. GCS motor subscore is 6.   Psychiatric:         Attention and Perception: Attention normal.         Mood and Affect: Mood normal.         Speech: Speech normal.         Behavior: Behavior is agitated.         Thought Content: Thought content does not include homicidal or suicidal ideation. Thought content does not include homicidal or suicidal plan.         Judgment: Judgment is impulsive and inappropriate.       ED Course     ED Course as of Aug 22 1854   Sat Aug 22, 2020   1531 Elevated   Alcohol Breath Test(!): 0.197   1531 Hypokalemic   Potassium(!!): 2.1     Procedures     2:22 PM  The patient was seen and examined by Darrick Shine MD in Room ED12.          EKG  Interpretation:      Interpreted by Darrick Shine MD  Time reviewed: 19:35  Symptoms at time of EKG: None  Rhythm: normal sinus   Rate: normal  Axis: normal  Ectopy: none  Conduction: normal  ST Segments/ T Waves: No ST-T wave changes  Q Waves: none  Comparison to prior: Resolution of prior tachycardia, anterior changes are not new. QTC prolongation is also not new    Clinical Impression: normal sinus rhythm, noted prolonged QTC             Results for orders placed or performed during the hospital encounter of 08/22/20   Alcohol breath test POCT     Status: Abnormal   Result Value Ref Range    Alcohol Breath Test 0.197 (A) 0.00 - 0.01     Medications - No data to display     Assessments & Plan (with Medical Decision Making)   This is a 30 year old female who comes to the ED for evaluation of failure to thrive and mental health concerns, PD was called by family and a  hold was signed. At present, given her history and my examination I do feel an acute emergency medical condition could be present and contributing to the symptoms of concern today. Considerations included delirium, infection, metabolic and endocrine abnormalities, medication effect, substance abuse or withdrawal or cute neurological process. The patient does qualify for a psychiatric hold, and is currently on a hold.     Given concern for metabolic etiology as well as possible suicidal attempt, CBC, CMP, magnesium, acetaminophen, salicylates, beta hCG were ordered.  Breath alcohol as well as COVID swab was also obtained.    COVID was obtained as an asymptomatic screening exam to help facilitate admission.    Breath alcohol was 0.197.  Patient does have significant history of alcohol abuse.    Patient has normal renal function, mild hyperglycemia, critical hypokalemia of 2.1.  This will require both oral and IV repletion, as well as repletion of magnesium.  Patient has significant elevated alk phos as well as ALT and AST, AST is  elevated greater than ALT which is concerning for ethanol etiology.  Patient has no significant abdominal exam findings.  She has no noted leukocytosis or anemia.  Both acetaminophen and salicylates are negative.  Patient is not currently pregnant.    I spoke with patient in an effort to convince her to come in willingly to the hospital, however she was unwilling to stay, more importantly after having her findings explained in plain and simple language, she was not able to explain in a cogent fashion why she did not wish to have her hypokalemia treated, given that she will require ongoing telemetry as well as repletion of her potassium and magnesium, she will be admitted for further care.  Help facilitate I placed her on a 72-hour hold until such time as it is felt that she is safe to discharge or the hold expires which ever occurs first.    I have reviewed the nursing notes. I have reviewed the findings, diagnosis, plan and need for follow up with the patient.    New Prescriptions    No medications on file       Final diagnoses:   None       --  I, Rico Bains, am serving as a trained medical scribe to document services personally performed by Darrick Shine MD, based on the provider's statements to me.     IDarrick MD, was physically present and have reviewed and verified the accuracy of this note documented by Rico Bains.    Darrick Shine MD  [facility], [place], EMERGENCY DEPARTMENT  8/22/2020     Darrick Shine MD  08/22/20 1948

## 2020-08-22 NOTE — ED NOTES
Bed: ED12  Expected date:   Expected time:   Means of arrival:   Comments:  [facility] 30F on a hold, failure to thrive.

## 2020-08-23 ENCOUNTER — APPOINTMENT (OUTPATIENT)
Dept: CT IMAGING | Facility: CLINIC | Age: 30
DRG: 897 | End: 2020-08-23
Attending: INTERNAL MEDICINE

## 2020-08-23 LAB
ALBUMIN SERPL-MCNC: 2.9 G/DL (ref 3.4–5)
ALBUMIN UR-MCNC: NEGATIVE MG/DL
ALP SERPL-CCNC: 416 U/L (ref 40–150)
ALT SERPL W P-5'-P-CCNC: 159 U/L (ref 0–50)
AMPHETAMINES UR QL SCN: NEGATIVE
ANION GAP SERPL CALCULATED.3IONS-SCNC: 9 MMOL/L (ref 3–14)
APPEARANCE UR: CLEAR
AST SERPL W P-5'-P-CCNC: 445 U/L (ref 0–45)
BACTERIA #/AREA URNS HPF: ABNORMAL /HPF
BARBITURATES UR QL: NEGATIVE
BASOPHILS # BLD AUTO: 0 10E9/L (ref 0–0.2)
BASOPHILS NFR BLD AUTO: 0.3 %
BENZODIAZ UR QL: POSITIVE
BILIRUB SERPL-MCNC: 1.6 MG/DL (ref 0.2–1.3)
BILIRUB UR QL STRIP: NEGATIVE
BUN SERPL-MCNC: 2 MG/DL (ref 7–30)
CALCIUM SERPL-MCNC: 6.2 MG/DL (ref 8.5–10.1)
CANNABINOIDS UR QL SCN: NEGATIVE
CHLORIDE SERPL-SCNC: 106 MMOL/L (ref 94–109)
CO2 SERPL-SCNC: 26 MMOL/L (ref 20–32)
COCAINE UR QL: NEGATIVE
COLOR UR AUTO: ABNORMAL
CREAT SERPL-MCNC: 0.38 MG/DL (ref 0.52–1.04)
DIFFERENTIAL METHOD BLD: ABNORMAL
EOSINOPHIL # BLD AUTO: 0 10E9/L (ref 0–0.7)
EOSINOPHIL NFR BLD AUTO: 0.1 %
ERYTHROCYTE [DISTWIDTH] IN BLOOD BY AUTOMATED COUNT: 14.6 % (ref 10–15)
ETHANOL UR QL SCN: NEGATIVE
GFR SERPL CREATININE-BSD FRML MDRD: >90 ML/MIN/{1.73_M2}
GLUCOSE SERPL-MCNC: 81 MG/DL (ref 70–99)
GLUCOSE UR STRIP-MCNC: NEGATIVE MG/DL
HCT VFR BLD AUTO: 32.4 % (ref 35–47)
HGB BLD-MCNC: 11 G/DL (ref 11.7–15.7)
HGB UR QL STRIP: NEGATIVE
IMM GRANULOCYTES # BLD: 0 10E9/L (ref 0–0.4)
IMM GRANULOCYTES NFR BLD: 0.1 %
KETONES UR STRIP-MCNC: NEGATIVE MG/DL
LEUKOCYTE ESTERASE UR QL STRIP: NEGATIVE
LYMPHOCYTES # BLD AUTO: 2.5 10E9/L (ref 0.8–5.3)
LYMPHOCYTES NFR BLD AUTO: 32.6 %
MAGNESIUM SERPL-MCNC: 1.7 MG/DL (ref 1.6–2.3)
MCH RBC QN AUTO: 34.3 PG (ref 26.5–33)
MCHC RBC AUTO-ENTMCNC: 34 G/DL (ref 31.5–36.5)
MCV RBC AUTO: 101 FL (ref 78–100)
MONOCYTES # BLD AUTO: 0.4 10E9/L (ref 0–1.3)
MONOCYTES NFR BLD AUTO: 5.4 %
MUCOUS THREADS #/AREA URNS LPF: PRESENT /LPF
NEUTROPHILS # BLD AUTO: 4.7 10E9/L (ref 1.6–8.3)
NEUTROPHILS NFR BLD AUTO: 61.5 %
NITRATE UR QL: NEGATIVE
NRBC # BLD AUTO: 0 10*3/UL
NRBC BLD AUTO-RTO: 0 /100
OPIATES UR QL SCN: NEGATIVE
PCP UR QL SCN: NEGATIVE
PH UR STRIP: 8 PH (ref 5–7)
PHOSPHATE SERPL-MCNC: 3.1 MG/DL (ref 2.5–4.5)
PLATELET # BLD AUTO: 181 10E9/L (ref 150–450)
POTASSIUM SERPL-SCNC: 3.1 MMOL/L (ref 3.4–5.3)
POTASSIUM SERPL-SCNC: 3.2 MMOL/L (ref 3.4–5.3)
PROT SERPL-MCNC: 5.5 G/DL (ref 6.8–8.8)
RBC # BLD AUTO: 3.21 10E12/L (ref 3.8–5.2)
RBC #/AREA URNS AUTO: 1 /HPF (ref 0–2)
SODIUM SERPL-SCNC: 141 MMOL/L (ref 133–144)
SOURCE: ABNORMAL
SP GR UR STRIP: 1.01 (ref 1–1.03)
SQUAMOUS #/AREA URNS AUTO: 1 /HPF (ref 0–1)
UROBILINOGEN UR STRIP-MCNC: NORMAL MG/DL (ref 0–2)
WBC # BLD AUTO: 7.6 10E9/L (ref 4–11)
WBC #/AREA URNS AUTO: 1 /HPF (ref 0–5)

## 2020-08-23 PROCEDURE — 25800030 ZZH RX IP 258 OP 636: Performed by: INTERNAL MEDICINE

## 2020-08-23 PROCEDURE — 83735 ASSAY OF MAGNESIUM: CPT | Performed by: INTERNAL MEDICINE

## 2020-08-23 PROCEDURE — 93010 ELECTROCARDIOGRAM REPORT: CPT | Performed by: INTERNAL MEDICINE

## 2020-08-23 PROCEDURE — 70450 CT HEAD/BRAIN W/O DYE: CPT

## 2020-08-23 PROCEDURE — 84100 ASSAY OF PHOSPHORUS: CPT | Performed by: INTERNAL MEDICINE

## 2020-08-23 PROCEDURE — 25000128 H RX IP 250 OP 636: Performed by: INTERNAL MEDICINE

## 2020-08-23 PROCEDURE — 36415 COLL VENOUS BLD VENIPUNCTURE: CPT | Performed by: INTERNAL MEDICINE

## 2020-08-23 PROCEDURE — 84132 ASSAY OF SERUM POTASSIUM: CPT | Performed by: INTERNAL MEDICINE

## 2020-08-23 PROCEDURE — 80307 DRUG TEST PRSMV CHEM ANLYZR: CPT | Performed by: INTERNAL MEDICINE

## 2020-08-23 PROCEDURE — 93005 ELECTROCARDIOGRAM TRACING: CPT

## 2020-08-23 PROCEDURE — 25000132 ZZH RX MED GY IP 250 OP 250 PS 637: Performed by: INTERNAL MEDICINE

## 2020-08-23 PROCEDURE — 80053 COMPREHEN METABOLIC PANEL: CPT | Performed by: INTERNAL MEDICINE

## 2020-08-23 PROCEDURE — 12000001 ZZH R&B MED SURG/OB UMMC

## 2020-08-23 PROCEDURE — 81001 URINALYSIS AUTO W/SCOPE: CPT | Performed by: INTERNAL MEDICINE

## 2020-08-23 PROCEDURE — 99221 1ST HOSP IP/OBS SF/LOW 40: CPT | Performed by: PSYCHIATRY & NEUROLOGY

## 2020-08-23 PROCEDURE — 85025 COMPLETE CBC W/AUTO DIFF WBC: CPT | Performed by: INTERNAL MEDICINE

## 2020-08-23 PROCEDURE — 99233 SBSQ HOSP IP/OBS HIGH 50: CPT | Performed by: INTERNAL MEDICINE

## 2020-08-23 PROCEDURE — 80320 DRUG SCREEN QUANTALCOHOLS: CPT | Performed by: INTERNAL MEDICINE

## 2020-08-23 RX ORDER — POTASSIUM CL/LIDO/0.9 % NACL 10MEQ/0.1L
10 INTRAVENOUS SOLUTION, PIGGYBACK (ML) INTRAVENOUS
Status: DISCONTINUED | OUTPATIENT
Start: 2020-08-23 | End: 2020-08-26

## 2020-08-23 RX ORDER — CLONIDINE HYDROCHLORIDE 0.1 MG/1
0.1 TABLET ORAL 2 TIMES DAILY
Status: DISCONTINUED | OUTPATIENT
Start: 2020-08-23 | End: 2020-08-26

## 2020-08-23 RX ORDER — POTASSIUM CHLORIDE 29.8 MG/ML
20 INJECTION INTRAVENOUS
Status: DISCONTINUED | OUTPATIENT
Start: 2020-08-23 | End: 2020-08-26

## 2020-08-23 RX ORDER — MAGNESIUM SULFATE HEPTAHYDRATE 40 MG/ML
4 INJECTION, SOLUTION INTRAVENOUS EVERY 4 HOURS PRN
Status: DISCONTINUED | OUTPATIENT
Start: 2020-08-23 | End: 2020-08-27 | Stop reason: HOSPADM

## 2020-08-23 RX ORDER — GABAPENTIN 300 MG/1
300 CAPSULE ORAL 3 TIMES DAILY
Status: DISCONTINUED | OUTPATIENT
Start: 2020-08-23 | End: 2020-08-27 | Stop reason: HOSPADM

## 2020-08-23 RX ORDER — MAGNESIUM SULFATE HEPTAHYDRATE 40 MG/ML
2 INJECTION, SOLUTION INTRAVENOUS DAILY PRN
Status: DISCONTINUED | OUTPATIENT
Start: 2020-08-23 | End: 2020-08-26

## 2020-08-23 RX ORDER — POTASSIUM CHLORIDE 750 MG/1
20-40 TABLET, EXTENDED RELEASE ORAL
Status: DISCONTINUED | OUTPATIENT
Start: 2020-08-23 | End: 2020-08-26

## 2020-08-23 RX ORDER — POTASSIUM CHLORIDE 7.45 MG/ML
10 INJECTION INTRAVENOUS
Status: DISCONTINUED | OUTPATIENT
Start: 2020-08-23 | End: 2020-08-26

## 2020-08-23 RX ORDER — POTASSIUM CHLORIDE 1.5 G/1.58G
20-40 POWDER, FOR SOLUTION ORAL
Status: DISCONTINUED | OUTPATIENT
Start: 2020-08-23 | End: 2020-08-26

## 2020-08-23 RX ADMIN — DIAZEPAM 10 MG: 5 TABLET ORAL at 08:56

## 2020-08-23 RX ADMIN — DIAZEPAM 5 MG: 5 TABLET ORAL at 15:50

## 2020-08-23 RX ADMIN — MAGNESIUM SULFATE 2 G: 2 INJECTION INTRAVENOUS at 08:27

## 2020-08-23 RX ADMIN — THIAMINE HYDROCHLORIDE 500 MG: 100 INJECTION, SOLUTION INTRAMUSCULAR; INTRAVENOUS at 19:18

## 2020-08-23 RX ADMIN — POTASSIUM CHLORIDE 20 MEQ: 750 TABLET, EXTENDED RELEASE ORAL at 11:11

## 2020-08-23 RX ADMIN — DIAZEPAM 10 MG: 5 TABLET ORAL at 02:23

## 2020-08-23 RX ADMIN — Medication 10 MEQ: at 21:17

## 2020-08-23 RX ADMIN — CLONIDINE HYDROCHLORIDE 0.1 MG: 0.1 TABLET ORAL at 19:18

## 2020-08-23 RX ADMIN — Medication 10 MEQ: at 00:55

## 2020-08-23 RX ADMIN — CLONIDINE HYDROCHLORIDE 0.1 MG: 0.1 TABLET ORAL at 09:52

## 2020-08-23 RX ADMIN — Medication 10 MEQ: at 02:05

## 2020-08-23 RX ADMIN — ONDANSETRON 4 MG: 2 INJECTION INTRAMUSCULAR; INTRAVENOUS at 02:10

## 2020-08-23 RX ADMIN — CALCIUM GLUCONATE 1 G: 98 INJECTION, SOLUTION INTRAVENOUS at 11:12

## 2020-08-23 RX ADMIN — Medication 10 MEQ: at 04:08

## 2020-08-23 RX ADMIN — Medication 10 MEQ: at 00:00

## 2020-08-23 RX ADMIN — Medication 10 MEQ: at 03:02

## 2020-08-23 RX ADMIN — Medication 10 MEQ: at 20:00

## 2020-08-23 RX ADMIN — FOLIC ACID 1 MG: 1 TABLET ORAL at 08:27

## 2020-08-23 RX ADMIN — THIAMINE HCL TAB 100 MG 100 MG: 100 TAB at 08:27

## 2020-08-23 RX ADMIN — GABAPENTIN 300 MG: 300 CAPSULE ORAL at 19:18

## 2020-08-23 RX ADMIN — GABAPENTIN 300 MG: 300 CAPSULE ORAL at 09:52

## 2020-08-23 RX ADMIN — GABAPENTIN 300 MG: 300 CAPSULE ORAL at 15:50

## 2020-08-23 RX ADMIN — POTASSIUM CHLORIDE 40 MEQ: 750 TABLET, EXTENDED RELEASE ORAL at 08:41

## 2020-08-23 RX ADMIN — FAMOTIDINE 20 MG: 20 TABLET ORAL at 19:18

## 2020-08-23 RX ADMIN — FAMOTIDINE 20 MG: 20 TABLET ORAL at 08:27

## 2020-08-23 RX ADMIN — Medication 10 MEQ: at 23:46

## 2020-08-23 RX ADMIN — MULTIPLE VITAMINS W/ MINERALS TAB 1 TABLET: TAB at 08:27

## 2020-08-23 RX ADMIN — Medication 10 MEQ: at 05:16

## 2020-08-23 RX ADMIN — SODIUM CHLORIDE, POTASSIUM CHLORIDE, SODIUM LACTATE AND CALCIUM CHLORIDE: 600; 310; 30; 20 INJECTION, SOLUTION INTRAVENOUS at 08:27

## 2020-08-23 NOTE — PLAN OF CARE
VS:    BP (!) 144/106   Pulse 91   Temp 98.7  F (37.1  C) (Oral)   Resp 18   Wt 56.4 kg (124 lb 4.8 oz)   SpO2 95%   BMI 19.47 kg/m      Output:    Voids spontaneously without difficulty into commode and into bathroom. Pt has had some loose stools today.    Activity:     Pt is SBA with gait belt. Up to commode multiple times today and up to bathroom x1.   Skin: Intact.    Pain:    Denies.   Neuro/CMS:    A&Ox4. CMS intact. Denies numbness/tingling. Denied SOB. K+, Mg, and Ca replaced today, and awaiting results from lab.    Dressing(s):     None   Diet:    Regular. Pt had intermittent nausea in morning but has since improved. Tolerated 30% of breakfast and lunch   Equipment:     IV pole.   IV's/Drains:    PIV in L antecube SL, PIV in r antecube infusing.    Plan:    Pt is on 72 hour hold beginning 08/24/20. Sitter at bedside for safety. Continue to monitor. Continue POC. Pt is able to make needs known.    Additional Info:

## 2020-08-23 NOTE — PROGRESS NOTES
[facility], [facility] Progress Note - Hospitalist Service       Date of Admission:  8/22/2020  Assessment & Plan   Maddie Orourke is a 30 year old female with a past medical history significant for alcohol dependence, alcohol related seizure and GERD who presented to the Emergency Department via EMS for evaluation of alcohol dependency, failure to thrive. Hx of recent fall.      # Alcohol dependency  # Alcohol intoxication  # Alcohol withdrawal -   # Hx of alcohol withdrawal seizure  # Elevated transaminases AST, ALT, ALP- suspect alcohol use related. ? Alcoholic hepatitis.   # Nausea, vomiting. ? Acute alcoholic gastropathy     - MSSA withdrawal protocol with diazepam. (preferred lorazepam 2.2 elevated lft. However pt prefers diazepam, monitor for sedation. Consider switching to lorazepam if e.o increased sedation)  - IV banana bag -> MVI, thiamine, folic acid daily.    -- Started gabapentin 8/23  -- Started clonidine 8/23      - RUQ US for elevated transaminases 8/22  1. Increased echogenicity of the liver likely related to hepatic  steatosis.  2. No cholelithiasis or cholecystitis.  3. Probable gallbladder polyps.  - Follow-up tranasminases  - Famotidine 20 po bid  - Fall, seizure precautions.  -  Psych consult  SW consult   Chem dep consult      # Severe hypokalemia in setting of chronic hypokalemia:  # hypo magnesemia   # hypocalcemia   # hypo phosphatemia    # QTc prolongation on EKG   Stopped Zofran   Stopped Compazine  Pharmacy consulted   Correct Electrolytes  Repeat EKG in am       Started on high intensity protocol with potassium and magnesium and phosphorus and IV calcium gluconate given       # Failure to thrive :  Hypoalbuminemia  Started boost shakes  Risk of refeeding syndrome    Nutrition consulted        # Hx of Fall ? Timing unclear   Mild head trauma . No focal deficit.   - Fall precautions  - PT consult.   Obtain CT head                Diet: Combination  Diet Regular Diet Adult    DVT Prophylaxis: Anti-embolisim stockings (TEDs)  Escamilla Catheter: not present  Code Status: Full Code               ______________________________________________________________________    Interval History   Feels better   Does not want to stay in hospital   Poor insight   No cp   No sob   No headache      Data reviewed today: I reviewed all medications, new labs and imaging results over the last 24 hours.   Physical Exam   Vital Signs: Temp: 98.4  F (36.9  C) Temp src: Oral BP: (!) 130/90 Pulse: 91   Resp: 18 SpO2: 95 % O2 Device: None (Room air)    Weight: 124 lbs 4.8 oz  Constitutional: dishelved , poor eye contact , looks older then stated age   Hematologic / Lymphatic: no cervical lymphadenopathy  Respiratory: No increased work of breathing, good air exchange, clear to auscultation bilaterally, no crackles or wheezing  Cardiovascular: Normal apical impulse, regular rate and rhythm, normal S1 and S2, no S3 or S4, and no murmur noted  GI: No scars, normal bowel sounds, soft, non-distended, non-tender, no masses palpated, no hepatosplenomegally    Data   Recent Labs   Lab 08/23/20  0620 08/22/20  2303 08/22/20  1438   WBC 7.6  --  5.8   HGB 11.0*  --  14.2   *  --  99     --  264     --  142   POTASSIUM 3.2* 2.4* 2.1*   CHLORIDE 106  --  99   CO2 26  --  31   BUN 2*  --  5*   CR 0.38*  --  0.42*   ANIONGAP 9  --  12   NISHI 6.2*  --  6.5*   GLC 81  --  137*   ALBUMIN 2.9*  --  3.4   PROTTOTAL 5.5*  --  6.6*   BILITOTAL 1.6*  --  0.8   ALKPHOS 416*  --  443*   *  --  193*   *  --  361*

## 2020-08-23 NOTE — PLAN OF CARE
Pt arrived on unit around 2230 and transferred to the bed. At the time alert and oriented x3. C/o nausea and vomiting. Given Compazine with good results. MSSA 22 and 16. Slept for most of the night, able to get up when using the bathroom and transferred to commode. Given valium and able to keep down both times. Experiencing soft BM's. Unable to get urine sample due to contamination from bowels. Pt tremulous with movements, and slow to respond. Tele NSR with tachycardic episodes when up to the commode. Prolonged QT. Pt c/o chest pain around 0600. Paged the moonlighter and they ordered an ECG. Came back unremarkable from previous ECG and chest pain resolved on its own after 10 min. Waiting for results of K+ AM draw to reassess replacement protocol.

## 2020-08-23 NOTE — CONSULTS
Consult Date:  08/23/2020      REQUESTING SOURCE:  Moe Garibay MD.      IDENTIFYING DATA:  This 30-year-old woman is seen for psychiatric consultation in regard to her alcohol use problems and screening for other psychiatric conditions.      HISTORY OF PRESENT ILLNESS:  Last Sunday, on 08/16, she had slipped in the shower and then hit her head.  She is a little vague about whether alcohol was involved.  Regardless, the next day due to a headache and some vomiting and diffuse muscle aches.  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  She was rather bored since she has started working on her 1.75 liter bottle of fireball whiskey.  She had completed this by the time the police could come to do a safety check.  The chiropractor had checked on her and were concerned so they called her parents, who in turn called the police.  She was brought in on a transport hold and is now on a 72-hour hold.      From a psychiatric perspective, she says she has never had episodes of significant depression and does not suffer from anxiety or panic attacks.  No significant mood swings, no manic or psychotic symptoms.  Never had DTs during alcohol withdrawals.      PAST PSYCHIATRIC HISTORY:  No medications or psychotherapy.  I see that she was seen by Dr. Augustine at [facility] in 06/23/2020; he provides a nice history.  She was referred to have a Rule 25 evaluation, but she did not follow through.      SUBSTANCE USE HISTORY:  She has a history of relatively heavy drinking since she was working as a  for about 10 years.  She escalated her use in March after the onset of COVID and then she and her boyfriend were having troubles to the extent that he will be moving out of the apartment at the end of the month.  She says she was having 4-5 shots of fireball per day over the weekends.  Her alcohol level on admission was 0.197.  She does have a history of mild alcohol related seizure and has  withdrawal symptoms when she abstains during the week.  No treatments, but at this point she intends to figure out a way to stop drinking.      PAST MEDICAL HISTORY:  History of alcohol-related seizure, GERD, and hypokalemia this admission.      PAST SURGICAL HISTORY:  She had surgery, she had repair of a deviated septum.      REVIEW OF SYSTEMS:  Per admission H and P.      OUTPATIENT MEDICATIONS:  None.  Here in the hospital, she is receivin.  Gabapentin.    2.  Some Valium under the CIWA protocol.   3.  Clonidine 0.1 mg.  It has just been started.      FAMILY HISTORY:  Negative for psychiatric or chemical use problems.      SOCIAL HISTORY:  She is a native of [place], [place].  Following high school graduation, she did complete a 4-year degree in biology.  She had moved from [place], [place] 2 years ago to be with her current boyfriend who is soon to be her ex-boyfriend.  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      MENTAL STATUS EXAMINATION:  She is sitting up quietly in bed and moves her arms without difficulty.  She is reasonably well groomed and looks her stated age.  She is pleasant and cooperative during my visit.  Speech is fluent.  Thought process is directed.  Associations tight.  Denies delusions or hallucinations.  Mood is described as okay.  Affect is congruent.  She is oriented x 3.  Recent and remote memory, attention span and concentration are grossly intact.  Use of language and fund of knowledge appear within normal limits.  Insight and judgment are fair to poor.      VITAL SIGNS:  Blood pressure 126/94, temperature 98.5, weight 124 pounds.      DIAGNOSIS:  Alcohol use disorder.      IMPRESSION:  Aside from her alcohol use, she does not really have any psychiatric problems that need to be addressed.  It appears that she is motivated to attain sobriety, but has an unclear plan about that at this point.  I think it  would be best to keep her in the hospital on a hold, at least until she can meet with our  and clarify what her discharge plan is for a safe disposition.      RECOMMENDATIONS:  We will continue on a 72-hour hold at least until tomorrow when discharge plan is clarified. Page me at [phone], or re-consult psychiatry as needed.       BRIGIDO MIMS MD             D: 2020   T: 2020   MT: FREDDY      Name:     ALFONZO MARTIN   MRN:      [id number]        Account:       [id number]   :      1990           Consult Date:  2020      Document: [id number]

## 2020-08-23 NOTE — H&P
[facility], [place]    History and Physical - Hospitalist Service       Date of Admission:  8/22/2020    Assessment & Plan   Maddie Orourke is a 30 year old female admitted on 8/22/2020.     Maddie Orourke is a 30 year old female with a past medical history significant for alcohol dependence, alcohol related seizure and GERD who presents to the Emergency Department via EMS for evaluation of alcohol dependency, failure to thrive. Hx of recent fall.     # Alcohol dependency  # Alcohol intoxication  # Alcohol withdrawal -   # Hx of alcohol withdrawal seizure  # Elevated transaminases AST, ALT, ALP- suspect alcohol use related. ? Alcoholic hepatitis.   # Nausea, vomiting. ? Acute alcoholic gastropathy    - MSSA withdrawal protocol with diazepam. (preferred lorazepam 2.2 elevated lft. However pt prefers diazepam, monitor for sedation. Consider switching to lorazepam if e.o increased sedation)  - IV banana bag -> MVI, thiamine, folic acid daily  - RUQ US for elevated transaminases  - Follow-up tranasminases  - Check Urine tox screen. UA.  - Antiemetics prn, tums prn.   - Famotidine 20 po bid  - Fall, seizure precautions.  - Sw consult. CD consult  - Psychiatry consult in am.   - 72 hour hold.     # Severe hypokalemia in setting of chronic hypokalemia:  Potassium level of 2.1 upon admit.  Appears patient takes potassium 99 mg tablet daily.  Mg 2.0     - hold pta k supplement  - replace K, mg per K, Mg, phos protocol.   - Tele monitor.     # Failure to thrive    - Nutrition consult   - Sw consult.      # Hx of Fall  Mild head trauma but no significant injury. No focal deficit.   - Fall precautions  - PT consult.              Diet: regular diet.   DVT Prophylaxis: Pneumatic Compression Devices  Escamilla Catheter: not present  Code Status: full code.          Disposition Plan   Expected discharge:  2 - 3 days, recommended to tbd.  once detoxed, safe dispo planning. pending sw. psychiatry  input. .  Entered: Moe Garibay MD 08/22/2020, 8:17 PM     The patient's care was discussed with the Patient and RN..    Moe Garibay MD  [facility], [place]    ______________________________________________________________________    Chief Complaint      Alcohol use disorder  Failure to thrive.     History is obtained from the patient, electronic health record and emergency department physician    History of Present Illness      Maddie Orourke is a 30 year old female with a past medical history significant for alcohol dependence, alcohol related seizure and GERD who presents to the Emergency Department via EMS for evaluation of alcohol dependency, failure to thrive. Hx of recent fall.      The patient's family called the police to the patient's house for a welfare check.  The patient has not eaten for the past 5 days and has not been drinking a lot of fluids besides alcohol. The police put the patient on a hold and EMS brought the patient to ED for further evaluation and management.     The patient reports that she had a fall on 8/15/2020, she denies any loss of consciousness during the fall. She reports that this was a mechanical fall.  She denies being medically evaluated at that time. The patient reports that since the fall she has been very tired and she has had a few episodes of vomiting. She denies any headache and she reports that she denies any difficulty with walking.   The patient reports that she has been drinking alcohol, she is unable to tell us how much she is drinking daily, but she reports that she is drinking whiskey.    The patient notes a history of hypokalemia and that she is on a potassium supplement. The patient reports that she has not eaten in the last 5 days because she does not feel that she has energy to get up and make herself food.  The patient states that she is not feeling depressed, but reports that she has had new stressors in her life  recently.  She denies any suicidal or homicidal ideation.   The [facility]  saw the patient in ED.     Patient put on 72 hour hold in ED.     To me, Denies fever or chills. No cough or cp or sob. No LH or dizziness. Nausea and couple non bloody emesis. No dysuria or bowel complaint. No new sensory or motor complaint. No new rash. No other concern.     Review of Systems    The 10 point Review of Systems is negative other than noted in the HPI or here.     Past Medical History    I have reviewed this patient's medical history and updated it with pertinent information if needed.     Alcohol dependence  Alcohol related sseizure  GERD  Hypokalemia.     Past Surgical History   I have reviewed this patient's surgical history and updated it with pertinent information if needed.  Past Surgical History:   Procedure Laterality Date     HEAD & NECK SURGERY      repair of deviated septum       Social History   I have reviewed this patient's social history and updated it with pertinent information if needed.  Social History     Tobacco Use     Smoking status: Current Every Day Smoker     Smokeless tobacco: Never Used   Substance Use Topics     Alcohol use: Yes     Comment: terry-pt states not that much     Drug use: Not Currently       Family History     Father:  HTN.    Mother:  Hyperthyroidism.        Prior to Admission Medications   Prior to Admission Medications   Prescriptions Last Dose Informant Patient Reported? Taking?   potassium 99 MG TABS Past Week at Unknown time Self Yes Yes   Sig: Take 1 tablet by mouth daily      Facility-Administered Medications: None     Allergies   No Known Allergies    Physical Exam   Vital Signs: Temp: 97.6  F (36.4  C) Temp src: Axillary BP: 131/84 Pulse: 117     SpO2: 98 % O2 Device: None (Room air)    Weight: 0 lbs 0 oz      General: alert, interactive, tremulous   HEENT: AT/NC, Anicteric, Moist MM  Neck: Supple.    Respi/Chest: Non labored, CTA BL.  CVS/Heart: S1S2 regular, no murmur.    GI/Abd: Soft, non tender, non distended, BS +, No g/r.  MSK/Extremities: Distally warm, well perfused.     Neuro: AO x 4, Grossly non focal. Tremors both UE+  Psychiatry: Stable mood.   Skin: no rash on exposed areas.         Data   Data reviewed today: I reviewed all medications, new labs and imaging results over the last 24 hours. I personally reviewed no images or EKG's today.    Recent Labs   Lab 08/22/20  1438   WBC 5.8   HGB 14.2   MCV 99         POTASSIUM 2.1*   CHLORIDE 99   CO2 31   BUN 5*   CR 0.42*   ANIONGAP 12   NISHI 6.5*   *   ALBUMIN 3.4   PROTTOTAL 6.6*   BILITOTAL 0.8   ALKPHOS 443*   *   *     No results found for this or any previous visit (from the past 24 hour(s)).

## 2020-08-23 NOTE — PROGRESS NOTES
"CLINICAL NUTRITION SERVICES - ASSESSMENT NOTE     Nutrition Prescription    RECOMMENDATIONS FOR MDs/PROVIDERS TO ORDER:  -Monitor nausea control and need for intervention.    Malnutrition Status:    -Unable to determine due to incomplete nutrition status validation.    Recommendations already ordered by Registered Dietitian (RD):  -Continue Boost Shake between meals-send vanilla flavor.    Future/Additional Recommendations:  -Monitor diet tolerance, po intakes and weight trends.  -Follow up for supplement acceptance and adjust as indicated.     REASON FOR ASSESSMENT  Maddie Orourke is a/an 30 year old female assessed by the dietitian for Provider Order -   \"failure to thrive, malnutrition.\"    Per chart review:  PMH: significant for alcohol dependence, alcohol related seizure and GERD who presents to the Emergency Department via EMS for evaluation of alcohol dependency, failure to thrive. Hx of recent fall.     NUTRITION HISTORY  Per chart review, pt was assessed by RD during previous hospital admission for Nutrition Admission Risk Screen - Unintentional weight loss of 10# or more in past 2 months.  Per RD note 6/24/20:    \"Information obtained from Chart Review.   - H/o alcohol dependence, alcohol related seizure, GERD.   - Recent social stressors likely impacting her ability to care for self.   - Noted diarrhea for 3 days PTA.\"  Pt was not available/appropriate to visit.  On a Regular Diet and ordering adequate amounts of food and balanced meals per review of menus.      Per chart review this admission:  \"The patient notes a history of hypokalemia and that she is on a potassium supplement.  The patient reports that she has not eaten in the last 5 days because she does not feel that she has energy to get up and make herself food, which she reports is abnormal for her.  The patient states that she is not feeling depressed, but reports that she has had new stressors in her life recently.\"     Pt visit by phone " "today due to RD workng remotely.  Per pt usual intake of 2 meals per day until the past 5 days PTA.  She reports decreased appetite in addition to inability to prepare meals since fall.  She states used to take Zantac at home, but heard it was bad for her so discontinued.      CURRENT NUTRITION ORDERS  Diet: Regular  Boost Shake between meals    Intake/Tolerance:   Complaints of nausea and vomiting per nursing notes last evening.  Given Compazine with good results.  RN today reported pt has not had vomiting so far.    Per pt this am has had some grapes and a Boost Shake.  She states tolerated the shake well and prefers vanilla flavor.  Denies nausea currently.  Per pt can order meals on her own.    LABS  Labs reviewed  (8/23)  K 3.2 (L)  BUN 2 (L)  Cr 0.38 (L)-may indicate decreased protein intake and muscle mass. Calcium 6.2 (L)  Mg 1.7  Phos 3.1   LFTs increased.    MEDICATIONS  Medications reviewed  Calcium Gluconate  MVI with minerals  Thiamine  Folic Acid  Sodium Phosphate, Magnesium Sulfate and Potassium Chloride replacement protocols.  Zofran and Compazine prn discontinued per Pharmacy consult.  Pepcid bid  Calcium Carbonate (TUMS) 4x daily prn  LR at 125 mL/hr    ANTHROPOMETRICS  Height: 0 cm (Data Unavailable)5'7\"  Most Recent Weight: 56.4 kg (124 lb 4.8 oz)    IBW: 135 lbs  % IBW:  91%  BMI: Normal BMI  Weight History:  Limited weight hx available per chart review.  Current weight is increased about 4 lbs from 2 months ago.  Per pt her weight fluctuates between 120-130 lbs.  She is not sure of weight hx over the past 2 months.    Wt Readings from Last 20 Encounters:   08/22/20 56.4 kg (124 lb 4.8 oz)   06/23/20 54.3 kg (119 lb 12.8 oz)   05/08/19 53.3 kg (117 lb 9.6 oz)       Dosing Weight: 56 kg (admission weight)    ASSESSED NUTRITION NEEDS  Estimated Energy Needs: 5595-4032+ kcals/day (25-30+ kcals/kg )  Justification: Maintenance and Repletion-low end IBW weight range.  Estimated Protein Needs: 56-67+ " grams protein/day (1-1.2+ grams of pro/kg)  Justification: Maintenance and Repletion  Estimated Fluid Needs:  (1 mL/kcal)   Justification: Per provider pending fluid status    PHYSICAL FINDINGS  See malnutrition section below.    MALNUTRITION  % Intake: </= 50% for >/= 5 days (severe)  % Weight Loss: Unable to assess  Subcutaneous Fat Loss: Unable to assess  Muscle Loss: Unable to assess  Fluid Accumulation/Edema: None noted  Malnutrition Diagnosis: Unable to determine due to incomplete nutrition status validation.    NUTRITION DIAGNOSIS  Inadequate oral intake related to decreased appetite, self care deficit and gi symptoms as evidenced by report has not eaten in the last 5 days, nausea and vomiting.    INTERVENTIONS  Implementation  Nutrition Education: Contacted pt by phone to discuss po intakes and nutrition hx.      Adjusted Boost Shake to vanilla flavor per pt request.     Collaboration with other providers-contacted RN by phone to discuss po intakes and diet tolerance.      Goals  Patient to consume % of nutritionally adequate meal trays TID, or the equivalent with supplements/snacks.     Monitoring/Evaluation  Progress toward goals will be monitored and evaluated per protocol.    Ivonne Gruber RD, LD

## 2020-08-23 NOTE — PROGRESS NOTES
"Brief Cross Cover note     Called to assess Ms. Orourke after she complained of chest pain - she describes acute, random onset of pain in the mid-sternum while lying down, \"like her heart was beating out of her chest\" that \"made her breath heavy\". Sitting up improved the pain and it resolved completely in <10 minutes before any evaluation. She describes having similar episodes at home over several months, also resolved with sitting up/repositioning, no clear trigger and distinct from her known reflux. Now without any chest pain or shortness of breath, note multiple episodes of emesis overnight.   Exam notable for tearful woman, disheveled, cooperative, lungs CTAB, RRR without murmurs, mild TTP in center of chest but not reproducing pain, abdomen soft. EKG reviewed, prolonged QTc but otherwise no acute changes. Tele during episode reportedly unremarkable, unable to visualize personally.   Unclear etiology, arrhythmia such as SVT in the context of hypokalemia a distinct possibility (am K+ pending), certainly reflux vs MSK in ddx. AM team signed out to, will follow-up am labs, tele.     Yuly Cristina MD   Med-Peds hospitalist   "

## 2020-08-23 NOTE — PHARMACY-CONSULT NOTE
Pharmacy was consulted to assess patient's medications for risk of QT prolongation. The following medications were found to have risk of QT prolongation.    Ondansetron -- Currently ordered prn for nausea. Ondansetron has documented risk of QT prolongation. Though this is typically seen in larger doses used for chemotherapy induced nausea (~32mg), there is still some risk at lower doses. Would recommend avoiding this in patient with already prolonged QT and hypokalemia     Prochlorperazine -- Currently ordered prn for nausea. Though the risk of QT prolongation is lower in prochlorperazine than other anti-psychotics, would recommend avoiding in patient with QT prolongation.      Recommendations  1. Discontinue prochlorperazine prn and ondansetron prn for nausea.   2. If patient requires anti-emetic, metoclopramide is an option that does not have risk of QT prolongation.    Thanks for this consult.    Chun Cosby, PharmD

## 2020-08-23 NOTE — PROGRESS NOTES
Ordered thiamine 500 mg iv daily- per psychiatry recommendations. See Psychiatry note.     Cross Cover.

## 2020-08-24 ENCOUNTER — APPOINTMENT (OUTPATIENT)
Dept: PHYSICAL THERAPY | Facility: CLINIC | Age: 30
DRG: 897 | End: 2020-08-24

## 2020-08-24 LAB
ALBUMIN SERPL-MCNC: 2.7 G/DL (ref 3.4–5)
ALP SERPL-CCNC: 402 U/L (ref 40–150)
ALT SERPL W P-5'-P-CCNC: 148 U/L (ref 0–50)
ANION GAP SERPL CALCULATED.3IONS-SCNC: 7 MMOL/L (ref 3–14)
AST SERPL W P-5'-P-CCNC: 350 U/L (ref 0–45)
BILIRUB SERPL-MCNC: 1.4 MG/DL (ref 0.2–1.3)
BUN SERPL-MCNC: 1 MG/DL (ref 7–30)
CA-I BLD-MCNC: 3.9 MG/DL (ref 4.4–5.2)
CALCIUM SERPL-MCNC: 6.8 MG/DL (ref 8.5–10.1)
CHLORIDE SERPL-SCNC: 109 MMOL/L (ref 94–109)
CO2 SERPL-SCNC: 26 MMOL/L (ref 20–32)
CREAT SERPL-MCNC: 0.36 MG/DL (ref 0.52–1.04)
ERYTHROCYTE [DISTWIDTH] IN BLOOD BY AUTOMATED COUNT: 14.4 % (ref 10–15)
GFR SERPL CREATININE-BSD FRML MDRD: >90 ML/MIN/{1.73_M2}
GLUCOSE SERPL-MCNC: 101 MG/DL (ref 70–99)
HCT VFR BLD AUTO: 31.7 % (ref 35–47)
HGB BLD-MCNC: 10.6 G/DL (ref 11.7–15.7)
INTERPRETATION ECG - MUSE: NORMAL
MAGNESIUM SERPL-MCNC: 1.4 MG/DL (ref 1.6–2.3)
MAGNESIUM SERPL-MCNC: 2.8 MG/DL (ref 1.6–2.3)
MCH RBC QN AUTO: 34.8 PG (ref 26.5–33)
MCHC RBC AUTO-ENTMCNC: 33.4 G/DL (ref 31.5–36.5)
MCV RBC AUTO: 104 FL (ref 78–100)
PLATELET # BLD AUTO: 134 10E9/L (ref 150–450)
POTASSIUM SERPL-SCNC: 3.7 MMOL/L (ref 3.4–5.3)
PROT SERPL-MCNC: 5.4 G/DL (ref 6.8–8.8)
RBC # BLD AUTO: 3.05 10E12/L (ref 3.8–5.2)
SARS-COV-2 RNA SPEC QL NAA+PROBE: NOT DETECTED
SODIUM SERPL-SCNC: 142 MMOL/L (ref 133–144)
SPECIMEN SOURCE: NORMAL
WBC # BLD AUTO: 3.7 10E9/L (ref 4–11)

## 2020-08-24 PROCEDURE — 40000893 ZZH STATISTIC PT IP EVAL DEFER: Performed by: PHYSICAL THERAPIST

## 2020-08-24 PROCEDURE — 85027 COMPLETE CBC AUTOMATED: CPT | Performed by: INTERNAL MEDICINE

## 2020-08-24 PROCEDURE — 25000132 ZZH RX MED GY IP 250 OP 250 PS 637: Performed by: INTERNAL MEDICINE

## 2020-08-24 PROCEDURE — 82330 ASSAY OF CALCIUM: CPT | Performed by: INTERNAL MEDICINE

## 2020-08-24 PROCEDURE — 80053 COMPREHEN METABOLIC PANEL: CPT | Performed by: INTERNAL MEDICINE

## 2020-08-24 PROCEDURE — 25800030 ZZH RX IP 258 OP 636: Performed by: INTERNAL MEDICINE

## 2020-08-24 PROCEDURE — 36415 COLL VENOUS BLD VENIPUNCTURE: CPT | Performed by: INTERNAL MEDICINE

## 2020-08-24 PROCEDURE — 84132 ASSAY OF SERUM POTASSIUM: CPT | Performed by: INTERNAL MEDICINE

## 2020-08-24 PROCEDURE — 93010 ELECTROCARDIOGRAM REPORT: CPT | Performed by: INTERNAL MEDICINE

## 2020-08-24 PROCEDURE — 93005 ELECTROCARDIOGRAM TRACING: CPT

## 2020-08-24 PROCEDURE — 99233 SBSQ HOSP IP/OBS HIGH 50: CPT | Performed by: INTERNAL MEDICINE

## 2020-08-24 PROCEDURE — 25000128 H RX IP 250 OP 636: Performed by: INTERNAL MEDICINE

## 2020-08-24 PROCEDURE — 12000001 ZZH R&B MED SURG/OB UMMC

## 2020-08-24 PROCEDURE — 99233 SBSQ HOSP IP/OBS HIGH 50: CPT | Performed by: PSYCHIATRY & NEUROLOGY

## 2020-08-24 PROCEDURE — 83735 ASSAY OF MAGNESIUM: CPT | Performed by: INTERNAL MEDICINE

## 2020-08-24 RX ADMIN — GABAPENTIN 300 MG: 300 CAPSULE ORAL at 21:41

## 2020-08-24 RX ADMIN — FOLIC ACID 1 MG: 1 TABLET ORAL at 08:53

## 2020-08-24 RX ADMIN — Medication 2 G: at 09:01

## 2020-08-24 RX ADMIN — CLONIDINE HYDROCHLORIDE 0.1 MG: 0.1 TABLET ORAL at 08:53

## 2020-08-24 RX ADMIN — GABAPENTIN 300 MG: 300 CAPSULE ORAL at 08:53

## 2020-08-24 RX ADMIN — POTASSIUM CHLORIDE 20 MEQ: 750 TABLET, EXTENDED RELEASE ORAL at 10:17

## 2020-08-24 RX ADMIN — FAMOTIDINE 20 MG: 20 TABLET ORAL at 21:41

## 2020-08-24 RX ADMIN — CALCIUM CARBONATE (ANTACID) CHEW TAB 500 MG 1000 MG: 500 CHEW TAB at 08:53

## 2020-08-24 RX ADMIN — MULTIPLE VITAMINS W/ MINERALS TAB 1 TABLET: TAB at 08:53

## 2020-08-24 RX ADMIN — CLONIDINE HYDROCHLORIDE 0.1 MG: 0.1 TABLET ORAL at 21:42

## 2020-08-24 RX ADMIN — Medication 10 MEQ: at 00:41

## 2020-08-24 RX ADMIN — POTASSIUM CHLORIDE 20 MEQ: 750 TABLET, EXTENDED RELEASE ORAL at 05:19

## 2020-08-24 RX ADMIN — SODIUM CHLORIDE, POTASSIUM CHLORIDE, SODIUM LACTATE AND CALCIUM CHLORIDE: 600; 310; 30; 20 INJECTION, SOLUTION INTRAVENOUS at 03:28

## 2020-08-24 RX ADMIN — FAMOTIDINE 20 MG: 20 TABLET ORAL at 08:53

## 2020-08-24 RX ADMIN — THIAMINE HYDROCHLORIDE 500 MG: 100 INJECTION, SOLUTION INTRAMUSCULAR; INTRAVENOUS at 16:27

## 2020-08-24 RX ADMIN — GABAPENTIN 300 MG: 300 CAPSULE ORAL at 15:01

## 2020-08-24 RX ADMIN — CALCIUM CARBONATE (ANTACID) CHEW TAB 500 MG 1000 MG: 500 CHEW TAB at 21:40

## 2020-08-24 RX ADMIN — MAGNESIUM SULFATE IN WATER 4 G: 40 INJECTION, SOLUTION INTRAVENOUS at 05:58

## 2020-08-24 NOTE — CONSULTS
8/23/20 CD consult acknowledged.     Per chart review, pt does not have active insurance listed. Patient will need Rule 25 funding through [facility]. The writer spoke with unit  and e-mailed her Rule 25 application for [facility] for the patient to complete. Unit  will follow up with chem dep with the completed application and at that time consult will be completed if patient is appropriate for consult.     Sharron Alvarez, [facility], [facility]  [phone]

## 2020-08-24 NOTE — PLAN OF CARE
VS:    /94 monitor R ARM, RR 18, Sp02 100% RA, T 98.9 oral,  Tachy, Denies CP/SOB- pt reports getting better.     Output:    Voids spontaneously in bathroom. LBM 8/23/20 loose stool  X1 this shift. Passing flatus.     Activity:     Pt is SBA. Ambulating in room.    Skin: Intact   Pain:    Denies    Neuro/CMS:    A&Ox4. CMS intact. Denies numbness/tingling.     Dressing(s):     NA   Diet:    Regular. Good appetite this evening. No N/V    Equipment:     IV pole, 1:1 sitter @ bedside for safety    IV's/Drains:    PIV in L antecubital infusing, PIV in R antecubital SL    Plan:    Pt is on 72 hour hold beginning 08/24/20. Sitter at bedside for safety. Continue to monitor. Continue POC. Pt is able to make needs known.   MSSA- 5mg given X1 per protocol   Additional Info:     Pt in good spirits this evening, making jokes and laughing with staff. Denies SI.     Psych consulted with Pt this evening.     CT completed this afternoon.     K+ replacement started (beginning result 3.1)

## 2020-08-24 NOTE — PLAN OF CARE
PT: Orders acknowledged, chart reviewed, and patient screened. She is independence with bed mobility, transfers, and ambulation with excellent balance. PT not appropriate - orders completed.

## 2020-08-24 NOTE — PLAN OF CARE
"  VS:   /86 (BP Location: Right arm)   Pulse 93   Temp 98.7  F (37.1  C) (Oral)   Resp 18   Ht 1.702 m (5' 7\")   Wt 56.4 kg (124 lb 4.8 oz)   SpO2 96%   BMI 19.47 kg/m    Room air   Output:   Pt voiding spontaneously without difficulty, LBM 8/23, loose.   Lungs Lung sounds clear   Activity:   Pt up independently, steady on feet   Skin: WDL   Pain:   Denies pain, nausea   Neuro/CMS:   CMS intact, denies N/T, A&)x4   Dressing(s):   None present   Diet:   Regular diet - tolerating well   LDA:   PIV saline locked   Equipment:   Call light, IV pole, television, tele   Plan:   Meeting with SW, Psych and medical team persuing commitment for treatment   Additional Info:   IV mag replaced, recheck came back at 2.8  Potassium replaced with 20mg oral, recheck tomorrow in the am  Calcium replaced per one time order, recheck for tomorrow am    Tele monitoring - prolonged QT interval       "

## 2020-08-24 NOTE — CONSULTS
Social Work Services Progress Note    Hospital Day: 3  Collaborated with:  Medical team, psychiatry, pt, [facility] Pre Petition Screening    Data:  Pt is a 30 year old female who presented to the ED for alcohol use and evaluation after a recent fall.    Pt was brought to the ED by EMS after a welfare check was completed. Per report, pt was on the ground covered in her dog's feces. Pt had not eaten or had other fluids in five days. Per handoff from the provider, pt is not agreeable to any type of CD treatment and feels she can manage it on her own.     Medical team and psych pursuing civil commitment for CD treatment. SW and psychiatry completed all forms necessary to [facility] Pre Petition Screening (Phone: [phone], Fax: [phone]). SW connected Pre Petition Screen notifying them of the paperwork.     SW connected with pt to discuss questions she may have about the commitment process. Per pt she does not feel she needs treatment. Pt reports not meeting with psychiatry (pt met with psychiatry twice in the last 24 hours). Pt frustrated that the [facility] could 'make her' go to treatment. Pt denies any danger to herself, says her fall was NOT related to alcohol use. Pt frustrated during SW call. SW explained process and possible outcomes. No additional questions by the pt at this time-SW to update pt when more information is received regarding her assessment with [facility].     Intervention:  Civil commitment    Assessment:  Pt does not have insight to her alcohol use. Pt does not feel treatment is necessary.    Plan:    Anticipated Disposition:  CD treatment or other, as recommended by [facility]    Barriers to d/c plan:  Medical stability, commitment outcome    Follow Up:  SW to continue to follow    HILLARY Munoz  Unit 5/Unit 8 Ortho/Med/Surg & [facility] Adult ED  Phone: [phone] Pager: [phone]

## 2020-08-24 NOTE — CONSULTS
"Consult Date:  08/24/2020      PSYCHIATRIC CONSULTATION      IDENTIFICATION:  Ms. Orourke is a 30-year-old white female who is hospitalized with alcohol withdrawal, marked fluid and electrolyte abnormalities as well as elevated liver function tests, bone marrow suppression and pronounced atrophy on CT scan.  I am asked to evaluate this patient for commitment by Dr. Singh.      HISTORY OF PRESENT ILLNESS:  Prior to interviewing this patient, I had an opportunity to review the initial psychiatric consultation completed by Dr. Antony Arambula on 08/23/2020, and I also discussed the case with Dr. Arambula on the telephone, as well as discussing the case with Dr. Singh and Social Service.  This patient was admitted with life threatening fluid and electrolyte abnormalities.  EMS said that she was covered with dog feces and really had been unable to take care of herself for the last 5 days, had not been eating and had  failure to thrive.  She does have a history of prior admissions for alcohol use.  One was in June of this year and one was in the past on a medicine unit at [place].  We do not have previous inpatient notes from [facility], and I do not know whether she has been admitted there in the past.  From what I understand, her parents were concerned about her as was her employer.  They called EMS for a welfare check.  EMS found the patient covered in dog feces and unable to care for herself.  Initial laboratory results were quite concerning with multiple abnormalities in potassium, calcium, magnesium, and others.  She also had bone marrow suppression and quite disturbingly, CT scan of the head revealed \"moderate generalized parenchyma volume loss, volume loss is more pronounced in the cerebellar hemispheres and has markedly increased since 2019.  Volume loss is more than appropriate for age and can be seen with alcohol abuse.\"  The patient has been refusing CD treatment and has been placed on a hold.  For all of the " reasons mentioned above, she is clearly at imminent risk of death should she be discharged without appropriate treatment and she will likely benefit from chemical dependency treatment.  Therefore, the plan is for petition for commitment.  I have filled out the examiner statement and will make this available to the station .      MENTAL STATUS EXAMINATION:  Today, the patient was generally pleasant and cooperative, but clearly was not a reliable historian.  She may have some memory issues.  She had no recollection of Dr. Singh talking to her about potential commitment, though Dr. Singh tells me that she has already had that discussion and the patient tends to minimize her symptoms.  Her mood is somewhat dysphoric, her affect somewhat angry.  Her speech is coherent and goal oriented.  Her associations are tight.  Her thought processes seemed logical and linear; however, certainly not logical when it comes to her current physical condition.  Recent memory may be somewhat impaired.  Remote memory is likely better preserved and knowledge is really difficult to assess.  Use of language and concentration were within normal limits.  She is alert and oriented x 3.  Insight and judgment are guarded.      VITAL SIGNS:  Temperature of 98.7, pulse of 93, respiration rate of 18 with 96% oxygen saturation, and a blood pressure of 125/86.      ASSESSMENT:  Alcohol use disorder, alcohol withdrawal, fluid and electrolyte abnormalities and brain atrophy.      RECOMMENDATION:  Inpatient stabilization followed by inpatient chemical dependency treatment.  I have filled out the examiner's statement for [facility].         SANTOS OWENS MD             D: 2020   T: 2020   MT: CELESTE      Name:     ALFONZO MARTIN   MRN:      [id number]        Account:       [id number]   :      1990           Consult Date:  2020      Document: [id number]

## 2020-08-24 NOTE — PROGRESS NOTES
[facility], [facility] Progress Note - Hospitalist Service       Date of Admission:  8/22/2020  Assessment & Plan     Maddie Orourke is a 30 year old female with a past medical history significant for alcohol dependence, alcohol related seizure and GERD who presented to the Emergency Department via EMS for evaluation of alcohol dependency, failure to thrive. Hx of recent fall.      # Alcohol dependency  # Alcohol intoxication  # Alcohol withdrawal -   # Hx of alcohol withdrawal seizure  # Elevated transaminases AST, ALT, ALP- suspect alcohol use related.  Alcoholic hepatitis.   # Nausea and  vomiting     - MSSA withdrawal protocol with diazepam.     -- Started gabapentin 8/23  -- Started clonidine 8/23  __IV Thiamin started 8/23 per psych recommendation         - RUQ US for elevated transaminases 8/22  1. Increased echogenicity of the liver likely related to hepatic  steatosis.  2. No cholelithiasis or cholecystitis.  3. Probable gallbladder polyps.  - Follow-up tranasminases  - Famotidine 20 po bid  - Fall, seizure precautions.  -  Psych consult , ? Committment. Patient is declining to get chemical dependency treatment   SW consulted   Chem dep consult      # Severe Hypokalemia in setting of chronic hypokalemia:  # Hypo magnesemia   # Hypocalcemia ;give 2 gram IV calcium   # Hypo phosphatemia     # QTc prolongation on EKG . QTc 508  Stopped Zofran   Stopped Compazine  Pharmacy consulted   Correct Electrolytes  Repeat EKG in am 8/25        Started on high intensity protocol with potassium and magnesium and phosphorus and IV calcium gluconate given       # Failure to thrive :  Hypoalbuminemia  Started boost shakes  Risk of refeeding syndrome    Nutrition consulted      Pancytopenia ; likely due to marrow suppression from effects of alcohol .  MVI started      # Hx of Fall ? Timing unclear   Mild head trauma . No focal deficit.   - Fall precautions  Obtained CT head on 8/23 ;  no acute pathology .                Diet: Combination Diet Regular Diet Adult  Snacks/Supplements Adult: Boost Shake; Between Meals    DVT Prophylaxis: Pneumatic Compression Devices  Escamilla Catheter: not present  Code Status: Full Code                The patient's care was discussed with the Bedside Nurse, Care Coordinator/, Patient and psych Consultant.    Vanessa Singh MD  Hospitalist Service  [facility], [place]    ______________________________________________________________________    Interval History   Wants to leave   Says she feels better  Declines treatment of mental health   No cp   No sob   No loose stools  Received 40 mg Valium       Data reviewed today: I reviewed all medications, new labs and imaging results over the last 24 hours.    Physical Exam   Vital Signs: Temp: 98.7  F (37.1  C) Temp src: Oral BP: 125/86 Pulse: 93   Resp: 18 SpO2: 96 % O2 Device: None (Room air)    Weight: 124 lbs 4.8 oz  Constitutional: awake, alert, mild istress  Hematologic / Lymphatic: no cervical lymphadenopathy  Respiratory: No increased work of breathing, good air exchange, clear to auscultation bilaterally, no crackles or wheezing  Cardiovascular: Normal apical impulse, regular rate and rhythm, normal S1 and S2, no S3 or S4, and no murmur noted  GI: No scars, normal bowel sounds, soft, non-distended, non-tender, no masses palpated, no hepatosplenomegally  Skin ; bruises   Data   Recent Labs   Lab 08/24/20  0442 08/23/20  1532 08/23/20  0620  08/22/20  1438   WBC 3.7*  --  7.6  --  5.8   HGB 10.6*  --  11.0*  --  14.2   *  --  101*  --  99   *  --  181  --  264     --  141  --  142   POTASSIUM 3.7 3.1* 3.2*   < > 2.1*   CHLORIDE 109  --  106  --  99   CO2 26  --  26  --  31   BUN 1*  --  2*  --  5*   CR 0.36*  --  0.38*  --  0.42*   ANIONGAP 7  --  9  --  12   NISHI 6.8*  --  6.2*  --  6.5*   *  --  81  --  137*   ALBUMIN 2.7*  --  2.9*  --  3.4    PROTTOTAL 5.4*  --  5.5*  --  6.6*   BILITOTAL 1.4*  --  1.6*  --  0.8   ALKPHOS 402*  --  416*  --  443*   *  --  159*  --  193*   *  --  445*  --  361*    < > = values in this interval not displayed.

## 2020-08-24 NOTE — PLAN OF CARE
"BP (!) 126/95 (BP Location: Right arm)   Pulse 80   Temp 99.1  F (37.3  C) (Oral)   Resp 17   Ht 1.702 m (5' 7\")   Wt 56.4 kg (124 lb 4.8 oz)   SpO2 100%   BMI 19.47 kg/m     Alert,orientated x4.Calm and cooperative.Up with steady gait.Voiding well.Is passing gas,LBM this morning.MSSA scored 7 and  6.Good appetite,denies any nausea.Potassium replaced via IV earlier,rechecked this morning at 3.7,replaced orally and tolerated well.Magnesium level low at 1.4.Will replace via IV to run x2 hrs.IVF running at 125ml/hr via PIV line L AC.PIV line R AC,saline locked.  Continue on 72 hour hold til today,hopefully once plan is clarified.Bedside attendant in room.  "

## 2020-08-25 LAB
ALBUMIN SERPL-MCNC: 2.9 G/DL (ref 3.4–5)
ALP SERPL-CCNC: 380 U/L (ref 40–150)
ALT SERPL W P-5'-P-CCNC: 125 U/L (ref 0–50)
ANION GAP SERPL CALCULATED.3IONS-SCNC: 5 MMOL/L (ref 3–14)
AST SERPL W P-5'-P-CCNC: 223 U/L (ref 0–45)
BILIRUB SERPL-MCNC: 0.9 MG/DL (ref 0.2–1.3)
BUN SERPL-MCNC: 3 MG/DL (ref 7–30)
CALCIUM SERPL-MCNC: 7.6 MG/DL (ref 8.5–10.1)
CHLORIDE SERPL-SCNC: 109 MMOL/L (ref 94–109)
CO2 SERPL-SCNC: 26 MMOL/L (ref 20–32)
CREAT SERPL-MCNC: 0.47 MG/DL (ref 0.52–1.04)
ERYTHROCYTE [DISTWIDTH] IN BLOOD BY AUTOMATED COUNT: 14.1 % (ref 10–15)
GFR SERPL CREATININE-BSD FRML MDRD: >90 ML/MIN/{1.73_M2}
GLUCOSE SERPL-MCNC: 75 MG/DL (ref 70–99)
HCT VFR BLD AUTO: 34.7 % (ref 35–47)
HGB BLD-MCNC: 11.2 G/DL (ref 11.7–15.7)
MAGNESIUM SERPL-MCNC: 1.5 MG/DL (ref 1.6–2.3)
MCH RBC QN AUTO: 34.7 PG (ref 26.5–33)
MCHC RBC AUTO-ENTMCNC: 32.3 G/DL (ref 31.5–36.5)
MCV RBC AUTO: 107 FL (ref 78–100)
PLATELET # BLD AUTO: 145 10E9/L (ref 150–450)
POTASSIUM SERPL-SCNC: 3.8 MMOL/L (ref 3.4–5.3)
PROT SERPL-MCNC: 6.1 G/DL (ref 6.8–8.8)
RBC # BLD AUTO: 3.23 10E12/L (ref 3.8–5.2)
SODIUM SERPL-SCNC: 140 MMOL/L (ref 133–144)
WBC # BLD AUTO: 4.5 10E9/L (ref 4–11)

## 2020-08-25 PROCEDURE — 80053 COMPREHEN METABOLIC PANEL: CPT | Performed by: INTERNAL MEDICINE

## 2020-08-25 PROCEDURE — 25800030 ZZH RX IP 258 OP 636: Performed by: INTERNAL MEDICINE

## 2020-08-25 PROCEDURE — 93005 ELECTROCARDIOGRAM TRACING: CPT

## 2020-08-25 PROCEDURE — 25000132 ZZH RX MED GY IP 250 OP 250 PS 637: Performed by: INTERNAL MEDICINE

## 2020-08-25 PROCEDURE — 36415 COLL VENOUS BLD VENIPUNCTURE: CPT | Performed by: INTERNAL MEDICINE

## 2020-08-25 PROCEDURE — 93010 ELECTROCARDIOGRAM REPORT: CPT | Performed by: INTERNAL MEDICINE

## 2020-08-25 PROCEDURE — 12000001 ZZH R&B MED SURG/OB UMMC

## 2020-08-25 PROCEDURE — 25000128 H RX IP 250 OP 636: Performed by: INTERNAL MEDICINE

## 2020-08-25 PROCEDURE — 83735 ASSAY OF MAGNESIUM: CPT | Performed by: INTERNAL MEDICINE

## 2020-08-25 PROCEDURE — 85027 COMPLETE CBC AUTOMATED: CPT | Performed by: INTERNAL MEDICINE

## 2020-08-25 PROCEDURE — 99232 SBSQ HOSP IP/OBS MODERATE 35: CPT | Performed by: INTERNAL MEDICINE

## 2020-08-25 RX ORDER — CALCIUM CARBONATE 500 MG/1
1000 TABLET, CHEWABLE ORAL 2 TIMES DAILY
Status: DISCONTINUED | OUTPATIENT
Start: 2020-08-25 | End: 2020-08-27 | Stop reason: HOSPADM

## 2020-08-25 RX ORDER — MAGNESIUM OXIDE 400 MG/1
400 TABLET ORAL 2 TIMES DAILY
Status: DISCONTINUED | OUTPATIENT
Start: 2020-08-25 | End: 2020-08-27 | Stop reason: HOSPADM

## 2020-08-25 RX ADMIN — POTASSIUM CHLORIDE 20 MEQ: 750 TABLET, EXTENDED RELEASE ORAL at 10:47

## 2020-08-25 RX ADMIN — MULTIPLE VITAMINS W/ MINERALS TAB 1 TABLET: TAB at 08:43

## 2020-08-25 RX ADMIN — THIAMINE HYDROCHLORIDE 500 MG: 100 INJECTION, SOLUTION INTRAMUSCULAR; INTRAVENOUS at 17:27

## 2020-08-25 RX ADMIN — GABAPENTIN 300 MG: 300 CAPSULE ORAL at 08:43

## 2020-08-25 RX ADMIN — CALCIUM CARBONATE (ANTACID) CHEW TAB 500 MG 1000 MG: 500 CHEW TAB at 22:00

## 2020-08-25 RX ADMIN — FAMOTIDINE 20 MG: 20 TABLET ORAL at 10:47

## 2020-08-25 RX ADMIN — MAGNESIUM OXIDE TAB 400 MG (241.3 MG ELEMENTAL MG) 400 MG: 400 (241.3 MG) TAB at 22:01

## 2020-08-25 RX ADMIN — CLONIDINE HYDROCHLORIDE 0.1 MG: 0.1 TABLET ORAL at 08:42

## 2020-08-25 RX ADMIN — CLONIDINE HYDROCHLORIDE 0.1 MG: 0.1 TABLET ORAL at 22:00

## 2020-08-25 RX ADMIN — GABAPENTIN 300 MG: 300 CAPSULE ORAL at 15:24

## 2020-08-25 RX ADMIN — FOLIC ACID 1 MG: 1 TABLET ORAL at 08:43

## 2020-08-25 RX ADMIN — MAGNESIUM OXIDE TAB 400 MG (241.3 MG ELEMENTAL MG) 400 MG: 400 (241.3 MG) TAB at 10:47

## 2020-08-25 NOTE — PROGRESS NOTES
Pt tearful and upset. Doesn't think staff has the right to transfer pt or keep her here. Pt says she's only in hospital because she slipped and fell in shower pt not understanding the hold or her situation

## 2020-08-25 NOTE — PROGRESS NOTES
Separate progress note to follow  Currently, medically stable to transfer out from medical floor     Dr BERNARDO Lyons MD, [facility]  Hospitalist ( Internal medicine)  Pager: [phone]

## 2020-08-25 NOTE — PROGRESS NOTES
1545  Per Linda at [facility], they are upholding the petition for CD commitment. Paperwork will be faxed from the 's office tomorrow morning with the new court hold and information about her first hearing. SW/staff to tell pt tomorrow once the court information/new hold form is obtained. Linda from [facility] will NOT call pt back (pt inquiring about this). Pt able to transfer to  if a bed is available. SW to continue to coordinate commitment and answer questions for pt and family.    Social Work Services Progress Note    Hospital Day: 4  Collaborated with:  Pre Petition Screener Linda [phone]    Data:  Linda from [facility] will be calling pt around 2pm to complete her initial screening. Pt's direct room number provided to Linda.     SW updated pt and pt's father in room. Pt continues to express frustration and does not understand what the commitment process is or why psychiatry initiated it. SW answered her and her father's questions to the best of their ability. Pt prepared for phone call at 2PM.     1530: VM left for Linda to inquire about status of today's assessment. Pt also requesting to speak to Linda again. From writer's knowledge,the pre petitioner will not speak to pt again. SW awaiting return call    Intervention:  Civil commitment     Assessment:  [facility] will assess pt this afternoon by phone    Plan:    Anticipated Disposition:  TBD    Barriers to d/c plan:  Commitment outcome    Follow Up:  SW to remain available    HILLARY Munoz  Unit 5/Unit 8 Ortho/Med/Surg & [place] Adult ED  Phone: [phone] Pager: [phone]

## 2020-08-25 NOTE — PLAN OF CARE
"VS:    /87 (BP Location: Right arm)   Pulse 75   Temp 98.9  F (37.2  C) (Oral)   Resp 18   Ht 1.702 m (5' 7\")   Wt 56.4 kg (124 lb 4.8 oz)   SpO2 98%   BMI 19.47 kg/m      Output:    Voids spontaneously without difficulty into bathroom.    Activity:     Pt is SBA, ambulating better today.    Skin: Intact.    Pain:    Denies.   Neuro/CMS:    A&Ox4. CMS intact. Denies numbness/tingling.    Dressing(s):     None   Diet:    Regular.   Equipment:     IV pole.   IV's/Drains:    PIV in L antecube SL   Plan:    Pt is on 72 hour hold beginning. Sitter at bedside for safety. Continue POC. Pt is able to make needs known.    Additional Info:           "

## 2020-08-25 NOTE — PROGRESS NOTES
[facility], [facility] Progress Note - Hospitalist Service       Date of Admission:  8/22/2020  Assessment & Plan     Maddie Orourke is a 30 year old female with a past medical history significant for alcohol dependence, alcohol related seizure and GERD who presented to the Emergency Department via EMS for evaluation of alcohol dependency, failure to thrive. Hx of recent fall.      # Alcohol dependency  # Alcohol intoxication  # Alcohol withdrawal   # Hx of alcohol withdrawal seizure  # Elevated transaminases  # Nausea and  vomiting     MSSA withdrawal protocol with diazepam. Likely to discontinue MSSA on 8/26  Started gabapentin 8/23 ( 300 mg TID)  Started clonidine 8/23 (0.1 mg BID)  IV Thiamin started 8/23 per psych recommendation  ( Day #2)   RUQ US for elevated transaminases  on 8/22 with no evidence of cholelithiasis or bile duct enlargement   Famotidine 20 po bid  Fall, seizure precautions.  Evaluated by psychiatrist, who supports petition for commitment. [place] to discuss this with patient today at 2PM      # Severe Hypokalemia in setting of chronic hypokalemia:  # Hypo magnesemia   # Hypocalcemia   # Hypo phosphatemia  Secondary to alcohol use and malnutrition. Patient had aggressive replacements  ( IV) and with scheduled magnesium and calcium supplements          # QTc prolongation on EKG . QTc 508  QT offending agents have been stopped. Electrolyte correctuion as above  Telemetry with normalized QT interval on 8/25  QTc on 8/25 at 494 ( 12 lead EKG)             # Failure to thrive :   Likely due to effects of alcohol and malnutrition   Started boost shakes  Risk of refeeding syndrome ( close monitoring of electrolytes)   Nutrition consulted      Pancytopenia  likely due to marrow suppression from effects of alcohol .  MVI started   Continue to monitor CBC     # Hx of Fall ? Timing unclear   Mild head trauma . No focal deficit.   - Fall precautions  Obtained CT head  on 8/23 ; no acute pathology .                Diet: Combination Diet Regular Diet Adult  Snacks/Supplements Adult: Boost Shake; Between Meals    DVT Prophylaxis: Pneumatic Compression Devices  Escamilla Catheter: not present  Code Status: Full Code                The patient's care was discussed with the Bedside Nurse, Care Coordinator/, Patient and psych Consultant.    Serena Lai MD  Hospitalist Service  [facility], [place]    ______________________________________________________________________    Interval History   This is the first time I am meeting with patient. Her H&P and progress of events has been reviewed  No fever or chills  Patient is upset that the medical team had pursued with application for commitment with the [facility] by filling in examiner's statement      Data reviewed today: I reviewed all medications, new labs and imaging results over the last 24 hours.    Physical Exam   Vital Signs: Temp: 97.8  F (36.6  C) Temp src: Oral BP: 125/89 Pulse: 82   Resp: 16 SpO2: 100 % O2 Device: None (Room air)    Weight: 124 lbs 4.8 oz  Constitutional: awake, alert, mild istress  Hematologic / Lymphatic: no cervical lymphadenopathy  Respiratory: No increased work of breathing, good air exchange, clear to auscultation bilaterally, no crackles or wheezing  Cardiovascular: Normal apical impulse, regular rate and rhythm, normal S1 and S2, no S3 or S4, and no murmur noted  GI: No scars, normal bowel sounds, soft, non-distended, non-tender, no masses palpated, no hepatosplenomegally  Skin ; bruises   Data   Recent Labs   Lab 08/25/20  0640 08/24/20  0442 08/23/20  1532 08/23/20  0620   WBC 4.5 3.7*  --  7.6   HGB 11.2* 10.6*  --  11.0*   * 104*  --  101*   * 134*  --  181    142  --  141   POTASSIUM 3.8 3.7 3.1* 3.2*   CHLORIDE 109 109  --  106   CO2 26 26  --  26   BUN 3* 1*  --  2*   CR 0.47* 0.36*  --  0.38*   ANIONGAP 5 7  --  9   NISHI 7.6*  6.8*  --  6.2*   GLC 75 101*  --  81   ALBUMIN 2.9* 2.7*  --  2.9*   PROTTOTAL 6.1* 5.4*  --  5.5*   BILITOTAL 0.9 1.4*  --  1.6*   ALKPHOS 380* 402*  --  416*   * 148*  --  159*   * 350*  --  445*

## 2020-08-25 NOTE — PLAN OF CARE
"VS:    /87 (BP Location: Right arm)   Pulse 75   Temp 98.9  F (37.2  C) (Oral)   Resp 18   Ht 1.702 m (5' 7\")   Wt 56.4 kg (124 lb 4.8 oz)   SpO2 98%   BMI 19.47 kg/m      Output:    Voids spontaneously without difficulty into bathroom.    Activity:     Pt is SBA, ambulating better today.    Skin: Intact.    Pain:    Denies.   Neuro/CMS:    A&Ox4. CMS intact. Denies numbness/tingling.    Dressing(s):     None   Diet:    Regular. Pt denied nausea this evening. Ordered dinner from outside and ate 100%. Tums given x1 for heartburn.   Equipment:     IV pole.   IV's/Drains:    PIV in L antecube SL   Plan:    Pt is on 72 hour hold beginning. Sitter at bedside for safety. Continue POC. Pt is able to make needs known.    Additional Info:           "

## 2020-08-26 LAB
ALBUMIN SERPL-MCNC: 3 G/DL (ref 3.4–5)
ALP SERPL-CCNC: 340 U/L (ref 40–150)
ALT SERPL W P-5'-P-CCNC: 109 U/L (ref 0–50)
ANION GAP SERPL CALCULATED.3IONS-SCNC: 7 MMOL/L (ref 3–14)
AST SERPL W P-5'-P-CCNC: 155 U/L (ref 0–45)
BASOPHILS # BLD AUTO: 0 10E9/L (ref 0–0.2)
BASOPHILS NFR BLD AUTO: 0.5 %
BILIRUB SERPL-MCNC: 0.5 MG/DL (ref 0.2–1.3)
BUN SERPL-MCNC: 6 MG/DL (ref 7–30)
CA-I BLD-MCNC: 4.7 MG/DL (ref 4.4–5.2)
CALCIUM SERPL-MCNC: 8.2 MG/DL (ref 8.5–10.1)
CHLORIDE SERPL-SCNC: 107 MMOL/L (ref 94–109)
CO2 SERPL-SCNC: 24 MMOL/L (ref 20–32)
CREAT SERPL-MCNC: 0.39 MG/DL (ref 0.52–1.04)
DIFFERENTIAL METHOD BLD: ABNORMAL
EOSINOPHIL # BLD AUTO: 0.1 10E9/L (ref 0–0.7)
EOSINOPHIL NFR BLD AUTO: 1.1 %
ERYTHROCYTE [DISTWIDTH] IN BLOOD BY AUTOMATED COUNT: 14.4 % (ref 10–15)
GFR SERPL CREATININE-BSD FRML MDRD: >90 ML/MIN/{1.73_M2}
GLUCOSE SERPL-MCNC: 85 MG/DL (ref 70–99)
HCT VFR BLD AUTO: 36.2 % (ref 35–47)
HGB BLD-MCNC: 11.7 G/DL (ref 11.7–15.7)
IMM GRANULOCYTES # BLD: 0 10E9/L (ref 0–0.4)
IMM GRANULOCYTES NFR BLD: 0.2 %
INTERPRETATION ECG - MUSE: NORMAL
LYMPHOCYTES # BLD AUTO: 2.4 10E9/L (ref 0.8–5.3)
LYMPHOCYTES NFR BLD AUTO: 43.2 %
MAGNESIUM SERPL-MCNC: 1.6 MG/DL (ref 1.6–2.3)
MCH RBC QN AUTO: 35.3 PG (ref 26.5–33)
MCHC RBC AUTO-ENTMCNC: 32.3 G/DL (ref 31.5–36.5)
MCV RBC AUTO: 109 FL (ref 78–100)
MONOCYTES # BLD AUTO: 0.3 10E9/L (ref 0–1.3)
MONOCYTES NFR BLD AUTO: 5.6 %
NEUTROPHILS # BLD AUTO: 2.7 10E9/L (ref 1.6–8.3)
NEUTROPHILS NFR BLD AUTO: 49.4 %
NRBC # BLD AUTO: 0 10*3/UL
NRBC BLD AUTO-RTO: 0 /100
PHOSPHATE SERPL-MCNC: 1.4 MG/DL (ref 2.5–4.5)
PLATELET # BLD AUTO: 165 10E9/L (ref 150–450)
POTASSIUM SERPL-SCNC: 4.2 MMOL/L (ref 3.4–5.3)
PROT SERPL-MCNC: 6.4 G/DL (ref 6.8–8.8)
RBC # BLD AUTO: 3.31 10E12/L (ref 3.8–5.2)
SODIUM SERPL-SCNC: 138 MMOL/L (ref 133–144)
WBC # BLD AUTO: 5.6 10E9/L (ref 4–11)

## 2020-08-26 PROCEDURE — 85025 COMPLETE CBC W/AUTO DIFF WBC: CPT | Performed by: INTERNAL MEDICINE

## 2020-08-26 PROCEDURE — 40000007 ZZH STATISTIC ADULT CD FACE TO FACE-NO CHRG

## 2020-08-26 PROCEDURE — 36415 COLL VENOUS BLD VENIPUNCTURE: CPT | Performed by: INTERNAL MEDICINE

## 2020-08-26 PROCEDURE — 99239 HOSP IP/OBS DSCHRG MGMT >30: CPT | Performed by: INTERNAL MEDICINE

## 2020-08-26 PROCEDURE — 12000001 ZZH R&B MED SURG/OB UMMC

## 2020-08-26 PROCEDURE — 83735 ASSAY OF MAGNESIUM: CPT | Performed by: INTERNAL MEDICINE

## 2020-08-26 PROCEDURE — 82330 ASSAY OF CALCIUM: CPT | Performed by: INTERNAL MEDICINE

## 2020-08-26 PROCEDURE — 80053 COMPREHEN METABOLIC PANEL: CPT | Performed by: INTERNAL MEDICINE

## 2020-08-26 PROCEDURE — 25000132 ZZH RX MED GY IP 250 OP 250 PS 637: Performed by: INTERNAL MEDICINE

## 2020-08-26 PROCEDURE — 25800030 ZZH RX IP 258 OP 636: Performed by: INTERNAL MEDICINE

## 2020-08-26 PROCEDURE — 25000128 H RX IP 250 OP 636: Performed by: INTERNAL MEDICINE

## 2020-08-26 PROCEDURE — 84100 ASSAY OF PHOSPHORUS: CPT | Performed by: INTERNAL MEDICINE

## 2020-08-26 RX ORDER — CLONIDINE HYDROCHLORIDE 0.1 MG/1
0.1 TABLET ORAL 2 TIMES DAILY
Qty: 60 TABLET | Refills: 0 | Status: SHIPPED | OUTPATIENT
Start: 2020-08-26 | End: 2020-08-26

## 2020-08-26 RX ORDER — MULTIPLE VITAMINS W/ MINERALS TAB 9MG-400MCG
1 TAB ORAL DAILY
Qty: 30 TABLET | Refills: 0 | Status: ON HOLD | OUTPATIENT
Start: 2020-08-27 | End: 2021-01-01

## 2020-08-26 RX ORDER — LANOLIN ALCOHOL/MO/W.PET/CERES
100 CREAM (GRAM) TOPICAL DAILY
Qty: 30 TABLET | Refills: 0 | Status: ON HOLD | OUTPATIENT
Start: 2020-08-26 | End: 2021-01-01

## 2020-08-26 RX ORDER — GABAPENTIN 300 MG/1
300 CAPSULE ORAL 3 TIMES DAILY
Qty: 90 CAPSULE | Refills: 0 | Status: ON HOLD | OUTPATIENT
Start: 2020-08-26 | End: 2021-01-16

## 2020-08-26 RX ORDER — CALCIUM CARBONATE 500 MG/1
1 TABLET, CHEWABLE ORAL 2 TIMES DAILY
Qty: 60 TABLET | Refills: 0 | Status: ON HOLD | OUTPATIENT
Start: 2020-08-26 | End: 2021-01-16

## 2020-08-26 RX ORDER — MAGNESIUM OXIDE 400 MG/1
400 TABLET ORAL DAILY
Qty: 30 TABLET | Refills: 0 | Status: ON HOLD | OUTPATIENT
Start: 2020-08-26 | End: 2021-04-07

## 2020-08-26 RX ORDER — FOLIC ACID 1 MG/1
1 TABLET ORAL DAILY
Qty: 30 TABLET | Refills: 0 | Status: ON HOLD | OUTPATIENT
Start: 2020-08-27 | End: 2021-01-01

## 2020-08-26 RX ADMIN — MAGNESIUM OXIDE TAB 400 MG (241.3 MG ELEMENTAL MG) 400 MG: 400 (241.3 MG) TAB at 08:00

## 2020-08-26 RX ADMIN — MULTIPLE VITAMINS W/ MINERALS TAB 1 TABLET: TAB at 07:59

## 2020-08-26 RX ADMIN — SODIUM PHOSPHATE, MONOBASIC, MONOHYDRATE 40 MMOL: 276; 142 INJECTION, SOLUTION INTRAVENOUS at 09:33

## 2020-08-26 RX ADMIN — SODIUM PHOSPHATE, MONOBASIC, MONOHYDRATE 40 MMOL: 276; 142 INJECTION, SOLUTION INTRAVENOUS at 14:40

## 2020-08-26 RX ADMIN — GABAPENTIN 300 MG: 300 CAPSULE ORAL at 07:59

## 2020-08-26 RX ADMIN — THIAMINE HYDROCHLORIDE 500 MG: 100 INJECTION, SOLUTION INTRAMUSCULAR; INTRAVENOUS at 18:12

## 2020-08-26 RX ADMIN — SODIUM PHOSPHATE, MONOBASIC, MONOHYDRATE 40 MMOL: 276; 142 INJECTION, SOLUTION INTRAVENOUS at 21:48

## 2020-08-26 RX ADMIN — CALCIUM CARBONATE (ANTACID) CHEW TAB 500 MG 1000 MG: 500 CHEW TAB at 21:48

## 2020-08-26 RX ADMIN — MAGNESIUM OXIDE TAB 400 MG (241.3 MG ELEMENTAL MG) 400 MG: 400 (241.3 MG) TAB at 21:48

## 2020-08-26 RX ADMIN — FOLIC ACID 1 MG: 1 TABLET ORAL at 07:59

## 2020-08-26 RX ADMIN — CALCIUM CARBONATE (ANTACID) CHEW TAB 500 MG 1000 MG: 500 CHEW TAB at 08:00

## 2020-08-26 RX ADMIN — FAMOTIDINE 20 MG: 20 TABLET ORAL at 07:59

## 2020-08-26 RX ADMIN — CLONIDINE HYDROCHLORIDE 0.1 MG: 0.1 TABLET ORAL at 07:59

## 2020-08-26 ASSESSMENT — ACTIVITIES OF DAILY LIVING (ADL)
WHICH_OF_THE_ABOVE_FUNCTIONAL_RISKS_HAD_A_RECENT_ONSET_OR_CHANGE?: AMBULATION;COGNITION
AMBULATION: 0-->INDEPENDENT
FALL_HISTORY_WITHIN_LAST_SIX_MONTHS: YES
RETIRED_EATING: 0-->INDEPENDENT
TRANSFERRING: 0-->INDEPENDENT
TOILETING: 0-->INDEPENDENT
BATHING: 0-->INDEPENDENT
SWALLOWING: 0-->SWALLOWS FOODS/LIQUIDS WITHOUT DIFFICULTY
DRESS: 0-->INDEPENDENT
NUMBER_OF_TIMES_PATIENT_HAS_FALLEN_WITHIN_LAST_SIX_MONTHS: 1
COGNITION: 1 - ATTENTION OR MEMORY DEFICITS
RETIRED_COMMUNICATION: 0-->UNDERSTANDS/COMMUNICATES WITHOUT DIFFICULTY

## 2020-08-26 NOTE — PROGRESS NOTES
[facility], [facility] Progress Note - Hospitalist Service       Date of Admission:  8/22/2020  Assessment & Plan     Maddie Orourke is a 30 year old female with a past medical history significant for alcohol dependence, alcohol related seizure and GERD who presented to the Emergency Department via EMS for evaluation of alcohol dependency, failure to thrive. Hx of recent fall.      # Alcohol dependency  # Alcohol intoxication  # Alcohol withdrawal   # Hx of alcohol withdrawal seizure  # Elevated transaminases  # Nausea and  vomiting     MSSA withdrawal protocol with diazepam, discontinued on 8/25  Started gabapentin 8/23 ( 300 mg TID)  Started clonidine 8/23 (0.1 mg BID)  IV Thiamin started 8/23 per psych recommendation  ( Day #3)   RUQ US for elevated transaminases  on 8/22 with no evidence of cholelithiasis or bile duct enlargement. There has been a gradual improvement in elevated transaminates   Discontinue fall and seizure precautions   Evaluated by psychiatrist, who supports petition for commitment. [facility]  Discussed the case with patient on 8/26. Informal information on the outcome is that the [facility] has upheld support for commitment       # Severe Hypokalemia in setting of chronic hypokalemia:  # Hypo magnesemia   # Hypocalcemia   # Hypo phosphatemia  Secondary to alcohol use and malnutrition. Patient had aggressive replacements  ( IV) and with scheduled magnesium, phosporus and calcium supplements          # QTc prolongation on EKG . QTc 508  QT offending agents have been stopped. Electrolyte correctuion as above  Telemetry with normalized QT interval on 8/25  QTc on 8/25 at 494 ( 12 lead EKG)  Discontinue telemetry on 8/26          # Failure to thrive :   Likely due to effects of alcohol and malnutrition   Started boost shakes  Risk of refeeding syndrome ( close monitoring of electrolytes)   Nutrition consulted      Pancytopenia ( Resolved)   likely due to marrow  suppression from effects of alcohol .  MVI started   Continue to monitor CBC     # Hx of Fall ? Timing unclear   Mild head trauma . No focal deficit.   - Fall precautions  Obtained CT head on 8/23 ; no acute pathology .                Diet: Combination Diet Regular Diet Adult  Snacks/Supplements Adult: Boost Shake; Between Meals    DVT Prophylaxis: Pneumatic Compression Devices  Escamilla Catheter: not present  Code Status: Full Code                The patient's care was discussed with the Bedside Nurse, Care Coordinator/, Patient and psych Consultant.    Serena Lai MD  Hospitalist Service  [facility], [place]    ______________________________________________________________________    Interval History   Patient feels well this morning. More pleasant  She is hoping that the [facility] will not uphold support for commitment  No withdrawal;l symptoms   tolerating diet well      Data reviewed today: I reviewed all medications, new labs and imaging results over the last 24 hours.    Physical Exam   Vital Signs: Temp: 97.6  F (36.4  C) Temp src: Oral BP: 100/86 Pulse: 103   Resp: 18 SpO2: 97 % O2 Device: None (Room air)    Weight: 124 lbs 4.8 oz  Constitutional: awake, alert, mild istress  Hematologic / Lymphatic: no cervical lymphadenopathy  Respiratory: No increased work of breathing, good air exchange, clear to auscultation bilaterally, no crackles or wheezing  Cardiovascular: Normal apical impulse, regular rate and rhythm, normal S1 and S2, no S3 or S4, and no murmur noted  GI: No scars, normal bowel sounds, soft, non-distended, non-tender, no masses palpated, no hepatosplenomegally  Skin ; bruises   Data   Recent Labs   Lab 08/26/20  0644 08/25/20  0640 08/24/20  0442   WBC 5.6 4.5 3.7*   HGB 11.7 11.2* 10.6*   * 107* 104*    145* 134*    140 142   POTASSIUM 4.2 3.8 3.7   CHLORIDE 107 109 109   CO2 24 26 26   BUN 6* 3* 1*   CR 0.39* 0.47* 0.36*    ANIONGAP 7 5 7   NISHI 8.2* 7.6* 6.8*   GLC 85 75 101*   ALBUMIN 3.0* 2.9* 2.7*   PROTTOTAL 6.4* 6.1* 5.4*   BILITOTAL 0.5 0.9 1.4*   ALKPHOS 340* 380* 402*   * 125* 148*   * 223* 350*

## 2020-08-26 NOTE — CONSULTS
Consulted with Meme GALLARDO. Pt is transferring to [address] tomorrow morning. Pt is upset about court, and is not appropriate to meaningfully participate in a Rule 25 today. Pt will complete Rule 25 at [address].  [email or web address]  [phone]

## 2020-08-26 NOTE — PLAN OF CARE
Patient A&O x4, and able to make her needs known. Lungs sound are clear and no SOB/DEAL noted. Bowel sounds are active in all 4Qs. Denied CP, lightheadedness, dizziness. MSSA is < 8 and pt is drinking well and voiding spontaneously without difficulties. Pt had electrolyte replacements. Pt is independent with turning and repositioning and ambulates to the bathroom and the hallway with the bed side attendant. No tremors noted. Will continue with POC.

## 2020-08-26 NOTE — PROGRESS NOTES
1400 ADDENDUM  Dr. Lyons and SW went into pt's room to discuss the commitment status. It was disclosed to the pt that [facility] upheld the commitment and paperwork with additional information would be faxed this evening. Pt began crying, pt asking why she cannot go home with an ankle bracelet. Pt's father wants to 'put her in the car and drive her to [place].' Pt's father feels counseling would be the appropriate route. SW encouraged pt and father to share desired outcome with their assigned  she will receive through the commitment courts. Per Dr. Lyons, pt will need another night in the hospital. [facility] expecting pt at 9AM tomorrow, transport was set up for 8:45AM (stretcher per transport protocol to detox). PCS form in chart.     Social Work Services Progress Note    Hospital Day: 5  Collaborated with: [facility] Attorney's Office [phone]    Data:  Medical team still waiting on new court hold form from [facility]. Per verbal update from [facility] yesterday, they are upholding the CD commitment.     PAULINA called the [facility]'s 's office to inquire about the court hold verification document. Per the court representative, they have not yet received any documents from the [facility]'s office. Per the court representative, she is going to work with them to get the paperwork and get a copy to the unit as soon as possible. Unit number and fax provided.    Per team, pt believes she is going home today.     1300: Per [facility], the paperwork will be sent this afternoon closer to when her hold expires. Per RN, pt and father inquiring about outcome. SW and provider to provide update to pt and her father.     PAULINA also spoke with [facility] in [place] and they are able to accept pt for admission today at 4PM.     Intervention:  Civil commitment     Assessment:  Pt wants to discharge today    Plan:    Anticipated Disposition:   TBD    Barriers to d/c plan:  Commitment outcome    Follow Up:  SW to continue to follow    HILLARY Munoz  Unit 5/Unit 8 Ortho/Med/Surg & [facility] Adult ED  Phone: [phone] Pager: [phone]

## 2020-08-26 NOTE — PROGRESS NOTES
[facility] called and reported patient is now on a Court Hold as of 1611 on 8/26. Paperwork was received and placed in patient's chart and a copy was given to the patient.

## 2020-08-26 NOTE — PLAN OF CARE
"/70 (BP Location: Right arm)   Pulse 83   Temp 97.8  F (36.6  C) (Oral)   Resp 18   Ht 1.702 m (5' 7\")   Wt 56.4 kg (124 lb 4.8 oz)   SpO2 96%   BMI 19.47 kg/m     Out ambulating to hallway with sitter.Steady gait.Scoring low on MSSA.  No issues with both BM and bladder.  PIV line L forearm,saline locked.  Irritable with staffs.Refusing magnesium replacement via IV inspite educ on risks/benefits.Did take her scheduled oral magnesium tabs.  Thinks she's going home today.  No attempts getting out of unit.  Plan:72 hr expiring 12nn today.Awaiting paperworks(court  hold form and information ) faxed from [facility] Atty before confirming to pt today.Continue with bedside attendant.  "

## 2020-08-27 ENCOUNTER — PATIENT OUTREACH (OUTPATIENT)
Dept: CARE COORDINATION | Facility: CLINIC | Age: 30
End: 2020-08-27

## 2020-08-27 VITALS
WEIGHT: 124.3 LBS | HEIGHT: 67 IN | BODY MASS INDEX: 19.51 KG/M2 | SYSTOLIC BLOOD PRESSURE: 123 MMHG | OXYGEN SATURATION: 100 % | TEMPERATURE: 97.4 F | HEART RATE: 62 BPM | DIASTOLIC BLOOD PRESSURE: 90 MMHG | RESPIRATION RATE: 20 BRPM

## 2020-08-27 LAB
ALBUMIN SERPL-MCNC: 3 G/DL (ref 3.4–5)
ALP SERPL-CCNC: 274 U/L (ref 40–150)
ALT SERPL W P-5'-P-CCNC: 95 U/L (ref 0–50)
ANION GAP SERPL CALCULATED.3IONS-SCNC: 8 MMOL/L (ref 3–14)
AST SERPL W P-5'-P-CCNC: 114 U/L (ref 0–45)
BILIRUB SERPL-MCNC: 0.5 MG/DL (ref 0.2–1.3)
BUN SERPL-MCNC: 7 MG/DL (ref 7–30)
CALCIUM SERPL-MCNC: 8.3 MG/DL (ref 8.5–10.1)
CHLORIDE SERPL-SCNC: 106 MMOL/L (ref 94–109)
CO2 SERPL-SCNC: 27 MMOL/L (ref 20–32)
CREAT SERPL-MCNC: 0.42 MG/DL (ref 0.52–1.04)
GFR SERPL CREATININE-BSD FRML MDRD: >90 ML/MIN/{1.73_M2}
GLUCOSE SERPL-MCNC: 86 MG/DL (ref 70–99)
MAGNESIUM SERPL-MCNC: 1.6 MG/DL (ref 1.6–2.3)
PHOSPHATE SERPL-MCNC: 4.8 MG/DL (ref 2.5–4.5)
POTASSIUM SERPL-SCNC: 3.7 MMOL/L (ref 3.4–5.3)
PROT SERPL-MCNC: 6.3 G/DL (ref 6.8–8.8)
SODIUM SERPL-SCNC: 141 MMOL/L (ref 133–144)

## 2020-08-27 PROCEDURE — 25000132 ZZH RX MED GY IP 250 OP 250 PS 637: Performed by: INTERNAL MEDICINE

## 2020-08-27 PROCEDURE — 99232 SBSQ HOSP IP/OBS MODERATE 35: CPT | Performed by: PSYCHIATRY & NEUROLOGY

## 2020-08-27 PROCEDURE — 84100 ASSAY OF PHOSPHORUS: CPT | Performed by: INTERNAL MEDICINE

## 2020-08-27 PROCEDURE — 83735 ASSAY OF MAGNESIUM: CPT | Performed by: INTERNAL MEDICINE

## 2020-08-27 PROCEDURE — 36415 COLL VENOUS BLD VENIPUNCTURE: CPT | Performed by: INTERNAL MEDICINE

## 2020-08-27 PROCEDURE — 80053 COMPREHEN METABOLIC PANEL: CPT | Performed by: INTERNAL MEDICINE

## 2020-08-27 RX ADMIN — FOLIC ACID 1 MG: 1 TABLET ORAL at 08:01

## 2020-08-27 RX ADMIN — MAGNESIUM OXIDE TAB 400 MG (241.3 MG ELEMENTAL MG) 400 MG: 400 (241.3 MG) TAB at 08:01

## 2020-08-27 RX ADMIN — CALCIUM CARBONATE (ANTACID) CHEW TAB 500 MG 1000 MG: 500 CHEW TAB at 08:01

## 2020-08-27 RX ADMIN — MULTIPLE VITAMINS W/ MINERALS TAB 1 TABLET: TAB at 08:01

## 2020-08-27 NOTE — PLAN OF CARE
"VSS. Pt was calm and cooperative- pt did report frustration with court hold but remained calm with staff. Pt on court hold as of 1611 per [facility]. Voiding adequately. PIV saline locked. Denies pain. Able to reposition self in bed. Sitter at bedside for safety. Pt able to make all needs known. Plan is to discharge at 0845 8/27 to [address]- detox.     Pt's father Law stated \"I contacted an  and I would like to talk to Meme (social work) as soon as possible tomorrow because I do not want her going to [address]. We heard it is a very bad center and we want her to go somewhere else.\" Discussed court hold and provided pt and father paperwork. Provided empathetic listening and support.   "

## 2020-08-27 NOTE — DISCHARGE SUMMARY
[facility], [place]  Hospitalist Discharge Summary      Date of Admission:  8/22/2020  Date of Discharge:  8/26/2020  Discharging Provider: Serena Lai MD      Discharge Diagnoses   # Alcohol dependency  # Alcohol intoxication  # Alcohol withdrawal   # Hx of alcohol withdrawal seizure  # Elevated transaminases  # Nausea and  vomiting   # Severe Hypokalemia in setting of severe hypokalemia   # Hypo magnesemia   # Hypocalcemia   # Hypo phosphatemia  # QTc prolongation  # Failure to thrive   #Pancytopenia   # Falls       Follow-ups Needed After Discharge   Follow-up Appointments     Adult [facility] Follow-up and recommended labs and tests      Follow up with Chemical dependency and pursue with treatment for alcohol   addiction as advised.  Establish with PCP for long term care       Appointments on [place] and/or [facility] (with [facility] or [facility]   provider or service). Call [phone] if you haven't heard regarding   these appointments within 7 days of discharge.         Follow Up and recommended labs and tests      CBC, CMP, LFT's in 1 week             Discharge Disposition   Discharged to home  Condition at discharge: Stable      Hospital Course   Maddie Orourke is a 30 year old female with a past medical history significant for alcohol dependence, alcohol related seizure and GERD who presented to the Emergency Department via EMS for evaluation of alcohol dependency, failure to thrive. Hx of recent fall.      # Alcohol dependency  # Alcohol intoxication  # Alcohol withdrawal   # Hx of alcohol withdrawal seizure  # Elevated transaminases  # Nausea and  vomiting     MSSA withdrawal protocol with diazepam, discontinued on 8/25  Started gabapentin 8/23 ( 300 mg TID)  Started clonidine 8/23 (0.1 mg BID), not continued at discharge   IV Thiamin started 8/23 per psych recommendation. Patient received a total of 5 doses of IV thiamine and transitioned to oral at  discharge   RUQ US for elevated transaminases  on 8/22 with no evidence of cholelithiasis or bile duct enlargement. There has been a gradual improvement in elevated transaminates   Discontinue fall and seizure precautions   Evaluated by psychiatrist, who supported petition for commitment. [facility]  Discussed the case with patient on 8/26. Informal information on the outcome is that the [facility] has upheld support for commitment.  Subsequent to this, patient's family expressed that they could support patient with alcohol cessation, treatment and oversight and did not want to pursue with commitment. Per family 9 father), they had explored all options and believed that they had plan which was safe in the best interest of their daughter.  In the light of this new information, which was very reassuring, psychiatry team re-evaluated patient, situation and available options. It was ultimately determined to drop the commitment.   Patient was discharged to the care of the family ( father)        # Severe Hypokalemia in setting of chronic hypokalemia:  # Hypo magnesemia   # Hypocalcemia   # Hypo phosphatemia  Secondary to alcohol use and malnutrition. Patient had aggressive replacements  ( IV) and with scheduled magnesium, phosporus and calcium supplements   At the time of discharge, patient was tolerating diet well and did not need further electrolyte supplements          # QTc prolongation on EKG . QTc 508  QT offending agents have been stopped. Electrolyte correctuion as above  Telemetry with normalized QT interval on 8/25  QTc on 8/25 at 494 ( 12 lead EKG)  Discontinue telemetry on 8/26           # Failure to thrive :   Likely due to effects of alcohol and malnutrition   Started boost shakes  Risk of refeeding syndrome ( close monitoring of electrolytes)   Nutrition consulted and advised boost shakes between meals      Pancytopenia ( Resolved)   likely due to marrow suppression from effects of alcohol .  MVI started    Continue to monitor CBC     # Hx of Fall ? Timing unclear   Mild head trauma . No focal deficit.   - Fall precautions  Obtained CT head on 8/23 ; no acute pathology .        Consultations This Hospital Stay   MEDICATION HISTORY IP PHARMACY CONSULT  SOCIAL WORK IP CONSULT  PHYSICAL THERAPY ADULT IP CONSULT  NUTRITION SERVICES ADULT IP CONSULT  PSYCHIATRY IP CONSULT  CHEMICAL DEPENDENCY IP CONSULT  PHARMACY IP CONSULT  PSYCHIATRY IP CONSULT  PSYCHIATRY IP CONSULT  PSYCHIATRY IP CONSULT    Code Status   Full Code    Time Spent on this Encounter   I, Serena Lai MD, personally saw the patient today and spent greater than 30 minutes discharging this patient.       Serena Lia MD  [facility] [place]  ______________________________________________________________________    Physical Exam   Vital Signs: Temp: 97.4  F (36.3  C) Temp src: Oral BP: (!) 123/90 Pulse: 62   Resp: 20 SpO2: 100 % O2 Device: None (Room air)    Weight: 124 lbs 4.8 oz  General Appearance: Awake,alert and not in distress  Respiratory: Clear breath sounds bilaterally   Cardiovascular: Normal heart sounds. No murmurs  GI: soft, non tender. Normal bowel sounds   Skin: No bruising or bleeding   Other: Awake, alert and orientated X 3        Primary Care Physician   Physician No Ref-Primary    Discharge Orders      Reason for your hospital stay    Acute alcohol intoxication  Alcohol withdrawal     Follow Up and recommended labs and tests    CBC, CMP, LFT's in 1 week     Activity    Your activity upon discharge: activity as tolerated     Adult [facility] Follow-up and recommended labs and tests    Follow up with Chemical dependency and pursue with treatment for alcohol addiction as advised.  Establish with PCP for long term care       Appointments on [place] and/or [facility] (with [facility] or [facility] provider or service). Call [phone] if you haven't heard regarding these appointments  within 7 days of discharge.     Full Code     Diet    Follow this diet upon discharge: Orders Placed This Encounter      Snacks/Supplements Adult: Boost Shake; Between Meals      Combination Diet Regular Diet Adult       Significant Results and Procedures   Results for orders placed or performed during the hospital encounter of 08/22/20   US Abdomen Limited ([facility] [place] only)    Narrative    ULTRASOUND ABDOMEN LIMITED  8/22/2020 9:13 PM     HISTORY: Elevated transaminases including ALP. History of alcohol use.    COMPARISON: None.    FINDINGS:  Liver is moderately fatty infiltrated as evidenced by a  diffuse increase in echogenicity without focal lesions. Gallbladder  demonstrates no cholelithiasis or cholecystitis. Question a few  gallbladder polyps. Extrahepatic bile duct is normal in diameter.  Pancreas is normal where visualized. Right kidney is normal in size.  There is no hydronephrosis.      Impression    IMPRESSION:    1. Increased echogenicity of the liver likely related to hepatic  steatosis.  2. No cholelithiasis or cholecystitis.  3. Probable gallbladder polyps.    JOEY GALAN MD   CT Head w/o Contrast    Narrative    CT HEAD W/O CONTRAST 8/23/2020 3:05 PM    Provided History: fall . etoh abuse  ICD-10:    Comparison: CT dated 5/8/2019.    Technique: Using multidetector thin collimation helical acquisition  technique, axial, coronal and sagittal CT images from the skull base  to the vertex were obtained without intravenous contrast.     Findings:    No intracranial hemorrhage, mass effect, or midline shift. No  hydrocephalus. The gray to white matter differentiation of the  cerebral hemispheres is preserved. The basal cisterns are patent.  Moderate generalized parenchyma volume loss. Volume loss is more  pronounced in the cerebellar hemispheres and has markedly increased  since 2019. Volume loss is more than appropriate for age and can be  seen with alcohol abuse.    Diffuse mucosal thickening of the  paranasal sinuses. The mastoid air  cells are clear.       Impression    Impression:   1. No acute intracranial pathology.  2. Moderate diffuse brain atrophy, more than expected for age.    I have personally reviewed the examination and initial interpretation  and I agree with the findings.    DEBBIE ESPAÑA MD       Discharge Medications   Current Discharge Medication List      START taking these medications    Details   calcium carbonate (TUMS) 500 MG chewable tablet Take 1 tablet (500 mg) by mouth 2 times daily  Qty: 60 tablet, Refills: 0    Associated Diagnoses: Failure to thrive in adult      folic acid (FOLVITE) 1 MG tablet Take 1 tablet (1 mg) by mouth daily  Qty: 30 tablet, Refills: 0    Associated Diagnoses: Failure to thrive in adult      gabapentin (NEURONTIN) 300 MG capsule Take 1 capsule (300 mg) by mouth 3 times daily  Qty: 90 capsule, Refills: 0    Associated Diagnoses: Failure to thrive in adult      magnesium oxide (MAG-OX) 400 MG tablet Take 1 tablet (400 mg) by mouth daily  Qty: 30 tablet, Refills: 0    Associated Diagnoses: Failure to thrive in adult      multivitamin w/minerals (THERA-VIT-M) tablet Take 1 tablet by mouth daily  Qty: 30 tablet, Refills: 0    Associated Diagnoses: Failure to thrive in adult      thiamine (B-1) 100 MG tablet Take 1 tablet (100 mg) by mouth daily  Qty: 30 tablet, Refills: 0    Associated Diagnoses: Failure to thrive in adult         CONTINUE these medications which have CHANGED    Details   potassium 99 MG TABS Take 1 tablet by mouth daily  Qty: 30 tablet, Refills: 0    Associated Diagnoses: Failure to thrive in adult           Allergies   No Known Allergies

## 2020-08-27 NOTE — PLAN OF CARE
Pt has been pleasant and cooperative, requested food and writer provided boxed lunch which pt consumed entirely, up independently, voiding, denies nausea, dizziness, SOB or CP, sleeping the entire shift, sitter at bedside, pt will be leaving today @ 0845 to [address], father might be here before visiting hours per evening RN, continue to monitor and assist.

## 2020-08-27 NOTE — PLAN OF CARE
Per MD pt okay to discharge home with father, commitment dropped by psych. Pt. discharged at 1310 to home, was accompanied by father, and left with personal belongings. Pt. received complete discharge paperwork and medications as filled by discharge pharmacy. Pt. was given times of last dose for all discharge medications in writing on discharge medication sheets. Discharge teaching completed. Pt. to follow up with PCP in 1 week and to work with counselor with CD plan. Pt. had no further questions at the time of discharge and no unmet needs were identified.

## 2020-08-27 NOTE — PROGRESS NOTES
Social Work Services Progress Note    Hospital Day: 6  Collaborated with:  RN, RN Manager Chad Phillip, provider-Dr. Lyons, pt, pt's father Law,  supervisor Isabella Rodriguez, [facility] Attorney Ap Lima [phone]    Data:  This AM, RN notified writer that pt's father, Law, was present and strongly against pt going to [facility] [address] [facility] this morning. Transport was set up to take pt at 8:45. Law indicating that he has an  he is working with and is wanting to know the legalities of the transfer.    Per the [facility] document received last night, it indicates that the hospital has the right to transfer pt to [address] (specifically notes this location in the order).     PAULINA called Ap Lima,  at [facility], who indicated that the [facility] had not yet filed any documents regarding pt's case. Ap reports that if the documents filed by [facility] are not identical to the one the hospital has, there could be legal repercussions of transferring the pt. Ap notes that some of the newer documents have not included transfer orders. Ap and her  report that they 'strongly advise the hospital against transferring the patient.'    PAULINA updated provider and RN. Per discussions with  supervisor, Isabella Rodriguez, it would be appropriate to withdrawal the petition if pt's father has stepped in and expressed desire to take pt and provide the support she needs. PAULINA Mason, RN Manager Chad and Dr. Lyons discussed this. Dr. Lyons would like psych to weigh in prior to withdrawing the petition.     PAULINA called Ap Lima again to confirm that the hospital would still have dictation to withdraw the petition. Per Ap, the hospital is the petitioner and can drop the petition at any time. Per Ap, the county 'works' for the petitioner, and they are driving the plan. Ap also notes that by statutes, it is the petitioner's responsibility to assess  "for the path of resistance if possible.     SW updated provider and alerted psychiatry.     1113: Dr. Lauren from psychiatry will come to meet with pt and pt's father to discuss their plan and weigh in regarding the commitment and whether or not it will be dropped. If the petition is to be dropped, a mark needs to be typed up and e-mailed to pA Lima at [email or web address]     The document would need to say something to the affect of: \"The petitioner has re-evaluated the circumstances and the petitioner is requesting that the petition be withdrawn.\"     1300: Commitment was withdrawn, [facility] was notified by phone (Ap Lima) and by e-mail, per her request. Pt discharging.    Intervention:  Discharge planning    Assessment:  Pt may discharge to community if an appropriate plan is established     Plan:    Anticipated Disposition:  Home with dad and follow up v stay of commitment    Barriers to d/c plan:  Psych assessment, safe plan    Follow Up:  SW to continue to follow    HILLARY Munoz  Unit 5/Unit 8 Ortho/Med/Surg & [facility]  Phone: [phone] Pager: [phone]        "

## 2020-08-27 NOTE — CONSULTS
Consult Date:  2020      PSYCHIATRIC CONSULTATION      IDENTIFICATION:  Ms. Maddie Orourke is a 30-year-old white female who iwasadmitted with alcohol use disorder, alcohol withdrawal and a variety of fluid and electrolyte abnormalities as well as increases in liver function tests and a brain CT that revealed atrophy.  On my original consult the patient was refusing treatment and I believe that if she was discharged home, she would be in imminent danger of death from her chemical dependency.  In fact, while living alone she had not eaten very much in 5 days and the ambulance personnel found her covered dog feces and completely unable to manage her own ADLs.  Therefore, I placed her on a hold and filed a petition for commitment.  Things have changed dramatically at this point.  Happily, her family has understood the seriousness of this situation and are willing to come here in shifts from [place] to stay with the patient and make sure that she is safe.  They have also discussed the case with a chemical dependency counselor who is willing to assess the patient and then make recommendations for appropriate treatment.  Her fluid and electrolyte abnormalities are markedly improved and her liver function tests have come down significantly.      At this point, there are several salient points both medical and medical legal that lead me to recommend dropping the commitment and allowing the patient to be discharged with her father.  The first issue is the concept of imminent danger of death.  Although I do believe the patient would likely have  had she been discharged home,  at present , she will be discharged with family.  They will be supervising and are unlikely to simply let her drink herself to death.  The second issue is the concept of least restrictive environment.  The family seems very willing to provide safety.  A third, the [facility] has mentioned to social work that at this point we do not  have the right to transfer this patient. Since she is medically stable and currently cannot be transferred to a CD program ,she would end up being in the hospital for no apparent reason.  [facility] [place] recommends that we do not transfer the patient at present.  Given all of these issues, it does seem that the best and probably safest alternative would be to discharge this patient with supervision from family.  Father tells me that he and the patient's mother are planning to take shifts, staying with the patient in [place].  They have already discussed the case with the patient's employer and with an alcohol counselor who plans to set up an outpatient program.  It does seem that this would be a safe discharge plan.      MENTAL STATUS EXAMINATION:  Today, the patient had much more psychomotor activity.  She was able to jump out of bed and go down the avila to make sure she had her lunch tray.  She seemed to have no difficulty ambulating.  Her mood was good.  Her affect was somewhat tearful.  It seemed to me these were tears of relief and she was quite grateful that her parents were helping.  Her speech was coherent and goal oriented.  Her associations tight and her thought processes logical and linear.  Her content of thought was without psychosis or suicidal ideation.  Recent and remote memory, concentration, fund of knowledge and use of language were all coming back to baseline or at baseline.  She is alert and oriented x 3.  Insight and judgment have certainly improved.  Gait and station were within normal limits.      Recent vital signs include a temperature of 97.4, pulse of 62, respiration rate of 20 with 100% oxygen saturation and a blood pressure of 123/90.      ASSESSMENT:  Alcohol use disorder.      RECOMMENDATION:  Discharge home with family to pursue chemical dependency treatment.  I have discussed this case with Social Service, nursing and Dr. Lyons.         SANTOS OWENS MD             D:  2020   T: 2020   MT: DEVON      Name:     ALFONZO MARTIN   MRN:      [id number]        Account:       [id number]   :      1990           Consult Date:  2020      Document: [id number]

## 2020-08-27 NOTE — PLAN OF CARE
"BP (!) 123/90 (BP Location: Right arm)   Pulse 62   Temp 97.4  F (36.3  C) (Oral)   Resp 20   Ht 1.702 m (5' 7\")   Wt 56.4 kg (124 lb 4.8 oz)   SpO2 100%   BMI 19.47 kg/m    VSS. On room air. A&Ox4. LS clear. Pt denies SOB and chest pain. Pt ambulating independenlty. BS active and audible in all quadrants. Pt passing gas. Voiding without difficulty. Skin intact. Regular diet, tolerating well. Pt denies numbness/tingling. Pt denies pain. Continue to monitor. Pt able to make needs known, call light within reach.     Plan is to have psych reevaluate pt to discuss their plan (pt and father's) regarding the commitment and whether or not it will be dropped. If it is dropped pt will be discharged with her father to help care for her at home.     "

## 2020-08-28 NOTE — PROGRESS NOTES
[facility]: Post-Discharge Note  SITUATION                                                      Admission:    Admission Date: 08/22/20   Reason for Admission: Alcohol dependency  Discharge:   Discharge Date: 08/27/20  Discharge Diagnosis: Alcohol dependency    BACKGROUND                                                      Maddie Orourke is a 30 year old female with a past medical history significant for alcohol dependence, alcohol related seizure and GERD who presented to the Emergency Department via EMS for evaluation of alcohol dependency, failure to thrive. Hx of recent fall.     ASSESSMENT      Discharge Assessment  Patient reports symptoms are: Improved  Does the patient have all of their medications?: Yes  Does patient know what their new medications are for?: Yes  Does patient have a follow-up appointment scheduled?: Yes  Does patient have any other questions or concerns?: No    Post-op  Did the patient have surgery or a procedure: No  Fever: No  Chills: No  Eating & Drinking: eating and drinking without complaints/concerns  PO Intake: regular diet  Bowel Function: normal  Urinary Status: voiding without complaint/concerns        PLAN                                                      Outpatient Plan:  Follow-up Appointments     Adult [facility] Follow-up and recommended labs and tests      Follow up with Chemical dependency and pursue with treatment for alcohol   addiction as advised.  Establish with PCP for long term care       No future appointments.        Sandrine Arreola, CMA

## 2021-01-01 ENCOUNTER — PATIENT OUTREACH (OUTPATIENT)
Dept: NURSING | Facility: CLINIC | Age: 31
End: 2021-01-01
Attending: INTERNAL MEDICINE
Payer: COMMERCIAL

## 2021-01-01 ENCOUNTER — TELEPHONE (OUTPATIENT)
Dept: INTERNAL MEDICINE | Facility: CLINIC | Age: 31
End: 2021-01-01

## 2021-01-01 ENCOUNTER — HOSPITAL ENCOUNTER (INPATIENT)
Facility: CLINIC | Age: 31
LOS: 2 days | Discharge: HOME | DRG: 896 | End: 2021-11-04
Attending: EMERGENCY MEDICINE | Admitting: INTERNAL MEDICINE
Payer: COMMERCIAL

## 2021-01-01 ENCOUNTER — APPOINTMENT (OUTPATIENT)
Dept: ULTRASOUND IMAGING | Facility: CLINIC | Age: 31
DRG: 896 | End: 2021-01-01
Attending: EMERGENCY MEDICINE
Payer: COMMERCIAL

## 2021-01-01 ENCOUNTER — PATIENT OUTREACH (OUTPATIENT)
Dept: CARE COORDINATION | Facility: CLINIC | Age: 31
End: 2021-01-01

## 2021-01-01 VITALS
SYSTOLIC BLOOD PRESSURE: 109 MMHG | BODY MASS INDEX: 19.93 KG/M2 | HEART RATE: 84 BPM | TEMPERATURE: 99.4 F | WEIGHT: 127 LBS | DIASTOLIC BLOOD PRESSURE: 70 MMHG | OXYGEN SATURATION: 98 % | RESPIRATION RATE: 16 BRPM | HEIGHT: 67 IN

## 2021-01-01 DIAGNOSIS — F10.939 ALCOHOL WITHDRAWAL SEIZURE WITH COMPLICATION (H): ICD-10-CM

## 2021-01-01 DIAGNOSIS — E83.42 HYPOMAGNESEMIA: ICD-10-CM

## 2021-01-01 DIAGNOSIS — E55.9 VITAMIN D DEFICIENCY: Primary | ICD-10-CM

## 2021-01-01 DIAGNOSIS — R62.7 FAILURE TO THRIVE IN ADULT: ICD-10-CM

## 2021-01-01 DIAGNOSIS — R11.2 NAUSEA AND VOMITING, INTRACTABILITY OF VOMITING NOT SPECIFIED, UNSPECIFIED VOMITING TYPE: ICD-10-CM

## 2021-01-01 DIAGNOSIS — B17.9 ACUTE HEPATITIS: ICD-10-CM

## 2021-01-01 DIAGNOSIS — E87.1 HYPONATREMIA: ICD-10-CM

## 2021-01-01 DIAGNOSIS — R56.9 ALCOHOL WITHDRAWAL SEIZURE WITH COMPLICATION (H): ICD-10-CM

## 2021-01-01 DIAGNOSIS — E87.6 HYPOKALEMIA: ICD-10-CM

## 2021-01-01 DIAGNOSIS — E87.3 METABOLIC ALKALOSIS: ICD-10-CM

## 2021-01-01 LAB
ALBUMIN SERPL-MCNC: 2.2 G/DL (ref 3.4–5)
ALBUMIN SERPL-MCNC: 2.3 G/DL (ref 3.4–5)
ALBUMIN SERPL-MCNC: 2.4 G/DL (ref 3.4–5)
ALBUMIN SERPL-MCNC: 3.2 G/DL (ref 3.4–5)
ALP SERPL-CCNC: 522 U/L (ref 40–150)
ALP SERPL-CCNC: 576 U/L (ref 40–150)
ALP SERPL-CCNC: 586 U/L (ref 40–150)
ALP SERPL-CCNC: 738 U/L (ref 40–150)
ALT SERPL W P-5'-P-CCNC: 106 U/L (ref 0–50)
ALT SERPL W P-5'-P-CCNC: 137 U/L (ref 0–50)
ALT SERPL W P-5'-P-CCNC: 146 U/L (ref 0–50)
ALT SERPL W P-5'-P-CCNC: 186 U/L (ref 0–50)
ANION GAP SERPL CALCULATED.3IONS-SCNC: 10 MMOL/L (ref 3–14)
ANION GAP SERPL CALCULATED.3IONS-SCNC: 10 MMOL/L (ref 3–14)
ANION GAP SERPL CALCULATED.3IONS-SCNC: 22 MMOL/L (ref 3–14)
ANION GAP SERPL CALCULATED.3IONS-SCNC: 7 MMOL/L (ref 3–14)
ANION GAP SERPL CALCULATED.3IONS-SCNC: 8 MMOL/L (ref 3–14)
AST SERPL W P-5'-P-CCNC: 1212 U/L (ref 0–45)
AST SERPL W P-5'-P-CCNC: 1472 U/L (ref 0–45)
AST SERPL W P-5'-P-CCNC: 350 U/L (ref 0–45)
AST SERPL W P-5'-P-CCNC: 706 U/L (ref 0–45)
ATRIAL RATE - MUSE: 115 BPM
BACTERIA BLD CULT: NO GROWTH
BASE EXCESS BLDV CALC-SCNC: 9 MMOL/L (ref -7.7–1.9)
BASOPHILS # BLD AUTO: 0 10E3/UL (ref 0–0.2)
BASOPHILS NFR BLD AUTO: 0 %
BILIRUB SERPL-MCNC: 3.2 MG/DL (ref 0.2–1.3)
BILIRUB SERPL-MCNC: 3.8 MG/DL (ref 0.2–1.3)
BILIRUB SERPL-MCNC: 4.5 MG/DL (ref 0.2–1.3)
BILIRUB SERPL-MCNC: 4.6 MG/DL (ref 0.2–1.3)
BUN SERPL-MCNC: 2 MG/DL (ref 7–30)
BUN SERPL-MCNC: 2 MG/DL (ref 7–30)
BUN SERPL-MCNC: 5 MG/DL (ref 7–30)
BUN SERPL-MCNC: <1 MG/DL (ref 7–30)
BUN SERPL-MCNC: <1 MG/DL (ref 7–30)
CA-I BLD-MCNC: 3.6 MG/DL (ref 4.4–5.2)
CALCIUM SERPL-MCNC: 6.7 MG/DL (ref 8.5–10.1)
CALCIUM SERPL-MCNC: 7.1 MG/DL (ref 8.5–10.1)
CALCIUM SERPL-MCNC: 7.7 MG/DL (ref 8.5–10.1)
CALCIUM SERPL-MCNC: 7.7 MG/DL (ref 8.5–10.1)
CALCIUM SERPL-MCNC: 8.1 MG/DL (ref 8.5–10.1)
CHLORIDE BLD-SCNC: 101 MMOL/L (ref 94–109)
CHLORIDE BLD-SCNC: 76 MMOL/L (ref 94–109)
CHLORIDE BLD-SCNC: 90 MMOL/L (ref 94–109)
CHLORIDE BLD-SCNC: 94 MMOL/L (ref 94–109)
CHLORIDE BLD-SCNC: 96 MMOL/L (ref 94–109)
CK SERPL-CCNC: 213 U/L (ref 30–225)
CO2 SERPL-SCNC: 25 MMOL/L (ref 20–32)
CO2 SERPL-SCNC: 26 MMOL/L (ref 20–32)
CO2 SERPL-SCNC: 29 MMOL/L (ref 20–32)
CO2 SERPL-SCNC: 31 MMOL/L (ref 20–32)
CO2 SERPL-SCNC: 33 MMOL/L (ref 20–32)
CREAT SERPL-MCNC: 0.32 MG/DL (ref 0.52–1.04)
CREAT SERPL-MCNC: 0.37 MG/DL (ref 0.52–1.04)
CREAT SERPL-MCNC: 0.4 MG/DL (ref 0.52–1.04)
CREAT SERPL-MCNC: 0.42 MG/DL (ref 0.52–1.04)
CREAT SERPL-MCNC: 0.46 MG/DL (ref 0.52–1.04)
DEPRECATED CALCIDIOL+CALCIFEROL SERPL-MC: 8 UG/L (ref 20–75)
DIASTOLIC BLOOD PRESSURE - MUSE: NORMAL MMHG
EOSINOPHIL # BLD AUTO: 0 10E3/UL (ref 0–0.7)
EOSINOPHIL NFR BLD AUTO: 0 %
ERYTHROCYTE [DISTWIDTH] IN BLOOD BY AUTOMATED COUNT: 14.9 % (ref 10–15)
ERYTHROCYTE [DISTWIDTH] IN BLOOD BY AUTOMATED COUNT: 14.9 % (ref 10–15)
ERYTHROCYTE [DISTWIDTH] IN BLOOD BY AUTOMATED COUNT: 15 % (ref 10–15)
ERYTHROCYTE [DISTWIDTH] IN BLOOD BY AUTOMATED COUNT: 15.3 % (ref 10–15)
ETHANOL SERPL-MCNC: <0.01 G/DL
GFR SERPL CREATININE-BSD FRML MDRD: >90 ML/MIN/1.73M2
GLUCOSE BLD-MCNC: 100 MG/DL (ref 70–99)
GLUCOSE BLD-MCNC: 110 MG/DL (ref 70–99)
GLUCOSE BLD-MCNC: 125 MG/DL (ref 70–99)
GLUCOSE BLD-MCNC: 130 MG/DL (ref 70–99)
GLUCOSE BLD-MCNC: 156 MG/DL (ref 70–99)
HCG SERPL QL: NEGATIVE
HCO3 BLDV-SCNC: 34 MMOL/L (ref 21–28)
HCO3 BLDV-SCNC: 43 MMOL/L (ref 21–28)
HCT VFR BLD AUTO: 27.8 % (ref 35–47)
HCT VFR BLD AUTO: 27.8 % (ref 35–47)
HCT VFR BLD AUTO: 29.4 % (ref 35–47)
HCT VFR BLD AUTO: 35 % (ref 35–47)
HGB BLD-MCNC: 11.6 G/DL (ref 11.7–15.7)
HGB BLD-MCNC: 9.2 G/DL (ref 11.7–15.7)
HGB BLD-MCNC: 9.3 G/DL (ref 11.7–15.7)
HGB BLD-MCNC: 9.4 G/DL (ref 11.7–15.7)
HOLD SPECIMEN: NORMAL
IMM GRANULOCYTES # BLD: 0 10E3/UL
IMM GRANULOCYTES NFR BLD: 0 %
INR PPP: 1.06 (ref 0.85–1.15)
INTERPRETATION ECG - MUSE: NORMAL
LACTATE BLD-SCNC: 5 MMOL/L
LACTATE SERPL-SCNC: 3.6 MMOL/L (ref 0.7–2)
LIPASE SERPL-CCNC: 266 U/L (ref 73–393)
LYMPHOCYTES # BLD AUTO: 1 10E3/UL (ref 0.8–5.3)
LYMPHOCYTES NFR BLD AUTO: 11 %
MAGNESIUM SERPL-MCNC: 0.9 MG/DL (ref 1.6–2.3)
MAGNESIUM SERPL-MCNC: 1.3 MG/DL (ref 1.6–2.3)
MAGNESIUM SERPL-MCNC: 1.5 MG/DL (ref 1.6–2.3)
MAGNESIUM SERPL-MCNC: 1.7 MG/DL (ref 1.6–2.3)
MAGNESIUM SERPL-MCNC: 2.1 MG/DL (ref 1.6–2.3)
MAGNESIUM SERPL-MCNC: 2.3 MG/DL (ref 1.6–2.3)
MAGNESIUM SERPL-MCNC: 3 MG/DL (ref 1.6–2.3)
MCH RBC QN AUTO: 32.6 PG (ref 26.5–33)
MCH RBC QN AUTO: 32.6 PG (ref 26.5–33)
MCH RBC QN AUTO: 32.7 PG (ref 26.5–33)
MCH RBC QN AUTO: 33.6 PG (ref 26.5–33)
MCHC RBC AUTO-ENTMCNC: 32 G/DL (ref 31.5–36.5)
MCHC RBC AUTO-ENTMCNC: 33.1 G/DL (ref 31.5–36.5)
MCHC RBC AUTO-ENTMCNC: 33.1 G/DL (ref 31.5–36.5)
MCHC RBC AUTO-ENTMCNC: 33.5 G/DL (ref 31.5–36.5)
MCV RBC AUTO: 102 FL (ref 78–100)
MCV RBC AUTO: 102 FL (ref 78–100)
MCV RBC AUTO: 98 FL (ref 78–100)
MCV RBC AUTO: 98 FL (ref 78–100)
MONOCYTES # BLD AUTO: 0.4 10E3/UL (ref 0–1.3)
MONOCYTES NFR BLD AUTO: 4 %
NEUTROPHILS # BLD AUTO: 7.9 10E3/UL (ref 1.6–8.3)
NEUTROPHILS NFR BLD AUTO: 85 %
NRBC # BLD AUTO: 0 10E3/UL
NRBC BLD AUTO-RTO: 0 /100
O2/TOTAL GAS SETTING VFR VENT: 0 %
P AXIS - MUSE: 76 DEGREES
PCO2 BLDV: 44 MM HG (ref 40–50)
PCO2 BLDV: 51 MM HG (ref 40–50)
PH BLDV: 7.44 [PH] (ref 7.32–7.43)
PH BLDV: 7.61 [PH] (ref 7.32–7.43)
PHOSPHATE SERPL-MCNC: 1.3 MG/DL (ref 2.5–4.5)
PHOSPHATE SERPL-MCNC: 1.8 MG/DL (ref 2.5–4.5)
PHOSPHATE SERPL-MCNC: 1.9 MG/DL (ref 2.5–4.5)
PHOSPHATE SERPL-MCNC: 1.9 MG/DL (ref 2.5–4.5)
PHOSPHATE SERPL-MCNC: 2.2 MG/DL (ref 2.5–4.5)
PLAT MORPH BLD: NORMAL
PLATELET # BLD AUTO: 122 10E3/UL (ref 150–450)
PLATELET # BLD AUTO: 71 10E3/UL (ref 150–450)
PLATELET # BLD AUTO: 73 10E3/UL (ref 150–450)
PLATELET # BLD AUTO: 80 10E3/UL (ref 150–450)
PO2 BLDV: 19 MM HG (ref 25–47)
PO2 BLDV: 32 MM HG (ref 25–47)
POTASSIUM BLD-SCNC: 2.1 MMOL/L (ref 3.4–5.3)
POTASSIUM BLD-SCNC: 2.2 MMOL/L (ref 3.4–5.3)
POTASSIUM BLD-SCNC: 2.7 MMOL/L (ref 3.4–5.3)
POTASSIUM BLD-SCNC: 2.7 MMOL/L (ref 3.4–5.3)
POTASSIUM BLD-SCNC: 3 MMOL/L (ref 3.4–5.3)
POTASSIUM BLD-SCNC: 3.1 MMOL/L (ref 3.4–5.3)
POTASSIUM BLD-SCNC: 3.2 MMOL/L (ref 3.4–5.3)
POTASSIUM BLD-SCNC: 3.3 MMOL/L (ref 3.4–5.3)
POTASSIUM BLD-SCNC: 3.6 MMOL/L (ref 3.4–5.3)
PR INTERVAL - MUSE: 154 MS
PROT SERPL-MCNC: 4.9 G/DL (ref 6.8–8.8)
PROT SERPL-MCNC: 5 G/DL (ref 6.8–8.8)
PROT SERPL-MCNC: 5.1 G/DL (ref 6.8–8.8)
PROT SERPL-MCNC: 6.3 G/DL (ref 6.8–8.8)
QRS DURATION - MUSE: 90 MS
QT - MUSE: 354 MS
QTC - MUSE: 489 MS
R AXIS - MUSE: 6 DEGREES
RBC # BLD AUTO: 2.74 10E6/UL (ref 3.8–5.2)
RBC # BLD AUTO: 2.84 10E6/UL (ref 3.8–5.2)
RBC # BLD AUTO: 2.88 10E6/UL (ref 3.8–5.2)
RBC # BLD AUTO: 3.56 10E6/UL (ref 3.8–5.2)
RBC MORPH BLD: NORMAL
SAO2 % BLDV: 72 % (ref 94–100)
SARS-COV-2 RNA RESP QL NAA+PROBE: NEGATIVE
SODIUM SERPL-SCNC: 127 MMOL/L (ref 133–144)
SODIUM SERPL-SCNC: 132 MMOL/L (ref 133–144)
SODIUM SERPL-SCNC: 132 MMOL/L (ref 133–144)
SODIUM SERPL-SCNC: 133 MMOL/L (ref 133–144)
SODIUM SERPL-SCNC: 134 MMOL/L (ref 133–144)
SYSTOLIC BLOOD PRESSURE - MUSE: NORMAL MMHG
T AXIS - MUSE: -30 DEGREES
VENTRICULAR RATE- MUSE: 115 BPM
WBC # BLD AUTO: 4.6 10E3/UL (ref 4–11)
WBC # BLD AUTO: 5 10E3/UL (ref 4–11)
WBC # BLD AUTO: 5 10E3/UL (ref 4–11)
WBC # BLD AUTO: 9.4 10E3/UL (ref 4–11)

## 2021-01-01 PROCEDURE — 84132 ASSAY OF SERUM POTASSIUM: CPT | Performed by: INTERNAL MEDICINE

## 2021-01-01 PROCEDURE — 250N000009 HC RX 250: Performed by: INTERNAL MEDICINE

## 2021-01-01 PROCEDURE — 80053 COMPREHEN METABOLIC PANEL: CPT | Performed by: EMERGENCY MEDICINE

## 2021-01-01 PROCEDURE — 250N000011 HC RX IP 250 OP 636: Performed by: INTERNAL MEDICINE

## 2021-01-01 PROCEDURE — 250N000013 HC RX MED GY IP 250 OP 250 PS 637: Performed by: INTERNAL MEDICINE

## 2021-01-01 PROCEDURE — 258N000003 HC RX IP 258 OP 636: Performed by: EMERGENCY MEDICINE

## 2021-01-01 PROCEDURE — 250N000013 HC RX MED GY IP 250 OP 250 PS 637: Performed by: EMERGENCY MEDICINE

## 2021-01-01 PROCEDURE — 84100 ASSAY OF PHOSPHORUS: CPT | Performed by: INTERNAL MEDICINE

## 2021-01-01 PROCEDURE — HZ2ZZZZ DETOXIFICATION SERVICES FOR SUBSTANCE ABUSE TREATMENT: ICD-10-PCS | Performed by: INTERNAL MEDICINE

## 2021-01-01 PROCEDURE — 36415 COLL VENOUS BLD VENIPUNCTURE: CPT | Performed by: INTERNAL MEDICINE

## 2021-01-01 PROCEDURE — 83690 ASSAY OF LIPASE: CPT | Performed by: EMERGENCY MEDICINE

## 2021-01-01 PROCEDURE — 83735 ASSAY OF MAGNESIUM: CPT | Performed by: EMERGENCY MEDICINE

## 2021-01-01 PROCEDURE — 36415 COLL VENOUS BLD VENIPUNCTURE: CPT | Performed by: EMERGENCY MEDICINE

## 2021-01-01 PROCEDURE — 84703 CHORIONIC GONADOTROPIN ASSAY: CPT | Performed by: EMERGENCY MEDICINE

## 2021-01-01 PROCEDURE — 120N000001 HC R&B MED SURG/OB

## 2021-01-01 PROCEDURE — 82306 VITAMIN D 25 HYDROXY: CPT | Performed by: INTERNAL MEDICINE

## 2021-01-01 PROCEDURE — 99285 EMERGENCY DEPT VISIT HI MDM: CPT | Mod: 25

## 2021-01-01 PROCEDURE — 96375 TX/PRO/DX INJ NEW DRUG ADDON: CPT

## 2021-01-01 PROCEDURE — 258N000003 HC RX IP 258 OP 636: Performed by: INTERNAL MEDICINE

## 2021-01-01 PROCEDURE — 85027 COMPLETE CBC AUTOMATED: CPT | Performed by: INTERNAL MEDICINE

## 2021-01-01 PROCEDURE — 96366 THER/PROPH/DIAG IV INF ADDON: CPT

## 2021-01-01 PROCEDURE — 83735 ASSAY OF MAGNESIUM: CPT | Performed by: INTERNAL MEDICINE

## 2021-01-01 PROCEDURE — 85610 PROTHROMBIN TIME: CPT | Performed by: EMERGENCY MEDICINE

## 2021-01-01 PROCEDURE — 93005 ELECTROCARDIOGRAM TRACING: CPT

## 2021-01-01 PROCEDURE — 80053 COMPREHEN METABOLIC PANEL: CPT | Performed by: INTERNAL MEDICINE

## 2021-01-01 PROCEDURE — 82550 ASSAY OF CK (CPK): CPT | Performed by: INTERNAL MEDICINE

## 2021-01-01 PROCEDURE — 87040 BLOOD CULTURE FOR BACTERIA: CPT | Performed by: EMERGENCY MEDICINE

## 2021-01-01 PROCEDURE — 99239 HOSP IP/OBS DSCHRG MGMT >30: CPT | Performed by: INTERNAL MEDICINE

## 2021-01-01 PROCEDURE — 85025 COMPLETE CBC W/AUTO DIFF WBC: CPT | Performed by: EMERGENCY MEDICINE

## 2021-01-01 PROCEDURE — 82803 BLOOD GASES ANY COMBINATION: CPT | Performed by: INTERNAL MEDICINE

## 2021-01-01 PROCEDURE — 99232 SBSQ HOSP IP/OBS MODERATE 35: CPT | Performed by: INTERNAL MEDICINE

## 2021-01-01 PROCEDURE — 76705 ECHO EXAM OF ABDOMEN: CPT

## 2021-01-01 PROCEDURE — 96365 THER/PROPH/DIAG IV INF INIT: CPT

## 2021-01-01 PROCEDURE — C9803 HOPD COVID-19 SPEC COLLECT: HCPCS

## 2021-01-01 PROCEDURE — 99223 1ST HOSP IP/OBS HIGH 75: CPT | Mod: AI | Performed by: INTERNAL MEDICINE

## 2021-01-01 PROCEDURE — 82330 ASSAY OF CALCIUM: CPT | Performed by: INTERNAL MEDICINE

## 2021-01-01 PROCEDURE — 250N000011 HC RX IP 250 OP 636: Performed by: EMERGENCY MEDICINE

## 2021-01-01 PROCEDURE — 999N000216 HC STATISTIC ADULT CD FACE TO FACE-NO CHRG

## 2021-01-01 PROCEDURE — 82077 ASSAY SPEC XCP UR&BREATH IA: CPT | Performed by: EMERGENCY MEDICINE

## 2021-01-01 PROCEDURE — 83605 ASSAY OF LACTIC ACID: CPT | Performed by: INTERNAL MEDICINE

## 2021-01-01 PROCEDURE — 82803 BLOOD GASES ANY COMBINATION: CPT

## 2021-01-01 PROCEDURE — 82040 ASSAY OF SERUM ALBUMIN: CPT | Performed by: INTERNAL MEDICINE

## 2021-01-01 PROCEDURE — 87635 SARS-COV-2 COVID-19 AMP PRB: CPT | Performed by: EMERGENCY MEDICINE

## 2021-01-01 PROCEDURE — 96368 THER/DIAG CONCURRENT INF: CPT

## 2021-01-01 PROCEDURE — 96361 HYDRATE IV INFUSION ADD-ON: CPT

## 2021-01-01 PROCEDURE — 96367 TX/PROPH/DG ADDL SEQ IV INF: CPT

## 2021-01-01 PROCEDURE — 96374 THER/PROPH/DIAG INJ IV PUSH: CPT

## 2021-01-01 RX ORDER — MULTIPLE VITAMINS W/ MINERALS TAB 9MG-400MCG
1 TAB ORAL DAILY
Status: DISCONTINUED | OUTPATIENT
Start: 2021-01-01 | End: 2021-01-01 | Stop reason: HOSPADM

## 2021-01-01 RX ORDER — PROCHLORPERAZINE 25 MG
25 SUPPOSITORY, RECTAL RECTAL EVERY 12 HOURS PRN
Status: DISCONTINUED | OUTPATIENT
Start: 2021-01-01 | End: 2021-01-01 | Stop reason: HOSPADM

## 2021-01-01 RX ORDER — MAGNESIUM SULFATE HEPTAHYDRATE 40 MG/ML
2 INJECTION, SOLUTION INTRAVENOUS ONCE
Status: COMPLETED | OUTPATIENT
Start: 2021-01-01 | End: 2021-01-01

## 2021-01-01 RX ORDER — OLANZAPINE 5 MG/1
5-10 TABLET, ORALLY DISINTEGRATING ORAL EVERY 6 HOURS PRN
Status: DISCONTINUED | OUTPATIENT
Start: 2021-01-01 | End: 2021-01-01 | Stop reason: HOSPADM

## 2021-01-01 RX ORDER — GABAPENTIN 100 MG/1
100 CAPSULE ORAL EVERY 8 HOURS
Status: DISCONTINUED | OUTPATIENT
Start: 2021-01-01 | End: 2021-01-01 | Stop reason: HOSPADM

## 2021-01-01 RX ORDER — AMOXICILLIN 250 MG
1 CAPSULE ORAL 2 TIMES DAILY PRN
Status: DISCONTINUED | OUTPATIENT
Start: 2021-01-01 | End: 2021-01-01 | Stop reason: HOSPADM

## 2021-01-01 RX ORDER — SODIUM CHLORIDE, SODIUM LACTATE, POTASSIUM CHLORIDE, CALCIUM CHLORIDE 600; 310; 30; 20 MG/100ML; MG/100ML; MG/100ML; MG/100ML
INJECTION, SOLUTION INTRAVENOUS CONTINUOUS
Status: DISCONTINUED | OUTPATIENT
Start: 2021-01-01 | End: 2021-01-01 | Stop reason: HOSPADM

## 2021-01-01 RX ORDER — ACETAMINOPHEN 325 MG/1
650 TABLET ORAL EVERY 6 HOURS PRN
Status: DISCONTINUED | OUTPATIENT
Start: 2021-01-01 | End: 2021-01-01

## 2021-01-01 RX ORDER — HALOPERIDOL 5 MG/ML
2.5-5 INJECTION INTRAMUSCULAR EVERY 6 HOURS PRN
Status: DISCONTINUED | OUTPATIENT
Start: 2021-01-01 | End: 2021-01-01 | Stop reason: HOSPADM

## 2021-01-01 RX ORDER — MAGNESIUM SULFATE HEPTAHYDRATE 40 MG/ML
2 INJECTION, SOLUTION INTRAVENOUS ONCE
Status: DISCONTINUED | OUTPATIENT
Start: 2021-01-01 | End: 2021-01-01

## 2021-01-01 RX ORDER — POTASSIUM CHLORIDE 7.45 MG/ML
10 INJECTION INTRAVENOUS ONCE
Status: COMPLETED | OUTPATIENT
Start: 2021-01-01 | End: 2021-01-01

## 2021-01-01 RX ORDER — POTASSIUM CHLORIDE 1500 MG/1
20 TABLET, EXTENDED RELEASE ORAL 2 TIMES DAILY
Qty: 180 TABLET | Refills: 1 | Status: SHIPPED | OUTPATIENT
Start: 2021-01-01 | End: 2021-01-01

## 2021-01-01 RX ORDER — POTASSIUM CHLORIDE 1500 MG/1
40 TABLET, EXTENDED RELEASE ORAL ONCE
Status: COMPLETED | OUTPATIENT
Start: 2021-01-01 | End: 2021-01-01

## 2021-01-01 RX ORDER — MULTIPLE VITAMINS W/ MINERALS TAB 9MG-400MCG
1 TAB ORAL DAILY
Qty: 100 TABLET | Refills: 1 | Status: SHIPPED | OUTPATIENT
Start: 2021-01-01

## 2021-01-01 RX ORDER — LORAZEPAM 2 MG/ML
2 INJECTION INTRAMUSCULAR ONCE
Status: COMPLETED | OUTPATIENT
Start: 2021-01-01 | End: 2021-01-01

## 2021-01-01 RX ORDER — AMOXICILLIN 250 MG
2 CAPSULE ORAL 2 TIMES DAILY PRN
Status: DISCONTINUED | OUTPATIENT
Start: 2021-01-01 | End: 2021-01-01 | Stop reason: HOSPADM

## 2021-01-01 RX ORDER — ONDANSETRON 2 MG/ML
4 INJECTION INTRAMUSCULAR; INTRAVENOUS EVERY 6 HOURS PRN
Status: DISCONTINUED | OUTPATIENT
Start: 2021-01-01 | End: 2021-01-01 | Stop reason: HOSPADM

## 2021-01-01 RX ORDER — POTASSIUM CHLORIDE 1500 MG/1
20 TABLET, EXTENDED RELEASE ORAL ONCE
Status: COMPLETED | OUTPATIENT
Start: 2021-01-01 | End: 2021-01-01

## 2021-01-01 RX ORDER — LORAZEPAM 1 MG/1
1-2 TABLET ORAL EVERY 30 MIN PRN
Status: DISCONTINUED | OUTPATIENT
Start: 2021-01-01 | End: 2021-01-01 | Stop reason: HOSPADM

## 2021-01-01 RX ORDER — CLONIDINE HYDROCHLORIDE 0.1 MG/1
0.1 TABLET ORAL EVERY 8 HOURS
Status: DISCONTINUED | OUTPATIENT
Start: 2021-01-01 | End: 2021-01-01 | Stop reason: HOSPADM

## 2021-01-01 RX ORDER — FLUMAZENIL 0.1 MG/ML
0.2 INJECTION, SOLUTION INTRAVENOUS
Status: DISCONTINUED | OUTPATIENT
Start: 2021-01-01 | End: 2021-01-01 | Stop reason: HOSPADM

## 2021-01-01 RX ORDER — POTASSIUM CHLORIDE 1.5 G/1.58G
40 POWDER, FOR SOLUTION ORAL ONCE
Status: COMPLETED | OUTPATIENT
Start: 2021-01-01 | End: 2021-01-01

## 2021-01-01 RX ORDER — PROCHLORPERAZINE MALEATE 5 MG
10 TABLET ORAL EVERY 6 HOURS PRN
Status: DISCONTINUED | OUTPATIENT
Start: 2021-01-01 | End: 2021-01-01 | Stop reason: HOSPADM

## 2021-01-01 RX ORDER — ERGOCALCIFEROL 1.25 MG/1
50000 CAPSULE, LIQUID FILLED ORAL
Qty: 8 CAPSULE | Refills: 0 | Status: SHIPPED | OUTPATIENT
Start: 2021-01-01 | End: 2021-01-01

## 2021-01-01 RX ORDER — POTASSIUM CHLORIDE 7.45 MG/ML
10 INJECTION INTRAVENOUS ONCE
Status: DISCONTINUED | OUTPATIENT
Start: 2021-01-01 | End: 2021-01-01

## 2021-01-01 RX ORDER — GABAPENTIN 600 MG/1
1200 TABLET ORAL ONCE
Status: COMPLETED | OUTPATIENT
Start: 2021-01-01 | End: 2021-01-01

## 2021-01-01 RX ORDER — POLYETHYLENE GLYCOL 3350 17 G/17G
17 POWDER, FOR SOLUTION ORAL DAILY PRN
Status: DISCONTINUED | OUTPATIENT
Start: 2021-01-01 | End: 2021-01-01 | Stop reason: HOSPADM

## 2021-01-01 RX ORDER — VITAMIN B COMPLEX
50 TABLET ORAL DAILY
Status: DISCONTINUED | OUTPATIENT
Start: 2021-01-01 | End: 2021-01-01

## 2021-01-01 RX ORDER — ACETAMINOPHEN 650 MG/1
650 SUPPOSITORY RECTAL EVERY 6 HOURS PRN
Status: DISCONTINUED | OUTPATIENT
Start: 2021-01-01 | End: 2021-01-01

## 2021-01-01 RX ORDER — GABAPENTIN 300 MG/1
300 CAPSULE ORAL EVERY 8 HOURS
Status: DISCONTINUED | OUTPATIENT
Start: 2021-01-01 | End: 2021-01-01 | Stop reason: HOSPADM

## 2021-01-01 RX ORDER — POTASSIUM CHLORIDE 1500 MG/1
20 TABLET, EXTENDED RELEASE ORAL 2 TIMES DAILY
Qty: 180 TABLET | Refills: 1 | Status: SHIPPED | OUTPATIENT
Start: 2021-01-01 | End: 2022-01-01

## 2021-01-01 RX ORDER — GABAPENTIN 300 MG/1
900 CAPSULE ORAL EVERY 8 HOURS
Status: DISCONTINUED | OUTPATIENT
Start: 2021-01-01 | End: 2021-01-01 | Stop reason: HOSPADM

## 2021-01-01 RX ORDER — ERGOCALCIFEROL 1.25 MG/1
50000 CAPSULE, LIQUID FILLED ORAL
Qty: 8 CAPSULE | Refills: 0 | Status: SHIPPED | OUTPATIENT
Start: 2021-01-01 | End: 2022-01-01

## 2021-01-01 RX ORDER — ERGOCALCIFEROL 1.25 MG/1
50000 CAPSULE, LIQUID FILLED ORAL
Status: DISCONTINUED | OUTPATIENT
Start: 2021-01-01 | End: 2021-01-01 | Stop reason: HOSPADM

## 2021-01-01 RX ORDER — GABAPENTIN 300 MG/1
600 CAPSULE ORAL EVERY 8 HOURS
Status: DISCONTINUED | OUTPATIENT
Start: 2021-01-01 | End: 2021-01-01 | Stop reason: HOSPADM

## 2021-01-01 RX ORDER — LANOLIN ALCOHOL/MO/W.PET/CERES
100 CREAM (GRAM) TOPICAL DAILY
Status: DISCONTINUED | OUTPATIENT
Start: 2021-01-01 | End: 2021-01-01 | Stop reason: HOSPADM

## 2021-01-01 RX ORDER — FOLIC ACID 1 MG/1
1 TABLET ORAL DAILY
Qty: 100 TABLET | Refills: 0 | Status: SHIPPED | OUTPATIENT
Start: 2021-01-01

## 2021-01-01 RX ORDER — CALCIUM GLUCONATE 20 MG/ML
2 INJECTION, SOLUTION INTRAVENOUS ONCE
Status: COMPLETED | OUTPATIENT
Start: 2021-01-01 | End: 2021-01-01

## 2021-01-01 RX ORDER — ONDANSETRON 2 MG/ML
4 INJECTION INTRAMUSCULAR; INTRAVENOUS ONCE
Status: COMPLETED | OUTPATIENT
Start: 2021-01-01 | End: 2021-01-01

## 2021-01-01 RX ORDER — MULTIPLE VITAMINS W/ MINERALS TAB 9MG-400MCG
1 TAB ORAL DAILY
Qty: 100 TABLET | Refills: 1 | Status: SHIPPED | OUTPATIENT
Start: 2021-01-01 | End: 2021-01-01

## 2021-01-01 RX ORDER — BISACODYL 10 MG
10 SUPPOSITORY, RECTAL RECTAL DAILY PRN
Status: DISCONTINUED | OUTPATIENT
Start: 2021-01-01 | End: 2021-01-01 | Stop reason: HOSPADM

## 2021-01-01 RX ORDER — CALCIUM CARBONATE 500 MG/1
1000 TABLET, CHEWABLE ORAL 4 TIMES DAILY PRN
Status: DISCONTINUED | OUTPATIENT
Start: 2021-01-01 | End: 2021-01-01 | Stop reason: HOSPADM

## 2021-01-01 RX ORDER — GABAPENTIN 300 MG/1
900 CAPSULE ORAL 3 TIMES DAILY
Qty: 270 CAPSULE | Refills: 1 | Status: SHIPPED | OUTPATIENT
Start: 2021-01-01 | End: 2022-01-01

## 2021-01-01 RX ORDER — FOLIC ACID 1 MG/1
1 TABLET ORAL DAILY
Status: DISCONTINUED | OUTPATIENT
Start: 2021-01-01 | End: 2021-01-01 | Stop reason: HOSPADM

## 2021-01-01 RX ORDER — MULTIVITAMIN WITH IRON
1 TABLET ORAL DAILY
Qty: 90 TABLET | Refills: 1 | Status: SHIPPED | OUTPATIENT
Start: 2021-01-01 | End: 2021-01-01

## 2021-01-01 RX ORDER — LANOLIN ALCOHOL/MO/W.PET/CERES
3 CREAM (GRAM) TOPICAL
Status: DISCONTINUED | OUTPATIENT
Start: 2021-01-01 | End: 2021-01-01 | Stop reason: HOSPADM

## 2021-01-01 RX ORDER — LANOLIN ALCOHOL/MO/W.PET/CERES
100 CREAM (GRAM) TOPICAL ONCE
Status: COMPLETED | OUTPATIENT
Start: 2021-01-01 | End: 2021-01-01

## 2021-01-01 RX ORDER — LANOLIN ALCOHOL/MO/W.PET/CERES
100 CREAM (GRAM) TOPICAL DAILY
Qty: 100 TABLET | Refills: 1 | Status: SHIPPED | OUTPATIENT
Start: 2021-01-01

## 2021-01-01 RX ORDER — MAGNESIUM OXIDE 400 MG/1
400 TABLET ORAL 2 TIMES DAILY
Status: DISCONTINUED | OUTPATIENT
Start: 2021-01-01 | End: 2021-01-01

## 2021-01-01 RX ORDER — LIDOCAINE 40 MG/G
CREAM TOPICAL
Status: DISCONTINUED | OUTPATIENT
Start: 2021-01-01 | End: 2021-01-01 | Stop reason: HOSPADM

## 2021-01-01 RX ORDER — ONDANSETRON 4 MG/1
4 TABLET, ORALLY DISINTEGRATING ORAL EVERY 6 HOURS PRN
Status: DISCONTINUED | OUTPATIENT
Start: 2021-01-01 | End: 2021-01-01 | Stop reason: HOSPADM

## 2021-01-01 RX ORDER — LORAZEPAM 2 MG/ML
1-2 INJECTION INTRAMUSCULAR EVERY 30 MIN PRN
Status: DISCONTINUED | OUTPATIENT
Start: 2021-01-01 | End: 2021-01-01 | Stop reason: HOSPADM

## 2021-01-01 RX ORDER — MAGNESIUM OXIDE 400 MG/1
400 TABLET ORAL 2 TIMES DAILY
Status: DISCONTINUED | OUTPATIENT
Start: 2021-01-01 | End: 2021-01-01 | Stop reason: HOSPADM

## 2021-01-01 RX ORDER — MULTIPLE VITAMINS W/ MINERALS TAB 9MG-400MCG
1 TAB ORAL ONCE
Status: COMPLETED | OUTPATIENT
Start: 2021-01-01 | End: 2021-01-01

## 2021-01-01 RX ADMIN — POTASSIUM CHLORIDE 10 MEQ: 7.46 INJECTION, SOLUTION INTRAVENOUS at 02:26

## 2021-01-01 RX ADMIN — POTASSIUM & SODIUM PHOSPHATES POWDER PACK 280-160-250 MG 2 PACKET: 280-160-250 PACK at 11:58

## 2021-01-01 RX ADMIN — MAGNESIUM SULFATE HEPTAHYDRATE 2 G: 40 INJECTION, SOLUTION INTRAVENOUS at 01:21

## 2021-01-01 RX ADMIN — ONDANSETRON 4 MG: 2 INJECTION INTRAMUSCULAR; INTRAVENOUS at 22:56

## 2021-01-01 RX ADMIN — CLONIDINE HYDROCHLORIDE 0.1 MG: 0.1 TABLET ORAL at 12:22

## 2021-01-01 RX ADMIN — Medication 50 MCG: at 10:50

## 2021-01-01 RX ADMIN — CLONIDINE HYDROCHLORIDE 0.1 MG: 0.1 TABLET ORAL at 04:52

## 2021-01-01 RX ADMIN — POTASSIUM CHLORIDE 40 MEQ: 1500 TABLET, EXTENDED RELEASE ORAL at 00:52

## 2021-01-01 RX ADMIN — MULTIPLE VITAMINS W/ MINERALS TAB 1 TABLET: TAB at 08:34

## 2021-01-01 RX ADMIN — SODIUM CHLORIDE, POTASSIUM CHLORIDE, SODIUM LACTATE AND CALCIUM CHLORIDE: 600; 310; 30; 20 INJECTION, SOLUTION INTRAVENOUS at 02:03

## 2021-01-01 RX ADMIN — POTASSIUM & SODIUM PHOSPHATES POWDER PACK 280-160-250 MG 2 PACKET: 280-160-250 PACK at 08:34

## 2021-01-01 RX ADMIN — POTASSIUM & SODIUM PHOSPHATES POWDER PACK 280-160-250 MG 1 PACKET: 280-160-250 PACK at 08:45

## 2021-01-01 RX ADMIN — SODIUM CHLORIDE, POTASSIUM CHLORIDE, SODIUM LACTATE AND CALCIUM CHLORIDE: 600; 310; 30; 20 INJECTION, SOLUTION INTRAVENOUS at 05:09

## 2021-01-01 RX ADMIN — POTASSIUM CHLORIDE 40 MEQ: 1.5 POWDER, FOR SOLUTION ORAL at 01:25

## 2021-01-01 RX ADMIN — MULTIPLE VITAMINS W/ MINERALS TAB 1 TABLET: TAB at 00:25

## 2021-01-01 RX ADMIN — CLONIDINE HYDROCHLORIDE 0.1 MG: 0.1 TABLET ORAL at 12:31

## 2021-01-01 RX ADMIN — MAGNESIUM SULFATE HEPTAHYDRATE 2 G: 40 INJECTION, SOLUTION INTRAVENOUS at 04:29

## 2021-01-01 RX ADMIN — POTASSIUM & SODIUM PHOSPHATES POWDER PACK 280-160-250 MG 2 PACKET: 280-160-250 PACK at 19:14

## 2021-01-01 RX ADMIN — GABAPENTIN 900 MG: 300 CAPSULE ORAL at 19:57

## 2021-01-01 RX ADMIN — FOLIC ACID 1 MG: 1 TABLET ORAL at 08:43

## 2021-01-01 RX ADMIN — Medication 400 MG: at 08:43

## 2021-01-01 RX ADMIN — POTASSIUM CHLORIDE 10 MEQ: 7.46 INJECTION, SOLUTION INTRAVENOUS at 05:25

## 2021-01-01 RX ADMIN — THIAMINE HCL TAB 100 MG 100 MG: 100 TAB at 08:34

## 2021-01-01 RX ADMIN — POTASSIUM & SODIUM PHOSPHATES POWDER PACK 280-160-250 MG 2 PACKET: 280-160-250 PACK at 14:11

## 2021-01-01 RX ADMIN — POTASSIUM CHLORIDE 10 MEQ: 7.46 INJECTION, SOLUTION INTRAVENOUS at 01:21

## 2021-01-01 RX ADMIN — CLONIDINE HYDROCHLORIDE 0.1 MG: 0.1 TABLET ORAL at 11:55

## 2021-01-01 RX ADMIN — POTASSIUM CHLORIDE 40 MEQ: 1500 TABLET, EXTENDED RELEASE ORAL at 11:55

## 2021-01-01 RX ADMIN — CLONIDINE HYDROCHLORIDE 0.1 MG: 0.1 TABLET ORAL at 19:22

## 2021-01-01 RX ADMIN — MULTIPLE VITAMINS W/ MINERALS TAB 1 TABLET: TAB at 08:43

## 2021-01-01 RX ADMIN — POTASSIUM CHLORIDE 20 MEQ: 1500 TABLET, EXTENDED RELEASE ORAL at 08:43

## 2021-01-01 RX ADMIN — FOLIC ACID 1 MG: 1 TABLET ORAL at 08:34

## 2021-01-01 RX ADMIN — THIAMINE HCL TAB 100 MG 100 MG: 100 TAB at 00:25

## 2021-01-01 RX ADMIN — ERGOCALCIFEROL 50000 UNITS: 1.25 CAPSULE, LIQUID FILLED ORAL at 22:42

## 2021-01-01 RX ADMIN — Medication 400 MG: at 19:13

## 2021-01-01 RX ADMIN — GABAPENTIN 900 MG: 300 CAPSULE ORAL at 19:20

## 2021-01-01 RX ADMIN — CLONIDINE HYDROCHLORIDE 0.1 MG: 0.1 TABLET ORAL at 19:57

## 2021-01-01 RX ADMIN — SODIUM CHLORIDE, POTASSIUM CHLORIDE, SODIUM LACTATE AND CALCIUM CHLORIDE: 600; 310; 30; 20 INJECTION, SOLUTION INTRAVENOUS at 16:00

## 2021-01-01 RX ADMIN — LORAZEPAM 2 MG: 2 INJECTION INTRAMUSCULAR; INTRAVENOUS at 22:57

## 2021-01-01 RX ADMIN — GABAPENTIN 900 MG: 300 CAPSULE ORAL at 04:07

## 2021-01-01 RX ADMIN — CLONIDINE HYDROCHLORIDE 0.1 MG: 0.1 TABLET ORAL at 05:10

## 2021-01-01 RX ADMIN — POTASSIUM CHLORIDE 20 MEQ: 1500 TABLET, EXTENDED RELEASE ORAL at 14:34

## 2021-01-01 RX ADMIN — CALCIUM GLUCONATE 2 G: 20 INJECTION, SOLUTION INTRAVENOUS at 10:09

## 2021-01-01 RX ADMIN — GABAPENTIN 900 MG: 300 CAPSULE ORAL at 11:56

## 2021-01-01 RX ADMIN — POTASSIUM CHLORIDE 20 MEQ: 1500 TABLET, EXTENDED RELEASE ORAL at 15:57

## 2021-01-01 RX ADMIN — SODIUM CHLORIDE 1000 ML: 9 INJECTION, SOLUTION INTRAVENOUS at 22:57

## 2021-01-01 RX ADMIN — SODIUM CHLORIDE, POTASSIUM CHLORIDE, SODIUM LACTATE AND CALCIUM CHLORIDE: 600; 310; 30; 20 INJECTION, SOLUTION INTRAVENOUS at 08:29

## 2021-01-01 RX ADMIN — FOLIC ACID: 5 INJECTION, SOLUTION INTRAMUSCULAR; INTRAVENOUS; SUBCUTANEOUS at 05:12

## 2021-01-01 RX ADMIN — GABAPENTIN 900 MG: 300 CAPSULE ORAL at 11:54

## 2021-01-01 RX ADMIN — POTASSIUM CHLORIDE 40 MEQ: 1.5 POWDER, FOR SOLUTION ORAL at 05:24

## 2021-01-01 RX ADMIN — LORAZEPAM 2 MG: 1 TABLET ORAL at 04:52

## 2021-01-01 RX ADMIN — GABAPENTIN 900 MG: 300 CAPSULE ORAL at 12:22

## 2021-01-01 RX ADMIN — Medication 400 MG: at 19:57

## 2021-01-01 RX ADMIN — POTASSIUM & SODIUM PHOSPHATES POWDER PACK 280-160-250 MG 2 PACKET: 280-160-250 PACK at 21:20

## 2021-01-01 RX ADMIN — POTASSIUM & SODIUM PHOSPHATES POWDER PACK 280-160-250 MG 2 PACKET: 280-160-250 PACK at 15:57

## 2021-01-01 RX ADMIN — Medication 400 MG: at 08:34

## 2021-01-01 RX ADMIN — GABAPENTIN 900 MG: 300 CAPSULE ORAL at 05:10

## 2021-01-01 RX ADMIN — CLONIDINE HYDROCHLORIDE 0.1 MG: 0.1 TABLET ORAL at 04:08

## 2021-01-01 RX ADMIN — POTASSIUM CHLORIDE 40 MEQ: 1500 TABLET, EXTENDED RELEASE ORAL at 09:38

## 2021-01-01 RX ADMIN — THIAMINE HCL TAB 100 MG 100 MG: 100 TAB at 08:43

## 2021-01-01 RX ADMIN — SODIUM CHLORIDE, POTASSIUM CHLORIDE, SODIUM LACTATE AND CALCIUM CHLORIDE 1701 ML: 600; 310; 30; 20 INJECTION, SOLUTION INTRAVENOUS at 01:36

## 2021-01-01 RX ADMIN — SODIUM CHLORIDE, POTASSIUM CHLORIDE, SODIUM LACTATE AND CALCIUM CHLORIDE: 600; 310; 30; 20 INJECTION, SOLUTION INTRAVENOUS at 11:52

## 2021-01-01 RX ADMIN — SODIUM CHLORIDE, POTASSIUM CHLORIDE, SODIUM LACTATE AND CALCIUM CHLORIDE: 600; 310; 30; 20 INJECTION, SOLUTION INTRAVENOUS at 19:32

## 2021-01-01 RX ADMIN — GABAPENTIN 1200 MG: 600 TABLET, FILM COATED ORAL at 04:52

## 2021-01-01 ASSESSMENT — ENCOUNTER SYMPTOMS
SEIZURES: 1
VOMITING: 1

## 2021-01-01 ASSESSMENT — ACTIVITIES OF DAILY LIVING (ADL)
ADLS_ACUITY_SCORE: 12
ADLS_ACUITY_SCORE: 11
ADLS_ACUITY_SCORE: 10
ADLS_ACUITY_SCORE: 11
ADLS_ACUITY_SCORE: 11
ADLS_ACUITY_SCORE: 12
ADLS_ACUITY_SCORE: 10
ADLS_ACUITY_SCORE: 12
ADLS_ACUITY_SCORE: 12
ADLS_ACUITY_SCORE: 11
ADLS_ACUITY_SCORE: 12
ADLS_ACUITY_SCORE: 10
ADLS_ACUITY_SCORE: 10
ADLS_ACUITY_SCORE: 12
ADLS_ACUITY_SCORE: 10
ADLS_ACUITY_SCORE: 10
ADLS_ACUITY_SCORE: 11
ADLS_ACUITY_SCORE: 11
ADLS_ACUITY_SCORE: 10
ADLS_ACUITY_SCORE: 11
ADLS_ACUITY_SCORE: 10
ADLS_ACUITY_SCORE: 11
ADLS_ACUITY_SCORE: 12
ADLS_ACUITY_SCORE: 12
ADLS_ACUITY_SCORE: 11
ADLS_ACUITY_SCORE: 12
ADLS_ACUITY_SCORE: 11
ADLS_ACUITY_SCORE: 11
ADLS_ACUITY_SCORE: 10
ADLS_ACUITY_SCORE: 11
ADLS_ACUITY_SCORE: 10
ADLS_ACUITY_SCORE: 11
ADLS_ACUITY_SCORE: 11
ADLS_ACUITY_SCORE: 10
ADLS_ACUITY_SCORE: 4
ADLS_ACUITY_SCORE: 10
DEPENDENT_IADLS:: INDEPENDENT
ADLS_ACUITY_SCORE: 11
ADLS_ACUITY_SCORE: 10
ADLS_ACUITY_SCORE: 11

## 2021-01-01 ASSESSMENT — MIFFLIN-ST. JEOR: SCORE: 1318.7

## 2021-01-16 ENCOUNTER — HOSPITAL ENCOUNTER (INPATIENT)
Facility: CLINIC | Age: 31
LOS: 2 days | Discharge: HOME | DRG: 683 | End: 2021-01-18
Attending: EMERGENCY MEDICINE | Admitting: INTERNAL MEDICINE
Payer: COMMERCIAL

## 2021-01-16 DIAGNOSIS — R00.0 SINUS TACHYCARDIA: ICD-10-CM

## 2021-01-16 DIAGNOSIS — E87.6 HYPOKALEMIA: ICD-10-CM

## 2021-01-16 DIAGNOSIS — I51.9: Primary | ICD-10-CM

## 2021-01-16 DIAGNOSIS — F10.10 ALCOHOL ABUSE: ICD-10-CM

## 2021-01-16 DIAGNOSIS — N17.9 ACUTE KIDNEY INJURY (H): ICD-10-CM

## 2021-01-16 DIAGNOSIS — F10.930 ALCOHOL WITHDRAWAL SYNDROME WITHOUT COMPLICATION (H): ICD-10-CM

## 2021-01-16 DIAGNOSIS — R79.89 ELEVATED TROPONIN: ICD-10-CM

## 2021-01-16 LAB
ALBUMIN SERPL-MCNC: 4.7 G/DL (ref 3.4–5)
ALP SERPL-CCNC: 256 U/L (ref 40–150)
ALT SERPL W P-5'-P-CCNC: 58 U/L (ref 0–50)
ANION GAP SERPL CALCULATED.3IONS-SCNC: 2 MMOL/L (ref 3–14)
ANION GAP SERPL CALCULATED.3IONS-SCNC: 8 MMOL/L (ref 3–14)
AST SERPL W P-5'-P-CCNC: 164 U/L (ref 0–45)
BASE EXCESS BLDV CALC-SCNC: 17.6 MMOL/L
BASOPHILS # BLD AUTO: 0 10E9/L (ref 0–0.2)
BASOPHILS NFR BLD AUTO: 0.1 %
BILIRUB SERPL-MCNC: 2.8 MG/DL (ref 0.2–1.3)
BUN SERPL-MCNC: 11 MG/DL (ref 7–30)
BUN SERPL-MCNC: 12 MG/DL (ref 7–30)
CALCIUM SERPL-MCNC: 7.3 MG/DL (ref 8.5–10.1)
CALCIUM SERPL-MCNC: 9.4 MG/DL (ref 8.5–10.1)
CHLORIDE SERPL-SCNC: 81 MMOL/L (ref 94–109)
CHLORIDE SERPL-SCNC: 94 MMOL/L (ref 94–109)
CK SERPL-CCNC: 144 U/L (ref 30–225)
CO2 SERPL-SCNC: 40 MMOL/L (ref 20–32)
CO2 SERPL-SCNC: 43 MMOL/L (ref 20–32)
CREAT SERPL-MCNC: 1.02 MG/DL (ref 0.52–1.04)
CREAT SERPL-MCNC: 1.44 MG/DL (ref 0.52–1.04)
DIFFERENTIAL METHOD BLD: ABNORMAL
EOSINOPHIL # BLD AUTO: 0 10E9/L (ref 0–0.7)
EOSINOPHIL NFR BLD AUTO: 0 %
ERYTHROCYTE [DISTWIDTH] IN BLOOD BY AUTOMATED COUNT: 13.2 % (ref 10–15)
ETHANOL SERPL-MCNC: <0.01 G/DL
GFR SERPL CREATININE-BSD FRML MDRD: 48 ML/MIN/{1.73_M2}
GFR SERPL CREATININE-BSD FRML MDRD: 73 ML/MIN/{1.73_M2}
GLUCOSE SERPL-MCNC: 101 MG/DL (ref 70–99)
GLUCOSE SERPL-MCNC: 124 MG/DL (ref 70–99)
HCG SERPL QL: NEGATIVE
HCO3 BLDV-SCNC: 42 MMOL/L (ref 21–28)
HCT VFR BLD AUTO: 43.1 % (ref 35–47)
HGB BLD-MCNC: 14.9 G/DL (ref 11.7–15.7)
IMM GRANULOCYTES # BLD: 0 10E9/L (ref 0–0.4)
IMM GRANULOCYTES NFR BLD: 0.3 %
LABORATORY COMMENT REPORT: NORMAL
LACTATE BLD-SCNC: 1.1 MMOL/L (ref 0.7–2)
LACTATE BLD-SCNC: NORMAL MMOL/L (ref 0.7–2)
LYMPHOCYTES # BLD AUTO: 2.2 10E9/L (ref 0.8–5.3)
LYMPHOCYTES NFR BLD AUTO: 23.2 %
MAGNESIUM SERPL-MCNC: 1.5 MG/DL (ref 1.6–2.3)
MAGNESIUM SERPL-MCNC: 1.8 MG/DL (ref 1.6–2.3)
MCH RBC QN AUTO: 33.9 PG (ref 26.5–33)
MCHC RBC AUTO-ENTMCNC: 34.6 G/DL (ref 31.5–36.5)
MCV RBC AUTO: 98 FL (ref 78–100)
MONOCYTES # BLD AUTO: 0.9 10E9/L (ref 0–1.3)
MONOCYTES NFR BLD AUTO: 9.7 %
NEUTROPHILS # BLD AUTO: 6.2 10E9/L (ref 1.6–8.3)
NEUTROPHILS NFR BLD AUTO: 66.7 %
NRBC # BLD AUTO: 0 10*3/UL
NRBC BLD AUTO-RTO: 0 /100
O2/TOTAL GAS SETTING VFR VENT: ABNORMAL %
OXYHGB MFR BLDV: 87 %
PCO2 BLDV: 45 MM HG (ref 40–50)
PH BLDV: 7.58 PH (ref 7.32–7.43)
PHOSPHATE SERPL-MCNC: 1.2 MG/DL (ref 2.5–4.5)
PHOSPHATE SERPL-MCNC: 1.2 MG/DL (ref 2.5–4.5)
PLATELET # BLD AUTO: 190 10E9/L (ref 150–450)
PO2 BLDV: 54 MM HG (ref 25–47)
POTASSIUM SERPL-SCNC: 1.8 MMOL/L (ref 3.4–5.3)
POTASSIUM SERPL-SCNC: 2.2 MMOL/L (ref 3.4–5.3)
POTASSIUM SERPL-SCNC: 2.7 MMOL/L (ref 3.4–5.3)
PROT SERPL-MCNC: 8.3 G/DL (ref 6.8–8.8)
RBC # BLD AUTO: 4.39 10E12/L (ref 3.8–5.2)
SARS-COV-2 RNA RESP QL NAA+PROBE: NEGATIVE
SODIUM SERPL-SCNC: 132 MMOL/L (ref 133–144)
SODIUM SERPL-SCNC: 136 MMOL/L (ref 133–144)
SPECIMEN SOURCE: NORMAL
TROPONIN I SERPL-MCNC: 0.13 UG/L (ref 0–0.04)
TROPONIN I SERPL-MCNC: 0.15 UG/L (ref 0–0.04)
TROPONIN I SERPL-MCNC: 0.16 UG/L (ref 0–0.04)
WBC # BLD AUTO: 9.3 10E9/L (ref 4–11)

## 2021-01-16 PROCEDURE — 87635 SARS-COV-2 COVID-19 AMP PRB: CPT | Performed by: EMERGENCY MEDICINE

## 2021-01-16 PROCEDURE — 83735 ASSAY OF MAGNESIUM: CPT | Performed by: INTERNAL MEDICINE

## 2021-01-16 PROCEDURE — 120N000001 HC R&B MED SURG/OB

## 2021-01-16 PROCEDURE — 84703 CHORIONIC GONADOTROPIN ASSAY: CPT | Performed by: EMERGENCY MEDICINE

## 2021-01-16 PROCEDURE — 250N000013 HC RX MED GY IP 250 OP 250 PS 637: Performed by: INTERNAL MEDICINE

## 2021-01-16 PROCEDURE — 250N000011 HC RX IP 250 OP 636: Performed by: EMERGENCY MEDICINE

## 2021-01-16 PROCEDURE — 85025 COMPLETE CBC W/AUTO DIFF WBC: CPT | Performed by: EMERGENCY MEDICINE

## 2021-01-16 PROCEDURE — 96367 TX/PROPH/DG ADDL SEQ IV INF: CPT

## 2021-01-16 PROCEDURE — 80053 COMPREHEN METABOLIC PANEL: CPT | Performed by: EMERGENCY MEDICINE

## 2021-01-16 PROCEDURE — 99207 PR CDG-CODE CATEGORY CHANGED: CPT | Performed by: INTERNAL MEDICINE

## 2021-01-16 PROCEDURE — 258N000003 HC RX IP 258 OP 636: Performed by: EMERGENCY MEDICINE

## 2021-01-16 PROCEDURE — 96375 TX/PRO/DX INJ NEW DRUG ADDON: CPT

## 2021-01-16 PROCEDURE — 83605 ASSAY OF LACTIC ACID: CPT | Performed by: EMERGENCY MEDICINE

## 2021-01-16 PROCEDURE — 93005 ELECTROCARDIOGRAM TRACING: CPT

## 2021-01-16 PROCEDURE — 82805 BLOOD GASES W/O2 SATURATION: CPT | Performed by: EMERGENCY MEDICINE

## 2021-01-16 PROCEDURE — 82077 ASSAY SPEC XCP UR&BREATH IA: CPT | Performed by: EMERGENCY MEDICINE

## 2021-01-16 PROCEDURE — 250N000011 HC RX IP 250 OP 636: Performed by: INTERNAL MEDICINE

## 2021-01-16 PROCEDURE — 80048 BASIC METABOLIC PNL TOTAL CA: CPT | Performed by: EMERGENCY MEDICINE

## 2021-01-16 PROCEDURE — 84484 ASSAY OF TROPONIN QUANT: CPT | Performed by: EMERGENCY MEDICINE

## 2021-01-16 PROCEDURE — C9803 HOPD COVID-19 SPEC COLLECT: HCPCS

## 2021-01-16 PROCEDURE — 99223 1ST HOSP IP/OBS HIGH 75: CPT | Mod: AI | Performed by: INTERNAL MEDICINE

## 2021-01-16 PROCEDURE — 250N000009 HC RX 250: Performed by: EMERGENCY MEDICINE

## 2021-01-16 PROCEDURE — 84132 ASSAY OF SERUM POTASSIUM: CPT | Performed by: INTERNAL MEDICINE

## 2021-01-16 PROCEDURE — 96361 HYDRATE IV INFUSION ADD-ON: CPT

## 2021-01-16 PROCEDURE — 84100 ASSAY OF PHOSPHORUS: CPT | Performed by: INTERNAL MEDICINE

## 2021-01-16 PROCEDURE — 96365 THER/PROPH/DIAG IV INF INIT: CPT

## 2021-01-16 PROCEDURE — 36415 COLL VENOUS BLD VENIPUNCTURE: CPT | Performed by: INTERNAL MEDICINE

## 2021-01-16 PROCEDURE — 82550 ASSAY OF CK (CPK): CPT | Performed by: INTERNAL MEDICINE

## 2021-01-16 PROCEDURE — 99285 EMERGENCY DEPT VISIT HI MDM: CPT | Mod: 25

## 2021-01-16 PROCEDURE — 83735 ASSAY OF MAGNESIUM: CPT | Performed by: EMERGENCY MEDICINE

## 2021-01-16 PROCEDURE — 250N000013 HC RX MED GY IP 250 OP 250 PS 637: Performed by: EMERGENCY MEDICINE

## 2021-01-16 PROCEDURE — 36415 COLL VENOUS BLD VENIPUNCTURE: CPT | Performed by: EMERGENCY MEDICINE

## 2021-01-16 PROCEDURE — 84484 ASSAY OF TROPONIN QUANT: CPT | Performed by: INTERNAL MEDICINE

## 2021-01-16 RX ORDER — ACETAMINOPHEN 325 MG/1
650 TABLET ORAL EVERY 4 HOURS PRN
Status: DISCONTINUED | OUTPATIENT
Start: 2021-01-16 | End: 2021-01-18 | Stop reason: HOSPADM

## 2021-01-16 RX ORDER — POTASSIUM CHLORIDE 7.45 MG/ML
10 INJECTION INTRAVENOUS ONCE
Status: COMPLETED | OUTPATIENT
Start: 2021-01-16 | End: 2021-01-16

## 2021-01-16 RX ORDER — CLONIDINE HYDROCHLORIDE 0.1 MG/1
0.1 TABLET ORAL EVERY 8 HOURS
Status: DISCONTINUED | OUTPATIENT
Start: 2021-01-16 | End: 2021-01-18 | Stop reason: HOSPADM

## 2021-01-16 RX ORDER — LORAZEPAM 2 MG/ML
1-2 INJECTION INTRAMUSCULAR EVERY 30 MIN PRN
Status: DISCONTINUED | OUTPATIENT
Start: 2021-01-16 | End: 2021-01-18 | Stop reason: HOSPADM

## 2021-01-16 RX ORDER — FLUMAZENIL 0.1 MG/ML
0.2 INJECTION, SOLUTION INTRAVENOUS
Status: DISCONTINUED | OUTPATIENT
Start: 2021-01-16 | End: 2021-01-18 | Stop reason: HOSPADM

## 2021-01-16 RX ORDER — LORAZEPAM 2 MG/ML
2 INJECTION INTRAMUSCULAR ONCE
Status: DISCONTINUED | OUTPATIENT
Start: 2021-01-16 | End: 2021-01-16

## 2021-01-16 RX ORDER — SODIUM CHLORIDE AND POTASSIUM CHLORIDE 150; 900 MG/100ML; MG/100ML
INJECTION, SOLUTION INTRAVENOUS CONTINUOUS
Status: DISCONTINUED | OUTPATIENT
Start: 2021-01-16 | End: 2021-01-18 | Stop reason: HOSPADM

## 2021-01-16 RX ORDER — ASPIRIN 81 MG/1
81 TABLET ORAL DAILY
Status: DISCONTINUED | OUTPATIENT
Start: 2021-01-16 | End: 2021-01-18 | Stop reason: HOSPADM

## 2021-01-16 RX ORDER — LANOLIN ALCOHOL/MO/W.PET/CERES
200 CREAM (GRAM) TOPICAL 3 TIMES DAILY
Status: DISCONTINUED | OUTPATIENT
Start: 2021-01-16 | End: 2021-01-18 | Stop reason: HOSPADM

## 2021-01-16 RX ORDER — ATENOLOL 50 MG/1
50 TABLET ORAL DAILY
Status: DISCONTINUED | OUTPATIENT
Start: 2021-01-16 | End: 2021-01-18 | Stop reason: HOSPADM

## 2021-01-16 RX ORDER — LORAZEPAM 1 MG/1
1-2 TABLET ORAL EVERY 30 MIN PRN
Status: DISCONTINUED | OUTPATIENT
Start: 2021-01-16 | End: 2021-01-18 | Stop reason: HOSPADM

## 2021-01-16 RX ORDER — OLANZAPINE 5 MG/1
5-10 TABLET, ORALLY DISINTEGRATING ORAL EVERY 6 HOURS PRN
Status: DISCONTINUED | OUTPATIENT
Start: 2021-01-16 | End: 2021-01-18 | Stop reason: HOSPADM

## 2021-01-16 RX ORDER — LORAZEPAM 2 MG/ML
1 INJECTION INTRAMUSCULAR ONCE
Status: COMPLETED | OUTPATIENT
Start: 2021-01-16 | End: 2021-01-16

## 2021-01-16 RX ORDER — FOLIC ACID 1 MG/1
1 TABLET ORAL DAILY
Status: DISCONTINUED | OUTPATIENT
Start: 2021-01-16 | End: 2021-01-16

## 2021-01-16 RX ORDER — LANOLIN ALCOHOL/MO/W.PET/CERES
100 CREAM (GRAM) TOPICAL 3 TIMES DAILY
Status: DISCONTINUED | OUTPATIENT
Start: 2021-01-18 | End: 2021-01-18 | Stop reason: HOSPADM

## 2021-01-16 RX ORDER — HALOPERIDOL 5 MG/ML
2.5-5 INJECTION INTRAMUSCULAR EVERY 6 HOURS PRN
Status: DISCONTINUED | OUTPATIENT
Start: 2021-01-16 | End: 2021-01-18 | Stop reason: HOSPADM

## 2021-01-16 RX ORDER — FAMOTIDINE 20 MG/1
20 TABLET, FILM COATED ORAL 2 TIMES DAILY
Status: DISCONTINUED | OUTPATIENT
Start: 2021-01-16 | End: 2021-01-18 | Stop reason: HOSPADM

## 2021-01-16 RX ORDER — POTASSIUM CHLORIDE 1500 MG/1
40 TABLET, EXTENDED RELEASE ORAL ONCE
Status: COMPLETED | OUTPATIENT
Start: 2021-01-16 | End: 2021-01-16

## 2021-01-16 RX ORDER — FOLIC ACID 1 MG/1
1 TABLET ORAL DAILY
Status: DISCONTINUED | OUTPATIENT
Start: 2021-01-17 | End: 2021-01-18 | Stop reason: HOSPADM

## 2021-01-16 RX ORDER — LANOLIN ALCOHOL/MO/W.PET/CERES
100 CREAM (GRAM) TOPICAL DAILY
Status: DISCONTINUED | OUTPATIENT
Start: 2021-01-24 | End: 2021-01-18 | Stop reason: HOSPADM

## 2021-01-16 RX ORDER — MAGNESIUM SULFATE HEPTAHYDRATE 40 MG/ML
2 INJECTION, SOLUTION INTRAVENOUS ONCE
Status: COMPLETED | OUTPATIENT
Start: 2021-01-16 | End: 2021-01-16

## 2021-01-16 RX ORDER — LIDOCAINE 40 MG/G
CREAM TOPICAL
Status: DISCONTINUED | OUTPATIENT
Start: 2021-01-16 | End: 2021-01-18 | Stop reason: HOSPADM

## 2021-01-16 RX ORDER — MULTIPLE VITAMINS W/ MINERALS TAB 9MG-400MCG
1 TAB ORAL DAILY
Status: DISCONTINUED | OUTPATIENT
Start: 2021-01-17 | End: 2021-01-18 | Stop reason: HOSPADM

## 2021-01-16 RX ADMIN — POTASSIUM CHLORIDE 40 MEQ: 1500 TABLET, EXTENDED RELEASE ORAL at 23:22

## 2021-01-16 RX ADMIN — FAMOTIDINE 20 MG: 20 TABLET ORAL at 21:45

## 2021-01-16 RX ADMIN — CLONIDINE HYDROCHLORIDE 0.1 MG: 0.1 TABLET ORAL at 21:44

## 2021-01-16 RX ADMIN — LORAZEPAM 1 MG: 2 INJECTION INTRAMUSCULAR; INTRAVENOUS at 16:38

## 2021-01-16 RX ADMIN — ASPIRIN 81 MG: 81 TABLET, COATED ORAL at 21:45

## 2021-01-16 RX ADMIN — POTASSIUM CHLORIDE 10 MEQ: 7.46 INJECTION, SOLUTION INTRAVENOUS at 18:42

## 2021-01-16 RX ADMIN — POTASSIUM CHLORIDE 40 MEQ: 1500 TABLET, EXTENDED RELEASE ORAL at 17:22

## 2021-01-16 RX ADMIN — SODIUM CHLORIDE 1000 ML: 9 INJECTION, SOLUTION INTRAVENOUS at 16:38

## 2021-01-16 RX ADMIN — ATENOLOL 50 MG: 50 TABLET ORAL at 21:44

## 2021-01-16 RX ADMIN — POTASSIUM CHLORIDE 10 MEQ: 7.46 INJECTION, SOLUTION INTRAVENOUS at 17:22

## 2021-01-16 RX ADMIN — MAGNESIUM SULFATE 2 G: 2 INJECTION INTRAVENOUS at 21:15

## 2021-01-16 RX ADMIN — THIAMINE HCL TAB 100 MG 200 MG: 100 TAB at 21:45

## 2021-01-16 RX ADMIN — THIAMINE HYDROCHLORIDE: 100 INJECTION, SOLUTION INTRAMUSCULAR; INTRAVENOUS at 17:02

## 2021-01-16 RX ADMIN — POTASSIUM CHLORIDE AND SODIUM CHLORIDE: 900; 150 INJECTION, SOLUTION INTRAVENOUS at 21:01

## 2021-01-16 ASSESSMENT — ENCOUNTER SYMPTOMS
HEADACHES: 0
NAUSEA: 1
ABDOMINAL PAIN: 0
TREMORS: 0
VOMITING: 1
NECK PAIN: 0

## 2021-01-16 ASSESSMENT — ACTIVITIES OF DAILY LIVING (ADL): ADLS_ACUITY_SCORE: 14

## 2021-01-16 ASSESSMENT — MIFFLIN-ST. JEOR: SCORE: 1301.47

## 2021-01-16 NOTE — ED NOTES
Care Management Initial Consult    General Information  Assessment completed with: VM-chart review,     Primary Care Provider verified and updated as needed:   no  Readmission within the last 30 days: no previous admission in last 30 days   Advance Care Planning:   None in chart      ** In August 25-27,2020 social work notes, it was reported that a civil commitment was pursued, but later aborted, as father hired an  to prevent commitment from occurring.  I phoned [facility] at [phone], and Pt is currently not under commitment and has never been under commitment.**    Communication Assessment  Patient's communication style: spoken language (English or Bilingual)         Cognitive  Cognitive/Neuro/Behavioral: .WDL except  Level of Consciousness: alert  Arousal Level: opens eyes spontaneously  Orientation: oriented x 4  Mood/Behavior: cooperative, calm, anxious, sad  Best Language: 0 - No aphasia  Speech: logical, spontaneous, clear    Living Environment:   People in home:     unknown  Current living Arrangements:   apartment     Able to return to prior arrangements:  yes     Family/Social Support:  Care provided by:  self  Provides care for:  No one   Description of Support System: Supportive, Involved  , father, Law Orourke,  [phone], lives in [place]     Current Resources:   Skilled Home Care Services:  unknown  Community Resources:  unknown  Equipment currently used at home:  unknown  Supplies currently used at home:  unknown    Employment/Financial:  Employment Status:     unknown   Financial Concerns:   No insurance issues    Lifestyle & Psychosocial Needs:        Socioeconomic History     Marital status: Single     Spouse name: Not on file     Number of children: Not on file     Years of education: Not on file     Highest education level: Not on file     Tobacco Use     Smoking status: Current Every Day Smoker     Smokeless tobacco: Never Used   Substance and Sexual Activity      Alcohol use: Yes     Comment: deborah states not that much     Drug use: Not Currently       Functional Status:  Prior to admission patient needed assistance: unknown     Mental Health Status:unknown   Chemical Dependency Status:      Current concern      Values/Beliefs:  Spiritual, Cultural Beliefs, Yazdanism Practices, Values that affect care:       unknown

## 2021-01-16 NOTE — ED PROVIDER NOTES
History   Chief Complaint:  Alcohol Intoxication       The history is provided by the patient.      Maddie Orourke is a 30 year old female with history of alcohol abuse who presents for evaluation of alcohol withdrawal symptoms. The patient is a daily drinker, reporting that she has episodes of binge drinking less than 0.75 liters of whiskey for three days in a row and then stops for a few days and gets IV fluids, and then starts drinking again. This has been the case for about a year. She has not had a drink for the past two days and had been drinking for three days straight prior to that. She had an independent couple who are a nurse and an old ED doctor come in today to give her IV fluids. While they were there she had an episode of clenching bilateral fists and tingling around her mouth that she reports she was told was a seizure. She was conscious throughout the episode and remembers everything. She presents today because the independent care team was unable to give her the electrolytes she needs. This activity is usual for when she withdraws. She is also concerned because she ran out of gabapentin. She reports nausea and vomiting. She denies tremors. She is not having symptoms in the ED. She denies double vision, loss of vision, neck pain, headache, abdominal pain, leg swelling, and rash. She denies trauma. Her right ankle is swollen chronically.     The patient has a care plan and has been receiving home IV fluids from the independent care team for the past year. She has not been through treatment but has looked into it. She has a strong support system.     Review of Systems   Eyes: Negative for visual disturbance.   Cardiovascular: Negative for leg swelling.   Gastrointestinal: Positive for nausea and vomiting. Negative for abdominal pain.   Musculoskeletal: Negative for neck pain.   Skin: Negative for rash.   Neurological: Negative for tremors, syncope and headaches.   All other systems reviewed and  are negative.      Allergies:  No Known Allergies    Medications:  Calcium carbonate   Folic acid   Gabapentin   Magnesium oxide   Multivitamin w/minerals   Potassium   Thiamine     Past Medical History:     Alcohol withdrawal  Failure to thrive in adult  Suicidal ideation     Past Surgical History:    Repair of deviated septum     Social History:  Presents unaccompanied to the ED  Non-smoker.  Frequent binge drinking alcohol abuse    Physical Exam     Patient Vitals for the past 24 hrs:   BP Temp Temp src Pulse Resp SpO2   01/16/21 1900 (!) 141/96 -- -- 117 -- 94 %   01/16/21 1830 (!) 140/98 -- -- 106 -- 97 %   01/16/21 1800 (!) 141/105 -- -- 101 -- 96 %   01/16/21 1700 (!) 135/102 -- -- 120 19 96 %   01/16/21 1517 (!) 126/98 99.3  F (37.4  C) Temporal 148 20 97 %       Physical Exam  General: Adult female sitting upright  Eyes: PERRL, Conjunctive within normal limits. No scleral icterus.  ENT: Dry mucous membranes, oropharynx clear.   CV: Normal S1S2, no murmur, rub or gallop. Tachycardic, regular.  Resp: Clear to auscultation bilaterally, no wheezes, rales or rhonchi. Normal respiratory effort.  GI: Abdomen is soft, nontender and nondistended. No palpable masses. No rebound or guarding.  MSK: No edema. Nontender. Normal active range of motion.  Skin: Warm and dry. No rashes or lesions or ecchymoses on visible skin.  Neuro: Alert and oriented. Responds appropriately to all questions and commands. No focal findings appreciated. Normal muscle tone. Mildly tremulous.  Psych: Normal mood and affect.     Emergency Department Course     ECG:  Indication: Alcohol withdrawal  Time: 1528  Vent. Rate 120 bpm. MD interval 190. QRS duration 96. QT/QTc 304/429. P-R-T axis 54 5 248.  Sinus tachycardia. Biatrial enlargement. Possible inferior infarct, age undetermined. Anterior infarct, age undetermined. ST & T wave abnormality, consider lateral ischemia. Abnormal ECG. No significant change compared to ECG dated 8/25/2020. Read  time: 1605      Indication: Repeat  Time: 1709  Vent. Rate 117 bpm. KS interval 160. QRS duration 96. QT/QTc 334/465. P-R-T axis 31 -8 232.  Sinus tachycardia. Possible left atrial enlargement. Anterior infarct, age undetermined. ST & T wave abnormality, consider inferolateral ischemia. Abnormal ECG. No significant change compared to ECG dated 1/16/2021. Read time: 1718     Laboratory:  CBC: WBC: 9.3, HGB: 14.9, PLT: 190  CMP: Glucose 124(H), NA: 132(L), Potassium: 1.8(L), Chloride 81(L), Carbon Dioxide: 43(H), GFR: 48(L), Bilirubin Total: 2.8(H), Alkphos: 256(H), ALT: 58(H), AST: 164(H), o/w WNL (Creatinine: 1.44(H))  Blood gas venous and oxyhgb: pH: 7.58(H), PO2: 54(H), Bicarbonate: 42(H), o/w WNL   BMP: Glucose 101(H), Potassium: 2.2(L), Carbon Dioxide: 40(H), Anion Gap: 2(L), Calcium: 7.3(L), o/w WNL (Creatinine: 1.02)    1540 Troponin: 0.160(H)  1736 Repeat Troponin: 0.134(H)      1749 Lactic acid: 1.1   Alcohol level blood: <0.01   Magnesium: 1.8  HCG Qualitative Pregnancy: Negative     Asymptomatic COVID-19 Virus by PCR: Negative    Procedures  None    Emergency Department Course:    Reviewed:  I reviewed nursing notes, vitals and past medical history    Assessments:  1609 I obtained history and examined the patient as noted above.   1649 I rechecked the patient and explained findings. She denies any new concerns. She denies chest pain  1735 Patient reassessed. Her heart rate is improving. Signs of mild alcohol withdrawal.     Consults:   1836 I consulted with Leti Boles for Dr. Hawk of the hospitalist services who is in agreement to accept the patient for admission.     Interventions:  1638 0.9% sodium chloride bolus, 1,000 ml, IV   1638 Ativan, 1 mg, IV  1702 Dextrose 5% and 0.45% NS 1L with multivitamin 10 mL, thiamine 100 mg, folic acid 1 mg IV   1722 Potassium chloride infusion, 10 mEq, IV  1722 Potassium chloride, 40 mEq, PO  1842 Potassium chloride, 10 mEq, IV    Disposition:  The patient was  admitted to the hospital under the care of Dr. Hawk.       Impression & Plan     Medical Decision Making:  Maddie Orourke is a 30-year-old female with a history of alcohol abuse who presents emergency department with concerns for possible seizure-like activity after discontinuation of drinking 2 days prior.  On evaluation here she appeared to be in mild withdrawal.  The symptoms that she described as a possible seizure did not seem seizure-like.  This seems like carpal spasm or spasm of alternative etiology perhaps related to the severe hypokalemia noted on laboratory analysis.  Seizure unlikely by this description.  When she arrived she was mildly hypertensive and significantly tachycardic.  With IV fluids her heart rate improved suggesting an element of dehydration.  She was also given Ativan which helped with her withdrawal symptoms.  Elevation of troponin was initially concerning especially with some what appeared to be ST depressions in lateral precordial leads, however the patient was not having any symptoms of suggest ACS.  It is more likely the troponin is elevated due to significant cardiac strain.  Myocarditis considered but seems again unlikely given lack of symptoms.  She had no symptoms that would suggest pulmonary embolism.  Fortunately, there is no dysrhythmia or progressive signs on ECG.  Due to the need to continue with IV replenishment of the hypokalemia, monitor on telemetry and continue to assess for any worsening of alcohol withdrawal symptoms, the patient will be admitted for further care.  This plan was discussed with the patient.  She verbalized understanding agreement.  All question answered prior to admission.      Covid-19  Maddie Orourke was evaluated during a global COVID-19 pandemic, which necessitated consideration that the patient might be at risk for infection with the SARS-CoV-2 virus that causes COVID-19.   Applicable protocols for evaluation were followed during the  patient's care.   COVID-19 was considered as part of the patient's evaluation. The plan for testing is:  a test was obtained during this visit.    Diagnosis:    ICD-10-CM    1. Hypokalemia  E87.6    2. Elevated troponin  R77.8    3. Sinus tachycardia  R00.0    4. Acute kidney injury (H)  N17.9    5. Alcohol withdrawal syndrome without complication (H)  F10.230    6. Alcohol abuse  F10.10          Scribe Disclosure:  Iris STRICKLAND, am serving as a scribe at 4:02 PM on 1/16/2021 to document services personally performed by Deandra Villegas MD based on my observations and the provider's statements to me.              Deandra Villegas MD  01/16/21 [id number]

## 2021-01-16 NOTE — ED TRIAGE NOTES
Stopped drinking 2 days ago. Patient had a seizure before coming in. Patient is unsure of how much she drinks daily. Patient has been vomiting, states that she had a physician do a home visit today and was told to come into the ED. ABCDs intact.

## 2021-01-17 ENCOUNTER — APPOINTMENT (OUTPATIENT)
Dept: CARDIOLOGY | Facility: CLINIC | Age: 31
DRG: 683 | End: 2021-01-17
Attending: INTERNAL MEDICINE
Payer: COMMERCIAL

## 2021-01-17 LAB
ALBUMIN SERPL-MCNC: 3 G/DL (ref 3.4–5)
ALBUMIN UR-MCNC: 20 MG/DL
ALP SERPL-CCNC: 149 U/L (ref 40–150)
ALT SERPL W P-5'-P-CCNC: 36 U/L (ref 0–50)
ANION GAP SERPL CALCULATED.3IONS-SCNC: 4 MMOL/L (ref 3–14)
APPEARANCE UR: ABNORMAL
AST SERPL W P-5'-P-CCNC: 103 U/L (ref 0–45)
BILIRUB SERPL-MCNC: 1.3 MG/DL (ref 0.2–1.3)
BILIRUB UR QL STRIP: NEGATIVE
BUN SERPL-MCNC: 9 MG/DL (ref 7–30)
CALCIUM SERPL-MCNC: 7.5 MG/DL (ref 8.5–10.1)
CHLORIDE SERPL-SCNC: 101 MMOL/L (ref 94–109)
CO2 SERPL-SCNC: 35 MMOL/L (ref 20–32)
COLOR UR AUTO: YELLOW
CREAT SERPL-MCNC: 0.72 MG/DL (ref 0.52–1.04)
GFR SERPL CREATININE-BSD FRML MDRD: >90 ML/MIN/{1.73_M2}
GLUCOSE SERPL-MCNC: 106 MG/DL (ref 70–99)
GLUCOSE UR STRIP-MCNC: NEGATIVE MG/DL
HGB UR QL STRIP: NEGATIVE
HYALINE CASTS #/AREA URNS LPF: 4 /LPF (ref 0–2)
KETONES UR STRIP-MCNC: NEGATIVE MG/DL
LEUKOCYTE ESTERASE UR QL STRIP: NEGATIVE
MAGNESIUM SERPL-MCNC: 2.7 MG/DL (ref 1.6–2.3)
MUCOUS THREADS #/AREA URNS LPF: PRESENT /LPF
NITRATE UR QL: NEGATIVE
PH UR STRIP: 6 PH (ref 5–7)
PHOSPHATE SERPL-MCNC: 1.1 MG/DL (ref 2.5–4.5)
POTASSIUM SERPL-SCNC: 2.7 MMOL/L (ref 3.4–5.3)
POTASSIUM SERPL-SCNC: 2.8 MMOL/L (ref 3.4–5.3)
POTASSIUM SERPL-SCNC: 2.9 MMOL/L (ref 3.4–5.3)
POTASSIUM SERPL-SCNC: 2.9 MMOL/L (ref 3.4–5.3)
PROT SERPL-MCNC: 5.6 G/DL (ref 6.8–8.8)
RBC #/AREA URNS AUTO: 1 /HPF (ref 0–2)
SODIUM SERPL-SCNC: 140 MMOL/L (ref 133–144)
SOURCE: ABNORMAL
SP GR UR STRIP: 1.01 (ref 1–1.03)
SQUAMOUS #/AREA URNS AUTO: 3 /HPF (ref 0–1)
UROBILINOGEN UR STRIP-MCNC: NORMAL MG/DL (ref 0–2)
WBC #/AREA URNS AUTO: 4 /HPF (ref 0–5)

## 2021-01-17 PROCEDURE — 250N000009 HC RX 250: Performed by: INTERNAL MEDICINE

## 2021-01-17 PROCEDURE — 258N000003 HC RX IP 258 OP 636: Performed by: INTERNAL MEDICINE

## 2021-01-17 PROCEDURE — 250N000013 HC RX MED GY IP 250 OP 250 PS 637: Performed by: INTERNAL MEDICINE

## 2021-01-17 PROCEDURE — 99232 SBSQ HOSP IP/OBS MODERATE 35: CPT | Performed by: INTERNAL MEDICINE

## 2021-01-17 PROCEDURE — 80053 COMPREHEN METABOLIC PANEL: CPT | Performed by: INTERNAL MEDICINE

## 2021-01-17 PROCEDURE — 84132 ASSAY OF SERUM POTASSIUM: CPT | Performed by: INTERNAL MEDICINE

## 2021-01-17 PROCEDURE — 93306 TTE W/DOPPLER COMPLETE: CPT | Mod: 26 | Performed by: INTERNAL MEDICINE

## 2021-01-17 PROCEDURE — 250N000011 HC RX IP 250 OP 636: Performed by: INTERNAL MEDICINE

## 2021-01-17 PROCEDURE — 81001 URINALYSIS AUTO W/SCOPE: CPT | Performed by: INTERNAL MEDICINE

## 2021-01-17 PROCEDURE — 93306 TTE W/DOPPLER COMPLETE: CPT

## 2021-01-17 PROCEDURE — 120N000001 HC R&B MED SURG/OB

## 2021-01-17 PROCEDURE — 36415 COLL VENOUS BLD VENIPUNCTURE: CPT | Performed by: INTERNAL MEDICINE

## 2021-01-17 PROCEDURE — 83735 ASSAY OF MAGNESIUM: CPT | Performed by: INTERNAL MEDICINE

## 2021-01-17 PROCEDURE — 84100 ASSAY OF PHOSPHORUS: CPT | Performed by: INTERNAL MEDICINE

## 2021-01-17 RX ORDER — POTASSIUM CHLORIDE 1500 MG/1
40 TABLET, EXTENDED RELEASE ORAL ONCE
Status: COMPLETED | OUTPATIENT
Start: 2021-01-17 | End: 2021-01-17

## 2021-01-17 RX ORDER — POTASSIUM CHLORIDE 1500 MG/1
40 TABLET, EXTENDED RELEASE ORAL ONCE
Status: COMPLETED | OUTPATIENT
Start: 2021-01-18 | End: 2021-01-17

## 2021-01-17 RX ADMIN — THIAMINE HCL TAB 100 MG 200 MG: 100 TAB at 22:34

## 2021-01-17 RX ADMIN — MULTIPLE VITAMINS W/ MINERALS TAB 1 TABLET: TAB at 10:07

## 2021-01-17 RX ADMIN — SODIUM PHOSPHATE, MONOBASIC, MONOHYDRATE 15 MMOL: 276; 142 INJECTION, SOLUTION INTRAVENOUS at 16:11

## 2021-01-17 RX ADMIN — FOLIC ACID 1 MG: 1 TABLET ORAL at 10:07

## 2021-01-17 RX ADMIN — FAMOTIDINE 20 MG: 20 TABLET ORAL at 20:57

## 2021-01-17 RX ADMIN — ATENOLOL 50 MG: 50 TABLET ORAL at 10:07

## 2021-01-17 RX ADMIN — SODIUM PHOSPHATE, MONOBASIC, MONOHYDRATE AND SODIUM PHOSPHATE, DIBASIC, ANHYDROUS 15 MMOL: 276; 142 INJECTION, SOLUTION INTRAVENOUS at 00:35

## 2021-01-17 RX ADMIN — POTASSIUM CHLORIDE 40 MEQ: 1500 TABLET, EXTENDED RELEASE ORAL at 23:33

## 2021-01-17 RX ADMIN — POTASSIUM CHLORIDE 40 MEQ: 1500 TABLET, EXTENDED RELEASE ORAL at 04:18

## 2021-01-17 RX ADMIN — POTASSIUM CHLORIDE AND SODIUM CHLORIDE: 900; 150 INJECTION, SOLUTION INTRAVENOUS at 07:20

## 2021-01-17 RX ADMIN — CLONIDINE HYDROCHLORIDE 0.1 MG: 0.1 TABLET ORAL at 20:57

## 2021-01-17 RX ADMIN — ASPIRIN 81 MG: 81 TABLET, COATED ORAL at 10:07

## 2021-01-17 RX ADMIN — THIAMINE HCL TAB 100 MG 200 MG: 100 TAB at 10:07

## 2021-01-17 RX ADMIN — CLONIDINE HYDROCHLORIDE 0.1 MG: 0.1 TABLET ORAL at 04:18

## 2021-01-17 RX ADMIN — POTASSIUM CHLORIDE 40 MEQ: 1500 TABLET, EXTENDED RELEASE ORAL at 10:45

## 2021-01-17 RX ADMIN — FAMOTIDINE 20 MG: 20 TABLET ORAL at 10:07

## 2021-01-17 RX ADMIN — THIAMINE HCL TAB 100 MG 200 MG: 100 TAB at 16:07

## 2021-01-17 RX ADMIN — POTASSIUM CHLORIDE AND SODIUM CHLORIDE: 900; 150 INJECTION, SOLUTION INTRAVENOUS at 19:05

## 2021-01-17 RX ADMIN — POTASSIUM CHLORIDE 40 MEQ: 1500 TABLET, EXTENDED RELEASE ORAL at 18:37

## 2021-01-17 RX ADMIN — CLONIDINE HYDROCHLORIDE 0.1 MG: 0.1 TABLET ORAL at 15:33

## 2021-01-17 ASSESSMENT — ACTIVITIES OF DAILY LIVING (ADL)
ADLS_ACUITY_SCORE: 18
ADLS_ACUITY_SCORE: 18
ADLS_ACUITY_SCORE: 17
ADLS_ACUITY_SCORE: 18
ADLS_ACUITY_SCORE: 17
ADLS_ACUITY_SCORE: 18

## 2021-01-17 NOTE — DISCHARGE INSTRUCTIONS
A video appointment was scheduled for you with Dr. Chau from the [facility] clinic on Monday January 25th at 9:00 AM. Please have your hospital discharge paperwork, ID and insurance information available to review if needed. Please call the clinic at [phone] if you need to reschedule.     - Video visit instructions will be sent to your primary e-mail address  - Please download the Google Duo erika prior to your appointment  - The doctor will call you at your scheduled appointment time.

## 2021-01-17 NOTE — CONSULTS
NUTRITION ASSESSMENT      REASON FOR NUTRITION CONSULT:  Provider Order  -  Alcohol withdrawal     ASSESSMENT:  Defer complete nutrition assessment - Writer off site, on call   Electrolyte replacement protocols, Folic acid 1 mg, thiamine 100 mg, MVI+M already ordered.    FOLLOW UP:   Will follow up when patient clically appropriate and as schedule, patient availability allows    Kirsty Santos MS, RDN, LD, CNSC  Pager - 3rd floor/ICU: [phone]  Pager - All other floors: [phone]  Pager - Weekend/holiday: [phone]  Office: [phone]

## 2021-01-17 NOTE — PLAN OF CARE
Denies pain, CP, SOB. CIWA 3, . Phosphorus replaced. IV fluids infusing in R. Arm at 100 ml/hour. Stand by assist. Bruised skin. Continue to monitor and with plan of care.

## 2021-01-17 NOTE — ED NOTES
.DATE:  1/16/2021   TIME OF RECEIPT FROM LAB:  1908  LAB TEST:  Potassium  LAB VALUE:  2.2  RESULTS GIVEN WITH READ-BACK TO (PROVIDER):  Bakari POE  TIME LAB VALUE REPORTED TO PROVIDER:   1908

## 2021-01-17 NOTE — ED NOTES
[facility]  ED Nurse Handoff Report    Maddie Orourke is a 30 year old female   ED Chief complaint: Alcohol Intoxication  . ED Diagnosis:   Final diagnoses:   Hypokalemia   Elevated troponin   Sinus tachycardia   Alcohol abuse     Allergies: No Known Allergies    Code Status: Full Code  Activity level - Baseline/Home:  Independent. Activity Level - Current:   Stand by Assist. Lift room needed: No. Bariatric: No   Needed: No   Isolation: No. Infection: Not Applicable.     Vital Signs:   Vitals:    01/16/21 1517 01/16/21 1700 01/16/21 1800   BP: (!) 126/98 (!) 135/102    Pulse: 148 120 101   Resp: 20 19    Temp: 99.3  F (37.4  C)     TempSrc: Temporal     SpO2: 97% 96% 96%       Cardiac Rhythm:  ,      Pain level:    Patient confused: No. Patient Falls Risk: Yes.   Elimination Status: Has voided   Patient Report - Initial Complaint: Alcohol Intoxication. Focused Assessment:  CIWA-Ar - Nausea and Vomiting: no nausea and no vomiting  Tactile Disturbances: none  Tremor: no tremor  Auditory Disturbances: not present  Paroxysmal Sweats: no sweat visible  Visual Disturbances: not present  Anxiety: mildly anxious  Headache, Fullness in Head: mild  Agitation: somewhat more than normal activity  Orientation and Clouding of Sensorium: oriented and can do serial additions  CIWA-Ar Total: 4   Cognitive - Cognitive/Neuro/Behavioral WDL: .WDL except  Level of Consciousness: alert  Arousal Level: opens eyes spontaneously  Orientation: oriented x 4  Follows Commands: yes  Speech: logical; spontaneous; clear  Best Language: 0 - No aphasia  Mood/Behavior: cooperative; calm; anxious; sad   Romney Coma Scale - Best Eye Response: 4-->(E4) spontaneous  Best Motor Response: 6-->(M6) obeys commands  Best Verbal Response: 5-->(V5) oriented  Romney Coma Scale Score: 15   Pupils (CN II) - Pupil PERRLA: yes   Motor Strength - Left Upper: 5 - active movement against gravity and full resistance  Right Upper: 5 -  active movement against gravity and full resistance  Left Lower: 5 - active movement against gravity and full resistance  Right Lower: 5 - active movement against gravity and full resistance   Tests Performed:   Labs Ordered and Resulted from Time of ED Arrival Up to the Time of Departure from the ED   CBC WITH PLATELETS DIFFERENTIAL - Abnormal; Notable for the following components:       Result Value    MCH 33.9 (*)     All other components within normal limits   COMPREHENSIVE METABOLIC PANEL - Abnormal; Notable for the following components:    Sodium 132 (*)     Potassium 1.8 (*)     Chloride 81 (*)     Carbon Dioxide 43 (*)     Glucose 124 (*)     Creatinine 1.44 (*)     GFR Estimate 48 (*)     GFR Estimate If Black 56 (*)     Bilirubin Total 2.8 (*)     Alkaline Phosphatase 256 (*)     ALT 58 (*)      (*)     All other components within normal limits   TROPONIN I - Abnormal; Notable for the following components:    Troponin I ES 0.160 (*)     All other components within normal limits   TROPONIN I - Abnormal; Notable for the following components:    Troponin I ES 0.134 (*)     All other components within normal limits   LACTIC ACID WHOLE BLOOD   ALCOHOL ETHYL   MAGNESIUM   SARS-COV-2 (COVID-19) VIRUS RT-PCR   HCG QUALITATIVE   LACTIC ACID WHOLE BLOOD   BLOOD GAS VENOUS AND OXYHGB   PERIPHERAL IV CATHETER     Treatments provided:   Medications   potassium chloride 10 mEq in 100 mL sterile water intermittent infusion (premix) (10 mEq Intravenous New Bag 1/16/21 1722)   0.9% sodium chloride BOLUS (0 mLs Intravenous Stopped 1/16/21 1810)   sodium chloride 0.9 % 1,000 mL with Infuvite Adult 10 mL, thiamine 100 mg, folic acid 1 mg infusion ( Intravenous New Bag 1/16/21 1702)   LORazepam (ATIVAN) injection 1 mg (1 mg Intravenous Given 1/16/21 1638)   potassium chloride ER (KLOR-CON M) CR tablet 40 mEq (40 mEq Oral Given 1/16/21 1722)       Family Comments: Byself  OBS brochure/video discussed/provided to patient:   N/A  ED Medications:   Medications   potassium chloride 10 mEq in 100 mL sterile water intermittent infusion (premix) (10 mEq Intravenous New Bag 1/16/21 1722)   0.9% sodium chloride BOLUS (0 mLs Intravenous Stopped 1/16/21 1810)   sodium chloride 0.9 % 1,000 mL with Infuvite Adult 10 mL, thiamine 100 mg, folic acid 1 mg infusion ( Intravenous New Bag 1/16/21 1702)   LORazepam (ATIVAN) injection 1 mg (1 mg Intravenous Given 1/16/21 1638)   potassium chloride ER (KLOR-CON M) CR tablet 40 mEq (40 mEq Oral Given 1/16/21 1722)     Drips infusing:  No  For the majority of the shift, the patient's behavior Green. Interventions performed were N/A.    Sepsis treatment initiated: No     Patient tested for COVID 19 prior to admission: YES    ED Nurse Name/Phone Number: Aniya Alfaro RN,   6:14 PM  RECEIVING UNIT ED HANDOFF REVIEW    Above ED Nurse Handoff Report was reviewed: Yes  Reviewed by: Halina Martínez RN on January 16, 2021 at 7:27 PM

## 2021-01-17 NOTE — CONSULTS
Care Management Initial Consult    General Information  Assessment completed with: Patient        Primary Care Provider verified and updated as needed:     Readmission within the last 30 days: no previous admission in last 30 days      Reason for Consult: substance use concerns  Advance Care Planning:            Communication Assessment  Patient's communication style: spoken language (English or Bilingual)             Cognitive  Cognitive/Neuro/Behavioral: .WDL except  Level of Consciousness: alert  Arousal Level: opens eyes spontaneously  Orientation: oriented x 4  Mood/Behavior: cooperative, calm  Best Language: 0 - No aphasia  Speech: clear, spontaneous, logical    Living Environment:   People in home: alone     Current living Arrangements: apartment      Able to return to prior arrangements:         Family/Social Support:  Care provided by:  None   Provides care for: no one     Parent(s)          Description of Support System: Supportive, Involved    Support Assessment: Adequate family and caregiver support    Current Resources:   Skilled Home Care Services:    Community Resources:    Equipment currently used at home:    Supplies currently used at home:      Employment/Financial:  Employment Status:     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      Financial Concerns:   Patient reported financial concerns. She noted she moved with significant other but they broke up so know she suck with bills alone. Writer encouraged for pt to apply for assistance through local [facility] as needed.           Lifestyle & Psychosocial Needs:        Socioeconomic History     Marital status: Single     Spouse name: Not on file     Number of children: Not on file     Years of education: Not on file     Highest education level: Not on file     Tobacco Use     Smoking status: Current Every Day Smoker     Smokeless tobacco: Never Used   Substance and Sexual Activity     Alcohol use: Yes     Comment: deborah  states not that much     Drug use: Not Currently       Functional Status:  Prior to admission patient needed assistance:      Mental Health Status:  Patient reported history of depression. She noted she interested in finding a therapist that she able to see face to face. Writer discussed face to face limitation due to covid-19. Writer encouraged for pt to call her insurance provider [employer] and inquire about therapist in their network.     Chemical Dependency Status:   Patient reported she has reach out to treatment center in area name [facility] and plans to start outpatient treatment. Pt is not interested in CD assessment at this time.     Values/Beliefs:  Spiritual, Cultural Beliefs, Druze Practices, Values that affect care:  NA            Additional Information:  Met with patient for consult for chem dep resources. Pt reported she resides alone in apartment. She noted she two jobs. Patient reported she has already reach out to treatment center and plans to start outpatient treatment soon. Pt asked about mental health resources. Writer encouraged for pt to reach out to her health plan for specific therapist/ availability. Writer inquired about IV therapies indicated in notes. Pt reported independent health professional agency name [facility]. Pt noted they are able to provide cares as needed. Patient has no PCP established at this time and she open to hospital assisting with establishing care. Discussed transportation upon discharge.  Pt noted family/freinds able to provide transport. No other SW needs identified at this time.    Pierre Bertrand, FARZANASW

## 2021-01-17 NOTE — PROGRESS NOTES
[facility]             Hospitalist Progress Note    Name: Maddie Orourke    MRN: [id number]  YOB: 1990    Age: 30 year old  Date of admission: 1/16/2021  Primary care provider: Clinic, [facility] [place]      Reason for Stay (Diagnosis): Alcohol withdrawal/mildly elevated troponin/severe hypokalemia         Assessment and Plan:      Summary of Stay:  Maddie Orourke is a 30-year-old female with a significant past medical history related to alcohol abuse, dependence and withdrawal, a previous history of alcohol withdrawal seizure and severe hypokalemia due to decreased oral intake, who came in today with a complaint of decreased oral intake and after home infusion people unable to obtain her IV line to give her IV fluids and vitamins.  Home nursing also had difficulties with placing peripheral IV and felt patient was too dehydrated and possibly hypokalemic so she was encouraged to come to the ED.  On presentation, she had significant hypokalemia and was admitted for further potassium replacement therapy.  There was also signs of early alcohol withdrawal and significant hyperventilation.  Mildly elevated troponin with nonspecific ST-T changes but no other clinical evidence of an acute coronary syndrome.  We are asked to admit the patient to telemetry for her mildly elevated troponin, early alcohol withdrawal, and severe hyperkalemia management.    Primary diagnoses    Severe hypokalemia  Notable history of severe hypokalemia in the past and likely due to decreased oral intake and continued alcohol use disorder.  Notably, not chronically on diuretics.  She does report some episodes of diarrhea at home which could be exacerbating her hypokalemia.  -Replace potassium per protocol    Mildly elevated troponin  Trivial and suspect myocardial strain and not necessarily non-STEMI from tachycardia with anxiety/panic and alcohol withdrawal.  Also likely the possibility of stress  cardiomyopathy.  Echocardiogram demonstrates a reduced EF of 50 to 55% with no regional wall motion abnormalities.  Troponins are downtrending.  Low pretest probability for significant obstructive coronary artery disease  -Continue baby aspirin  -Also on atenolol 50 mg p.o. daily which will also help with blood pressure management and tachycardia  -Consider inpatient versus outpatient stress test for further stratification    Active medical conditions    Alcohol use disorder with early alcohol withdrawal protocol  -Continue CIWA protocol  -Educated and counseled to quit.  Also encouraged multimodality and interdisciplinary management of drinking and suspect a psychosocial component  -Patient reports that she is working with a friend to get involved with outpatient CD treatment.  She states that she does try to attend AA meetings  -Risks and ramifications of continued alcohol use conveyed to the patient not limited to worsening alcoholic cardiomyopathy, progressive liver disease, end-stage liver disease withdrawal seizures, but also death    Hypertension  -On clonidine and atenolol for now    Chronic medical conditions    Moderate nonsevere Malnutrition  -Patient apparently has orders from an outpatient MD for periodic IV fluid infusion 1-2 times a month  -Dietitian consult    -Resume chronic medications as ordered    DVT Prophylaxis: Pneumatic Compression Devices  Code Status: Full Code  Discharge Dispo: Home  Estimated Disch Date / # of Days until Disch: In 1 to 2 days once potassium levels are stable and no significant signs of further withdrawal          Interval History (Subjective):      No active chest pain.  Appears anxious and does convey that he patient was probably hyperventilating and had a panic attack yesterday at home when she actually thought she might of had a seizure event when patient felt faint, shaking, and lightheaded because the home infusion nursing staff had difficulties with placing  "peripheral IV.  No documented or reported history of alcohol withdrawal seizures.  Does reaffirm that drink was 2 days prior to admission         Physical Exam:      Vital signs:  Temp: 97.7  F (36.5  C) Temp src: Axillary(pt eating) BP: 106/63 Pulse: 80   Resp: 18 SpO2: 99 % O2 Device: None (Room air)   Height: 170.2 cm (5' 7\") Weight: 55.4 kg (122 lb 1.6 oz)  Estimated body mass index is 19.12 kg/m  as calculated from the following:    Height as of this encounter: 1.702 m (5' 7\").    Weight as of this encounter: 55.4 kg (122 lb 1.6 oz).    I/O last 3 completed shifts:  In: 1000 [P.O.:1000]  Out: 300 [Urine:300]  Vitals:    01/16/21 1956   Weight: 55.4 kg (122 lb 1.6 oz)       Constitutional: Awake, alert, cooperative, no apparent distress     Respiratory: Nl work of breathing. Clear to auscultation bilaterally, no crackles or wheezing   Cardiovascular: Regular rate and rhythm, normal S1 and S2, and no murmur noted       Skin: No rashes, no cyanosis, dry to touch   Neuro: CN 2-12 intact, no localizing weakness   Extremities: No edema, normal range of motion   HEENT Normocephalic, atraumatic, normal nasal turbinates; oropharynx clear   Neck Supple; nl inspection; trachea midline; no thryomegaly   Psychiatric: A+O x3.  Mildly anxious affect          Medications:      All current medications were reviewed with changes reflected in problem list.         Data:      All new lab and imaging data was reviewed.   Labs:  No results for input(s): CULT in the last 168 hours.  Recent Labs   Lab 01/16/21  1540   WBC 9.3   HGB 14.9   HCT 43.1   MCV 98        Recent Labs   Lab 01/17/21  1017 01/17/21  0616 01/17/21  0324 01/16/21  2315 01/16/21  2050 01/16/21  1823 01/16/21  1540   NA  --  140  --   --   --  136 132*   POTASSIUM  --  2.7* 2.9*  --  2.7* 2.2* 1.8*   CHLORIDE  --  101  --   --   --  94 81*   CO2  --  35*  --   --   --  40* 43*   ANIONGAP  --  4  --   --   --  2* 8   GLC  --  106*  --   --   --  101* 124* "   BUN  --  9  --   --   --  11 12   CR  --  0.72  --   --   --  1.02 1.44*   GFRESTIMATED  --  >90  --   --   --  73 48*   GFRESTBLACK  --  >90  --   --   --  84 56*   NISHI  --  7.5*  --   --   --  7.3* 9.4   MAG  --  2.7*  --   --  1.5*  --  1.8   PHOS 1.1*  --   --  1.2* 1.2*  --   --    PROTTOTAL  --  5.6*  --   --   --   --  8.3   ALBUMIN  --  3.0*  --   --   --   --  4.7   BILITOTAL  --  1.3  --   --   --   --  2.8*   ALKPHOS  --  149  --   --   --   --  256*   AST  --  103*  --   --   --   --  164*   ALT  --  36  --   --   --   --  58*     No results for input(s): INR in the last 168 hours.   Imaging:   Recent Results (from the past 24 hour(s))   Echocardiogram Complete    Narrative    [id number]  [id number]  [id number]  [id number]^KAMILA^IRAIDA^[facility]  Echocardiography Laboratory  [address] [place] [address]        Name: ALFONZO MARTIN  MRN: [id number]  : 1990  Study Date: 2021 08:11 AM  Age: 30 yrs  Gender: Female  Patient Location: [facility]  Reason For Study: Other, Please Specify in Comments  Ordering Physician: IRAIDA TREVIÑO  Referring Physician: No Primary  Performed By: Vadim Howard RDCS     BSA: 1.6 m2  Height: 67 in  Weight: 122 lb  HR: 78  _____________________________________________________________________________  __        Procedure  Complete Echo Adult.  _____________________________________________________________________________  __        Interpretation Summary     Left ventricular systolic function is normal.  The visual ejection fraction is estimated at 50-55%.  The right ventricle is normal in structure, function and size.  No significant valve dysfunction.  IVC is normal.  Aortic root is normal.  There is no pericardial effusion.     No prior for comparison.  _____________________________________________________________________________  __        Left Ventricle  The left ventricle is normal in structure, function and size. Left  ventricular  systolic function is normal. The visual ejection fraction is estimated at 50-  55%. Left ventricular diastolic function is normal. Normal left ventricular  wall motion.     Right Ventricle  The right ventricle is normal in structure, function and size.     Atria  Normal left atrial size. Right atrial size is normal.     Mitral Valve  The mitral valve is normal in structure and function. There is trace mitral  regurgitation.        Tricuspid Valve  The tricuspid valve is normal in structure and function. There is mild (1+)  tricuspid regurgitation. The right ventricular systolic pressure is  approximated at 13.2 mmHg plus the right atrial pressure.     Aortic Valve  Normal tricuspid aortic valve.     Pulmonic Valve  The pulmonic valve is normal in structure and function. There is trace  pulmonic valvular regurgitation.     Vessels  The aortic root is normal size. Normal size ascending aorta. The inferior vena  cava was normal in size with preserved respiratory variability.     Pericardium  There is no pericardial effusion.     _____________________________________________________________________________  __  MMode/2D Measurements & Calculations  IVSd: 0.87 cm  LVIDd: 4.4 cm  LVIDs: 3.1 cm  LVPWd: 1.0 cm  FS: 29.2 %     LV mass(C)d: 141.8 grams  LV mass(C)dI: 86.5 grams/m2  Ao root diam: 2.8 cm  LA dimension: 2.7 cm  asc Aorta Diam: 2.9 cm  LA/Ao: 0.96  LVOT diam: 1.8 cm  LVOT area: 2.7 cm2  LA Volume (BP): 36.0 ml  LA Volume Index (BP): 22.0 ml/m2  RWT: 0.47     Time Measurements  Aortic HR: 76.0 BPM        Doppler Measurements & Calculations  MV E max justo: 65.6 cm/sec  MV A max justo: 59.7 cm/sec  MV E/A: 1.1  MV max P.2 mmHg  MV mean P.5 mmHg  MV V2 VTI: 20.3 cm  MVA(VTI): 2.7 cm2  MV dec time: 0.23 sec  LV V1 max P.1 mmHg  LV V1 max: 101.7 cm/sec  LV V1 VTI: 20.6 cm  CO(LVOT): 4.2 l/min  CI(LVOT): 2.5 l/min/m2  SV(LVOT): 55.0 ml  SI(LVOT): 33.5 ml/m2  TR max justo: 181.9 cm/sec  TR max PG:  13.2 mmHg  E/E' av.4  Lateral E/e': 6.3  Medial E/e': 6.6              _____________________________________________________________________________  __        Report approved by: Mane Almazan 2021 08:47 AM            Priya Hill [phone]

## 2021-01-17 NOTE — PROGRESS NOTES
A&OX4. Ls diminished. denied any sob. T- max 100.9.  CIWA score is 3. No seizure during this shift. R ankle slightly swollen. Old bruises noted on upper and lower extremities. Need urine collect for UA. Mag was 1.5. replaced and recheck in am. K 2.7, replacement in progress. Phos 1.2. paged Md for a replacement order. continuous IVF 0.9% NS +KCL 20 Meq infusing at 100 ml/hr.  Regular diet. Up with assist of 1 with gait belt for ambulation. Tele Sinus tachy /1st degree AV block. Continue monitoring.

## 2021-01-17 NOTE — H&P
Admitted:     01/16/2021      CHIEF COMPLAINT:  Alcohol withdrawal, decreased oral intake.      HISTORY OF PRESENT ILLNESS:  Maddie Orourke is a 30-year-old female with a past medical history significant for alcohol dependence, intoxication, withdrawal with history of alcohol withdrawal seizure, severe hypokalemia due to chronic loss or intake, who presented to the emergency room today with generalized weakness, hyperventilation and symptoms of alcohol withdrawal.  The patient is a daily drinker; last drink was 2 days ago.  She also has episodes of binge drinking of whiskey for 3 days and then stop for a few days and again drinks.  She stated she stopped in between to recover and help her body nutritionally improve.  The patient stated she had visits from independent health professional at home today from whom she gets IV fluids and unable to find her vein to put a line and she was transferred here.  It is not clear who those people were that are professionals who are contracted to give IV fluid for patients per patient but stated she did not get her blood work done prior to attempting giving her fluids.  The patient is also concerned as she ran out of her Gabapentin, which she uses for withdrawal.  She denied any tremors.  She was hyperventilating on presentation.  No weakness, no episode of seizure.  In the emergency room she was evaluated.  Electrolytes checked; sodium was 132, potassium came back 1.8, elevated liver enzymes and also troponin was 0.160.  Discussed with the ED physician.  The patient was given 1 mg of lorazepam, 20 mEq IV potassium and also 40 mEq oral potassium and bolus of IV fluid and being admitted to the hospital.      PAST MEDICAL HISTORY:   1.  Alcohol dependence, intoxication and withdrawal.   2.  History of alcohol withdrawal seizure.   3.  Transaminitis due to alcohol.   4.  Nausea and vomiting.   5.  Severe hypokalemia.   6.  Failure to thrive.   7.  Hypomagnesemia.   8.   Hypophosphatemia.   9.  Falls.      PAST SURGICAL HISTORY:  Reviewed, repair of deviation septum.      SOCIAL HISTORY:  The patient lives in [place] with her boyfriend.  She smokes about 4 cigarettes a day.  She drinks alcohol almost every day last drink was 2 days ago, which she stopped to help her body rehabilitate before the next bout of drinking.  The patient stated she is still working.      HOME MEDICATIONS:  Reviewed and reconciled as in electronic medical record.      Prior to Admission Medications   Prescriptions Last Dose Informant Patient Reported? Taking?   MISC NATURAL PRODUCTS PO 1/15/2021 at Unknown time  Yes Yes   Sig: Formula 303- natural supplement (natural muscle relaxant) - 1-2 capsule PO every 8 hours prn   folic acid (FOLVITE) 1 MG tablet 1/16/2021 at Unknown time  No Yes   Sig: Take 1 tablet (1 mg) by mouth daily   magnesium oxide (MAG-OX) 400 MG tablet 1/16/2021 at Unknown time  No Yes   Sig: Take 1 tablet (400 mg) by mouth daily   multivitamin w/minerals (THERA-VIT-M) tablet 1/16/2021 at Unknown time  No Yes   Sig: Take 1 tablet by mouth daily   potassium 99 MG TABS 1/16/2021 at Unknown time  No No   Sig: Take 1 tablet by mouth daily   thiamine (B-1) 100 MG tablet 1/16/2021 at Unknown time  No Yes   Sig: Take 1 tablet (100 mg) by mouth daily      Facility-Administered Medications: None     REVIEW OF SYSTEMS:  Ten points reviewed, all are negative except those mentioned in history of present illness.      ALLERGIES:  NO KNOWN DRUG ALLERGIES.     FAMILY HISTORY:  Mother: Hyperthyroidism.  Father: HTN    PHYSICAL EXAMINATION:   GENERAL:  The patient is awake, alert, oriented, comfortable, not in distress, cooperative.   VITAL SIGNS:  Blood pressure 140/96, pulse rate 117, temperature 99.3, oxygen saturation 94% on room air.   HEENT:  Pink, nonicteric.  Extraocular muscle movement intact.   NECK:  Supple, no JVD, no thyromegaly.   CHEST:  Good air entry bilaterally.  No wheezing, crackles or  rales.   CARDIOVASCULAR:  S1 and S2 well heard, tachycardic.  No gallop.   ABDOMEN:  Soft, nontender, nondistended, positive bowel sounds.   EXTREMITIES:  No edema, cyanosis or clubbing.  Rather poor skin turgor.   PSYCHIATRIC:  Normal mood and affect, keeps eye contact, responds to questions appropriately, no agitation.      DIAGNOSTIC TESTS:  Sodium 132, potassium 1.8, BUN 12, creatinine 1.44, ALT 58, , total bilirubin is 2.8, albumin is 4.7.  Troponin 0.160.  Glucose 101.  Venous pH 7.58, pCO2 of 45, pO2 54, bicarbonate was increased to 42.  WBC 9.3, hemoglobin 14.9, platelets 190.  Alcohol negative.     EKG showed sinus tachycardia at a rate of 120 beats per minute with nonspecific ST-T wave change.  QTc is 429.     Repeat EKG showed again sinus tachycardia at 117 beats per minute with QTc of 465 with nonspecific ST-T wave changes.      ASSESSMENT AND PLAN:  Maddie Orourke is a 30-year-old female with a significant past medical history related to alcohol abuse, dependence and withdrawal, a previous history of alcohol withdrawal seizure and severe hypokalemia due to decreased oral intake, who came in today with a complaint of decreased oral intake and after home infusion people unable to obtain her IV line to give her IV fluids and vitamins and found to have severe hypokalemia and being admitted to the hospital.   1.  Severe hypokalemia.   The patient has a history of severe hypokalemia in the past.  This is due to decreased oral intake in setting of alcohol abuse.  She is not on any diuretics.  She has episodes of diarrhea, which could also exacerbate this.  So far she got 40 mEq p.o. potassium and 20 mEq IV potassium.  We will place her on potassium replacement protocol.  We will give her IV fluids with NS plus KCl at 100 mL per hour.  We will avoid dextrose containing fluids for now as that shifts the potassium into intracellular space and causes more hypokalemia.  Encourage oral intake.  Advised to  eat more fruits and juice to help her potassium level.  EKG did not show significant change seen with hypokalemia.  We will place her on telemetry and continuously monitor her.   2.  Dehydration with acute renal failure.  Baseline creatinine was 0.42, today it is 1.44.  The patient is being hydrated; expect this to improve with hydration.  Also encouraged oral intake.   3.  Alcohol dependence and withdrawal.  The patient drinks heavily.  Last drink was 2 days ago.  Risk of alcohol withdrawal.  We will place her on CIWA protocol.  At this point she is calm.  No tremors and we will hold off Gabapentin.  The patient weighs only 56 kilos and we will try on Ativan.  If she gets into severe withdrawal, we will consider placing her on Gabapentin.   4.  Anxiety state with hyperventilation.  There is some alkalosis on her VBG.  This is due to underlying alcohol withdrawal.  Continue CIWA protocol.   5.  Elevated troponin secondary to tachycardia.  No chest pain.  There is some EKG change, nonspecific ST-T wave change.  We will trend troponin.  Started her on aspirin.  No need for a heparin drip at this point.  We will get echocardiogram in the morning.  May consider involving cardiologist if troponin is trending up or if there is any echo finding.   6.  Alcoholic hepatitis.  Liver enzymes are elevated.  She also had increased total bilirubin which is 2.8; in August it was 0.5.  This is unusual.  She had an ultrasound done in August, which showed increased echogenicity of the liver, likely hepatic steatosis.  At the time there was no cholelithiasis, probably this is due to change in liver architecture related to alcohol abuse.  Would not repeat ultrasound at this point.      I discussed with the patient at length the plan of care.  All her questions and concerns were addressed.  She showed understanding.        CODE STATUS:  In the event of cardiorespiratory arrest, she is full code.      DISPOSITION:  The patient will be  admitted to telemetry floor.  Risk of deterioration and needs close monitoring.         IRAIDA TREVIÑO MD             D: 2021   T: 2021   MT: BARBARA      Name:     ALFONZO MARTIN   MRN:      [id number]        Account:      [id number]   :      1990        Admitted:     2021                   Document: [id number]

## 2021-01-17 NOTE — PROGRESS NOTES
Care Management Follow Up    Length of Stay (days): 1    Expected Discharge Date: 01/19/21     Concerns to be Addressed: Medically ready  Patient plan of care discussed at interdisciplinary rounds: Yes    Anticipated Discharge Disposition:  Home       Referrals Placed by CM/PAULINA:  Scheduled follow up appointment    Additional Information:  SW confirmed with patient that is currently not established with a PCP or clinic. Patient agreeable to assistance with scheduling appointment. Appointment scheduled and updated to AVS.    A video appointment was scheduled for you with Dr. Chau from the [facility] [place] clinic on Monday January 25th at 9:00 AM.    Of note, PAULINA verified that patient uses a  medicine service, [facility], for home IVF. Patient pays for services privately, out of pocket.     CM will continue to follow for discharge planning. Please consult for any specific needs.     Ally Dobbins, RN      Ally Dobbins RN Case Manager  Inpatient Care Coordination  [facility]   [phone]

## 2021-01-17 NOTE — PHARMACY-ADMISSION MEDICATION HISTORY
Admission medication history interview status for this patient is complete. See [facility] admission navigator for allergy information, prior to admission medications and immunization status.     Medication history interview done via telephone during Covid-19 pandemic, indicate source(s): Patient  Medication history resources (including written lists, pill bottles, clinic record):None  Pharmacy: [facility], [place]    Changes made to PTA medication list:  Added: formula 303  Deleted: gabapentin 300 mg TID (pt finished prescription months ago - was a short term script. States she would like to try again though), Tums  Changed: none    Actions taken by pharmacist (provider contacted, etc):None     Additional medication history information:None    Medication reconciliation/reorder completed by provider prior to medication history?  N   (Y/N)       Prior to Admission medications    Medication Sig Last Dose Taking? Auth Provider   folic acid (FOLVITE) 1 MG tablet Take 1 tablet (1 mg) by mouth daily 1/16/2021 at Unknown time Yes Jeanette Chinchilla MD   magnesium oxide (MAG-OX) 400 MG tablet Take 1 tablet (400 mg) by mouth daily 1/16/2021 at Unknown time Yes Jeanette Chinchilla MD   MISC NATURAL PRODUCTS PO Formula 303- natural supplement (natural muscle relaxant) - 1-2 capsule PO every 8 hours prn 1/15/2021 at Unknown time Yes Unknown, Entered By History   multivitamin w/minerals (THERA-VIT-M) tablet Take 1 tablet by mouth daily 1/16/2021 at Unknown time Yes Jeanette Chinchilla MD   potassium 99 MG TABS Take 1 tablet by mouth daily 1/16/2021 at Unknown time Yes Jeanette Chinchilla MD   thiamine (B-1) 100 MG tablet Take 1 tablet (100 mg) by mouth daily 1/16/2021 at Unknown time Yes Jeanette Chinchilla MD

## 2021-01-18 VITALS
RESPIRATION RATE: 16 BRPM | WEIGHT: 122.1 LBS | BODY MASS INDEX: 19.16 KG/M2 | OXYGEN SATURATION: 100 % | SYSTOLIC BLOOD PRESSURE: 122 MMHG | HEIGHT: 67 IN | TEMPERATURE: 95.4 F | HEART RATE: 71 BPM | DIASTOLIC BLOOD PRESSURE: 91 MMHG

## 2021-01-18 LAB
INTERPRETATION ECG - MUSE: NORMAL
INTERPRETATION ECG - MUSE: NORMAL
PHOSPHATE SERPL-MCNC: 1.8 MG/DL (ref 2.5–4.5)
POTASSIUM SERPL-SCNC: 3.3 MMOL/L (ref 3.4–5.3)
POTASSIUM SERPL-SCNC: 3.3 MMOL/L (ref 3.4–5.3)

## 2021-01-18 PROCEDURE — 99238 HOSP IP/OBS DSCHRG MGMT 30/<: CPT | Performed by: INTERNAL MEDICINE

## 2021-01-18 PROCEDURE — 250N000011 HC RX IP 250 OP 636: Performed by: INTERNAL MEDICINE

## 2021-01-18 PROCEDURE — 36415 COLL VENOUS BLD VENIPUNCTURE: CPT | Performed by: INTERNAL MEDICINE

## 2021-01-18 PROCEDURE — 250N000013 HC RX MED GY IP 250 OP 250 PS 637: Performed by: INTERNAL MEDICINE

## 2021-01-18 PROCEDURE — 84132 ASSAY OF SERUM POTASSIUM: CPT | Performed by: INTERNAL MEDICINE

## 2021-01-18 PROCEDURE — 84100 ASSAY OF PHOSPHORUS: CPT | Performed by: INTERNAL MEDICINE

## 2021-01-18 RX ORDER — POTASSIUM CHLORIDE 1500 MG/1
20 TABLET, EXTENDED RELEASE ORAL ONCE
Status: COMPLETED | OUTPATIENT
Start: 2021-01-18 | End: 2021-01-18

## 2021-01-18 RX ORDER — ATENOLOL 25 MG/1
25 TABLET ORAL DAILY
Qty: 30 TABLET | Refills: 0 | Status: ON HOLD | OUTPATIENT
Start: 2021-01-19 | End: 2021-06-08

## 2021-01-18 RX ORDER — POTASSIUM CHLORIDE 1500 MG/1
20 TABLET, EXTENDED RELEASE ORAL ONCE
Status: DISCONTINUED | OUTPATIENT
Start: 2021-01-18 | End: 2021-01-18 | Stop reason: ALTCHOICE

## 2021-01-18 RX ORDER — POTASSIUM CHLORIDE 750 MG/1
10 TABLET, EXTENDED RELEASE ORAL 2 TIMES DAILY
Qty: 60 TABLET | Refills: 0 | Status: ON HOLD | OUTPATIENT
Start: 2021-01-18 | End: 2021-04-07

## 2021-01-18 RX ADMIN — MULTIPLE VITAMINS W/ MINERALS TAB 1 TABLET: TAB at 09:59

## 2021-01-18 RX ADMIN — POTASSIUM CHLORIDE 20 MEQ: 1500 TABLET, EXTENDED RELEASE ORAL at 04:33

## 2021-01-18 RX ADMIN — ASPIRIN 81 MG: 81 TABLET, COATED ORAL at 09:59

## 2021-01-18 RX ADMIN — POTASSIUM CHLORIDE AND SODIUM CHLORIDE: 900; 150 INJECTION, SOLUTION INTRAVENOUS at 05:25

## 2021-01-18 RX ADMIN — FAMOTIDINE 20 MG: 20 TABLET ORAL at 10:00

## 2021-01-18 RX ADMIN — THIAMINE HCL TAB 100 MG 200 MG: 100 TAB at 09:59

## 2021-01-18 RX ADMIN — FOLIC ACID 1 MG: 1 TABLET ORAL at 09:59

## 2021-01-18 RX ADMIN — POTASSIUM & SODIUM PHOSPHATES POWDER PACK 280-160-250 MG 2 PACKET: 280-160-250 PACK at 02:52

## 2021-01-18 RX ADMIN — POTASSIUM CHLORIDE 20 MEQ: 1500 TABLET, EXTENDED RELEASE ORAL at 10:05

## 2021-01-18 RX ADMIN — POTASSIUM & SODIUM PHOSPHATES POWDER PACK 280-160-250 MG 2 PACKET: 280-160-250 PACK at 10:04

## 2021-01-18 RX ADMIN — POTASSIUM & SODIUM PHOSPHATES POWDER PACK 280-160-250 MG 2 PACKET: 280-160-250 PACK at 10:00

## 2021-01-18 RX ADMIN — CLONIDINE HYDROCHLORIDE 0.1 MG: 0.1 TABLET ORAL at 04:00

## 2021-01-18 ASSESSMENT — ACTIVITIES OF DAILY LIVING (ADL)
ADLS_ACUITY_SCORE: 17

## 2021-01-18 NOTE — PLAN OF CARE
Vitals VSS  Neuro A&Ox4, scorng 0 on CIWA  Respiratory 99% RA  Cardiac/Tele SR w/prolonged GT interval  GI/ Continent of B&B  Skin Intact  LDAs PIV infusing NaCl + K 20 mEq @ 100 mL/hr  Labs K+ 3.3, Mag 2.7, Phos 1.8. Phos and K+ replaced  Diet Regular  Activity Ind  Plan Continue to monitor electrolytes. Continue with POC.

## 2021-01-18 NOTE — PROGRESS NOTES
A&OX4. Ls clear. Up with assist of 1. CIWA score is 0. Regular diet. Good appetite. Tele SR/ PQT. K 2.8 replaced. Recheck is 2.9. replaced.Recheck ordered.  Phos 1.1 Replacement in progress. Loss iv access. New iv started. Continue monitoring.

## 2021-01-19 NOTE — DISCHARGE SUMMARY
[facility]       DISCHARGE SUMMARY          Maddie Orourke MRN# [id number]   YOB: 1990 Age: 30 year old     Date of Admission:  1/16/2021  Date of Discharge:  1/18/2021 10:48 AM  Admitting Physician:  Les Hawk MD  Discharge Physician: Priya Hill MD  Discharging Service: Hospitalist     Primary Provider: Clinic, [facility]  Primary Care Physician Phone Number: [phone]         Discharge Diagnoses/Problem Oriented Hospital Course (Providers):      Maddie Orourke was admitted on 1/16/2021 by Les Hawk MD and I would refer you to their history and physical.      Patient was seen and examined on the day of discharge    DISCHARGE DIAGNOSES:   1.  Severe hypokalemia/hypomagnesemia/hypophosphatemia.   2.  Alcohol use disorder with history of dependence and prior history of withdrawal.   3.  Mild troponin elevation, suspect demand ischemia versus stress cardiomyopathy.      HOSPITAL COURSE:   1.  Severe hypokalemia, hypomagnesemia and hypophosphatemia.  The patient is a 30-year-old female with history of alcohol use disorder.  She continues to use alcohol, but more on a binge and intermittent basis.  She apparently still has home care who comes out and does home infusion once or twice a month for volume management, as well as to check labs for electrolytes.  They do give her a banana bag from time to time based on the provider's discretion.  On the day of admission, the Home Care Nursing staff did come out to start an IV.  Unfortunately, despite multiple efforts, he was unable to successfully obtain one.  He felt that the patient was dehydrated.  After multiple attempts, the patient felt dizzy, lightheaded, had some palpitations, and felt like she was having a seizure.  The provider recommended the patient present to the ED.  On assessment in the ED, it is felt that she did not have a seizure, but her symptoms were more likely related to  anxiety with panic disorder.  The patient was given Ativan as well as IV fluid resuscitation.  Initial potassium was extremely low at 1.8.  She was placed on telemetry and we were asked to admit her for further cares.  Other workup did demonstrate a positive troponin of 1.60 but EKG did not demonstrate any ischemic changes other than some nonspecific changes that could be related to severe hyperkalemia.  The patient was admitted and electrolytes were replaced.  She did have some evidence of acute kidney injury with an elevated creatinine of 1.44 and came down nicely with IV fluid resuscitation.  She does take over-the-counter supplement, but suspect this is inadequate for her significant hyperkalemia.  Her electrolyte abnormalities was felt to be due to his alcohol use disorder and then until she remains abstinent on alcohol use it would be diagnostically challenging to determine if she has other renal potassium loss issues.   2.  Acute kidney injury, likely prerenal azotemia, improved nicely with a full resuscitation.   3.  Elevated troponin.  Suspect some demand ischemia with dehydration and tachycardia.  She was tachycardic and anxious.  Given her alcohol use, I am concern about signs of tachycardia cardiomyopathy as well as stress cardiomyopathy.  The patient was placed on atenolol this admission.  Additionally, she was placed on clonidine for the alcohol withdrawal protocol.  Given her slightly depressed EF, tachycardia, and concerns for early alcohol-induced tachycardic cardiomyopathy as well as stress cardiomyopathy, I kept her on a baby aspirin as well as atenolol.  She was educated and counseled to quit drinking and to lead a more active and healthy lifestyle.  The patient did report that she has a friend who is trying to get her into an outpatient CD program with his Nathanael Forrest.  She also reports that she has, but does not have a sponsor.  Further risks and ramifications of continued alcohol use was  conveyed to the patient and education as well as resources were provided.      DISPOSITION:  Discharged to home in stable condition.      FOLLOWUP:  Primary MD followup was scheduled for a week from discharge with [facility].        Repeat labs as recommended including CBC, BMP, magnesium and phosphorus.             Pending Results:        Unresulted Labs Ordered in the Past 30 Days of this Admission     No orders found from 12/17/2020 to 1/17/2021.            Discharge Instructions and Follow-Up:      Follow-up Appointments     Follow-up and recommended labs and tests       Follow up with primary care provider, [facility], on   1/25/21 as scheduled.  The following labs/tests are recommended:   bmp,magnesium and phosphorus levels.               Discharge Disposition:        Discharged to home         Discharge Medications:        Discharge Medication List as of 1/18/2021 10:29 AM      START taking these medications    Details   aspirin (ASA) 81 MG EC tablet Take 1 tablet (81 mg) by mouth daily, OTC      atenolol (TENORMIN) 25 MG tablet Take 1 tablet (25 mg) by mouth daily, Disp-30 tablet, R-0, E-Prescribe      potassium chloride ER (K-TAB/KLOR-CON) 10 MEQ CR tablet Take 1 tablet (10 mEq) by mouth 2 times daily, Disp-60 tablet, R-0, E-Prescribe         CONTINUE these medications which have NOT CHANGED    Details   folic acid (FOLVITE) 1 MG tablet Take 1 tablet (1 mg) by mouth daily, Disp-30 tablet,R-0, E-Prescribe      magnesium oxide (MAG-OX) 400 MG tablet Take 1 tablet (400 mg) by mouth daily, Disp-30 tablet,R-0, E-Prescribe      MISC NATURAL PRODUCTS PO Formula 303- natural supplement (natural muscle relaxant) - 1-2 capsule PO every 8 hours prn, Historical      multivitamin w/minerals (THERA-VIT-M) tablet Take 1 tablet by mouth daily, Disp-30 tablet,R-0, E-Prescribe      thiamine (B-1) 100 MG tablet Take 1 tablet (100 mg) by mouth daily, Disp-30 tablet,R-0, E-Prescribe         STOP taking  these medications       potassium 99 MG TABS Comments:   Reason for Stopping:                 Allergies:       No Known Allergies        Consultations This Hospital Stay:        No consultations were requested during this admission           Image Results From This Hospital Stay (For Non-EPIC Providers):        Results for orders placed or performed during the hospital encounter of 21   Echocardiogram Complete    Narrative    [id number]  [id number]  [id number]  [id number]^KAMILA^IRAIDA^[facility]  Echocardiography Laboratory  [address] [place] [address]        Name: ALFONZO MARTIN  MRN: [id number]  : 1990  Study Date: 2021 08:11 AM  Age: 30 yrs  Gender: Female  Patient Location: [facility]  Reason For Study: Other, Please Specify in Comments  Ordering Physician: IRAIDA TREVIÑO  Referring Physician: Kyleigh Primary  Performed By: Vadim Howard RDCS     BSA: 1.6 m2  Height: 67 in  Weight: 122 lb  HR: 78  _____________________________________________________________________________  __        Procedure  Complete Echo Adult.  _____________________________________________________________________________  __        Interpretation Summary     Left ventricular systolic function is normal.  The visual ejection fraction is estimated at 50-55%.  The right ventricle is normal in structure, function and size.  No significant valve dysfunction.  IVC is normal.  Aortic root is normal.  There is no pericardial effusion.     No prior for comparison.  _____________________________________________________________________________  __        Left Ventricle  The left ventricle is normal in structure, function and size. Left ventricular  systolic function is normal. The visual ejection fraction is estimated at 50-  55%. Left ventricular diastolic function is normal. Normal left ventricular  wall motion.     Right Ventricle  The right ventricle is normal in structure, function and  size.     Atria  Normal left atrial size. Right atrial size is normal.     Mitral Valve  The mitral valve is normal in structure and function. There is trace mitral  regurgitation.        Tricuspid Valve  The tricuspid valve is normal in structure and function. There is mild (1+)  tricuspid regurgitation. The right ventricular systolic pressure is  approximated at 13.2 mmHg plus the right atrial pressure.     Aortic Valve  Normal tricuspid aortic valve.     Pulmonic Valve  The pulmonic valve is normal in structure and function. There is trace  pulmonic valvular regurgitation.     Vessels  The aortic root is normal size. Normal size ascending aorta. The inferior vena  cava was normal in size with preserved respiratory variability.     Pericardium  There is no pericardial effusion.     _____________________________________________________________________________  __  MMode/2D Measurements & Calculations  IVSd: 0.87 cm  LVIDd: 4.4 cm  LVIDs: 3.1 cm  LVPWd: 1.0 cm  FS: 29.2 %     LV mass(C)d: 141.8 grams  LV mass(C)dI: 86.5 grams/m2  Ao root diam: 2.8 cm  LA dimension: 2.7 cm  asc Aorta Diam: 2.9 cm  LA/Ao: 0.96  LVOT diam: 1.8 cm  LVOT area: 2.7 cm2  LA Volume (BP): 36.0 ml  LA Volume Index (BP): 22.0 ml/m2  RWT: 0.47     Time Measurements  Aortic HR: 76.0 BPM        Doppler Measurements & Calculations  MV E max justo: 65.6 cm/sec  MV A max justo: 59.7 cm/sec  MV E/A: 1.1  MV max P.2 mmHg  MV mean P.5 mmHg  MV V2 VTI: 20.3 cm  MVA(VTI): 2.7 cm2  MV dec time: 0.23 sec  LV V1 max P.1 mmHg  LV V1 max: 101.7 cm/sec  LV V1 VTI: 20.6 cm  CO(LVOT): 4.2 l/min  CI(LVOT): 2.5 l/min/m2  SV(LVOT): 55.0 ml  SI(LVOT): 33.5 ml/m2  TR max justo: 181.9 cm/sec  TR max P.2 mmHg  E/E' av.4  Lateral E/e': 6.3  Medial E/e': 6.6              _____________________________________________________________________________  __        Report approved by: Mane Almazan 2021 08:47 AM                      ASHELY BANEGAS MD              D: 2021   T: 2021   MT: ELISHA      Name:     ALFONZO MARTIN   MRN:      [id number]        Account:        [id number]   :      1990           Admit Date:     2021                                  Discharge Date: 2021      Document: [id number]

## 2021-03-19 ENCOUNTER — APPOINTMENT (OUTPATIENT)
Dept: CT IMAGING | Facility: CLINIC | Age: 31
End: 2021-03-19
Attending: INTERNAL MEDICINE
Payer: COMMERCIAL

## 2021-03-19 ENCOUNTER — HOSPITAL ENCOUNTER (EMERGENCY)
Facility: CLINIC | Age: 31
Discharge: LEFT BEFORE DISCHARGE | End: 2021-03-19
Attending: INTERNAL MEDICINE | Admitting: INTERNAL MEDICINE
Payer: COMMERCIAL

## 2021-03-19 VITALS
RESPIRATION RATE: 17 BRPM | HEART RATE: 80 BPM | SYSTOLIC BLOOD PRESSURE: 148 MMHG | DIASTOLIC BLOOD PRESSURE: 108 MMHG | OXYGEN SATURATION: 100 %

## 2021-03-19 DIAGNOSIS — E83.42 HYPOMAGNESEMIA: ICD-10-CM

## 2021-03-19 DIAGNOSIS — E87.6 HYPOKALEMIA: ICD-10-CM

## 2021-03-19 DIAGNOSIS — R56.9 SEIZURES (H): ICD-10-CM

## 2021-03-19 LAB
ALBUMIN SERPL-MCNC: 3.6 G/DL (ref 3.4–5)
ALP SERPL-CCNC: 281 U/L (ref 40–150)
ALT SERPL W P-5'-P-CCNC: 68 U/L (ref 0–50)
ANION GAP SERPL CALCULATED.3IONS-SCNC: 12 MMOL/L (ref 3–14)
AST SERPL W P-5'-P-CCNC: 178 U/L (ref 0–45)
BASOPHILS # BLD AUTO: 0 10E9/L (ref 0–0.2)
BASOPHILS NFR BLD AUTO: 0.2 %
BILIRUB SERPL-MCNC: 1.6 MG/DL (ref 0.2–1.3)
BUN SERPL-MCNC: 4 MG/DL (ref 7–30)
CALCIUM SERPL-MCNC: 7.9 MG/DL (ref 8.5–10.1)
CHLORIDE SERPL-SCNC: 93 MMOL/L (ref 94–109)
CO2 SERPL-SCNC: 29 MMOL/L (ref 20–32)
CREAT SERPL-MCNC: 0.6 MG/DL (ref 0.52–1.04)
DIFFERENTIAL METHOD BLD: ABNORMAL
EOSINOPHIL # BLD AUTO: 0 10E9/L (ref 0–0.7)
EOSINOPHIL NFR BLD AUTO: 0.2 %
ERYTHROCYTE [DISTWIDTH] IN BLOOD BY AUTOMATED COUNT: 14.4 % (ref 10–15)
ETHANOL SERPL-MCNC: <0.01 G/DL
GFR SERPL CREATININE-BSD FRML MDRD: >90 ML/MIN/{1.73_M2}
GLUCOSE SERPL-MCNC: 144 MG/DL (ref 70–99)
HCT VFR BLD AUTO: 35.5 % (ref 35–47)
HGB BLD-MCNC: 11.8 G/DL (ref 11.7–15.7)
IMM GRANULOCYTES # BLD: 0 10E9/L (ref 0–0.4)
IMM GRANULOCYTES NFR BLD: 0.2 %
INR PPP: 0.9 (ref 0.86–1.14)
LACTATE BLD-SCNC: 1.2 MMOL/L (ref 0.7–2)
LACTATE BLD-SCNC: 4.5 MMOL/L (ref 0.7–2)
LYMPHOCYTES # BLD AUTO: 1.4 10E9/L (ref 0.8–5.3)
LYMPHOCYTES NFR BLD AUTO: 21.8 %
MAGNESIUM SERPL-MCNC: 1.3 MG/DL (ref 1.6–2.3)
MCH RBC QN AUTO: 32.9 PG (ref 26.5–33)
MCHC RBC AUTO-ENTMCNC: 33.2 G/DL (ref 31.5–36.5)
MCV RBC AUTO: 99 FL (ref 78–100)
MONOCYTES # BLD AUTO: 0.3 10E9/L (ref 0–1.3)
MONOCYTES NFR BLD AUTO: 4.7 %
NEUTROPHILS # BLD AUTO: 4.8 10E9/L (ref 1.6–8.3)
NEUTROPHILS NFR BLD AUTO: 72.9 %
NRBC # BLD AUTO: 0 10*3/UL
NRBC BLD AUTO-RTO: 0 /100
PHOSPHATE SERPL-MCNC: 1.2 MG/DL (ref 2.5–4.5)
PLATELET # BLD AUTO: 152 10E9/L (ref 150–450)
POTASSIUM SERPL-SCNC: 2.1 MMOL/L (ref 3.4–5.3)
PROT SERPL-MCNC: 6.7 G/DL (ref 6.8–8.8)
RBC # BLD AUTO: 3.59 10E12/L (ref 3.8–5.2)
SODIUM SERPL-SCNC: 134 MMOL/L (ref 133–144)
TROPONIN I SERPL-MCNC: <0.015 UG/L (ref 0–0.04)
WBC # BLD AUTO: 6.6 10E9/L (ref 4–11)

## 2021-03-19 PROCEDURE — 99285 EMERGENCY DEPT VISIT HI MDM: CPT | Mod: 25

## 2021-03-19 PROCEDURE — 250N000013 HC RX MED GY IP 250 OP 250 PS 637: Performed by: INTERNAL MEDICINE

## 2021-03-19 PROCEDURE — 96365 THER/PROPH/DIAG IV INF INIT: CPT

## 2021-03-19 PROCEDURE — 93005 ELECTROCARDIOGRAM TRACING: CPT

## 2021-03-19 PROCEDURE — 85025 COMPLETE CBC W/AUTO DIFF WBC: CPT | Performed by: INTERNAL MEDICINE

## 2021-03-19 PROCEDURE — 80053 COMPREHEN METABOLIC PANEL: CPT | Performed by: INTERNAL MEDICINE

## 2021-03-19 PROCEDURE — 250N000009 HC RX 250: Performed by: INTERNAL MEDICINE

## 2021-03-19 PROCEDURE — 85610 PROTHROMBIN TIME: CPT | Performed by: INTERNAL MEDICINE

## 2021-03-19 PROCEDURE — 258N000003 HC RX IP 258 OP 636: Performed by: INTERNAL MEDICINE

## 2021-03-19 PROCEDURE — 250N000011 HC RX IP 250 OP 636: Performed by: INTERNAL MEDICINE

## 2021-03-19 PROCEDURE — 84100 ASSAY OF PHOSPHORUS: CPT | Performed by: INTERNAL MEDICINE

## 2021-03-19 PROCEDURE — 82077 ASSAY SPEC XCP UR&BREATH IA: CPT | Performed by: INTERNAL MEDICINE

## 2021-03-19 PROCEDURE — 96368 THER/DIAG CONCURRENT INF: CPT

## 2021-03-19 PROCEDURE — 84484 ASSAY OF TROPONIN QUANT: CPT | Performed by: INTERNAL MEDICINE

## 2021-03-19 PROCEDURE — 96366 THER/PROPH/DIAG IV INF ADDON: CPT

## 2021-03-19 PROCEDURE — 70450 CT HEAD/BRAIN W/O DYE: CPT

## 2021-03-19 PROCEDURE — 83735 ASSAY OF MAGNESIUM: CPT | Performed by: INTERNAL MEDICINE

## 2021-03-19 PROCEDURE — 83605 ASSAY OF LACTIC ACID: CPT | Performed by: INTERNAL MEDICINE

## 2021-03-19 RX ORDER — POTASSIUM CHLORIDE 1500 MG/1
20 TABLET, EXTENDED RELEASE ORAL 2 TIMES DAILY
Qty: 60 TABLET | Refills: 0 | Status: ON HOLD | OUTPATIENT
Start: 2021-03-19 | End: 2021-06-08

## 2021-03-19 RX ORDER — POTASSIUM CHLORIDE 1.5 G/1.58G
40 POWDER, FOR SOLUTION ORAL ONCE
Status: COMPLETED | OUTPATIENT
Start: 2021-03-19 | End: 2021-03-19

## 2021-03-19 RX ORDER — POTASSIUM CHLORIDE 7.45 MG/ML
10 INJECTION INTRAVENOUS ONCE
Status: COMPLETED | OUTPATIENT
Start: 2021-03-19 | End: 2021-03-19

## 2021-03-19 RX ORDER — MAGNESIUM CHLORIDE 71.5 G/G
2 TABLET ORAL 2 TIMES DAILY
Qty: 120 TABLET | Refills: 0 | Status: SHIPPED | OUTPATIENT
Start: 2021-03-19 | End: 2021-04-18

## 2021-03-19 RX ADMIN — SODIUM CHLORIDE, POTASSIUM CHLORIDE, SODIUM LACTATE AND CALCIUM CHLORIDE 1000 ML: 600; 310; 30; 20 INJECTION, SOLUTION INTRAVENOUS at 19:06

## 2021-03-19 RX ADMIN — POTASSIUM CHLORIDE 10 MEQ: 7.46 INJECTION, SOLUTION INTRAVENOUS at 19:58

## 2021-03-19 RX ADMIN — FOLIC ACID: 5 INJECTION, SOLUTION INTRAMUSCULAR; INTRAVENOUS; SUBCUTANEOUS at 19:21

## 2021-03-19 RX ADMIN — POTASSIUM CHLORIDE 40 MEQ: 1.5 POWDER, FOR SOLUTION ORAL at 19:59

## 2021-03-19 ASSESSMENT — ENCOUNTER SYMPTOMS: SEIZURES: 1

## 2021-03-19 NOTE — ED TRIAGE NOTES
"Witnessed 2 minute seizure in car around 1800. Post ictal on scene, Improving mentation. . Tachycardia. Only seizure in the past was when she had a \"mini heart attack\"  "

## 2021-03-19 NOTE — ED NOTES
Bed: ED16  Expected date: 3/19/21  Expected time: 6:45 PM  Means of arrival:   Comments:  [place] [address] 71F seizure, tachycardic

## 2021-03-19 NOTE — ED PROVIDER NOTES
History   Chief Complaint:  Seizures      HPI   Maddie Orourke is a 30 year old female with history of alcohol abuse who presents with seizure. Patient was on her way to [place] in an Uber when she had a witnessed 2 minute seizure. Paramedics report She was post ictal on scene. EMS reports she had a blood sugar of 163 and tachycardia.  Patient denies any incontinence or tongue biting.  She notes that her last alcoholic drink was 2 days ago.  She denies any symptoms of alcohol withdrawal.  She had 1 previous episode in January of this year of abnormal neurologic function but she says the ED doctor told her it was not actually a seizure.  No recent head injury.  No headache.  No symptoms of illness.      Review of Systems   Neurological: Positive for seizures.   All other systems reviewed and are negative.      Allergies:  No Known Allergies     Medications:  Calcium carbonate   Folic acid   Gabapentin   Magnesium oxide   Multivitamin w/minerals   Potassium   Thiamine      Past Medical History:     Alcohol withdrawal  Failure to thrive in adult  Suicidal ideation      Past Surgical History:    Repair of deviated septum      Social History:  Presents unaccompanied to the ED  Non-smoker.  Frequent binge drinking alcohol abuse    Physical Exam     Patient Vitals for the past 24 hrs:   BP Pulse Resp SpO2   03/19/21 1900 (!) 142/113 101 15 98 %   03/19/21 1852 -- 123 -- 100 %   03/19/21 1851 (!) 149/114 -- 20 --       Physical Exam  Constitutional:       Comments: Pleasant and cooperative   HENT:      Right Ear: Tympanic membrane normal.      Left Ear: Tympanic membrane normal.      Mouth/Throat:      Pharynx: No posterior oropharyngeal erythema.   Eyes:      Conjunctiva/sclera: Conjunctivae normal.   Neck:      Musculoskeletal: Neck supple.   Cardiovascular:      Rate and Rhythm: Normal rate and regular rhythm.      Heart sounds: Normal heart sounds.   Pulmonary:      Effort: Pulmonary effort is normal.      Breath  sounds: Normal breath sounds.   Abdominal:      General: Bowel sounds are normal. There is no distension.      Palpations: Abdomen is soft.      Tenderness: There is no abdominal tenderness. There is no guarding or rebound.   Musculoskeletal:      Comments: Muscle wasting   Skin:     General: Skin is warm and dry.   Neurological:      Mental Status: She is alert.      Comments: Moderate tongue tremor         Emergency Department Course     ECG:  ECG taken at 1855, ECG read at 1900  Sinus tachycardia. Cannot rule out anterior infarct, age undetermined. Abnormal ECG.  Rate 109 bpm. NH interval 170 ms. QRS duration 92 ms. QT/QTc 374/503 ms. P-R-T axes 52 -5 24.     Imaging:  CT head without contrast:  1. Generalized cerebral and cerebellar atrophy. 2. Negative for acute intracranial process. . Reading per radiology.     Laboratory:  CBC: WBC: 6.6, HGB: 11.8, PLT: 152  CMP: Glucose 144 (H), Potassium: 2.1 (low!), Chloride: 93 (low), Urea Nitrogen: 4 (low), Calcium: 7.9 (low), Bilirubin Total: 1.6 (H), Protein Total: 6.7 (low), Alkaline Phosphatase: 281 (H), ALT: 68 (H), AST: 178 (H), o/w WNL (Creatinine: 0.60)  Troponin (Collected 1900): <0.015  Lactic acid (Resulted 1941): 4.5 (H!)  Lactic Acid:(Resulted 2201): 1.2  INR: 0.90  Magnesium: 1.3 (low)  Phosphorus: 1.2 (low)  Alcohol ethyl: <0.01    Emergency Department Course:    Reviewed:  nursing notes, vitals and past medical history    Assessments:    1915 Initial assessment     2223 I rechecked the patient and discussed the results of her workup thus far.     Consults:   none    Interventions:  1906 lactated ringers 1L IV Bolus  1958 KCl 10 mEq IV  1959 Klor-Con 40 mEq PO    Disposition:  The patient was discharged to home.     Impression & Plan     Medical Decision Making:    Maddie Orourke is a 30 year old female brought to the emergency department by ambulance after a witnessed generalized tonic-clonic seizure.  Supporting the eyewitness was the paramedics  impression that she seemed post ictal.  I think this most likely is an alcohol withdrawal seizure given that she has now been abstinent for 2 days though may also be a primary seizure disorder.  Evaluation here shows very severe hypokalemia and hypomagnesemia.  I have given oral and IV potassium replacement here.  Patient said from early on in the visit that she absolutely needed to go home to care for her dog so I did not give her any Ativan.  I discussed with her the risks of discharge home with such severe electrolyte derangements and discussed risks including cardiac arrhythmia and death.  She is not intoxicated and does not seem altered and I do think has capacity to sign out against advice.  I have prescribed additional potassium and magnesium supplementation.  I have invited her to return here in about 24 hours for recheck of labs.  She should follow-up in clinic this week.    Diagnosis:    ICD-10-CM    1. Seizures (H)  R56.9    2. Hypokalemia  E87.6    3. Hypomagnesemia  E83.42      Discharge Medications:  Discharge Medication List as of 3/19/2021 10:29 PM      START taking these medications    Details   Magnesium Cl-Calcium Carbonate (SLOW-MAG) 71.5-119 MG TBEC Take 2 tablets by mouth 2 times daily, Disp-120 tablet, R-0, Local Print      potassium chloride ER (KLOR-CON M) 20 MEQ CR tablet Take 1 tablet (20 mEq) by mouth 2 times daily, Disp-60 tablet, R-0, Local Print           Scribe Disclosure:  I, Garry Mercedes, am serving as a scribe at 6:59 PM on 3/19/2021 to document services personally performed by Raine Best MD based on my observations and the provider's statements to me.            Raine Best MD  03/19/21 3861

## 2021-03-20 NOTE — ED NOTES
Patient alert and oriented. Respirations even and unlabored. All discharge education given. All questions answered. All medications explained in detail. Patient denies further needs and states that they are ready to leave AMA. MD aware. Patient ambulated out of the ER with steady gait.

## 2021-03-20 NOTE — DISCHARGE INSTRUCTIONS
Discharge Instructions  First Time Seizure (Convulsion)    You have had a spell that may have been a seizure. Many other things can look like a seizure, including a fainting spell, a migraine, psychological issues, and other movement disorders. It can often be hard to know with certainty whether you had a seizure. Your evaluation today may not be complete and you may need further testing and evaluation. You need to follow-up with your regular doctor within 3 days. You will often need to be scheduled for an EEG to monitor your brain waves.    Of people who have a seizure, most never have another one. For that reason, anti-seizure medicines are not started in most cases after a first seizure. If you have risk factors for seizures, medication may be started after a first seizure. People are not usually kept in the hospital after a seizure.    During a seizure, there is abnormal and excessive electrical activity in the brain. This can cause changes in awareness, behavior, and abnormal shaking or jerking movements.  This activity usually lasts only a few seconds to minutes. The period following a seizure is called the postictal state. During this time, you may be confused, tired, and you may develop a throbbing headache. Some people develop a brief period of difficulty speaking or experience temporary vision loss, numbness, or weakness.    Return to the Emergency Department if:   You have another seizure.  You develop a fever over 101 degrees.  You feel much more ill, or develop new symptoms like severe headache.  You have severe nausea or vomiting.  You have trouble walking, seeing, or develop weakness or numbness in your arms or legs.     What can I do to help myself?  Do not drive until you have been rechecked by your doctor and have been told it is safe to drive.  If you have a seizure while driving you may cause a motor vehicle accident with injury or death to yourself or others.   Do not swim, climb ladders, or do  anything else that would be dangerous if you had another seizure or spell of loss of consciousness, until you are cleared by your doctor.    Check your state driving requirements for patients with seizures on the [employer]ile[employer] Website at www.epilepsyfoundation.org/resources/drivingandtravel.cfm.  Do not drink alcohol.  Drinking alcohol increases the risk of seizures and can interfere with the effect of anti-seizure medications.  Take any medication prescribed for you exactly as directed, at the right times, and at the right doses.    If you were given a prescription for medicine here today, be sure to read all of the information (including the package insert) that comes with your prescription.  This will include important information about the medicine, its side effects, and any warnings that you need to know about.  The pharmacist who fills the prescription can provide more information and answer questions you may have about the medicine.  If you have questions or concerns that the pharmacist cannot address, please call or return to the Emergency Department.     Opioid Medication Information    Pain medications are among the most commonly prescribed medicines, so we are including this information for all our patients. If you did not receive pain medication or get a prescription for pain medicine, you can ignore it.     You may have been given a prescription for an opioid (narcotic) pain medicine and/or have received a pain medicine while here in the Emergency Department. These medicines can make you drowsy or impaired. You must not drive, operate dangerous equipment, or engage in any other dangerous activities while taking these medications. If you drive while taking these medications, you could be arrested for DUI, or driving under the influence. Do not drink any alcohol while you are taking these medications.     Opioid pain medications can cause addiction. If you have a history of chemical dependency of  any type, you are at a higher risk of becoming addicted to pain medications.  Only take these prescribed medications to treat your pain when all other options have been tried. Take it for as short a time and as few doses as possible. Store your pain pills in a secure place, as they are frequently stolen and provide a dangerous opportunity for children or visitors in your house to start abusing these powerful medications. We will not replace any lost or stolen medicine.  As soon as your pain is better, you should flush all your remaining medication.     Many prescription pain medications contain Tylenol  (acetaminophen), including Vicodin , Tylenol #3 , Norco , Lortab , and Percocet .  You should not take any extra pills of Tylenol  if you are using these prescription medications or you can get very sick.  Do not ever take more than 3000 mg of acetaminophen in any 24 hour period.    All opioids tend to cause constipation. Drink plenty of water and eat foods that have a lot of fiber, such as fruits, vegetables, prune juice, apple juice and high fiber cereal.  Take a laxative if you don t move your bowels at least every other day. Miralax , Milk of Magnesia, Colace , or Senna  can be used to keep you regular.      Remember that you can always come back to the Emergency Department if you are not able to see your regular doctor in the amount of time listed above, if you get any new symptoms, or if there is anything that worries you.

## 2021-03-22 LAB — INTERPRETATION ECG - MUSE: NORMAL

## 2021-04-07 ENCOUNTER — HOSPITAL ENCOUNTER (OUTPATIENT)
Facility: CLINIC | Age: 31
Discharge: HOME | End: 2021-04-08
Attending: NURSE PRACTITIONER | Admitting: INTERNAL MEDICINE
Payer: COMMERCIAL

## 2021-04-07 DIAGNOSIS — E83.39 HYPOPHOSPHATEMIA: Primary | ICD-10-CM

## 2021-04-07 DIAGNOSIS — F10.929 ALCOHOL INTOXICATION (H): ICD-10-CM

## 2021-04-07 DIAGNOSIS — R94.31 PROLONGED QT INTERVAL: ICD-10-CM

## 2021-04-07 DIAGNOSIS — F10.10 ALCOHOL ABUSE: ICD-10-CM

## 2021-04-07 LAB
ALBUMIN SERPL-MCNC: 4.2 G/DL (ref 3.4–5)
ALP SERPL-CCNC: 319 U/L (ref 40–150)
ALT SERPL W P-5'-P-CCNC: 57 U/L (ref 0–50)
ANION GAP SERPL CALCULATED.3IONS-SCNC: 15 MMOL/L (ref 3–14)
AST SERPL W P-5'-P-CCNC: 85 U/L (ref 0–45)
BASOPHILS # BLD AUTO: 0 10E9/L (ref 0–0.2)
BASOPHILS NFR BLD AUTO: 0.3 %
BILIRUB SERPL-MCNC: 0.5 MG/DL (ref 0.2–1.3)
BUN SERPL-MCNC: 9 MG/DL (ref 7–30)
CALCIUM SERPL-MCNC: 9.2 MG/DL (ref 8.5–10.1)
CHLORIDE SERPL-SCNC: 100 MMOL/L (ref 94–109)
CO2 SERPL-SCNC: 26 MMOL/L (ref 20–32)
CREAT SERPL-MCNC: 0.5 MG/DL (ref 0.52–1.04)
DIFFERENTIAL METHOD BLD: NORMAL
EOSINOPHIL # BLD AUTO: 0 10E9/L (ref 0–0.7)
EOSINOPHIL NFR BLD AUTO: 0 %
ERYTHROCYTE [DISTWIDTH] IN BLOOD BY AUTOMATED COUNT: 13.7 % (ref 10–15)
ETHANOL SERPL-MCNC: 0.28 G/DL
GFR SERPL CREATININE-BSD FRML MDRD: >90 ML/MIN/{1.73_M2}
GLUCOSE SERPL-MCNC: 151 MG/DL (ref 70–99)
HCT VFR BLD AUTO: 46.4 % (ref 35–47)
HGB BLD-MCNC: 15.6 G/DL (ref 11.7–15.7)
IMM GRANULOCYTES # BLD: 0 10E9/L (ref 0–0.4)
IMM GRANULOCYTES NFR BLD: 0.3 %
INTERPRETATION ECG - MUSE: NORMAL
LABORATORY COMMENT REPORT: NORMAL
LYMPHOCYTES # BLD AUTO: 2.3 10E9/L (ref 0.8–5.3)
LYMPHOCYTES NFR BLD AUTO: 35.1 %
MAGNESIUM SERPL-MCNC: 2.1 MG/DL (ref 1.6–2.3)
MAGNESIUM SERPL-MCNC: 2.5 MG/DL (ref 1.6–2.3)
MCH RBC QN AUTO: 32.4 PG (ref 26.5–33)
MCHC RBC AUTO-ENTMCNC: 33.6 G/DL (ref 31.5–36.5)
MCV RBC AUTO: 96 FL (ref 78–100)
MONOCYTES # BLD AUTO: 0.5 10E9/L (ref 0–1.3)
MONOCYTES NFR BLD AUTO: 7 %
NEUTROPHILS # BLD AUTO: 3.8 10E9/L (ref 1.6–8.3)
NEUTROPHILS NFR BLD AUTO: 57.3 %
NRBC # BLD AUTO: 0 10*3/UL
NRBC BLD AUTO-RTO: 0 /100
PHOSPHATE SERPL-MCNC: 4.4 MG/DL (ref 2.5–4.5)
PLATELET # BLD AUTO: 317 10E9/L (ref 150–450)
POTASSIUM SERPL-SCNC: 3.3 MMOL/L (ref 3.4–5.3)
POTASSIUM SERPL-SCNC: 3.5 MMOL/L (ref 3.4–5.3)
PROT SERPL-MCNC: 8.1 G/DL (ref 6.8–8.8)
RBC # BLD AUTO: 4.82 10E12/L (ref 3.8–5.2)
SARS-COV-2 RNA RESP QL NAA+PROBE: NEGATIVE
SODIUM SERPL-SCNC: 141 MMOL/L (ref 133–144)
SPECIMEN SOURCE: NORMAL
WBC # BLD AUTO: 6.6 10E9/L (ref 4–11)

## 2021-04-07 PROCEDURE — 250N000013 HC RX MED GY IP 250 OP 250 PS 637: Performed by: NURSE PRACTITIONER

## 2021-04-07 PROCEDURE — 93005 ELECTROCARDIOGRAM TRACING: CPT

## 2021-04-07 PROCEDURE — 250N000013 HC RX MED GY IP 250 OP 250 PS 637: Performed by: HOSPITALIST

## 2021-04-07 PROCEDURE — 96365 THER/PROPH/DIAG IV INF INIT: CPT

## 2021-04-07 PROCEDURE — 258N000003 HC RX IP 258 OP 636: Performed by: NURSE PRACTITIONER

## 2021-04-07 PROCEDURE — 250N000011 HC RX IP 250 OP 636: Performed by: HOSPITALIST

## 2021-04-07 PROCEDURE — 87635 SARS-COV-2 COVID-19 AMP PRB: CPT | Performed by: NURSE PRACTITIONER

## 2021-04-07 PROCEDURE — 36415 COLL VENOUS BLD VENIPUNCTURE: CPT | Performed by: HOSPITALIST

## 2021-04-07 PROCEDURE — 84132 ASSAY OF SERUM POTASSIUM: CPT | Performed by: HOSPITALIST

## 2021-04-07 PROCEDURE — 96375 TX/PRO/DX INJ NEW DRUG ADDON: CPT

## 2021-04-07 PROCEDURE — 83735 ASSAY OF MAGNESIUM: CPT | Mod: 91 | Performed by: HOSPITALIST

## 2021-04-07 PROCEDURE — 258N000003 HC RX IP 258 OP 636: Performed by: HOSPITALIST

## 2021-04-07 PROCEDURE — 99285 EMERGENCY DEPT VISIT HI MDM: CPT | Mod: 25

## 2021-04-07 PROCEDURE — 82077 ASSAY SPEC XCP UR&BREATH IA: CPT | Performed by: EMERGENCY MEDICINE

## 2021-04-07 PROCEDURE — 83735 ASSAY OF MAGNESIUM: CPT | Performed by: NURSE PRACTITIONER

## 2021-04-07 PROCEDURE — C9113 INJ PANTOPRAZOLE SODIUM, VIA: HCPCS | Performed by: HOSPITALIST

## 2021-04-07 PROCEDURE — 85025 COMPLETE CBC W/AUTO DIFF WBC: CPT | Performed by: EMERGENCY MEDICINE

## 2021-04-07 PROCEDURE — 250N000011 HC RX IP 250 OP 636: Performed by: NURSE PRACTITIONER

## 2021-04-07 PROCEDURE — 83735 ASSAY OF MAGNESIUM: CPT | Performed by: EMERGENCY MEDICINE

## 2021-04-07 PROCEDURE — 99223 1ST HOSP IP/OBS HIGH 75: CPT | Mod: AI | Performed by: HOSPITALIST

## 2021-04-07 PROCEDURE — 96376 TX/PRO/DX INJ SAME DRUG ADON: CPT

## 2021-04-07 PROCEDURE — 84100 ASSAY OF PHOSPHORUS: CPT | Performed by: EMERGENCY MEDICINE

## 2021-04-07 PROCEDURE — 96361 HYDRATE IV INFUSION ADD-ON: CPT

## 2021-04-07 PROCEDURE — C9803 HOPD COVID-19 SPEC COLLECT: HCPCS

## 2021-04-07 PROCEDURE — 120N000001 HC R&B MED SURG/OB

## 2021-04-07 PROCEDURE — 80053 COMPREHEN METABOLIC PANEL: CPT | Performed by: EMERGENCY MEDICINE

## 2021-04-07 RX ORDER — MAGNESIUM SULFATE HEPTAHYDRATE 40 MG/ML
2 INJECTION, SOLUTION INTRAVENOUS ONCE
Status: COMPLETED | OUTPATIENT
Start: 2021-04-07 | End: 2021-04-07

## 2021-04-07 RX ORDER — POTASSIUM CHLORIDE 1500 MG/1
20 TABLET, EXTENDED RELEASE ORAL ONCE
Status: COMPLETED | OUTPATIENT
Start: 2021-04-07 | End: 2021-04-07

## 2021-04-07 RX ORDER — GABAPENTIN 300 MG/1
600 CAPSULE ORAL EVERY 8 HOURS
Status: DISCONTINUED | OUTPATIENT
Start: 2021-04-10 | End: 2021-04-08 | Stop reason: HOSPADM

## 2021-04-07 RX ORDER — LORAZEPAM 2 MG/ML
1 INJECTION INTRAMUSCULAR ONCE
Status: COMPLETED | OUTPATIENT
Start: 2021-04-07 | End: 2021-04-07

## 2021-04-07 RX ORDER — LANOLIN ALCOHOL/MO/W.PET/CERES
100 CREAM (GRAM) TOPICAL DAILY
Status: DISCONTINUED | OUTPATIENT
Start: 2021-04-15 | End: 2021-04-08 | Stop reason: HOSPADM

## 2021-04-07 RX ORDER — HALOPERIDOL 5 MG/ML
2.5-5 INJECTION INTRAMUSCULAR EVERY 6 HOURS PRN
Status: DISCONTINUED | OUTPATIENT
Start: 2021-04-07 | End: 2021-04-08 | Stop reason: HOSPADM

## 2021-04-07 RX ORDER — LIDOCAINE 40 MG/G
CREAM TOPICAL
Status: DISCONTINUED | OUTPATIENT
Start: 2021-04-07 | End: 2021-04-08 | Stop reason: HOSPADM

## 2021-04-07 RX ORDER — ATENOLOL 25 MG/1
25 TABLET ORAL DAILY
Status: DISCONTINUED | OUTPATIENT
Start: 2021-04-07 | End: 2021-04-08 | Stop reason: HOSPADM

## 2021-04-07 RX ORDER — LANOLIN ALCOHOL/MO/W.PET/CERES
100 CREAM (GRAM) TOPICAL 3 TIMES DAILY
Status: DISCONTINUED | OUTPATIENT
Start: 2021-04-09 | End: 2021-04-08 | Stop reason: HOSPADM

## 2021-04-07 RX ORDER — GABAPENTIN 100 MG/1
100 CAPSULE ORAL EVERY 8 HOURS
Status: DISCONTINUED | OUTPATIENT
Start: 2021-04-14 | End: 2021-04-08 | Stop reason: HOSPADM

## 2021-04-07 RX ORDER — ASPIRIN 81 MG/1
81 TABLET ORAL DAILY
Status: DISCONTINUED | OUTPATIENT
Start: 2021-04-07 | End: 2021-04-07

## 2021-04-07 RX ORDER — MAGNESIUM OXIDE 400 MG/1
400 TABLET ORAL DAILY
Status: DISCONTINUED | OUTPATIENT
Start: 2021-04-07 | End: 2021-04-08 | Stop reason: HOSPADM

## 2021-04-07 RX ORDER — DEXTROSE MONOHYDRATE, SODIUM CHLORIDE, AND POTASSIUM CHLORIDE 50; 1.49; 4.5 G/1000ML; G/1000ML; G/1000ML
INJECTION, SOLUTION INTRAVENOUS CONTINUOUS
Status: DISCONTINUED | OUTPATIENT
Start: 2021-04-07 | End: 2021-04-08 | Stop reason: HOSPADM

## 2021-04-07 RX ORDER — GABAPENTIN 600 MG/1
1200 TABLET ORAL ONCE
Status: COMPLETED | OUTPATIENT
Start: 2021-04-07 | End: 2021-04-07

## 2021-04-07 RX ORDER — GABAPENTIN 300 MG/1
300 CAPSULE ORAL EVERY 8 HOURS
Status: DISCONTINUED | OUTPATIENT
Start: 2021-04-12 | End: 2021-04-08 | Stop reason: HOSPADM

## 2021-04-07 RX ORDER — LORAZEPAM 2 MG/ML
1-2 INJECTION INTRAMUSCULAR EVERY 30 MIN PRN
Status: DISCONTINUED | OUTPATIENT
Start: 2021-04-07 | End: 2021-04-08 | Stop reason: HOSPADM

## 2021-04-07 RX ORDER — FOLIC ACID 1 MG/1
1 TABLET ORAL DAILY
Status: DISCONTINUED | OUTPATIENT
Start: 2021-04-07 | End: 2021-04-07

## 2021-04-07 RX ORDER — FOLIC ACID 1 MG/1
1 TABLET ORAL DAILY
Status: DISCONTINUED | OUTPATIENT
Start: 2021-04-08 | End: 2021-04-08 | Stop reason: HOSPADM

## 2021-04-07 RX ORDER — OLANZAPINE 5 MG/1
5-10 TABLET, ORALLY DISINTEGRATING ORAL EVERY 6 HOURS PRN
Status: DISCONTINUED | OUTPATIENT
Start: 2021-04-07 | End: 2021-04-08 | Stop reason: HOSPADM

## 2021-04-07 RX ORDER — ONDANSETRON 2 MG/ML
4 INJECTION INTRAMUSCULAR; INTRAVENOUS ONCE
Status: COMPLETED | OUTPATIENT
Start: 2021-04-07 | End: 2021-04-07

## 2021-04-07 RX ORDER — LANOLIN ALCOHOL/MO/W.PET/CERES
200 CREAM (GRAM) TOPICAL 3 TIMES DAILY
Status: DISCONTINUED | OUTPATIENT
Start: 2021-04-07 | End: 2021-04-08 | Stop reason: HOSPADM

## 2021-04-07 RX ORDER — FLUMAZENIL 0.1 MG/ML
0.2 INJECTION, SOLUTION INTRAVENOUS
Status: DISCONTINUED | OUTPATIENT
Start: 2021-04-07 | End: 2021-04-08 | Stop reason: HOSPADM

## 2021-04-07 RX ORDER — MULTIPLE VITAMINS W/ MINERALS TAB 9MG-400MCG
1 TAB ORAL ONCE
Status: COMPLETED | OUTPATIENT
Start: 2021-04-07 | End: 2021-04-07

## 2021-04-07 RX ORDER — FOLIC ACID 1 MG/1
1 TABLET ORAL ONCE
Status: COMPLETED | OUTPATIENT
Start: 2021-04-07 | End: 2021-04-07

## 2021-04-07 RX ORDER — LORAZEPAM 1 MG/1
1-2 TABLET ORAL EVERY 30 MIN PRN
Status: DISCONTINUED | OUTPATIENT
Start: 2021-04-07 | End: 2021-04-08 | Stop reason: HOSPADM

## 2021-04-07 RX ORDER — THIAMINE HYDROCHLORIDE 100 MG/ML
100 INJECTION, SOLUTION INTRAMUSCULAR; INTRAVENOUS ONCE
Status: COMPLETED | OUTPATIENT
Start: 2021-04-07 | End: 2021-04-07

## 2021-04-07 RX ORDER — GABAPENTIN 300 MG/1
900 CAPSULE ORAL EVERY 8 HOURS
Status: DISCONTINUED | OUTPATIENT
Start: 2021-04-07 | End: 2021-04-08 | Stop reason: HOSPADM

## 2021-04-07 RX ORDER — MULTIPLE VITAMINS W/ MINERALS TAB 9MG-400MCG
1 TAB ORAL DAILY
Status: DISCONTINUED | OUTPATIENT
Start: 2021-04-08 | End: 2021-04-08 | Stop reason: HOSPADM

## 2021-04-07 RX ADMIN — LORAZEPAM 1 MG: 2 INJECTION INTRAMUSCULAR; INTRAVENOUS at 15:45

## 2021-04-07 RX ADMIN — LORAZEPAM 1 MG: 2 INJECTION INTRAMUSCULAR; INTRAVENOUS at 16:14

## 2021-04-07 RX ADMIN — MAGNESIUM SULFATE HEPTAHYDRATE 2 G: 2 INJECTION, SOLUTION INTRAVENOUS at 14:15

## 2021-04-07 RX ADMIN — ONDANSETRON 4 MG: 2 INJECTION INTRAMUSCULAR; INTRAVENOUS at 15:59

## 2021-04-07 RX ADMIN — LORAZEPAM 1 MG: 2 INJECTION INTRAMUSCULAR; INTRAVENOUS at 18:10

## 2021-04-07 RX ADMIN — LORAZEPAM 2 MG: 1 TABLET ORAL at 19:42

## 2021-04-07 RX ADMIN — POTASSIUM CHLORIDE 20 MEQ: 1500 TABLET, EXTENDED RELEASE ORAL at 19:02

## 2021-04-07 RX ADMIN — LORAZEPAM 1 MG: 2 INJECTION INTRAMUSCULAR; INTRAVENOUS at 14:15

## 2021-04-07 RX ADMIN — GABAPENTIN 1200 MG: 600 TABLET, FILM COATED ORAL at 19:03

## 2021-04-07 RX ADMIN — SODIUM CHLORIDE 1000 ML: 9 INJECTION, SOLUTION INTRAVENOUS at 14:02

## 2021-04-07 RX ADMIN — ATENOLOL 25 MG: 25 TABLET ORAL at 19:07

## 2021-04-07 RX ADMIN — MULTIPLE VITAMINS W/ MINERALS TAB 1 TABLET: TAB at 14:03

## 2021-04-07 RX ADMIN — POTASSIUM CHLORIDE, DEXTROSE MONOHYDRATE AND SODIUM CHLORIDE: 150; 5; 450 INJECTION, SOLUTION INTRAVENOUS at 19:34

## 2021-04-07 RX ADMIN — SODIUM CHLORIDE 1000 ML: 9 INJECTION, SOLUTION INTRAVENOUS at 15:45

## 2021-04-07 RX ADMIN — PANTOPRAZOLE SODIUM 40 MG: 40 INJECTION, POWDER, FOR SOLUTION INTRAVENOUS at 19:04

## 2021-04-07 RX ADMIN — FOLIC ACID 1 MG: 1 TABLET ORAL at 14:03

## 2021-04-07 RX ADMIN — THIAMINE HYDROCHLORIDE 100 MG: 100 INJECTION, SOLUTION INTRAMUSCULAR; INTRAVENOUS at 19:02

## 2021-04-07 RX ADMIN — THIAMINE HCL TAB 100 MG 200 MG: 100 TAB at 19:43

## 2021-04-07 RX ADMIN — Medication 400 MG: at 19:03

## 2021-04-07 ASSESSMENT — ENCOUNTER SYMPTOMS
APPETITE CHANGE: 1
SHORTNESS OF BREATH: 0
FATIGUE: 1
DYSURIA: 0
ACTIVITY CHANGE: 0
VOMITING: 1

## 2021-04-07 ASSESSMENT — ACTIVITIES OF DAILY LIVING (ADL): ADLS_ACUITY_SCORE: 20

## 2021-04-07 NOTE — ED NOTES
.  [facility]  ED Nurse Handoff Report    Maddie Orourke is a 31 year old female   ED Chief complaint: Abnormal Labs  . ED Diagnosis:   Final diagnoses:   Alcohol intoxication (H)   Alcohol abuse   Prolonged QT interval     Allergies: No Known Allergies    Code Status: Full Code  Activity level - Baseline/Home:  Independent. Activity Level - Current:   Stand by Assist. Lift room needed: No. Bariatric: No   Needed: No   Isolation: No. Infection: Not Applicable.     Vital Signs:   Vitals:    04/07/21 1400 04/07/21 1415 04/07/21 1430 04/07/21 1445   BP: (!) 132/107 (!) 136/112 (!) 130/104 (!) 128/94   Pulse: 133 124 115 134   Resp: 26 11 17 16   Temp:       TempSrc:       SpO2: 98% 97% 98%        Cardiac Rhythm:  ,   Cardiac  Cardiac Rhythm: Sinus tachycardia  Pain level:  0  Patient confused: No. Patient Falls Risk: Yes. Intoxicated  Elimination Status: Has voided   Patient Report - Initial Complaint: Patient presents stating increased weakness and possible potassium depletion due to a ETOH problem . Focused Assessment: Alert and talkative, oriented x4.  Feeling shaky and weak with last ETOH drink at 0200.  Patient states she drinks a 1.75 Whiskey in 2 days.  Also states she has had 3 seizures in the past relating to ETOH.   Tests Performed:   Labs Ordered and Resulted from Time of ED Arrival Up to the Time of Departure from the ED   COMPREHENSIVE METABOLIC PANEL - Abnormal; Notable for the following components:       Result Value    Potassium 3.3 (*)     Anion Gap 15 (*)     Glucose 151 (*)     Creatinine 0.50 (*)     Alkaline Phosphatase 319 (*)     ALT 57 (*)     AST 85 (*)     All other components within normal limits   ALCOHOL ETHYL - Abnormal; Notable for the following components:    Ethanol g/dL 0.28 (*)     All other components within normal limits   CBC WITH PLATELETS DIFFERENTIAL   MAGNESIUM   PHOSPHORUS   SARS-COV-2 (COVID-19) VIRUS RT-PCR   . Abnormal Results: as  above  Treatments provided: ativan 1mg x2, NS bolus x2L , Mag  Family Comments: here with a friend who is no longer here  OBS brochure/video discussed/provided to patient:  N/A  ED Medications:   Medications   0.9% sodium chloride BOLUS (has no administration in time range)   LORazepam (ATIVAN) injection 1 mg (has no administration in time range)   0.9% sodium chloride BOLUS (0 mLs Intravenous Stopped 4/7/21 1528)   multivitamin w/minerals (THERA-VIT-M) tablet 1 tablet (1 tablet Oral Given 4/7/21 1403)   folic acid (FOLVITE) tablet 1 mg (1 mg Oral Given 4/7/21 1403)   LORazepam (ATIVAN) injection 1 mg (1 mg Intravenous Given 4/7/21 1415)   magnesium sulfate 2 g in water intermittent infusion (2 g Intravenous New Bag 4/7/21 1415)     Drips infusing:  No  For the majority of the shift, the patient's behavior Green. Interventions performed were none.  Ativan for shakiness.    Sepsis treatment initiated: No     Patient tested for COVID 19 prior to admission: YES    ED Nurse Name/Phone Number: Tatum Mcneill RN,   3:33 PM    RECEIVING UNIT ED HANDOFF REVIEW    Above ED Nurse Handoff Report was reviewed: Yes  Reviewed by: Lucinda Calderon RN on April 7, 2021 at 4:08 PM

## 2021-04-07 NOTE — ED PROVIDER NOTES
History   Chief Complaint:  Abnormal Labs       The history is provided by the patient.      Maddie Orourke is a 31 year old female with history of alcohol abuse, seizures, and hypokalemia who presents for evaluation of lethargy. The patient was seen in [facility] ED on 3/19/21 for seizures and was found to have hypokalemia and hypomagnesemia, results below. She presents today due to lethargy and the feeling that her heart is going to stop. She had one episode of emesis at 0400 toady. She was drinking alcohol last night and stopped at 0200. She has not eaten in a while. Denies other drug use.    From 3/19/2021 [facility] ED Visit:   CBC: WBC: 6.6, HGB: 11.8, PLT: 152  CMP: Glucose 144 (H), Potassium: 2.1 (low!), Chloride: 93 (low), Urea Nitrogen: 4 (low), Calcium: 7.9 (low), Bilirubin Total: 1.6 (H), Protein Total: 6.7 (low), Alkaline Phosphatase: 281 (H), ALT: 68 (H), AST: 178 (H), o/w WNL (Creatinine: 0.60)  Troponin (Collected 1900): <0.015  Lactic acid (Resulted 1941): 4.5 (H!)  Lactic Acid:(Resulted 2201): 1.2  INR: 0.90  Magnesium: 1.3 (low)  Phosphorus: 1.2 (low)  Alcohol ethyl: <0.01    Review of Systems   Constitutional: Positive for appetite change and fatigue. Negative for activity change.   Respiratory: Negative for shortness of breath.    Cardiovascular: Negative for chest pain.   Gastrointestinal: Positive for vomiting.   Genitourinary: Negative for dysuria.   All other systems reviewed and are negative.      Allergies:  No Known Allergies    Medications:  Aspirin 81 mg  Atenolol   Folic acid   Magnesium Cl-Calcium Carbonate   Magnesium oxide   Multivitamin w/minerals    Potassium chloride   Thiamine     Past Medical History:    Acute kidney injury  Alcohol abuse  Alcohol withdrawal   Elevated troponin  Failure to thrive in adult  Hypokalemia  Sinus tachycardia  Suicidal ideation     Past Surgical History:    Repair deviated septum     Social History:  Presents unaccompanied to the ED  [facility]  Alcohol use: positive  Drug use: negative    Physical Exam     Patient Vitals for the past 24 hrs:   BP Temp Temp src Pulse Resp SpO2   04/07/21 1445 (!) 128/94 -- -- 134 16 --   04/07/21 1430 (!) 130/104 -- -- 115 17 98 %   04/07/21 1415 (!) 136/112 -- -- 124 11 97 %   04/07/21 1400 (!) 132/107 -- -- 133 26 98 %   04/07/21 1328 (!) 142/100 96.6  F (35.9  C) Temporal 163 22 100 %       Physical Exam  General: Alert, Mild/moderate  discomfort, well kept.  Appears intoxicated  Eyes: PERRL, conjunctivae pink no scleral icterus or conjunctival injection  ENT:   Moist mucus membranes, posterior oropharynx clear without erythema or exudates, No lymphadenopathy, Normal voice  Resp:  Lungs clear to auscultation bilaterally, no crackles/rubs/wheezes. Good air movement  CV:  Normal rate and rhythm, no murmurs/rubs/gallops  GI:  Abdomen soft and non-distended.  Normoactive BS.  No tenderness, guarding or rebound, No masses  Skin:  Warm, dry.  No rashes or petechiae  Musculoskeletal: No peripheral edema or calf tenderness, Normal gross ROM   Neuro: Alert and oriented to person/place/time, normal sensation  Psychiatric: Normal affect, cooperative, good eye contact     Emergency Department Course   ECG  ECG taken at 1347, ECG read at 1347  Accelerated junctional rhythm. Cannot rule our anterior infarct, age undetermined. Prolonged QT. Abnormal ECG.   No significant change as compared to prior, dated 3/19/21.  Rate 122 bpm. WV interval * ms. QRS duration 82 ms. QT/QTc 426/607 ms. P-R-T axes * 4 69.     Laboratory:  CBC: WBC 6.6, HGB 15.6,    CMP: Glucose: 151(H), Potassium: 3.3(L), Anion Gap: 15(H), Alkphos: 319(H), ALT: 57(H), AST: 85(H), o/w WNL (Creatinine 0.50(L))     Magnesium: 2.1  Phosphorus: 4.4  Alcohol ethyl: 0.28(H)    Asymptomatic COVID19 Virus PCR by nasopharyngeal swab pending    Emergency Department Course:    Reviewed:  I reviewed nursing notes, vitals and past medical  "history    Assessments:  1350 I obtained history and examined the patient as noted above.     Consults:   1518 I consulted with Dr. Nunez of the hospitalist services who is in agreement to accept the patient for admission.     Interventions:  1402 0.9% sodium chloride bolus, 1,000 ml, IV  1403 Multivitamin w/ minerals, 1 tablet, PO  1403 Folic acid, 1 mg, PO  1415 Ativan, 1 mg, IV  1415 Magnesium sulfate, 2 g, IV  1545 Ativan, 1 mg, IV  1545 0.9% sodium chloride bolus, 1,000 ml, IV   1559 Zofran, 4 mg, IV  1616  Ativan, 1 mg, IV    Disposition:  The patient was admitted to the hospital under the care of Dr. Nunez.       Impression & Plan     Medical Decision Making:  Maddie Orourke is a 31 year old female who presents today for evaluation of a generalized feeling of weakness and \"that my heart is going to stop.\"  She has a longstanding history of alcohol abuse and has been admitted within the last month for significant electrolyte derangements.  Her evaluation today shows a significant alcohol level of 0.28.  She has a slightly low potassium at 3.3.  Magnesium today is 2.1.  Despite lack of previous electrolyte derangements she does have a significantly prolonged QT interval so I did order magnesium.  She has been quite tachycardic and does have a history of withdrawal seizures.  She is given Ativan as above which has helped reduce her symptoms somewhat.  Given her tachycardic state likely alcohol withdrawal state and risk of seizure plan will be to admit patient to hospitalist service for continued monitoring and evaluation.  She did get folate and a multivitamin here.  She stated that she has not been eating in the last week.  I did discuss the case with the hospitalist who accepted patient to the service.        Covid-19  Maddie Orourke was evaluated during a global COVID-19 pandemic, which necessitated consideration that the patient might be at risk for infection with the SARS-CoV-2 virus that causes " COVID-19.   Applicable protocols for evaluation were followed during the patient's care.   COVID-19 was considered as part of the patient's evaluation. The plan for testing is:  a test was obtained during this visit.    Diagnosis:    ICD-10-CM    1. Alcohol intoxication (H)  F10.929 Phosphorus     Phosphorus     CANCELED: Phosphorus   2. Alcohol abuse  F10.10    3. Prolonged QT interval  R94.31        Scribe Disclosure:  Iris STRICKLAND, am serving as a scribe at 1:47 PM on 4/7/2021 to personally document services performed by Matthew Funez APRN CNP based on my observations and the provider's statements to me.       Matthew Funez APRN CNP  04/07/21 7508

## 2021-04-07 NOTE — PHARMACY-ADMISSION MEDICATION HISTORY
Admission medication history interview status for this patient is complete. See [facility] admission navigator for allergy information, prior to admission medications and immunization status.     Medication history interview done, indicate source(s): Patient  Medication history resources (including written lists, pill bottles, clinic record):None  [facility]    Changes made to PTA medication list:  Added: none   Deleted: aspirin 81 mg daily , KCL 10 meq BID   Changed: none     Actions taken by pharmacist (provider contacted, etc): left reminder for provider to review med history    Additional medication history information: Note patient had not taken atenolol in almost a month     Medication reconciliation/reorder completed by provider prior to medication history?  Yes        Prior to Admission medications    Medication Sig Last Dose Taking? Auth Provider   folic acid (FOLVITE) 1 MG tablet Take 1 tablet (1 mg) by mouth daily Past Month Yes Jeanette Chinchilla MD   Magnesium Cl-Calcium Carbonate (SLOW-MAG) 71.5-119 MG TBEC Take 2 tablets by mouth 2 times daily 4/6/2021 at pm  Yes Raine Best MD   MISC NATURAL PRODUCTS PO Formula 303- natural supplement (natural muscle relaxant) - 1-2 capsule PO every 8 hours prn  Yes Unknown, Entered By History   multivitamin w/minerals (THERA-VIT-M) tablet Take 1 tablet by mouth daily 4/6/2021 at am  Yes Jeanette Chinchilla MD   potassium chloride ER (KLOR-CON M) 20 MEQ CR tablet Take 1 tablet (20 mEq) by mouth 2 times daily 4/6/2021 at pm  Yes Raine Best MD   thiamine (B-1) 100 MG tablet Take 1 tablet (100 mg) by mouth daily 4/6/2021 at am Yes Jeanette Chinchilla MD   atenolol (TENORMIN) 25 MG tablet Take 1 tablet (25 mg) by mouth daily Almost a month ago   HerPriya MD

## 2021-04-07 NOTE — H&P
[facility]    History and Physical  Hospitalist       Date of Admission:  4/7/2021    Assessment & Plan   Maddie Orourke is a 31 year old female with a past medical history of alcohol use with withdrawal seizures, hypokalemia, prolonged QT who presents intoxicated for alcohol withdrawal.    #Alcohol use disorder with withdrawal: Pt has been admitted for alcohol withdrawal in the past. Last admission was in January 2021.  She notes that a bottle whiskey lasts about 3 days.  Her last drink was at 0200 on day of admission reportedly.  Her Blood alcohol in the ED was 0.28.  She is reported binge drinker.  She did receive ativan 1 mg x2 in the ED.   -CIWA protocol with gabapentin and vitamin therapy  -IV fluids with D5/0.45 NS with K rider   -Alcohol cessation will be critical for her moving forward.  Recommend chem dep in coming days.  I did explain this to her.      #Hypokalemia: Pt with potassium of 3.3 in the ED.  Will place her on replacement protocol.    #History of hypomagnesemia, hypophosphatemia: Given IV magnesium in ED.  Magnesium over 2.0.  Phosphorus added to ED labs with replacement protocol ordered    #Elevation of LFT's secondary to alcoholic hepatitis: Manner c/w alcohol use.  Will monitor    #Prolonged QTc: Pt with sinus tachycardia noted on admitting ECG.  Will try to avoid QT prolonging drugs (anti-emetics).  Maintain on tele.  Received IV mag in the ED.    #Dehydration and Likely Malnutrition: Pt notes she has not really eaten anything in over a week.  Obviously related to her alcohol use as above.  -IVF overnight. Nutrition consult.     DVT Prophylaxis: Pneumatic Compression Devices  Code Status: Full Code  Admit to inpatient    Gus Nunez MD    Primary Care Physician   [facility]    Chief Complaint   Alcohol Withdrawal    History is obtained from the patient, patient's chart discussed with ER provider    History of Present Illness   Maddie Orourke  is a 31 year old female with a past medical history of alcohol use with withdrawal seizures, hypokalemia, prolonged QT who presents for generalized weakness.    Patient initially presented to the ED on 3/19/2021 for seizures and was found to have hypokalemia and hypomagnesemia.  She re-presents for increased generalized weakness and fatigue.  She has also noted her heart racing.  She did have one episode of nonbloody emesis at approximately 0400 today.  She denies associated nausea.  She is a binge drinker and has had hospitalizations for withdrawal in the past.  Last hospitalization was in January 2021.  She continues to drink alcohol with her last drink at 0200 on the a.m. of admission.  She goes through a bottle of whiskey every 3 days.  She has had very poor p.o. intake.  She denies any other drug use.  She denies any chest pain or shortness of breath.  No fevers or chills.    In the ED, patient afebrile very tachycardic with heart rates in the 130s to 160s.  Normotensive.  Saturating well on room air.  Alcohol level was 0.28.  BMP showed potassium of 3.3.  Creatinine was at her baseline at 0.50.  She did have elevation in her LFTs which is persistent.  Her magnesium level was 2.1.  CBC was within normal limits.  EKG done seem to show sinus tachycardia with rates 120s.    Past Medical History    I have reviewed this patient's medical history and updated it with pertinent information if needed.   1.  Alcohol dependence, intoxication and withdrawal.   2.  History of alcohol withdrawal seizure.   3.  Transaminitis due to alcohol.   4.  Nausea and vomiting.   5.  Severe hypokalemia.   6.  Failure to thrive.   7.  Hypomagnesemia.   8.  Hypophosphatemia.   9.  Falls.     Past Surgical History    Reviewed, repair of deviation septum.     Prior to Admission Medications   Prior to Admission Medications   Prescriptions Last Dose Informant Patient Reported? Taking?   MISC NATURAL PRODUCTS PO   Yes No   Sig: Formula 303-  natural supplement (natural muscle relaxant) - 1-2 capsule PO every 8 hours prn   Magnesium Cl-Calcium Carbonate (SLOW-MAG) 71.5-119 MG TBEC   No No   Sig: Take 2 tablets by mouth 2 times daily   aspirin (ASA) 81 MG EC tablet   No No   Sig: Take 1 tablet (81 mg) by mouth daily   atenolol (TENORMIN) 25 MG tablet   No No   Sig: Take 1 tablet (25 mg) by mouth daily   folic acid (FOLVITE) 1 MG tablet   No No   Sig: Take 1 tablet (1 mg) by mouth daily   magnesium oxide (MAG-OX) 400 MG tablet   No No   Sig: Take 1 tablet (400 mg) by mouth daily   multivitamin w/minerals (THERA-VIT-M) tablet   No No   Sig: Take 1 tablet by mouth daily   potassium chloride ER (K-TAB/KLOR-CON) 10 MEQ CR tablet   No No   Sig: Take 1 tablet (10 mEq) by mouth 2 times daily   potassium chloride ER (KLOR-CON M) 20 MEQ CR tablet   No No   Sig: Take 1 tablet (20 mEq) by mouth 2 times daily   thiamine (B-1) 100 MG tablet   No No   Sig: Take 1 tablet (100 mg) by mouth daily      Facility-Administered Medications: None     Allergies   No Known Allergies    Social History   I have reviewed this patient's social history and updated it with pertinent information if needed.   She smokes about 4 cigarettes a day, usually with drinking as detailed in HPI. She lives alone. 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     Family History   I have reviewed this patient's family history and updated it with pertinent information if needed.   Mother: Hyperthyroidism.  Father: HTN    Review of Systems   The 10 point Review of Systems is negative other than noted in the HPI or here.     Physical Exam   Temp: 96.6  F (35.9  C) Temp src: Temporal BP: (!) 128/94 Pulse: 134   Resp: 16 SpO2: 98 % O2 Device: None (Room air)    Vital Signs with Ranges  Temp:  [96.6  F (35.9  C)] 96.6  F (35.9  C)  Pulse:  [115-163] 134  Resp:  [11-26] 16  BP: (128-142)/() 128/94  SpO2:  [97 %-100 %] 98 %  0 lbs 0 oz    Constitutional: Unkempt, anxious.  Answers coherently.   HEENT: Normocephalic, mucous membranes dry. No elevation of JVD noted  Respiratory: Nl WOB, Clear bilaterally, No wheezes, no crackles  Cardiovascular: Tachy, regular. No murmur  GI: BS+, NT, ND, no rebound or guarding  Lymph/Hematologic: Some scattered bruising noted.   Skin: No rash  Musculoskeletal: Nl Tone, No edema noted  Neurologic: A&Ox3, Answers appropriately. CNII-XII intact. +bilateral hand tremor, mild.    Psychiatric: Anxious    Data   Data reviewed today:  I personally reviewed   Recent Labs   Lab 04/07/21  1336   WBC 6.6   HGB 15.6   MCV 96         POTASSIUM 3.3*   CHLORIDE 100   CO2 26   BUN 9   CR 0.50*   ANIONGAP 15*   NISHI 9.2   *   ALBUMIN 4.2   PROTTOTAL 8.1   BILITOTAL 0.5   ALKPHOS 319*   ALT 57*   AST 85*       No results found for this or any previous visit (from the past 24 hour(s)).

## 2021-04-07 NOTE — PLAN OF CARE
Admitted to floor around 5pm. A&Ox4. VSS. Tremors noted. Besides emesis no other sx of withdrawal at this time. Unsteady gait, SBA with IV pole. Emesis x1. Attempting to eat diet but vomiting. Denies pain. PRN ativan for CIWA. Requested boost supplements. Mag, phos and potassium replacement ordered.

## 2021-04-08 VITALS
HEART RATE: 90 BPM | OXYGEN SATURATION: 98 % | WEIGHT: 125.7 LBS | RESPIRATION RATE: 16 BRPM | BODY MASS INDEX: 19.69 KG/M2 | SYSTOLIC BLOOD PRESSURE: 146 MMHG | TEMPERATURE: 97.3 F | DIASTOLIC BLOOD PRESSURE: 85 MMHG

## 2021-04-08 PROBLEM — E83.39 HYPOPHOSPHATEMIA: Status: ACTIVE | Noted: 2021-04-08

## 2021-04-08 LAB
ALBUMIN SERPL-MCNC: 3.1 G/DL (ref 3.4–5)
ALP SERPL-CCNC: 263 U/L (ref 40–150)
ALT SERPL W P-5'-P-CCNC: 55 U/L (ref 0–50)
ANION GAP SERPL CALCULATED.3IONS-SCNC: 5 MMOL/L (ref 3–14)
AST SERPL W P-5'-P-CCNC: 151 U/L (ref 0–45)
BILIRUB SERPL-MCNC: 0.7 MG/DL (ref 0.2–1.3)
BUN SERPL-MCNC: 3 MG/DL (ref 7–30)
CALCIUM SERPL-MCNC: 7.9 MG/DL (ref 8.5–10.1)
CHLORIDE SERPL-SCNC: 102 MMOL/L (ref 94–109)
CO2 SERPL-SCNC: 27 MMOL/L (ref 20–32)
CREAT SERPL-MCNC: 0.53 MG/DL (ref 0.52–1.04)
ERYTHROCYTE [DISTWIDTH] IN BLOOD BY AUTOMATED COUNT: 14 % (ref 10–15)
GFR SERPL CREATININE-BSD FRML MDRD: >90 ML/MIN/{1.73_M2}
GLUCOSE SERPL-MCNC: 108 MG/DL (ref 70–99)
HCT VFR BLD AUTO: 33.4 % (ref 35–47)
HGB BLD-MCNC: 11.2 G/DL (ref 11.7–15.7)
MAGNESIUM SERPL-MCNC: 1.8 MG/DL (ref 1.6–2.3)
MCH RBC QN AUTO: 33.5 PG (ref 26.5–33)
MCHC RBC AUTO-ENTMCNC: 33.5 G/DL (ref 31.5–36.5)
MCV RBC AUTO: 100 FL (ref 78–100)
PHOSPHATE SERPL-MCNC: 1 MG/DL (ref 2.5–4.5)
PLATELET # BLD AUTO: 180 10E9/L (ref 150–450)
POTASSIUM SERPL-SCNC: 3.5 MMOL/L (ref 3.4–5.3)
POTASSIUM SERPL-SCNC: 3.8 MMOL/L (ref 3.4–5.3)
PROT SERPL-MCNC: 5.9 G/DL (ref 6.8–8.8)
RBC # BLD AUTO: 3.34 10E12/L (ref 3.8–5.2)
SODIUM SERPL-SCNC: 134 MMOL/L (ref 133–144)
WBC # BLD AUTO: 6.2 10E9/L (ref 4–11)

## 2021-04-08 PROCEDURE — 96376 TX/PRO/DX INJ SAME DRUG ADON: CPT

## 2021-04-08 PROCEDURE — 99238 HOSP IP/OBS DSCHRG MGMT 30/<: CPT | Performed by: INTERNAL MEDICINE

## 2021-04-08 PROCEDURE — 99207 PR CDG-CODE CATEGORY CHANGED: CPT | Performed by: INTERNAL MEDICINE

## 2021-04-08 PROCEDURE — 250N000013 HC RX MED GY IP 250 OP 250 PS 637: Performed by: HOSPITALIST

## 2021-04-08 PROCEDURE — 85027 COMPLETE CBC AUTOMATED: CPT | Performed by: HOSPITALIST

## 2021-04-08 PROCEDURE — 80053 COMPREHEN METABOLIC PANEL: CPT | Performed by: HOSPITALIST

## 2021-04-08 PROCEDURE — 84100 ASSAY OF PHOSPHORUS: CPT | Performed by: HOSPITALIST

## 2021-04-08 PROCEDURE — 250N000011 HC RX IP 250 OP 636: Performed by: HOSPITALIST

## 2021-04-08 PROCEDURE — 96361 HYDRATE IV INFUSION ADD-ON: CPT

## 2021-04-08 PROCEDURE — 250N000009 HC RX 250: Performed by: HOSPITALIST

## 2021-04-08 PROCEDURE — C9113 INJ PANTOPRAZOLE SODIUM, VIA: HCPCS | Performed by: HOSPITALIST

## 2021-04-08 PROCEDURE — 258N000003 HC RX IP 258 OP 636: Performed by: HOSPITALIST

## 2021-04-08 PROCEDURE — 83735 ASSAY OF MAGNESIUM: CPT | Performed by: HOSPITALIST

## 2021-04-08 PROCEDURE — G0378 HOSPITAL OBSERVATION PER HR: HCPCS

## 2021-04-08 PROCEDURE — 36415 COLL VENOUS BLD VENIPUNCTURE: CPT | Performed by: HOSPITALIST

## 2021-04-08 RX ADMIN — MULTIPLE VITAMINS W/ MINERALS TAB 1 TABLET: TAB at 08:48

## 2021-04-08 RX ADMIN — SODIUM PHOSPHATE, MONOBASIC, MONOHYDRATE AND SODIUM PHOSPHATE, DIBASIC, ANHYDROUS 15 MMOL: 276; 142 INJECTION, SOLUTION INTRAVENOUS at 09:01

## 2021-04-08 RX ADMIN — POTASSIUM CHLORIDE, DEXTROSE MONOHYDRATE AND SODIUM CHLORIDE: 150; 5; 450 INJECTION, SOLUTION INTRAVENOUS at 05:43

## 2021-04-08 RX ADMIN — PANTOPRAZOLE SODIUM 40 MG: 40 INJECTION, POWDER, FOR SOLUTION INTRAVENOUS at 08:48

## 2021-04-08 RX ADMIN — POTASSIUM & SODIUM PHOSPHATES POWDER PACK 280-160-250 MG 2 PACKET: 280-160-250 PACK at 13:51

## 2021-04-08 RX ADMIN — Medication 400 MG: at 08:47

## 2021-04-08 RX ADMIN — POTASSIUM & SODIUM PHOSPHATES POWDER PACK 280-160-250 MG 2 PACKET: 280-160-250 PACK at 08:48

## 2021-04-08 RX ADMIN — ATENOLOL 25 MG: 25 TABLET ORAL at 08:48

## 2021-04-08 RX ADMIN — THIAMINE HCL TAB 100 MG 200 MG: 100 TAB at 13:51

## 2021-04-08 RX ADMIN — GABAPENTIN 900 MG: 300 CAPSULE ORAL at 00:14

## 2021-04-08 RX ADMIN — GABAPENTIN 900 MG: 300 CAPSULE ORAL at 08:48

## 2021-04-08 RX ADMIN — FOLIC ACID 1 MG: 1 TABLET ORAL at 08:48

## 2021-04-08 RX ADMIN — LORAZEPAM 1 MG: 1 TABLET ORAL at 01:59

## 2021-04-08 RX ADMIN — THIAMINE HCL TAB 100 MG 200 MG: 100 TAB at 08:48

## 2021-04-08 SDOH — ECONOMIC STABILITY: INCOME INSECURITY: HOW HARD IS IT FOR YOU TO PAY FOR THE VERY BASICS LIKE FOOD, HOUSING, MEDICAL CARE, AND HEATING?: NOT HARD AT ALL

## 2021-04-08 SDOH — SOCIAL STABILITY: SOCIAL NETWORK
IN A TYPICAL WEEK, HOW MANY TIMES DO YOU TALK ON THE PHONE WITH FAMILY, FRIENDS, OR NEIGHBORS?: MORE THAN THREE TIMES A WEEK

## 2021-04-08 SDOH — SOCIAL STABILITY: SOCIAL NETWORK: HOW OFTEN DO YOU ATTEND CHURCH OR RELIGIOUS SERVICES?: NOT ASKED

## 2021-04-08 SDOH — ECONOMIC STABILITY: FOOD INSECURITY: WITHIN THE PAST 12 MONTHS, YOU WORRIED THAT YOUR FOOD WOULD RUN OUT BEFORE YOU GOT MONEY TO BUY MORE.: NEVER TRUE

## 2021-04-08 SDOH — SOCIAL STABILITY: SOCIAL NETWORK: HOW OFTEN DO YOU ATTENT MEETINGS OF THE CLUB OR ORGANIZATION YOU BELONG TO?: NOT ASKED

## 2021-04-08 SDOH — SOCIAL STABILITY: SOCIAL INSECURITY
WITHIN THE LAST YEAR, HAVE TO BEEN RAPED OR FORCED TO HAVE ANY KIND OF SEXUAL ACTIVITY BY YOUR PARTNER OR EX-PARTNER?: NOT ASKED

## 2021-04-08 SDOH — HEALTH STABILITY: MENTAL HEALTH: HOW OFTEN DO YOU HAVE 6 OR MORE DRINKS ON ONE OCCASION?: NOT ASKED

## 2021-04-08 SDOH — HEALTH STABILITY: MENTAL HEALTH
STRESS IS WHEN SOMEONE FEELS TENSE, NERVOUS, ANXIOUS, OR CAN'T SLEEP AT NIGHT BECAUSE THEIR MIND IS TROUBLED. HOW STRESSED ARE YOU?: NOT ASKED

## 2021-04-08 SDOH — HEALTH STABILITY: PHYSICAL HEALTH: ON AVERAGE, HOW MANY MINUTES DO YOU ENGAGE IN EXERCISE AT THIS LEVEL?: NOT ASKED

## 2021-04-08 SDOH — SOCIAL STABILITY: SOCIAL INSECURITY
WITHIN THE LAST YEAR, HAVE YOU BEEN HUMILIATED OR EMOTIONALLY ABUSED IN OTHER WAYS BY YOUR PARTNER OR EX-PARTNER?: NOT ASKED

## 2021-04-08 SDOH — HEALTH STABILITY: MENTAL HEALTH: HOW MANY STANDARD DRINKS CONTAINING ALCOHOL DO YOU HAVE ON A TYPICAL DAY?: 1 OR 2

## 2021-04-08 SDOH — SOCIAL STABILITY: SOCIAL NETWORK: HOW OFTEN DO YOU GET TOGETHER WITH FRIENDS OR RELATIVES?: MORE THAN THREE TIMES A WEEK

## 2021-04-08 SDOH — ECONOMIC STABILITY: FOOD INSECURITY: WITHIN THE PAST 12 MONTHS, THE FOOD YOU BOUGHT JUST DIDN'T LAST AND YOU DIDN'T HAVE MONEY TO GET MORE.: NEVER TRUE

## 2021-04-08 SDOH — HEALTH STABILITY: PHYSICAL HEALTH: ON AVERAGE, HOW MANY DAYS PER WEEK DO YOU ENGAGE IN MODERATE TO STRENUOUS EXERCISE (LIKE A BRISK WALK)?: 0 DAYS

## 2021-04-08 SDOH — SOCIAL STABILITY: SOCIAL INSECURITY: WITHIN THE LAST YEAR, HAVE YOU BEEN AFRAID OF YOUR PARTNER OR EX-PARTNER?: NOT ASKED

## 2021-04-08 SDOH — ECONOMIC STABILITY: TRANSPORTATION INSECURITY
IN THE PAST 12 MONTHS, HAS THE LACK OF TRANSPORTATION KEPT YOU FROM MEDICAL APPOINTMENTS OR FROM GETTING MEDICATIONS?: NO

## 2021-04-08 SDOH — SOCIAL STABILITY: SOCIAL NETWORK
DO YOU BELONG TO ANY CLUBS OR ORGANIZATIONS SUCH AS CHURCH GROUPS UNIONS, FRATERNAL OR ATHLETIC GROUPS, OR SCHOOL GROUPS?: NOT ASKED

## 2021-04-08 SDOH — SOCIAL STABILITY: SOCIAL INSECURITY
WITHIN THE LAST YEAR, HAVE YOU BEEN KICKED, HIT, SLAPPED, OR OTHERWISE PHYSICALLY HURT BY YOUR PARTNER OR EX-PARTNER?: NOT ASKED

## 2021-04-08 SDOH — HEALTH STABILITY: MENTAL HEALTH: HOW OFTEN DO YOU HAVE A DRINK CONTAINING ALCOHOL?: 4 OR MORE TIMES A WEEK

## 2021-04-08 SDOH — ECONOMIC STABILITY: TRANSPORTATION INSECURITY
IN THE PAST 12 MONTHS, HAS LACK OF TRANSPORTATION KEPT YOU FROM MEETINGS, WORK, OR FROM GETTING THINGS NEEDED FOR DAILY LIVING?: NO

## 2021-04-08 SDOH — SOCIAL STABILITY: SOCIAL NETWORK: ARE YOU MARRIED, WIDOWED, DIVORCED, SEPARATED, NEVER MARRIED, OR LIVING WITH A PARTNER?: NOT ASKED

## 2021-04-08 ASSESSMENT — ACTIVITIES OF DAILY LIVING (ADL)
ADLS_ACUITY_SCORE: 20
DEPENDENT_IADLS:: INDEPENDENT
ADLS_ACUITY_SCORE: 20
ADLS_ACUITY_SCORE: 20

## 2021-04-08 NOTE — CONSULTS
"CLINICAL NUTRITION SERVICES  -  BRIEF ASSESSMENT NOTE      Malnutrition Diagnosis: Deferred, if remains admitted, will follow-up for nutrition history and physical exam        REASON FOR ASSESSMENT  Maddie Orourke is a 31 year old female seen by Registered Dietitian for Admission Nutrition Risk Screen for positive and Provider Order - \"concern for malnutrition\".     PMH of: Alcohol use with previous withdrawal seizures.    Admit 2/2: Etoh withdrawal, dehydration, \"likely malnutrition\".      NUTRITION HISTORY  - Information obtained from chart as now observation status with plans for discharge this afternoon once phosphorus replaced.   - H+P indicates poor PO intakes over the past week, likely in the setting of etoh abuse.   - Allergies: NKFA.      CURRENT NUTRITION ORDERS  Diet Order:     Regular  Boost shakes BID between meals (vanilla) ordered     Current Intake/Tolerance:  Vomited with PO intake last evening.  Ate later on without concern.        NUTRITION FOCUSED PHYSICAL ASSESSMENT FOR DIAGNOSING MALNUTRITION)  No, planned discharge this afternoon    Obtained from Chart/Interdisciplinary Team:  - No documentation of PI  - Stooling patterns reviewed    ANTHROPOMETRICS  Height: 5' 7\"  Weight: 125 lbs 11.2 oz  Body mass index is 19.69 kg/m .  Weight Status:  Normal BMI  Weight History:  Wt Readings from Last 10 Encounters:   04/08/21 57 kg (125 lb 11.2 oz)   01/16/21 55.4 kg (122 lb 1.6 oz)   08/22/20 56.4 kg (124 lb 4.8 oz)   06/23/20 54.3 kg (119 lb 12.8 oz)   05/08/19 53.3 kg (117 lb 9.6 oz)     - Current wt consistent with that from 8/2020 and 1/2021.  No wt loss.    LABS  Labs reviewed:  Electrolytes  Potassium (mmol/L)   Date Value   04/08/2021 3.8   04/07/2021 3.5   04/07/2021 3.5     Phosphorus (mg/dL)   Date Value   04/08/2021 1.0 (L)   04/07/2021 4.4   03/19/2021 1.2 (L)   01/18/2021 1.8 (L)   01/17/2021 1.1 (L)    Blood Glucose  Glucose (mg/dL)   Date Value   04/08/2021 108 (H)   04/07/2021 151 (H) "   03/19/2021 144 (H)   01/17/2021 106 (H)   01/16/2021 101 (H)    Inflammatory Markers  WBC (10e9/L)   Date Value   04/08/2021 6.2   04/07/2021 6.6   03/19/2021 6.6     Albumin (g/dL)   Date Value   04/08/2021 3.1 (L)   04/07/2021 4.2   03/19/2021 3.6      Magnesium (mg/dL)   Date Value   04/08/2021 1.8   04/07/2021 2.5 (H)   04/07/2021 2.1     Sodium (mmol/L)   Date Value   04/08/2021 134   04/07/2021 141   03/19/2021 134    Renal  Urea Nitrogen (mg/dL)   Date Value   04/08/2021 3 (L)   04/07/2021 9   03/19/2021 4 (L)     Creatinine (mg/dL)   Date Value   04/08/2021 0.53   04/07/2021 0.50 (L)   03/19/2021 0.60     Additional  Ketones Urine (mg/dL)   Date Value   01/17/2021 Negative        B/P: 146/85, T: 97.3, P: 90, R: 16      MEDICATIONS  Medications reviewed:    atenolol  25 mg Oral Daily     folic acid  1 mg Oral Daily     [START ON 4/14/2021] gabapentin  100 mg Oral Q8H     [START ON 4/12/2021] gabapentin  300 mg Oral Q8H     [START ON 4/10/2021] gabapentin  600 mg Oral Q8H     gabapentin  900 mg Oral Q8H     magnesium oxide  400 mg Oral Daily     multivitamin w/minerals  1 tablet Oral Daily     pantoprazole (PROTONIX) IV  40 mg Intravenous Daily     potassium & sodium phosphates  2 packet Oral or Feeding Tube TID    And     sodium phosphate  15 mmol Intravenous Once     sodium chloride (PF)  3 mL Intracatheter Q8H     thiamine  200 mg Oral TID    Followed by     [START ON 4/9/2021] thiamine  100 mg Oral TID    Followed by     [START ON 4/15/2021] thiamine  100 mg Oral Daily        dextrose 5% and 0.45% NaCl + KCl 20 mEq/L 100 mL/hr at 04/08/21 0543          NUTRITION DIAGNOSIS:  Predicted inadequate nutrient intake (energy/protein) related to potential for decline in PO intakes given ongoing etoh abuse.    NUTRITION INTERVENTIONS  Recommendations / Nutrition Prescription  Diet per MD.  Small/frequent meals as needed and self-selection of protein focus as able.      Continue supplement as ordered by  MD.    Continue daily MVI/M.     Electrolyte replacements per MD (phosphorus 1.0 this AM).        Implementation  Nutrition education: Will follow up as able if patient remains admitted.    Collaboration and Referral of Nutrition care: Discussed POC with RN Care Coordinator.    Nutrition Goals  Patient to consume at least 50-75% of meals or supplements while admitted.      MONITORING AND EVALUATION:  Progress towards goals will be monitored and evaluated per protocol and Practice Guidelines          Zoë Ochoa RDN, LD  Clinical Dietitian  3rd floor/ICU: [phone]  All other floors: [phone]  Weekend/holiday: [phone]

## 2021-04-08 NOTE — PROGRESS NOTES
Pt seen and examined.  Feels well.  No withdrawal sx; sx resolved.  Tolerating PO.      Labs notable for low phos    Plan:  - supplement phosphorus  - mobilize in halls  - anticipate discharge this aftn after above completed  - pt declined input from SW/CD for resources to stop drinking

## 2021-04-08 NOTE — PLAN OF CARE
Patient alert but disoriented to time, as she thought it was April 1st. Denies any pain. CIWA's have been 1-15, total of 3mg PO ativan given overnight. Nausea resolved, patient ate a lot of food overnight. Tele is SR/ST. K/mag/phos protocols in place, will all be rechecked this morning. Continue with current plan of care.

## 2021-04-08 NOTE — PROGRESS NOTES
Pt discharged home today at 1400. Sent home phos-k packets from pharmacy. Understanding of discharge instructions.

## 2021-04-08 NOTE — DISCHARGE SUMMARY
Admit Date:     04/07/2021   Discharge Date:     04/08/2021      PRINCIPAL FINAL DIAGNOSES:   1.  Alcohol dependence.   2.  Alcohol withdrawal.   3.  Hypokalemia related to alcohol abuse.   4.  Hypophosphatemia related to alcohol abuse.   5.  Mild alcoholic hepatitis.   6.  Dehydration.   7.  Prolonged QT interval.      PRINCIPAL PROCEDURES THIS ADMISSION:   1.  CIWA protocol.   2.  IV fluid hydration.   3.  Electrolyte supplementation.     4.  COVID-19 testing negative.      REASON FOR ADMISSION:  Please see dictated history and physical.  In brief, Ms. Orourke is a 31-year-old female with the above medical history who presented to the hospital with acute alcohol intoxication and concern for alcohol withdrawal.      HOSPITAL COURSE:  Alcohol withdrawal:  Symptoms were mild while hospitalized and resolved by time of discharge.  She required several doses of Ativan while hospitalized for withdrawal symptoms.  By time of discharge, withdrawal symptoms have completely resolved, and she was eager to leave the hospital today.  She appears stable for discharge today.      She was noted to be hypokalemic, which was replaced while hospitalized, as well as hypophosphatemic, which was also supplemented.  She will be discharged on oral electrolyte supplementation with recommendation for repeat labs in 1 week.      DISCHARGE MEDICATIONS:   1.  Atenolol 25 mg daily.   2.  Folic acid 1 mg daily.   3.  Multivitamin daily.   4.  Neutra-Phos 1 packet 3 times a day for 5 days.   5.  Potassium chloride extended release 20 mEq twice a day.   6.  Magnesium 2 tablets twice a day.   7.  Thiamine 100 mg daily.      FOLLOWUP INSTRUCTIONS:   1.  See primary MD in 1 week.  Recheck labs, including electrolytes and phosphorus level at that visit.   2.  Recommend avoid drinking any alcohol.      I examined the patient on day of discharge.         HUSSAIN CHAUHAN MD             D: 04/08/2021   T: 04/08/2021   MT: MAI      Name:     VERONICA  ALFONZO   MRN:      [id number]        Account:        [id number]   :      1990           Admit Date:     2021                                  Discharge Date: 2021      Document: [id number]

## 2021-04-08 NOTE — UTILIZATION REVIEW
"  Admission Status; Secondary Review Determination         Under the authority of the Utilization Management Committee, the utilization review process indicated a secondary review on the above patient.  The review outcome is based on review of the medical records, discussions with staff, and applying clinical experience noted on the date of the review.        ()      Inpatient Status Appropriate - This patient's medical care is consistent with medical management for inpatient care and reasonable inpatient medical practice.      (x) Observation Status Appropriate - This patient does not meet hospital inpatient criteria and is placed in observation status. If this patient's primary payer is Medicare and was admitted as an inpatient, Condition Code 44 should be used and patient status changed to \"observation\".   () Admission Status NOT Appropriate - This patient's medical care is not consistent with medical management for Inpatient or Observation Status.          RATIONALE FOR DETERMINATION     Maddie Orourke is a 31 year old female with a past medical history of alcohol use with withdrawal seizures, hypokalemia, and prolonged QT who was admitted to observation status on 4/7/2021 with generalized weakness following alcohol binge drinking.  In the ED, patient was afebrile, tachycardic (130-160), and normotensive.  Blood alcohol level was 0.28.  BMP showed potassium of 3.3 and magnesium level of 2.1. EKG showed sinus tachycardia.  IVF and IV magnesium administered.    She has done well overnight.  The care team anticipated discharge later today.  Observation status appropriate.    If, however, medical issues arise which require further evaluation/treatment and patient is not stable for discharge today, consideration should be given to advancing to IP status at that time.      The severity of illness, intensity of service provided, expected LOS and risk for adverse outcome make the care complex, high risk and " appropriate for hospital admission.        The information on this document is developed by the utilization review team in order for the business office to ensure compliance.  This only denotes the appropriateness of proper admission status and does not reflect the quality of care rendered.         The definitions of Inpatient Status and Observation Status used in making the determination above are those provided in the CMS Coverage Manual, Chapter 1 and Chapter 6, section 70.4.      Sincerely,     Carlee Cervantes MD  Physician Advisor   Utilization Review/ Case Management  [facility].

## 2021-04-08 NOTE — CONSULTS
Care Management Initial Consult    General Information  Assessment completed with: Patient,    Type of CM/SW Visit: Initial Assessment    Primary Care Provider verified and updated as needed: Yes   Readmission within the last 30 days: previous discharge plan unsuccessful   Return Category: Progression of disease  Reason for Consult: care coordination/care conference, discharge planning  Advance Care Planning:            Communication Assessment  Patient's communication style: spoken language (English or Bilingual)    Hearing Difficulty or Deaf: no   Wear Glasses or Blind: yes    Cognitive  Cognitive/Neuro/Behavioral: WDL        Orientation: disoriented to, time, situation  Mood/Behavior: calm, cooperative          Living Environment:   People in home: alone     Current living Arrangements: apartment      Able to return to prior arrangements: yes       Family/Social Support:  Care provided by: self  Provides care for: no one  Marital Status: Single  Parent(s)          Description of Support System: Supportive, Involved    Support Assessment: Adequate family and caregiver support    Current Resources:   Patient receiving home care services: No     Community Resources: None  Equipment currently used at home: none  Supplies currently used at home: None    Employment/Financial:  Employment Status: employed full-time        Financial Concerns: No concerns identified           Lifestyle & Psychosocial Needs:        Socioeconomic History     Marital status: Single     Spouse name: Not on file     Number of children: Not on file     Years of education: Not on file     Highest education level: Not on file     Tobacco Use     Smoking status: Current Every Day Smoker     Smokeless tobacco: Never Used   Substance and Sexual Activity     Alcohol use: Yes     Comment: deborah states not that much     Drug use: Not Currently       Functional Status:  Prior to admission patient needed assistance:   Dependent ADLs::  Independent  Dependent IADLs:: Independent       Mental Health Status:    No concerns      Chemical Dependency Status:   using alcohol daily             Values/Beliefs:  Spiritual, Cultural Beliefs, Amish Practices, Values that affect care: yes               Additional Information:  Pt identifies as high risk for readmission. Pt admitted with alcohol use disorder with withdrawal.  Met with pt at bedside to discuss discharge planning.  Pt smokes socially and is not interested in resources at this time. Pt is aware that her alcohol use is disrupting her electrolytes and said she would like to keep drinking socially, but not at home.  She does attend AA and has a sponsor.  She is ot interested in any other resources at this time.  She said her family is supportive and a friend will transport home.    Sonal Ruby RN, BSN, PHN, CTS  Care Coordinator  [facility]  [phone]

## 2021-04-25 ENCOUNTER — HEALTH MAINTENANCE LETTER (OUTPATIENT)
Age: 31
End: 2021-04-25

## 2021-05-28 ENCOUNTER — HOSPITAL ENCOUNTER (OUTPATIENT)
Facility: CLINIC | Age: 31
Setting detail: OBSERVATION
Discharge: HOME | End: 2021-05-28
Attending: EMERGENCY MEDICINE | Admitting: HOSPITALIST
Payer: COMMERCIAL

## 2021-05-28 ENCOUNTER — APPOINTMENT (OUTPATIENT)
Dept: CT IMAGING | Facility: CLINIC | Age: 31
End: 2021-05-28
Attending: EMERGENCY MEDICINE
Payer: COMMERCIAL

## 2021-05-28 VITALS
HEART RATE: 93 BPM | RESPIRATION RATE: 34 BRPM | OXYGEN SATURATION: 100 % | SYSTOLIC BLOOD PRESSURE: 115 MMHG | DIASTOLIC BLOOD PRESSURE: 89 MMHG | TEMPERATURE: 99 F

## 2021-05-28 DIAGNOSIS — R40.20 LOSS OF CONSCIOUSNESS (H): ICD-10-CM

## 2021-05-28 DIAGNOSIS — E87.6 HYPOKALEMIA: ICD-10-CM

## 2021-05-28 LAB
ALBUMIN SERPL-MCNC: 3.6 G/DL (ref 3.4–5)
ALBUMIN UR-MCNC: NEGATIVE MG/DL
ALP SERPL-CCNC: 270 U/L (ref 40–150)
ALT SERPL W P-5'-P-CCNC: 37 U/L (ref 0–50)
ANION GAP SERPL CALCULATED.3IONS-SCNC: 13 MMOL/L (ref 3–14)
APPEARANCE UR: CLEAR
AST SERPL W P-5'-P-CCNC: 84 U/L (ref 0–45)
B-HCG FREE SERPL-ACNC: <5 IU/L
BACTERIA #/AREA URNS HPF: ABNORMAL /HPF
BASOPHILS # BLD AUTO: 0 10E9/L (ref 0–0.2)
BASOPHILS NFR BLD AUTO: 0.3 %
BILIRUB SERPL-MCNC: 1.6 MG/DL (ref 0.2–1.3)
BILIRUB UR QL STRIP: NEGATIVE
BUN SERPL-MCNC: 3 MG/DL (ref 7–30)
CALCIUM SERPL-MCNC: 8.5 MG/DL (ref 8.5–10.1)
CHLORIDE SERPL-SCNC: 91 MMOL/L (ref 94–109)
CO2 SERPL-SCNC: 30 MMOL/L (ref 20–32)
COLOR UR AUTO: ABNORMAL
CREAT SERPL-MCNC: 0.62 MG/DL (ref 0.52–1.04)
DIFFERENTIAL METHOD BLD: NORMAL
EOSINOPHIL # BLD AUTO: 0 10E9/L (ref 0–0.7)
EOSINOPHIL NFR BLD AUTO: 0.1 %
ERYTHROCYTE [DISTWIDTH] IN BLOOD BY AUTOMATED COUNT: 13.9 % (ref 10–15)
ETHANOL SERPL-MCNC: <0.01 G/DL
GFR SERPL CREATININE-BSD FRML MDRD: >90 ML/MIN/{1.73_M2}
GLUCOSE SERPL-MCNC: 117 MG/DL (ref 70–99)
GLUCOSE UR STRIP-MCNC: NEGATIVE MG/DL
HCT VFR BLD AUTO: 37.4 % (ref 35–47)
HGB BLD-MCNC: 12.1 G/DL (ref 11.7–15.7)
HGB UR QL STRIP: NEGATIVE
IMM GRANULOCYTES # BLD: 0 10E9/L (ref 0–0.4)
IMM GRANULOCYTES NFR BLD: 0.5 %
INTERPRETATION ECG - MUSE: NORMAL
KETONES UR STRIP-MCNC: NEGATIVE MG/DL
LEUKOCYTE ESTERASE UR QL STRIP: NEGATIVE
LIPASE SERPL-CCNC: 231 U/L (ref 73–393)
LYMPHOCYTES # BLD AUTO: 2.6 10E9/L (ref 0.8–5.3)
LYMPHOCYTES NFR BLD AUTO: 34.4 %
MAGNESIUM SERPL-MCNC: 1.8 MG/DL (ref 1.6–2.3)
MCH RBC QN AUTO: 31.5 PG (ref 26.5–33)
MCHC RBC AUTO-ENTMCNC: 32.4 G/DL (ref 31.5–36.5)
MCV RBC AUTO: 97 FL (ref 78–100)
MONOCYTES # BLD AUTO: 0.6 10E9/L (ref 0–1.3)
MONOCYTES NFR BLD AUTO: 8.1 %
NEUTROPHILS # BLD AUTO: 4.3 10E9/L (ref 1.6–8.3)
NEUTROPHILS NFR BLD AUTO: 56.6 %
NITRATE UR QL: NEGATIVE
NRBC # BLD AUTO: 0 10*3/UL
NRBC BLD AUTO-RTO: 0 /100
PH UR STRIP: 7.5 PH (ref 5–7)
PLATELET # BLD AUTO: 179 10E9/L (ref 150–450)
POTASSIUM SERPL-SCNC: 2.1 MMOL/L (ref 3.4–5.3)
PROT SERPL-MCNC: 7 G/DL (ref 6.8–8.8)
RBC # BLD AUTO: 3.84 10E12/L (ref 3.8–5.2)
RBC #/AREA URNS AUTO: <1 /HPF (ref 0–2)
SODIUM SERPL-SCNC: 134 MMOL/L (ref 133–144)
SOURCE: ABNORMAL
SP GR UR STRIP: 1 (ref 1–1.03)
SQUAMOUS #/AREA URNS AUTO: 1 /HPF (ref 0–1)
T4 FREE SERPL-MCNC: 0.93 NG/DL (ref 0.76–1.46)
TSH SERPL DL<=0.005 MIU/L-ACNC: 6.5 MU/L (ref 0.4–4)
UROBILINOGEN UR STRIP-MCNC: NORMAL MG/DL (ref 0–2)
WBC # BLD AUTO: 7.6 10E9/L (ref 4–11)
WBC #/AREA URNS AUTO: <1 /HPF (ref 0–5)

## 2021-05-28 PROCEDURE — 84439 ASSAY OF FREE THYROXINE: CPT | Performed by: EMERGENCY MEDICINE

## 2021-05-28 PROCEDURE — 84443 ASSAY THYROID STIM HORMONE: CPT | Performed by: EMERGENCY MEDICINE

## 2021-05-28 PROCEDURE — 96366 THER/PROPH/DIAG IV INF ADDON: CPT

## 2021-05-28 PROCEDURE — 96368 THER/DIAG CONCURRENT INF: CPT

## 2021-05-28 PROCEDURE — G0378 HOSPITAL OBSERVATION PER HR: HCPCS

## 2021-05-28 PROCEDURE — 70450 CT HEAD/BRAIN W/O DYE: CPT

## 2021-05-28 PROCEDURE — 250N000013 HC RX MED GY IP 250 OP 250 PS 637: Performed by: EMERGENCY MEDICINE

## 2021-05-28 PROCEDURE — 99285 EMERGENCY DEPT VISIT HI MDM: CPT | Mod: 25

## 2021-05-28 PROCEDURE — 83690 ASSAY OF LIPASE: CPT | Performed by: EMERGENCY MEDICINE

## 2021-05-28 PROCEDURE — 96367 TX/PROPH/DG ADDL SEQ IV INF: CPT

## 2021-05-28 PROCEDURE — 93005 ELECTROCARDIOGRAM TRACING: CPT

## 2021-05-28 PROCEDURE — 83735 ASSAY OF MAGNESIUM: CPT | Performed by: EMERGENCY MEDICINE

## 2021-05-28 PROCEDURE — 81001 URINALYSIS AUTO W/SCOPE: CPT | Performed by: EMERGENCY MEDICINE

## 2021-05-28 PROCEDURE — 96365 THER/PROPH/DIAG IV INF INIT: CPT

## 2021-05-28 PROCEDURE — 258N000003 HC RX IP 258 OP 636: Performed by: EMERGENCY MEDICINE

## 2021-05-28 PROCEDURE — 80053 COMPREHEN METABOLIC PANEL: CPT | Performed by: EMERGENCY MEDICINE

## 2021-05-28 PROCEDURE — 250N000011 HC RX IP 250 OP 636: Performed by: EMERGENCY MEDICINE

## 2021-05-28 PROCEDURE — 84702 CHORIONIC GONADOTROPIN TEST: CPT

## 2021-05-28 PROCEDURE — 85025 COMPLETE CBC W/AUTO DIFF WBC: CPT | Performed by: EMERGENCY MEDICINE

## 2021-05-28 PROCEDURE — 250N000009 HC RX 250: Performed by: EMERGENCY MEDICINE

## 2021-05-28 PROCEDURE — 82077 ASSAY SPEC XCP UR&BREATH IA: CPT | Performed by: EMERGENCY MEDICINE

## 2021-05-28 PROCEDURE — 96375 TX/PRO/DX INJ NEW DRUG ADDON: CPT

## 2021-05-28 RX ORDER — POTASSIUM CHLORIDE 1500 MG/1
20 TABLET, EXTENDED RELEASE ORAL 2 TIMES DAILY
Qty: 14 TABLET | Refills: 0 | Status: SHIPPED | OUTPATIENT
Start: 2021-05-28 | End: 2021-06-04

## 2021-05-28 RX ORDER — MAGNESIUM SULFATE HEPTAHYDRATE 40 MG/ML
2 INJECTION, SOLUTION INTRAVENOUS ONCE
Status: COMPLETED | OUTPATIENT
Start: 2021-05-28 | End: 2021-05-28

## 2021-05-28 RX ORDER — LORAZEPAM 2 MG/ML
1 INJECTION INTRAMUSCULAR ONCE
Status: COMPLETED | OUTPATIENT
Start: 2021-05-28 | End: 2021-05-28

## 2021-05-28 RX ORDER — POTASSIUM CHLORIDE 7.45 MG/ML
10 INJECTION INTRAVENOUS
Status: DISCONTINUED | OUTPATIENT
Start: 2021-05-28 | End: 2021-05-28 | Stop reason: HOSPADM

## 2021-05-28 RX ORDER — POTASSIUM CHLORIDE 1.5 G/1.58G
40 POWDER, FOR SOLUTION ORAL ONCE
Status: COMPLETED | OUTPATIENT
Start: 2021-05-28 | End: 2021-05-28

## 2021-05-28 RX ADMIN — MAGNESIUM SULFATE HEPTAHYDRATE 2 G: 2 INJECTION, SOLUTION INTRAVENOUS at 11:07

## 2021-05-28 RX ADMIN — POTASSIUM CHLORIDE 10 MEQ: 7.46 INJECTION, SOLUTION INTRAVENOUS at 12:21

## 2021-05-28 RX ADMIN — POTASSIUM CHLORIDE 10 MEQ: 7.46 INJECTION, SOLUTION INTRAVENOUS at 14:09

## 2021-05-28 RX ADMIN — LORAZEPAM 1 MG: 2 INJECTION INTRAMUSCULAR; INTRAVENOUS at 10:35

## 2021-05-28 RX ADMIN — POTASSIUM CHLORIDE 40 MEQ: 1.5 POWDER, FOR SOLUTION ORAL at 12:21

## 2021-05-28 RX ADMIN — THIAMINE HYDROCHLORIDE: 100 INJECTION, SOLUTION INTRAMUSCULAR; INTRAVENOUS at 11:08

## 2021-05-28 RX ADMIN — POTASSIUM CHLORIDE 10 MEQ: 7.46 INJECTION, SOLUTION INTRAVENOUS at 14:08

## 2021-05-28 RX ADMIN — POTASSIUM CHLORIDE 10 MEQ: 7.46 INJECTION, SOLUTION INTRAVENOUS at 12:37

## 2021-05-28 ASSESSMENT — ENCOUNTER SYMPTOMS
LIGHT-HEADEDNESS: 1
NAUSEA: 0
VOMITING: 0
SEIZURES: 1
DIARRHEA: 0
COUGH: 0
TREMORS: 0
FEVER: 0

## 2021-05-28 NOTE — PHARMACY-ADMISSION MEDICATION HISTORY
Admission medication history interview status for this patient is complete. See [facility] admission navigator for allergy information, prior to admission medications and immunization status.     Medication history interview done, indicate source(s): Patient  Medication history resources (including written lists, pill bottles, clinic record): SureScripts and Care Everywhere  Pharmacy: [facility] [place] for discharge    Changes made to PTA medication list:  Added: magnesium  Deleted: atenolol and natural supplement called Formula 303  Changed: none    Actions taken by pharmacist (provider contacted, etc): Spoke with patient about home med list     Additional medication history information:None    Medication reconciliation/reorder completed by provider prior to medication history?  N   (Y/N)       Prior to Admission medications    Medication Sig Last Dose Taking? Auth Provider   folic acid (FOLVITE) 1 MG tablet Take 1 tablet (1 mg) by mouth daily 5/27/2021 at pm Yes Jeanette Chinchilla MD   magnesium 250 MG tablet Take 1 tablet by mouth daily 5/28/2021 at am Yes Unknown, Entered By History   multivitamin w/minerals (THERA-VIT-M) tablet Take 1 tablet by mouth daily 5/28/2021 at am Yes Jeanette Chinchilla MD   potassium chloride ER (KLOR-CON M) 20 MEQ CR tablet Take 1 tablet (20 mEq) by mouth 2 times daily 5/28/2021 at am Yes Raine Best MD   thiamine (B-1) 100 MG tablet Take 1 tablet (100 mg) by mouth daily 5/27/2021 at pm Yes Jeanette Chinchilla MD   atenolol (TENORMIN) 25 MG tablet Take 1 tablet (25 mg) by mouth daily  Patient not taking: Reported on 5/28/2021 Not Taking at Unknown time  Her, MD Priya         
Supple

## 2021-05-28 NOTE — DISCHARGE INSTRUCTIONS
Discharge Instructions  Recurrent Seizure (Convulsion)    You were seen today for a seizure. The most common reason for a recurrent seizure is having missed a dose of your medication or taken it at a different time than normal. Other things that increase the risk of seizures include fever, sleep deprivation, alcohol, and stress. Although anti-seizure medications (anti-epileptic drugs) work for many people with seizure disorders, some people continue to have seizures even after trying several medications.    Generally, every Emergency Department visit should have a follow-up clinic visit with either a primary or a specialty clinic/provider. Please follow-up as instructed by your emergency provider today.    Return to the Emergency Department if:   You develop a fever over 100.4 F.  You feel much more ill, or develop new symptoms like severe headache.  You have trouble walking, seeing, or develop weakness or numbness in your arms or legs.     What can I do to help myself?  Take your medication exactly as directed, at the right times, and the right doses.   If you develop uncomfortable side effects, do not stop taking your anti-seizure medication without first speaking to your provider.   Do not let your prescription run out. Stopping anti-seizure medication abruptly can put you at risk of a seizure.  While taking an anti-seizure medication, do not start taking any other medications including over-the-counter medications and herbal supplements without first checking with your provider because mixing them can be dangerous.  Do not drive until you have been rechecked by your provider and have been told it is safe to drive.  If you have a seizure while driving you may cause a motor vehicle accident with injury or death to yourself or others.   Do not swim, climb ladders, or do anything else that would be dangerous if you had another seizure or spell of loss of consciousness, until you are cleared by your  provider.    Check your state driving requirements for patients with seizures on the [employer]ile[employer] Website at www.epilepsyfoundation.org/resources/drivingandtravel.cfm.  Do not drink alcohol.  Drinking alcohol increases the risk of seizures and can interfere with the effect of anti-seizure medications.  Start a seizure calendar to record any seizure triggers, such as days when you were sleep-deprived, stressed, drank alcohol, or (if you are a woman) had your period.  Remember, if one medication does not work for you, either because you cannot tolerate the side effects or because you continue to have seizures, your provider can suggest alternate medications or alternate methods of taking the medication.  If you were given a prescription for medicine here today, be sure to read all of the information (including the package insert) that comes with your prescription.  This will include important information about the medicine, its side effects, and any warnings that you need to know about.  The pharmacist who fills the prescription can provide more information and answer questions you may have about the medicine.  If you have questions or concerns that the pharmacist cannot address, please call or return to the Emergency Department.   Remember that you can always come back to the Emergency Department if you are not able to see your regular provider in the amount of time listed above, if you get any new symptoms, or if there is anything that worries you.

## 2021-05-28 NOTE — ED TRIAGE NOTES
Presents via EMS from [employer] after witnessed 1-2 minute seizure like activity. EMS report pt alert but postictal upon arrival. Hx of seizures last about 1 month ago. EMS reports pt takes K and Mg. Currently A&O x 4, reports biting tongue. VSS on RA.

## 2021-05-28 NOTE — ED NOTES
Pt wishes to have ivs removed and not complete ordered iv potassium doses. Pt is given discharge instructions and is choosing to leave A.

## 2021-05-28 NOTE — ED NOTES
DATE:  5/28/2021   TIME OF RECEIPT FROM LAB:  1059  LAB TEST:  Potassium  LAB VALUE:  2.1  RESULTS GIVEN WITH READ-BACK TO (PROVIDER):  Notified in comments, Dr. Farrell  TIME LAB VALUE REPORTED TO PROVIDER:   0446

## 2021-05-28 NOTE — ED PROVIDER NOTES
"  History   Chief Complaint:  Seizures       HPI   Maddie Orourke is a 31 year old female with history of alcoholism and 2 prior seizures who presents with a seizure. The pt reports that she was at [employer] today when she noticed her vision was abnormal with what she describes as red and blue lights, felt a little lightheaded, and lost consciousness. She next remembers seeing paramedics, who report that she was alert but postictal when they arrived, and subsequently presented to the ED via EMS. Maddie reports that her last drink was a few shots of whiskey 2 days ago, and prior to that she was drinking approximately 4 times per week. She says she has been hospitalized for seizures in the past, and says that in those instances she is \"always\" taken to a cardiologist, and has been told that she was having \"mini heart attacks\". She reports that she was taken off gabapentin during her last encounter, and says that she has fainted before today. However, Maddie says this episode is different from previous seizures in that she had no tremors prior to her seizure today. She denies having any chest pain, fever, cough, vomiting, nausea, or diarrhea. She denies being on any anti-seizure medications, having any allergies, or having any addictions to anything besides alcohol.    Review of Systems   Constitutional: Negative for fever.   Respiratory: Negative for cough.    Cardiovascular: Negative for chest pain.   Gastrointestinal: Negative for diarrhea, nausea and vomiting.   Neurological: Positive for seizures, syncope and light-headedness. Negative for tremors.   All other systems reviewed and are negative.    Allergies:  The patient has no known allergies.     Medications:  Atenolol  Folic acid  Multivitamin with minerals  Potassium chloride  Thiamine    Past Medical History:    Hypophosphatemia  Prolonged QT interval  Sinus tachycardia  Hypokalemia  Alcohol abuse  Elevated troponin  Acute kidney injury  Suicidal " ideation  Alcohol withdrawal     Past Surgical History:    Deviated septum repair     Social History:  Smokes tobacco.  Lives alone in an apartment.  Contacted a friend to stay with her today.    Physical Exam     Patient Vitals for the past 24 hrs:   BP Temp Temp src Pulse Resp SpO2   05/28/21 1415 -- -- -- 93 -- 100 %   05/28/21 1400 -- -- -- 147 -- 99 %   05/28/21 1345 115/89 -- -- 85 -- 95 %   05/28/21 1330 115/89 -- -- 84 -- 97 %   05/28/21 1315 -- -- -- 95 -- 98 %   05/28/21 1300 -- -- -- 93 (!) 34 97 %   05/28/21 1245 -- -- -- 82 9 100 %   05/28/21 1230 -- -- -- 106 16 100 %   05/28/21 1215 (!) 129/96 -- -- 79 18 96 %   05/28/21 1200 (!) 136/100 -- -- 84 19 97 %   05/28/21 1145 -- -- -- 90 17 98 %   05/28/21 1130 -- -- -- 88 22 90 %   05/28/21 1115 (!) 134/90 -- -- 90 19 97 %   05/28/21 1100 (!) 137/99 -- -- 106 21 97 %   05/28/21 1045 (!) 131/100 -- -- 106 14 96 %   05/28/21 0953 (!) 146/103 99  F (37.2  C) Oral 124 19 95 %       Physical Exam  Constitutional: Well developed, nontox appearance  Head: Atraumatic.   Mouth/Throat: Oropharynx is moist, bite td noted to right side of tongue.   Neck:  no stridor, no C-spine tenderness  Eyes: no scleral icterus, EOMI  Cardiovascular: Regular tachycardia, 2+ bilat radial pulses  Pulmonary/Chest: nml resp effort, Clear BS bilat  Abdominal: ND, soft, NT, no rebound or guarding   Ext: Warm, well perfused, no edema, nontender, no obvious deformities  Neurological: A&O, symmetric facies, moves ext x4  Skin: Skin is warm and dry.   Psychiatric: Behavior is normal. Thought content normal.   Nursing note and vitals reviewed.    Emergency Department Course   ECG  ECG taken at 1038, ECG read at 1042  Normal sinus rhythm  Cannot rule out Anterior infarct, age undetermined  Prolonged QT  Abnormal ECG   No significant change as compared to prior, dated 4/7/21.  Rate 91 bpm. LA interval 148 ms. QRS duration 96 ms. QT/QTc 410/504 ms. P-R-T axes 48 -13 10.     Imaging:  CT Head  w/o Contrast  1. Mild cerebral and cerebellar atrophy.  2. No evidence for intracranial hemorrhage or any acute process.    ISRA PHAM MD  Reading per radiology.    Laboratory:  CBC: WBC: 7.6, HGB: 12.1, PLT: 179  CMP: Glucose 117 (H), Potassium: 2.1 (L!), Chloride: 91 (L), Urea Nitrogen: 3 (L), Bilirubin Total: 1.6 (H), Alkaline Phosphatase: 270 (H), AST: 84 (H), o/w WNL (Creatinine: 0.62)  Lipase: 231  TSH with Free T4 Reflex: 6.50 (H)  Magnesium: 1.8  ISTAT HCG quantitative pregnancy POCT: <5.0  Alcohol ethyl: <0.01    Emergency Department Course:    Reviewed:  I reviewed nursing notes, vitals and past medical history    Assessments:  1012 I obtained history and examined the patient as noted above.   1142 I rechecked the patient and explained findings.   1233 I rechecked the patient again.    Consults:  1247 I spoke with Dr. Cervantes, the hospitalist, who agreed to admit the patient.  1502 I spoke with Dr. Cervantes regarding the patient again.    Interventions:  1035 Ativan 1 mg IV  1107 Magnesium sulfate 2g IV  1108 Sodium chloride 0.9% 1000 mL with Infuvite, thiamine 100 mg, folic acid 1 mg IV  1221 potassium chloride 10 mEq in 100 mL sterile water  1237 Klor-Con 40 mEq  1237 potassium chloride 10 mEq in 100 mL sterile water  1238 potassium chloride 10 mEq in 100 mL sterile water  1408 potassium chloride 10 mEq in 100 mL sterile water  1409 potassium chloride 10 mEq in 100 mL sterile water    Disposition:  The patient left AMA.      Impression & Plan   CMS Diagnoses: None    Medical Decision Makin year old female presenting w/ loss of consciousness, concern for seizure     DDx includes seizure NOS, withdrawal seizure, syncope, vasovagal syncope, dysrhythmia, electrolyte abnormality, intracranial hemorrhage. EKG interpretation as noted above and significant for prolonged QT.  Doubt PE given no shortness of breath or hypoxia.  Labs significant for severe hypokalemia, magnesium within normal limits.  Imaging sig  for no acute intracranial abnormality.  Potassium repletion started in the emergency department.  Given the patient's history of alcohol abuse and withdrawal including previous seizure 2 days after not drinking, the seizure occurring 2 days after her last drink, I think would be appropriate admit the patient for further evaluation and management of possible alcohol withdrawal or syncope in context of prolonged QT concern for dysrhythmia.  Pt accepted for admission to observation given her report she will likely discharge AMA.  Prior to transfer to the floor, pt elected to leave AGAINST MEDICAL ADVICE after discussion of the potential life-threatening or permanently debilitating possible etiology of her symptoms as well as from her electrolyte abnormality.  Patient is understanding and has capacity at this time; she continued to refuse admission.  Patient counseled on all results, disposition and diagnosis.  They are understanding and agreeable to plan. Patient discharged AMA  in guarded condition.        Diagnosis:    ICD-10-CM    1. Loss of consciousness (H)  R40.20    2. Hypokalemia  E87.6        Discharge Medications:  New Prescriptions    POTASSIUM CHLORIDE ER (K-TAB) 20 MEQ CR TABLET    Take 1 tablet (20 mEq) by mouth 2 times daily for 7 days       Scribe Disclosure:  Pavel STRICKLAND, am serving as a scribe at 10:08 AM on 5/28/2021 to document services personally performed by Robson Farrell MD based on my observations and the provider's statements to me.      Robson Farrell MD  05/28/21 0747

## 2021-06-01 NOTE — ED NOTES
Per EMS    A-T Advancement Flap Text: The defect edges were debeveled with a #15 scalpel blade.  Given the location of the defect, shape of the defect and the proximity to free margins an A-T advancement flap was deemed most appropriate.  Using a sterile surgical marker, an appropriate advancement flap was drawn incorporating the defect and placing the expected incisions within the relaxed skin tension lines where possible.    The area thus outlined was incised deep to adipose tissue with a #15 scalpel blade.  The skin margins were undermined to an appropriate distance in all directions utilizing iris scissors.

## 2021-06-07 ENCOUNTER — HOSPITAL ENCOUNTER (INPATIENT)
Facility: CLINIC | Age: 31
LOS: 1 days | Discharge: HOME | DRG: 897 | End: 2021-06-08
Attending: EMERGENCY MEDICINE | Admitting: INTERNAL MEDICINE
Payer: COMMERCIAL

## 2021-06-07 DIAGNOSIS — E87.6 HYPOKALEMIA: ICD-10-CM

## 2021-06-07 DIAGNOSIS — F10.930 ALCOHOL WITHDRAWAL SYNDROME WITHOUT COMPLICATION (H): ICD-10-CM

## 2021-06-07 DIAGNOSIS — R74.01 TRANSAMINITIS: ICD-10-CM

## 2021-06-07 DIAGNOSIS — E83.51 HYPOCALCEMIA: ICD-10-CM

## 2021-06-07 DIAGNOSIS — E83.42 HYPOMAGNESEMIA: ICD-10-CM

## 2021-06-07 LAB
ALBUMIN SERPL-MCNC: 2.5 G/DL (ref 3.4–5)
ALP SERPL-CCNC: 177 U/L (ref 40–150)
ALT SERPL W P-5'-P-CCNC: 59 U/L (ref 0–50)
ANION GAP SERPL CALCULATED.3IONS-SCNC: 9 MMOL/L (ref 3–14)
AST SERPL W P-5'-P-CCNC: 158 U/L (ref 0–45)
BASOPHILS # BLD AUTO: 0 10E9/L (ref 0–0.2)
BASOPHILS NFR BLD AUTO: 0.2 %
BILIRUB SERPL-MCNC: 0.7 MG/DL (ref 0.2–1.3)
BUN SERPL-MCNC: 7 MG/DL (ref 7–30)
CALCIUM SERPL-MCNC: 5.9 MG/DL (ref 8.5–10.1)
CHLORIDE SERPL-SCNC: 105 MMOL/L (ref 94–109)
CO2 SERPL-SCNC: 25 MMOL/L (ref 20–32)
CREAT SERPL-MCNC: 0.47 MG/DL (ref 0.52–1.04)
DIFFERENTIAL METHOD BLD: ABNORMAL
EOSINOPHIL # BLD AUTO: 0 10E9/L (ref 0–0.7)
EOSINOPHIL NFR BLD AUTO: 0.1 %
ERYTHROCYTE [DISTWIDTH] IN BLOOD BY AUTOMATED COUNT: 14.1 % (ref 10–15)
ETHANOL SERPL-MCNC: <0.01 G/DL
GFR SERPL CREATININE-BSD FRML MDRD: >90 ML/MIN/{1.73_M2}
GLUCOSE SERPL-MCNC: 90 MG/DL (ref 70–99)
HCG SERPL QL: NEGATIVE
HCT VFR BLD AUTO: 34.4 % (ref 35–47)
HGB BLD-MCNC: 11.3 G/DL (ref 11.7–15.7)
IMM GRANULOCYTES # BLD: 0 10E9/L (ref 0–0.4)
IMM GRANULOCYTES NFR BLD: 0.3 %
LABORATORY COMMENT REPORT: NORMAL
LIPASE SERPL-CCNC: 24 U/L (ref 73–393)
LYMPHOCYTES # BLD AUTO: 1.6 10E9/L (ref 0.8–5.3)
LYMPHOCYTES NFR BLD AUTO: 16.8 %
MAGNESIUM SERPL-MCNC: 0.8 MG/DL (ref 1.6–2.3)
MAGNESIUM SERPL-MCNC: 1.1 MG/DL (ref 1.6–2.3)
MCH RBC QN AUTO: 31 PG (ref 26.5–33)
MCHC RBC AUTO-ENTMCNC: 32.8 G/DL (ref 31.5–36.5)
MCV RBC AUTO: 95 FL (ref 78–100)
MONOCYTES # BLD AUTO: 0.7 10E9/L (ref 0–1.3)
MONOCYTES NFR BLD AUTO: 7.8 %
NEUTROPHILS # BLD AUTO: 7 10E9/L (ref 1.6–8.3)
NEUTROPHILS NFR BLD AUTO: 74.8 %
NRBC # BLD AUTO: 0 10*3/UL
NRBC BLD AUTO-RTO: 0 /100
PHOSPHATE SERPL-MCNC: 3.5 MG/DL (ref 2.5–4.5)
PLATELET # BLD AUTO: 272 10E9/L (ref 150–450)
POTASSIUM SERPL-SCNC: 2.1 MMOL/L (ref 3.4–5.3)
POTASSIUM SERPL-SCNC: 2.2 MMOL/L (ref 3.4–5.3)
PROT SERPL-MCNC: 5 G/DL (ref 6.8–8.8)
RBC # BLD AUTO: 3.64 10E12/L (ref 3.8–5.2)
SARS-COV-2 RNA RESP QL NAA+PROBE: NEGATIVE
SODIUM SERPL-SCNC: 139 MMOL/L (ref 133–144)
SPECIMEN SOURCE: NORMAL
WBC # BLD AUTO: 9.4 10E9/L (ref 4–11)

## 2021-06-07 PROCEDURE — 83735 ASSAY OF MAGNESIUM: CPT | Performed by: EMERGENCY MEDICINE

## 2021-06-07 PROCEDURE — 83735 ASSAY OF MAGNESIUM: CPT | Performed by: INTERNAL MEDICINE

## 2021-06-07 PROCEDURE — 120N000001 HC R&B MED SURG/OB

## 2021-06-07 PROCEDURE — 36415 COLL VENOUS BLD VENIPUNCTURE: CPT | Performed by: INTERNAL MEDICINE

## 2021-06-07 PROCEDURE — 250N000013 HC RX MED GY IP 250 OP 250 PS 637: Performed by: INTERNAL MEDICINE

## 2021-06-07 PROCEDURE — U0005 INFEC AGEN DETEC AMPLI PROBE: HCPCS | Performed by: EMERGENCY MEDICINE

## 2021-06-07 PROCEDURE — 93005 ELECTROCARDIOGRAM TRACING: CPT

## 2021-06-07 PROCEDURE — 258N000003 HC RX IP 258 OP 636: Performed by: EMERGENCY MEDICINE

## 2021-06-07 PROCEDURE — 96366 THER/PROPH/DIAG IV INF ADDON: CPT

## 2021-06-07 PROCEDURE — U0003 INFECTIOUS AGENT DETECTION BY NUCLEIC ACID (DNA OR RNA); SEVERE ACUTE RESPIRATORY SYNDROME CORONAVIRUS 2 (SARS-COV-2) (CORONAVIRUS DISEASE [COVID-19]), AMPLIFIED PROBE TECHNIQUE, MAKING USE OF HIGH THROUGHPUT TECHNOLOGIES AS DESCRIBED BY CMS-2020-01-R: HCPCS | Performed by: EMERGENCY MEDICINE

## 2021-06-07 PROCEDURE — 80053 COMPREHEN METABOLIC PANEL: CPT | Performed by: EMERGENCY MEDICINE

## 2021-06-07 PROCEDURE — C9803 HOPD COVID-19 SPEC COLLECT: HCPCS

## 2021-06-07 PROCEDURE — 250N000009 HC RX 250: Performed by: EMERGENCY MEDICINE

## 2021-06-07 PROCEDURE — 82077 ASSAY SPEC XCP UR&BREATH IA: CPT | Performed by: EMERGENCY MEDICINE

## 2021-06-07 PROCEDURE — 99285 EMERGENCY DEPT VISIT HI MDM: CPT | Mod: 25

## 2021-06-07 PROCEDURE — 96375 TX/PRO/DX INJ NEW DRUG ADDON: CPT

## 2021-06-07 PROCEDURE — 258N000003 HC RX IP 258 OP 636: Performed by: INTERNAL MEDICINE

## 2021-06-07 PROCEDURE — 85025 COMPLETE CBC W/AUTO DIFF WBC: CPT | Performed by: EMERGENCY MEDICINE

## 2021-06-07 PROCEDURE — 250N000011 HC RX IP 250 OP 636: Performed by: EMERGENCY MEDICINE

## 2021-06-07 PROCEDURE — 84132 ASSAY OF SERUM POTASSIUM: CPT | Performed by: INTERNAL MEDICINE

## 2021-06-07 PROCEDURE — 250N000013 HC RX MED GY IP 250 OP 250 PS 637: Performed by: EMERGENCY MEDICINE

## 2021-06-07 PROCEDURE — 83690 ASSAY OF LIPASE: CPT | Performed by: EMERGENCY MEDICINE

## 2021-06-07 PROCEDURE — 84100 ASSAY OF PHOSPHORUS: CPT | Performed by: INTERNAL MEDICINE

## 2021-06-07 PROCEDURE — 99223 1ST HOSP IP/OBS HIGH 75: CPT | Performed by: INTERNAL MEDICINE

## 2021-06-07 PROCEDURE — 84703 CHORIONIC GONADOTROPIN ASSAY: CPT | Performed by: EMERGENCY MEDICINE

## 2021-06-07 PROCEDURE — 96365 THER/PROPH/DIAG IV INF INIT: CPT

## 2021-06-07 PROCEDURE — 84703 CHORIONIC GONADOTROPIN ASSAY: CPT | Performed by: INTERNAL MEDICINE

## 2021-06-07 PROCEDURE — 250N000011 HC RX IP 250 OP 636: Performed by: INTERNAL MEDICINE

## 2021-06-07 PROCEDURE — HZ2ZZZZ DETOXIFICATION SERVICES FOR SUBSTANCE ABUSE TREATMENT: ICD-10-PCS | Performed by: INTERNAL MEDICINE

## 2021-06-07 RX ORDER — POTASSIUM CHLORIDE 1500 MG/1
40 TABLET, EXTENDED RELEASE ORAL ONCE
Status: COMPLETED | OUTPATIENT
Start: 2021-06-07 | End: 2021-06-07

## 2021-06-07 RX ORDER — ONDANSETRON 2 MG/ML
4 INJECTION INTRAMUSCULAR; INTRAVENOUS EVERY 6 HOURS PRN
Status: DISCONTINUED | OUTPATIENT
Start: 2021-06-07 | End: 2021-06-08 | Stop reason: HOSPADM

## 2021-06-07 RX ORDER — MAGNESIUM SULFATE HEPTAHYDRATE 40 MG/ML
2 INJECTION, SOLUTION INTRAVENOUS ONCE
Status: COMPLETED | OUTPATIENT
Start: 2021-06-07 | End: 2021-06-07

## 2021-06-07 RX ORDER — LORAZEPAM 2 MG/ML
2 INJECTION INTRAMUSCULAR ONCE
Status: COMPLETED | OUTPATIENT
Start: 2021-06-07 | End: 2021-06-07

## 2021-06-07 RX ORDER — GABAPENTIN 100 MG/1
100 CAPSULE ORAL EVERY 8 HOURS
Status: DISCONTINUED | OUTPATIENT
Start: 2021-06-15 | End: 2021-06-08 | Stop reason: HOSPADM

## 2021-06-07 RX ORDER — FOLIC ACID 1 MG/1
1 TABLET ORAL DAILY
Status: DISCONTINUED | OUTPATIENT
Start: 2021-06-08 | End: 2021-06-08 | Stop reason: HOSPADM

## 2021-06-07 RX ORDER — NALOXONE HYDROCHLORIDE 0.4 MG/ML
0.4 INJECTION, SOLUTION INTRAMUSCULAR; INTRAVENOUS; SUBCUTANEOUS
Status: DISCONTINUED | OUTPATIENT
Start: 2021-06-07 | End: 2021-06-08 | Stop reason: HOSPADM

## 2021-06-07 RX ORDER — POLYETHYLENE GLYCOL 3350 17 G/17G
17 POWDER, FOR SOLUTION ORAL DAILY PRN
Status: DISCONTINUED | OUTPATIENT
Start: 2021-06-07 | End: 2021-06-08 | Stop reason: HOSPADM

## 2021-06-07 RX ORDER — NALOXONE HYDROCHLORIDE 0.4 MG/ML
0.2 INJECTION, SOLUTION INTRAMUSCULAR; INTRAVENOUS; SUBCUTANEOUS
Status: DISCONTINUED | OUTPATIENT
Start: 2021-06-07 | End: 2021-06-08 | Stop reason: HOSPADM

## 2021-06-07 RX ORDER — GABAPENTIN 300 MG/1
600 CAPSULE ORAL EVERY 8 HOURS
Status: DISCONTINUED | OUTPATIENT
Start: 2021-06-11 | End: 2021-06-08 | Stop reason: HOSPADM

## 2021-06-07 RX ORDER — AMOXICILLIN 250 MG
2 CAPSULE ORAL 2 TIMES DAILY PRN
Status: DISCONTINUED | OUTPATIENT
Start: 2021-06-07 | End: 2021-06-08 | Stop reason: HOSPADM

## 2021-06-07 RX ORDER — LANOLIN ALCOHOL/MO/W.PET/CERES
100 CREAM (GRAM) TOPICAL DAILY
Status: DISCONTINUED | OUTPATIENT
Start: 2021-06-15 | End: 2021-06-08 | Stop reason: HOSPADM

## 2021-06-07 RX ORDER — POTASSIUM CHLORIDE 1500 MG/1
20 TABLET, EXTENDED RELEASE ORAL 2 TIMES DAILY
Status: DISCONTINUED | OUTPATIENT
Start: 2021-06-07 | End: 2021-06-08 | Stop reason: HOSPADM

## 2021-06-07 RX ORDER — LANOLIN ALCOHOL/MO/W.PET/CERES
200 CREAM (GRAM) TOPICAL 3 TIMES DAILY
Status: DISCONTINUED | OUTPATIENT
Start: 2021-06-07 | End: 2021-06-08 | Stop reason: HOSPADM

## 2021-06-07 RX ORDER — CLONIDINE HYDROCHLORIDE 0.1 MG/1
0.1 TABLET ORAL EVERY 8 HOURS
Status: DISCONTINUED | OUTPATIENT
Start: 2021-06-07 | End: 2021-06-08 | Stop reason: HOSPADM

## 2021-06-07 RX ORDER — GABAPENTIN 300 MG/1
900 CAPSULE ORAL EVERY 8 HOURS
Status: DISCONTINUED | OUTPATIENT
Start: 2021-06-08 | End: 2021-06-08 | Stop reason: HOSPADM

## 2021-06-07 RX ORDER — GABAPENTIN 600 MG/1
1200 TABLET ORAL ONCE
Status: COMPLETED | OUTPATIENT
Start: 2021-06-07 | End: 2021-06-07

## 2021-06-07 RX ORDER — AMOXICILLIN 250 MG
1 CAPSULE ORAL 2 TIMES DAILY PRN
Status: DISCONTINUED | OUTPATIENT
Start: 2021-06-07 | End: 2021-06-08 | Stop reason: HOSPADM

## 2021-06-07 RX ORDER — ACETAMINOPHEN 325 MG/1
650 TABLET ORAL EVERY 4 HOURS PRN
Status: DISCONTINUED | OUTPATIENT
Start: 2021-06-07 | End: 2021-06-08 | Stop reason: HOSPADM

## 2021-06-07 RX ORDER — GABAPENTIN 300 MG/1
300 CAPSULE ORAL EVERY 8 HOURS
Status: DISCONTINUED | OUTPATIENT
Start: 2021-06-13 | End: 2021-06-08 | Stop reason: HOSPADM

## 2021-06-07 RX ORDER — ONDANSETRON 4 MG/1
4 TABLET, ORALLY DISINTEGRATING ORAL EVERY 6 HOURS PRN
Status: DISCONTINUED | OUTPATIENT
Start: 2021-06-07 | End: 2021-06-08 | Stop reason: HOSPADM

## 2021-06-07 RX ORDER — LIDOCAINE 40 MG/G
CREAM TOPICAL
Status: DISCONTINUED | OUTPATIENT
Start: 2021-06-07 | End: 2021-06-08 | Stop reason: HOSPADM

## 2021-06-07 RX ORDER — MULTIPLE VITAMINS W/ MINERALS TAB 9MG-400MCG
1 TAB ORAL DAILY
Status: DISCONTINUED | OUTPATIENT
Start: 2021-06-08 | End: 2021-06-08 | Stop reason: HOSPADM

## 2021-06-07 RX ORDER — OXYCODONE HYDROCHLORIDE 5 MG/1
5 TABLET ORAL EVERY 6 HOURS PRN
Status: DISCONTINUED | OUTPATIENT
Start: 2021-06-07 | End: 2021-06-08 | Stop reason: HOSPADM

## 2021-06-07 RX ORDER — LANOLIN ALCOHOL/MO/W.PET/CERES
100 CREAM (GRAM) TOPICAL 3 TIMES DAILY
Status: DISCONTINUED | OUTPATIENT
Start: 2021-06-09 | End: 2021-06-08 | Stop reason: HOSPADM

## 2021-06-07 RX ORDER — DEXTROSE MONOHYDRATE, SODIUM CHLORIDE, AND POTASSIUM CHLORIDE 50; 1.49; 4.5 G/1000ML; G/1000ML; G/1000ML
INJECTION, SOLUTION INTRAVENOUS CONTINUOUS
Status: DISCONTINUED | OUTPATIENT
Start: 2021-06-07 | End: 2021-06-08 | Stop reason: HOSPADM

## 2021-06-07 RX ORDER — ATENOLOL 25 MG/1
25 TABLET ORAL DAILY
Status: DISCONTINUED | OUTPATIENT
Start: 2021-06-08 | End: 2021-06-08 | Stop reason: HOSPADM

## 2021-06-07 RX ORDER — FAMOTIDINE 20 MG/1
20 TABLET, FILM COATED ORAL 2 TIMES DAILY
Status: DISCONTINUED | OUTPATIENT
Start: 2021-06-07 | End: 2021-06-08 | Stop reason: HOSPADM

## 2021-06-07 RX ORDER — LORAZEPAM 2 MG/ML
1-2 INJECTION INTRAMUSCULAR EVERY 30 MIN PRN
Status: DISCONTINUED | OUTPATIENT
Start: 2021-06-07 | End: 2021-06-08 | Stop reason: HOSPADM

## 2021-06-07 RX ORDER — FLUMAZENIL 0.1 MG/ML
0.2 INJECTION, SOLUTION INTRAVENOUS
Status: DISCONTINUED | OUTPATIENT
Start: 2021-06-07 | End: 2021-06-08 | Stop reason: HOSPADM

## 2021-06-07 RX ORDER — LORAZEPAM 1 MG/1
1-2 TABLET ORAL EVERY 30 MIN PRN
Status: DISCONTINUED | OUTPATIENT
Start: 2021-06-07 | End: 2021-06-08 | Stop reason: HOSPADM

## 2021-06-07 RX ADMIN — GABAPENTIN 1200 MG: 600 TABLET, FILM COATED ORAL at 22:00

## 2021-06-07 RX ADMIN — MAGNESIUM SULFATE HEPTAHYDRATE 2 G: 40 INJECTION, SOLUTION INTRAVENOUS at 20:48

## 2021-06-07 RX ADMIN — LORAZEPAM 2 MG: 2 INJECTION INTRAMUSCULAR; INTRAVENOUS at 17:21

## 2021-06-07 RX ADMIN — FOLIC ACID: 5 INJECTION, SOLUTION INTRAMUSCULAR; INTRAVENOUS; SUBCUTANEOUS at 18:16

## 2021-06-07 RX ADMIN — FAMOTIDINE 20 MG: 20 TABLET ORAL at 22:00

## 2021-06-07 RX ADMIN — POTASSIUM CHLORIDE, DEXTROSE MONOHYDRATE AND SODIUM CHLORIDE: 150; 5; 450 INJECTION, SOLUTION INTRAVENOUS at 21:05

## 2021-06-07 RX ADMIN — POTASSIUM CHLORIDE 20 MEQ: 1500 TABLET, EXTENDED RELEASE ORAL at 21:59

## 2021-06-07 RX ADMIN — POTASSIUM CHLORIDE 40 MEQ: 1500 TABLET, EXTENDED RELEASE ORAL at 20:48

## 2021-06-07 RX ADMIN — MAGNESIUM SULFATE HEPTAHYDRATE 2 G: 2 INJECTION, SOLUTION INTRAVENOUS at 22:22

## 2021-06-07 RX ADMIN — CLONIDINE HYDROCHLORIDE 0.1 MG: 0.1 TABLET ORAL at 22:00

## 2021-06-07 RX ADMIN — POTASSIUM CHLORIDE 40 MEQ: 1500 TABLET, EXTENDED RELEASE ORAL at 22:00

## 2021-06-07 RX ADMIN — THIAMINE HCL TAB 100 MG 200 MG: 100 TAB at 22:00

## 2021-06-07 ASSESSMENT — ENCOUNTER SYMPTOMS
DIARRHEA: 1
SHORTNESS OF BREATH: 0
NAUSEA: 1
NERVOUS/ANXIOUS: 1
VOMITING: 1

## 2021-06-07 NOTE — ED NOTES
Bed: ED07  Expected date: 6/7/21  Expected time: 4:35 PM  Means of arrival: Ambulance  Comments:  bv2

## 2021-06-07 NOTE — ED PROVIDER NOTES
History     Chief Complaint:  Anxiety    The history is provided by the patient.      Maddie Orourke is a 31 year old female who presents via EMS for evaluation of multiple symptoms in the setting of anxiety. She reports feeling anxious all day long, partially due to the fact that she ran out of her potassium supplement today which she usually takes daily. She has not been able to set up a PCP appointment to refill her medications either. Today, her symptoms started with nausea upon waking this morning which quickly progressed into non-bloody vomiting and diarrhea. As her GI symptoms progressed, she reports her anxiety did too. Just prior to arrival, she reports her hands and feet began to feel numb and tingling shortly before they started cramping. She also started to feel an intermittent chest pain at this time too. However, because her hands were cramped up, she dared not drive and called 911 thinking she was having a heart attack. She reports the paramedics told her it was likely more of an anxiety attack. Here, she reports her symptoms are improving, but still present. She denies shortness of breath. She also continues to feel anxious but she denies suicidal or homicidal ideations. She also denies chance of pregnancy and she has received 1/2 COVID-19 vaccines.     She does report regular alcohol use, stating she drinks at least 4 times a week; she reports drinking about 5 shots of liquor each time she drinks. She reports her last drink was yesterday. She denies drug use. She does report a history of withdrawal, but denies seizure history.    Review of Systems   Respiratory: Negative for shortness of breath.    Cardiovascular: Positive for chest pain.   Gastrointestinal: Positive for diarrhea, nausea and vomiting.   Psychiatric/Behavioral: Negative for self-injury and suicidal ideas. The patient is nervous/anxious.    All other systems reviewed and are negative.    Allergies:  No Known  Allergies    Medications:    Folic acid   Potassium chloride  Thiamine   Magnesium     Past Medical History:    Alcohol abuse   Prolonged QT   Alcohol withdrawal    Past Surgical History:    Deviated septum repair     Family History:    No past family history.     Social History:  Presents via EMS   Reports drinking 4+ times a week. Last drink yesterday.     Physical Exam     Patient Vitals for the past 24 hrs:   BP Temp Temp src Pulse Resp SpO2   06/07/21 1648 (!) 141/102 99.1  F (37.3  C) Oral 110 20 100 %       Physical Exam  Nursing note and vitals reviewed.  Constitutional: Well nourished.   Eyes: Conjunctiva normal.  Pupils are equal, round, and reactive to light.   ENT: Nose normal. Mucous membranes pink and moist.    Neck: Normal range of motion.  CVS: Sinus tachycardia  Pulmonary: Lungs clear to auscultation bilaterally. No wheezes/rales/rhonchi.  GI: Abdomen soft. Nontender, nondistended. No rigidity or guarding.    MSK: No calf tenderness or swelling.  Neuro: Alert. Follows simple commands.  Mildly tremulous  Skin: Skin is warm and dry. No rash noted.   Psychiatric: Anxious appearing      Emergency Department Course   ECG:  ECG taken at 1801  Normal sinus rhythm.  Cannot rule out anterior infarct, age undetermined.  Abnormal ECG  Rate 99 bpm. NC interval 160 ms. QRS duration 94 ms. QT/QTc 466/598 ms. P-R-T axes 59 -11 0.     Laboratory:  CBC: WBC: 9.4, HGB: 11.3 (L), PLT: 272  CMP: K: 2.1 (LL), Creatinine: 0.47 (L), Ca: 8.9 (LL), Albumin: 2.5 (L), Protein total: 5.0 (L), Alkaline phosphatase: 177 (H), ALT: 59 (H), AST: 158 (H), o/w WNL     Lipase: 24 (L)  Magnesium: 0.8 (LL)    Alcohol ethyl: <0.01    HCG: Pending on admission     Asymptomatic COVID-19 Virus PCR: Pending on admission    Emergency Department Course:    Reviewed:  I reviewed nursing notes, vitals, past history and care everywhere    Assessments:  1710 I obtained history and examined the patient as noted above.   1840 I rechecked the patient  and explained findings and plan for admission.     Consults:   1857: I consulted with Dr. Kellogg of the hospitalist services. He is in agreement to accept the patient for admission.    Interventions:  1721: Ativan, 2 mg, IV injection   1816: NS + banana bag, IV infusion     Disposition:  The patient was admitted to the hospital under the care of Dr. Kellogg.    Impression & Plan      Medical Decision Making:  Patient is a 31-year-old female presenting with a multitude of symptoms predominantly anxiety.  I do have concerns for alcohol withdrawl today based on presentation.  She also has profound electrolyte derangements notably hypo-K and hypomagnesemia in addition to hypocalcemia.  No significant EKG changes.  She is not septic appearing.  Transaminitis consistent with chronic alcohol use.  She was given IV Ativan during her time in the ED and will require CIWA protocol.  She remained hemodynamically stable, accepted by hospitalist for admission.  She is agreeable to admission.  Will require telemetry.      Covid-19  Maddie Orourke was evaluated during a global COVID-19 pandemic, which necessitated consideration that the patient might be at risk for infection with the SARS-CoV-2 virus that causes COVID-19.   Applicable protocols for evaluation were followed during the patient's care.   COVID-19 was considered as part of the patient's evaluation. The plan for testing is:  a test was obtained during this visit.    Diagnosis:    ICD-10-CM    1. Alcohol withdrawal syndrome without complication (H)  F10.230    2. Hypokalemia  E87.6    3. Hypomagnesemia  E83.42    4. Hypocalcemia  E83.51    5. Transaminitis  R74.01      Scribe Disclosure:  I, Ally Garduno, am serving as a scribe at 4:51 PM on 6/7/2021 to document services personally performed by Uyen Santos DO based on my observations and the provider's statements to me.         Uyen Santos DO  06/07/21 1700

## 2021-06-07 NOTE — ED TRIAGE NOTES
Pt presents via EMS after having been fighting anxiety all day and it was getting worse. Pt also reports N/V/D all day as well. Pt is feeling better upon arrival, is A&O, ABC's intact. Pt denies any alcohol use today.

## 2021-06-08 VITALS
RESPIRATION RATE: 16 BRPM | BODY MASS INDEX: 19.7 KG/M2 | DIASTOLIC BLOOD PRESSURE: 71 MMHG | HEART RATE: 89 BPM | TEMPERATURE: 97.4 F | OXYGEN SATURATION: 100 % | SYSTOLIC BLOOD PRESSURE: 105 MMHG | WEIGHT: 125.8 LBS

## 2021-06-08 LAB
AMPHETAMINES UR QL SCN: NEGATIVE
ANION GAP SERPL CALCULATED.3IONS-SCNC: 4 MMOL/L (ref 3–14)
BARBITURATES UR QL: NEGATIVE
BENZODIAZ UR QL: NEGATIVE
BUN SERPL-MCNC: 2 MG/DL (ref 7–30)
CALCIUM SERPL-MCNC: 7.4 MG/DL (ref 8.5–10.1)
CANNABINOIDS UR QL SCN: NEGATIVE
CHLORIDE SERPL-SCNC: 110 MMOL/L (ref 94–109)
CO2 SERPL-SCNC: 24 MMOL/L (ref 20–32)
COCAINE UR QL: NEGATIVE
CREAT SERPL-MCNC: 0.54 MG/DL (ref 0.52–1.04)
GFR SERPL CREATININE-BSD FRML MDRD: >90 ML/MIN/{1.73_M2}
GLUCOSE SERPL-MCNC: 111 MG/DL (ref 70–99)
INTERPRETATION ECG - MUSE: NORMAL
MAGNESIUM SERPL-MCNC: 2.7 MG/DL (ref 1.6–2.3)
OPIATES UR QL SCN: NEGATIVE
PCP UR QL SCN: NEGATIVE
PHOSPHATE SERPL-MCNC: 2.2 MG/DL (ref 2.5–4.5)
POTASSIUM SERPL-SCNC: 3.2 MMOL/L (ref 3.4–5.3)
POTASSIUM SERPL-SCNC: 4.3 MMOL/L (ref 3.4–5.3)
SODIUM SERPL-SCNC: 138 MMOL/L (ref 133–144)

## 2021-06-08 PROCEDURE — 84100 ASSAY OF PHOSPHORUS: CPT | Performed by: INTERNAL MEDICINE

## 2021-06-08 PROCEDURE — 250N000013 HC RX MED GY IP 250 OP 250 PS 637: Performed by: INTERNAL MEDICINE

## 2021-06-08 PROCEDURE — 258N000003 HC RX IP 258 OP 636: Performed by: INTERNAL MEDICINE

## 2021-06-08 PROCEDURE — 99239 HOSP IP/OBS DSCHRG MGMT >30: CPT | Performed by: INTERNAL MEDICINE

## 2021-06-08 PROCEDURE — 80307 DRUG TEST PRSMV CHEM ANLYZR: CPT | Performed by: INTERNAL MEDICINE

## 2021-06-08 PROCEDURE — 84132 ASSAY OF SERUM POTASSIUM: CPT | Performed by: INTERNAL MEDICINE

## 2021-06-08 PROCEDURE — 36415 COLL VENOUS BLD VENIPUNCTURE: CPT | Performed by: INTERNAL MEDICINE

## 2021-06-08 PROCEDURE — 99207 PR NO BILLABLE SERVICE THIS VISIT: CPT | Performed by: INTERNAL MEDICINE

## 2021-06-08 PROCEDURE — 999N000216 HC STATISTIC ADULT CD FACE TO FACE-NO CHRG

## 2021-06-08 PROCEDURE — 80048 BASIC METABOLIC PNL TOTAL CA: CPT | Performed by: INTERNAL MEDICINE

## 2021-06-08 PROCEDURE — 83735 ASSAY OF MAGNESIUM: CPT | Performed by: INTERNAL MEDICINE

## 2021-06-08 RX ORDER — POTASSIUM CHLORIDE 1500 MG/1
20 TABLET, EXTENDED RELEASE ORAL ONCE
Status: COMPLETED | OUTPATIENT
Start: 2021-06-08 | End: 2021-06-08

## 2021-06-08 RX ORDER — POTASSIUM CHLORIDE 1500 MG/1
20 TABLET, EXTENDED RELEASE ORAL 2 TIMES DAILY
Qty: 60 TABLET | Refills: 0 | Status: ON HOLD | OUTPATIENT
Start: 2021-06-08 | End: 2021-07-13

## 2021-06-08 RX ORDER — MULTIVITAMIN WITH IRON
1 TABLET ORAL DAILY
Qty: 30 TABLET | Refills: 0 | Status: ON HOLD | OUTPATIENT
Start: 2021-06-08 | End: 2021-07-13

## 2021-06-08 RX ADMIN — POTASSIUM CHLORIDE, DEXTROSE MONOHYDRATE AND SODIUM CHLORIDE: 150; 5; 450 INJECTION, SOLUTION INTRAVENOUS at 05:03

## 2021-06-08 RX ADMIN — POTASSIUM & SODIUM PHOSPHATES POWDER PACK 280-160-250 MG 1 PACKET: 280-160-250 PACK at 13:56

## 2021-06-08 RX ADMIN — POTASSIUM CHLORIDE 20 MEQ: 1500 TABLET, EXTENDED RELEASE ORAL at 08:55

## 2021-06-08 RX ADMIN — POTASSIUM & SODIUM PHOSPHATES POWDER PACK 280-160-250 MG 1 PACKET: 280-160-250 PACK at 08:55

## 2021-06-08 RX ADMIN — THIAMINE HCL TAB 100 MG 200 MG: 100 TAB at 13:56

## 2021-06-08 RX ADMIN — GABAPENTIN 900 MG: 300 CAPSULE ORAL at 05:02

## 2021-06-08 RX ADMIN — GABAPENTIN 900 MG: 300 CAPSULE ORAL at 13:57

## 2021-06-08 RX ADMIN — MULTIPLE VITAMINS W/ MINERALS TAB 1 TABLET: TAB at 08:55

## 2021-06-08 RX ADMIN — FOLIC ACID 1 MG: 1 TABLET ORAL at 08:55

## 2021-06-08 RX ADMIN — THIAMINE HCL TAB 100 MG 200 MG: 100 TAB at 08:55

## 2021-06-08 RX ADMIN — CLONIDINE HYDROCHLORIDE 0.1 MG: 0.1 TABLET ORAL at 13:57

## 2021-06-08 RX ADMIN — POTASSIUM CHLORIDE, DEXTROSE MONOHYDRATE AND SODIUM CHLORIDE: 150; 5; 450 INJECTION, SOLUTION INTRAVENOUS at 12:44

## 2021-06-08 RX ADMIN — POTASSIUM CHLORIDE 20 MEQ: 1500 TABLET, EXTENDED RELEASE ORAL at 02:49

## 2021-06-08 RX ADMIN — ATENOLOL 25 MG: 25 TABLET ORAL at 08:55

## 2021-06-08 RX ADMIN — FAMOTIDINE 20 MG: 20 TABLET ORAL at 08:55

## 2021-06-08 ASSESSMENT — ACTIVITIES OF DAILY LIVING (ADL)
ADLS_ACUITY_SCORE: 19

## 2021-06-08 NOTE — ED NOTES
[facility]  ED Nurse Handoff Report    Maddie Orourke is a 31 year old female   ED Chief complaint: Anxiety  . ED Diagnosis:   Final diagnoses:   Alcohol withdrawal syndrome without complication (H)   Hypokalemia   Hypomagnesemia   Hypocalcemia   Transaminitis     Allergies: No Known Allergies    Code Status: Full Code  Activity level - Baseline/Home:  Stand by Assist. Activity Level - Current:   Stand by Assist. Lift room needed: No. Bariatric: No   Needed: No   Isolation: No. Infection: Not Applicable.     Vital Signs:   Vitals:    06/07/21 1845 06/07/21 1850 06/07/21 1855 06/07/21 1900   BP: (!) 126/90   116/75   Pulse: 98 81 78 84   Resp: 18 20 (!) 34 21   Temp:       TempSrc:       SpO2: 100% 100% 99% 96%       Cardiac Rhythm:  ,      Pain level:    Patient confused: No. Patient Falls Risk: Yes.   Elimination Status: Has voided   Patient Report - Initial Complaint: Increased anxiety. Focused Assessment: Patient having increased anxiety, chronic drinker found to have low potassium and magnesium    Tests Performed: labs, imaging. Abnormal Results:   Labs Ordered and Resulted from Time of ED Arrival Up to the Time of Departure from the ED   CBC WITH PLATELETS DIFFERENTIAL - Abnormal; Notable for the following components:       Result Value    RBC Count 3.64 (*)     Hemoglobin 11.3 (*)     Hematocrit 34.4 (*)     All other components within normal limits   COMPREHENSIVE METABOLIC PANEL - Abnormal; Notable for the following components:    Potassium 2.1 (*)     Creatinine 0.47 (*)     Calcium 5.9 (*)     Albumin 2.5 (*)     Protein Total 5.0 (*)     Alkaline Phosphatase 177 (*)     ALT 59 (*)      (*)     All other components within normal limits   LIPASE - Abnormal; Notable for the following components:    Lipase 24 (*)     All other components within normal limits   MAGNESIUM - Abnormal; Notable for the following components:    Magnesium 0.8 (*)     All other components within  normal limits   ALCOHOL ETHYL   SARS-COV-2 (COVID-19) VIRUS RT-PCR   HCG QUALITATIVE   PULSE OXIMETRY NURSING   CARDIAC CONTINUOUS MONITORING     No orders to display   .   Treatments provided: Medications  Family Comments: patient updated  OBS brochure/video discussed/provided to patient:  Yes  ED Medications:   Medications   magnesium sulfate 2 g in water intermittent infusion (has no administration in time range)   potassium chloride ER (KLOR-CON M) CR tablet 40 mEq (has no administration in time range)   sodium chloride 0.9 % 1,000 mL with Infuvite Adult 10 mL, thiamine 100 mg, folic acid 1 mg infusion ( Intravenous New Bag 6/7/21 1816)   LORazepam (ATIVAN) injection 2 mg (2 mg Intravenous Given 6/7/21 1721)     Drips infusing:  Yes  For the majority of the shift, the patient's behavior Green. Interventions performed were None.    Sepsis treatment initiated: No     Patient tested for COVID 19 prior to admission: YES    ED Nurse Name/Phone Number: Kelly Ervin RN,   8:01 PM    RECEIVING UNIT ED HANDOFF REVIEW    Above ED Nurse Handoff Report was reviewed: Yes  Reviewed by: Dafne Santamaria RN on June 7, 2021 at 8:19 PM

## 2021-06-08 NOTE — PLAN OF CARE
Pt arrived to floor around 2030. VSS. SBA. Tele- SR. CIWA score of 2. IVF started. Mg in process of being replaced IV, recheck in AM. K+ back @ 2.2, scheduled PO K+ and replacement K+ dose received per MD. Denies pain. Reports tingling is only in her right foot now and bilateral cheeks. Regular diet, denies nausea, able to tolerate chicken broth x2 and part of a turkey sandwich.

## 2021-06-08 NOTE — PLAN OF CARE
Pt discharged w/ mednatalia lindquist here. AVS discussed, questions encouraged and answered. Getting picked up by friend.

## 2021-06-08 NOTE — CONSULTS
6/8/2021      Pt has been interviewed via telephone and CD Consult has been completed. Pt reports she is not interested in CD treatment at this time. Pt reports she would be interested in CD resources. Email has been sent to pt with alternative sober support groups and the Mental Health and Addiction Services Line: [phone]. Pt is able to  make an appt through [facility] and schedule an outpatient evaluation after discharge.     GEOVANY Dolan  Phone: [phone]  Email: [email or web address]

## 2021-06-08 NOTE — UTILIZATION REVIEW
"[facility] Utilization Review  Admission Status; Secondary Review Determination     Admission Date: 6/7/2021  4:41 PM      Under the authority of the Utilization Management Committee, the utilization review process indicated a secondary review on the above patient.  The review outcome is based on review of the medical records, discussions with staff, and applying clinical experience noted on the date of the review.        (X) Observation Status Appropriate - This patient does not meet hospital inpatient criteria and is placed in observation status. If this patient's primary payer is Medicare and was admitted as an inpatient, Condition Code 44 should be used and patient status changed to \"observation\".   () Observation Status concurrent Review           RATIONALE FOR DETERMINATION   31-year-old female with history of alcoholism, prior hospitalizations for alcohol abuse, anxiety, hypokalemia, admitted with nausea, vomiting, bilateral upper extremity tingling with increasing anxiety, found to have alcohol withdrawal.  Started on CIWA protocol, patient also has severe electrolyte abnormalities including hypokalemia, hypomagnesemia, hypocalcemia, also has transaminitis.  Electrolytes adequately replaced, and patient stable and ready for discharge after single midnight hospital stay, recommend change to observation status, communicated to Dr. Kellogg      The severity of illness, intensity of service provided, expected LOS make the care appropriate for observation status at this time.        The information on this document is developed by the utilization review team in order for the business office to ensure compliance.  This only denotes the appropriateness of proper admission status and does not reflect the quality of care rendered.              Sincerely,       Guru Momin MD  Physician Advisor  Utilization Review-[place]    Phone: [phone]       "

## 2021-06-08 NOTE — PLAN OF CARE
A&Ox4. VSS. Ind in room. Would like to go home today to attend to her dog. Tolerating diet well. Voiding ok. Passing gas. CMS intact.

## 2021-06-08 NOTE — CONSULTS
"CLINICAL NUTRITION SERVICES  -  ASSESSMENT NOTE    Recommendations Ordered by Registered Dietitian (RD): *  Continue diet as ordered   Ordered Ensure Enlive BID (vanilla)   Continue MVI+M as ordered    Malnutrition:   % Weight Loss:  None noted  % Intake:  <75% for >/= 3 months (non-severe malnutrition)  Subcutaneous Fat Loss:  Orbital region mild depletion and Upper arm region mild depletion  Muscle Loss:  Clavicle bone region mild depletion, Acromion bone region mild depletion, Anterior thigh region mild depletion and Posterior calf region mild depletion  Fluid Retention:  None noted    Malnutrition Diagnosis: Non-Severe malnutrition  In Context of:  Environmental or social circumstances     REASON FOR ASSESSMENT  Maddie Orourke is a 31 year old female seen by Registered Dietitian for Admission Nutrition Risk Screen for positive    NUTRITION HISTORY  - Information obtained from patient and chart   - Patient admitted for alcohol withdrawal, critical hypokalemia and hypomagnesemia  - History of anxiety, alcohol abuse, prior hospitalization for alcohol withdrawals and hypokalemia   - Typical food/fluid intake PTA: pt states that her appetite was \"terrible\" prior to admission. Mentions that her PO intake goes in \"bouts\". When she is not drinking alcohol she states that she eats very well, 2 meals per day, however this will last for about 2 days and then she begins to drink alcohol again. States that when she is drinking alcohol she will not consume any food and has severe vomiting and diarrhea.   - Supplements: ensure, 1 per day  - Chewing/swallowing difficulty: yes, pt feels as though her throat is closing when she is vomiting.   - Food allergies: NKFA    CURRENT NUTRITION ORDERS  Diet Order:     Regular Diet     Current Intake/Tolerance:  No information recorded in the flowsheets to comment on     NUTRITION FOCUSED PHYSICAL ASSESSMENT FOR DIAGNOSING MALNUTRITION)  Yes            Observed:    Muscle wasting (refer " "to documentation in Malnutrition section) and Subcutaneous fat loss (refer to documentation in Malnutrition section)    Obtained from Chart/Interdisciplinary Team:  - care management/social work consulted  - chemical dependency consulted     ANTHROPOMETRICS  Height: 5' 7\" --> taken on 1/16/2021  Weight: 57.1 kg ( 125 lbs 12.8 oz)   Body mass index is 19.7 kg/m .  Weight Status:  Normal BMI  Weight History: weight appears stable, even increasing over the past 1 year.   Wt Readings from Last 10 Encounters:   06/07/21 57.1 kg (125 lb 12.8 oz)   04/08/21 57 kg (125 lb 11.2 oz)   01/16/21 55.4 kg (122 lb 1.6 oz)   08/22/20 56.4 kg (124 lb 4.8 oz)   06/23/20 54.3 kg (119 lb 12.8 oz)   05/08/19 53.3 kg (117 lb 9.6 oz)     LABS  Labs reviewed    Labs:  Electrolytes  Potassium (mmol/L)   Date Value   06/08/2021 4.3   06/08/2021 3.2 (L)   06/07/2021 2.2 (LL)     Phosphorus (mg/dL)   Date Value   06/08/2021 2.2 (L)   06/07/2021 3.5   04/08/2021 1.0 (L)   04/07/2021 4.4   03/19/2021 1.2 (L)    Blood Glucose  Glucose (mg/dL)   Date Value   06/08/2021 111 (H)   06/07/2021 90   05/28/2021 117 (H)   04/08/2021 108 (H)   04/07/2021 151 (H)    Inflammatory Markers  WBC (10e9/L)   Date Value   06/07/2021 9.4   05/28/2021 7.6   04/08/2021 6.2     Albumin (g/dL)   Date Value   06/07/2021 2.5 (L)   05/28/2021 3.6   04/08/2021 3.1 (L)      Magnesium (mg/dL)   Date Value   06/08/2021 2.7 (H)   06/07/2021 1.1 (L)   06/07/2021 0.8 (LL)     Sodium (mmol/L)   Date Value   06/08/2021 138   06/07/2021 139   05/28/2021 134    Renal  Urea Nitrogen (mg/dL)   Date Value   06/08/2021 2 (L)   06/07/2021 7   05/28/2021 3 (L)     Creatinine (mg/dL)   Date Value   06/08/2021 0.54   06/07/2021 0.47 (L)   05/28/2021 0.62     Additional  Ketones Urine (mg/dL)   Date Value   05/28/2021 Negative        MEDICATIONS  Medications reviewed    atenolol  25 mg Oral Daily     cloNIDine  0.1 mg Oral Q8H     famotidine  20 mg Oral BID     folic acid  1 mg Oral Daily "     [START ON 6/15/2021] gabapentin  100 mg Oral Q8H     [START ON 6/13/2021] gabapentin  300 mg Oral Q8H     [START ON 6/11/2021] gabapentin  600 mg Oral Q8H     gabapentin  900 mg Oral Q8H     multivitamin w/minerals  1 tablet Oral Daily     potassium & sodium phosphates  1 packet Oral TID     potassium chloride ER  20 mEq Oral BID     sodium chloride (PF)  3 mL Intracatheter Q8H     thiamine  200 mg Oral TID    Followed by     [START ON 6/9/2021] thiamine  100 mg Oral TID    Followed by     [START ON 6/15/2021] thiamine  100 mg Oral Daily        dextrose 5% and 0.45% NaCl + KCl 20 mEq/L 125 mL/hr at 06/08/21 0503      acetaminophen, flumazenil, lidocaine 4%, lidocaine (buffered or not buffered), LORazepam **OR** LORazepam, melatonin, naloxone **OR** naloxone **OR** naloxone **OR** naloxone, ondansetron **OR** ondansetron, oxyCODONE, polyethylene glycol, senna-docusate **OR** senna-docusate, sodium chloride (PF)     ASSESSED NUTRITION NEEDS PER APPROVED PRACTICE GUIDELINES:    Dosing Weight 57.1 kg   Estimated Energy Needs: 7107-4872 kcals (30-35 Kcal/Kg)  Justification: borderline underweight   Estimated Protein Needs: 69-86 grams protein (1.2-1.5 g pro/Kg)  Justification: preservation of lean body mass  Estimated Fluid Needs: per MD     MALNUTRITION:  % Weight Loss:  None noted  % Intake:  <75% for >/= 3 months (non-severe malnutrition)  Subcutaneous Fat Loss:  Orbital region mild depletion and Upper arm region mild depletion  Muscle Loss:  Clavicle bone region mild depletion, Acromion bone region mild depletion, Anterior thigh region mild depletion and Posterior calf region mild depletion  Fluid Retention:  None noted    Malnutrition Diagnosis: Non-Severe malnutrition  In Context of:  Environmental or social circumstances    NUTRITION DIAGNOSIS:  Inadequate oral intake related to poor nutritional choices in the setting of EtOH use as evidenced by pt likely consuming <75% of nutritional needs over the past > 3  months.     NUTRITION INTERVENTIONS  Recommendations / Nutrition Prescription  Continue diet as ordered   Ordered Ensure Enlive BID (vanilla)   Continue MVI+M as ordered     Implementation  Nutrition education: Provided education on the different oral nutritional supplements offered. Encouraged pt to consume protein with meals.   Medical Food Supplement: as above  Multivitamin+Minerals: as above    Nutrition Goals  Pt to consume >/=75% of meals ordered TID     MONITORING AND EVALUATION:  Progress towards goals will be monitored and evaluated per protocol and Practice Guidelines    Suzan Piper, RD, LD  Clinical Dietitian

## 2021-06-08 NOTE — PROVIDER NOTIFICATION
DATE:  6/7/2021   TIME OF RECEIPT FROM LAB:  2119  LAB TEST: Potassium  LAB VALUE:  2.2  RESULTS GIVEN WITH READ-BACK TO (PROVIDER):  Ralph  TIME LAB VALUE REPORTED TO PROVIDER:   2125    WBP sent to provider, following protocol and giving scheduled dose

## 2021-06-08 NOTE — PROGRESS NOTES
CM/SW:      D: Received request to see regarding discharge planning, chart reviewed noting pt's admit yesterday from home presenting with anxiety, hx ETOH use with hx of withdrawal. Noted CD consult ordered.       A/P: SW following, will assist with resources support if noted with CD eval, and provide information/resources on other needs that pt may identify during assessment with her.         .    Emerita Boles, [facility]   Inpatient Care Coordination   [facility]     [phone]

## 2021-06-08 NOTE — PHARMACY-ADMISSION MEDICATION HISTORY
Admission medication history interview status for this patient is complete. See [facility] admission navigator for allergy information, prior to admission medications and immunization status.     Medication history interview done, indicate source(s): Patient  Medication history resources (including written lists, pill bottles, clinic record):None    Changes made to PTA medication list:  Added: none  Deleted: none  Changed: none    Actions taken by pharmacist (provider contacted, etc):None     Additional medication history information: Patient states that she ran out of atenolol and needs a new prescription for it.      Medication reconciliation/reorder completed by provider prior to medication history?  Y   (Y/N)     Prior to Admission medications    Medication Sig Last Dose Taking? Auth Provider   folic acid (FOLVITE) 1 MG tablet Take 1 tablet (1 mg) by mouth daily 6/7/2021 at Unknown time Yes Jeanette Chinchilla MD   MAGNESIUM PO Take 1 tablet by mouth daily  6/7/2021 at Unknown time Yes Unknown, Entered By History   multivitamin w/minerals (THERA-VIT-M) tablet Take 1 tablet by mouth daily Past Month at Unknown time Yes Jeanette Chinchilla MD   potassium chloride ER (KLOR-CON M) 20 MEQ CR tablet Take 1 tablet (20 mEq) by mouth 2 times daily 6/6/2021 at Unknown time Yes Raine Best MD   thiamine (B-1) 100 MG tablet Take 1 tablet (100 mg) by mouth daily 6/7/2021 at Unknown time Yes Jeanette Chinchilla MD   atenolol (TENORMIN) 25 MG tablet Take 1 tablet (25 mg) by mouth daily more than a month at ran out of, need new prescription  Priya Hill MD

## 2021-06-08 NOTE — H&P
[facility]  Hospitalist Admission Note  Name: Maddie Orourke    MRN: [id number]  YOB: 1990    Age: 31 year old  Date of admission: 6/7/2021  Primary care provider: Juan A Giordano            Assessment and Plan:   Maddie Orourke is a 31 year old female with a history of alcoholism, prior hospitalization for alcohol abuse, hypokalemia, anxiety who presents with earlier nausea, vomiting, tingling sensation on bilateral upper extremity with increasing anxiety    1.  Alcohol withdrawals  2.  Critical hypokalemia  3.  Hypomagnesemia  4.  Alcohol abuse  5.  Alcohol dependence  6.  Alcohol transaminitis  7.  Anxiety    Admit as inpatient.  Unfortunately Maddie is a high risk for clinical deterioration.  Will need at least remote telemetry monitoring.  Aggressive electrolyte supplementation with potassium, magnesium supplementation possible.  Check phosphorus.  Provided with CIWA benzodiazepine triggered protocol.  I will also start her with gabapentin pathway as she remained high risk for alcohol withdrawals given her longstanding history of alcoholism and prior hospitalization for alcohol withdrawals  IV fluid support.  Pepcid.  Resume her beta-blockers.  Chemical dependency evaluation if desired, social service evaluation requested  Urine pregnancy negative  COVID-19 screen pending      Code status: Full code  Admit to inpatient  Prophylaxis: Mechanical prophylaxis  Disposition: Likely home but anticipating at least 2 inpatient days          Chief Complaint:   Increasing anxiety       Source of Information:   Patient with fair reliability  Discussion with ED physician  Review of E chart records         History of Present Illness:   Maddie Orourke is a 31 year old female with known prior history of anxiety, alcohol abuse, prior hospitalization for alcohol withdrawals, hypokalemia and has been been drinking on a regular basis.  Reported last EtOH intake yesterday and has  to presents to the emergency room due to increasing symptoms of anxiety as she felt tingling sensation and numbness on bilateral hands and started to have some crampy sensation.  She also mentioned intermittent chest discomfort and accompanying nausea with no vomiting and earlier nonbloody watery diarrhea.  She attributed this as she ran out of her KCl prescription.  Upon presentation in the emergency room she has stable hemodynamics but showed tachycardia, increase of blood pressure levels and concerns for ongoing alcohol withdrawals.  She was found with critically low hypokalemia.  She has no reported fevers, bleeding tendencies, back pain, mental status changes, focal weakness, recent fall events, constipation, urinary symptomatology, hematuria.  She was provided with KCl supplementation, magnesium supplementation, IV fluids and subsequently referred to us for further evaluation and care due to underlying suspected alcohol withdrawals with accompanying electrolyte derangements.           Past Medical History:   Known history of alcohol abuse          Past Surgical History:     Past Surgical History:   Procedure Laterality Date     HEAD & NECK SURGERY      repair of deviated septum             Social History:     Social History     Tobacco Use     Smoking status: Current Every Day Smoker     Packs/day: 0.25     Smokeless tobacco: Current User   Substance Use Topics     Alcohol use: Yes     Frequency: 4 or more times a week     Drinks per session: 1 or 2     Comment: eley 2 days ago.              Family History:   Family history was fully reviewed and non-contributory in this case.         Allergies:   No Known Allergies          Medications:     Prior to Admission medications    Medication Sig Last Dose Taking? Auth Provider   folic acid (FOLVITE) 1 MG tablet Take 1 tablet (1 mg) by mouth daily 6/7/2021 at Unknown time Yes Jeanette Chinchilla MD   MAGNESIUM PO Take 1 tablet by mouth daily  6/7/2021 at  Unknown time Yes Unknown, Entered By History   multivitamin w/minerals (THERA-VIT-M) tablet Take 1 tablet by mouth daily Past Month at Unknown time Yes Jeanette Chinchilla MD   potassium chloride ER (KLOR-CON M) 20 MEQ CR tablet Take 1 tablet (20 mEq) by mouth 2 times daily 6/6/2021 at Unknown time Yes Raine Best MD   thiamine (B-1) 100 MG tablet Take 1 tablet (100 mg) by mouth daily 6/7/2021 at Unknown time Yes Jeanette Chinchilla MD   atenolol (TENORMIN) 25 MG tablet Take 1 tablet (25 mg) by mouth daily more than a month at ran out of, need new prescription  Priya Hill MD             Review of Systems:   A Comprehensive greater than 10 system review of systems was carried out.  Pertinent positives and negatives are noted above.  Otherwise negative for contributory information.           Physical Exam:   Blood pressure 127/87, pulse 96, temperature 97.7  F (36.5  C), temperature source Temporal, resp. rate 19, weight 57.1 kg (125 lb 12.8 oz), SpO2 99 %.  Wt Readings from Last 1 Encounters:   06/07/21 57.1 kg (125 lb 12.8 oz)     Exam:  GENERAL: No apparent distress. Awake, alert, and fully oriented.  HEENT: Normocephalic, atraumatic. Extraocular movements intact.  CARDIOVASCULAR: Tachycardic rate and rhythm without murmurs or rubs. No JVD  PULMONARY: Clear to auscultation, no wheezes, crackles  ABDOMINAL: Soft, non-tender, non-distended. Bowel sounds normoactive. No hepatosplenomegaly.  EXTREMITIES: No cyanosis or clubbing. No edema.  NEUROLOGICAL: CN 2-12 grossly intact, awake and alert x3, spontaneous and coherent speech. no focal neurological deficits.  DERMATOLOGICAL: No rash, ulcer, ecchymoses, jaundice.  Psych: not agitation, not combative, pleasant mood  Lymph nodes: no obvious palpable  cervical or axillary lymphadenopathy         Data:   EKG:  I reviewed the EKG and showed sinus tachycardia 100 bpm, flattening of the T waves of the lateral leads    Imaging:  No results found  for this or any previous visit (from the past 48 hour(s)).    Labs:  No results for input(s): CULT in the last 168 hours.  Recent Labs   Lab 06/07/21  1716   WBC 9.4   HGB 11.3*   HCT 34.4*   MCV 95        Recent Labs   Lab 06/07/21  1716      POTASSIUM 2.1*   CHLORIDE 105   CO2 25   ANIONGAP 9   GLC 90   BUN 7   CR 0.47*   GFRESTIMATED >90   GFRESTBLACK >90   NISHI 5.9*   MAG 0.8*   PROTTOTAL 5.0*   ALBUMIN 2.5*   BILITOTAL 0.7   ALKPHOS 177*   *   ALT 59*     No results for input(s): SED, CRP in the last 168 hours.  No results for input(s): INR in the last 168 hours.  No results for input(s): CHOL, HDL, LDL, TRIG, CHOLHDLRATIO in the last 168 hours.  No results for input(s): TSH in the last 168 hours.  No results for input(s): COLOR, APPEARANCE, URINEGLC, URINEBILI, URINEKETONE, SG, UBLD, URINEPH, PROTEIN, UROBILINOGEN, NITRITE, LEUKEST, RBCU, WBCU in the last 168 hours.

## 2021-06-08 NOTE — DISCHARGE SUMMARY
[facility]  Discharge Summary  Name: Maddie Orourke    MRN: [id number]  YOB: 1990    Age: 31 year old  Date of Discharge:  6/8/2021  Date of Admission: 6/7/2021  Primary Care Provider: [facility]  Discharge Physician:  Constantino Kellogg MD  Discharging Service:  Hospitalist      Discharge Diagnosis:     1.  Alcohol withdrawals  2.  Critical hypokalemia  3.  Hypomagnesemia  4.  Alcohol abuse  5.  Alcohol dependence  6.  Alcohol transaminitis  7.  Anxiety  8.  Hypophosphatemia     Other Diagnosis:  No past medical history on file.       Discharge Disposition:  Discharged to home     Allergies:  No Known Allergies     Discharge Medications:   Discharge Medication List as of 6/8/2021  2:30 PM      CONTINUE these medications which have CHANGED    Details   magnesium 250 MG tablet Take 1 tablet (250 mg) by mouth daily, Disp-30 tablet, R-0, E-Prescribe      potassium chloride ER (KLOR-CON M) 20 MEQ CR tablet Take 1 tablet (20 mEq) by mouth 2 times daily, Disp-60 tablet, R-0, E-Prescribe         CONTINUE these medications which have NOT CHANGED    Details   folic acid (FOLVITE) 1 MG tablet Take 1 tablet (1 mg) by mouth daily, Disp-30 tablet,R-0, E-Prescribe      multivitamin w/minerals (THERA-VIT-M) tablet Take 1 tablet by mouth daily, Disp-30 tablet,R-0, E-Prescribe      thiamine (B-1) 100 MG tablet Take 1 tablet (100 mg) by mouth daily, Disp-30 tablet,R-0, E-Prescribe      atenolol (TENORMIN) 25 MG tablet Take 1 tablet (25 mg) by mouth daily, Disp-30 tablet, R-0, E-Prescribe              Condition on Discharge:  Discharge condition: Stable   Discharge vitals: Blood pressure 105/71, pulse 89, temperature 97.4  F (36.3  C), temperature source Temporal, resp. rate 16, weight 57.1 kg (125 lb 12.8 oz), SpO2 100 %.   Code status on discharge: Full Code     History of Present Illness:  See detailed admission note for full details.        Significant Physical Exam Findings Day of  Discharge:  HEENT; Atraumatic, normocephalic, pinkish conjuctiva, pupils bilateral reactive   Skin: warm and moist, no rashes  Lymphatics: no cervical or axillary lymphandenopathy  Lungs: equal chest expansion, clear to auscultation, no wheezes, no stridor, no crackles,   Heart: normal rate, normal rhythm, no rubs or gallops.   Abdomen: normal bowel sounds, no tenderness, no peritoneal signs, no guarding  Extremities: no deformities, no edema   Neuro; follow commands, alert and oriented x3, spontaneous speech, coherent, moves all extremities spontaneously  Psych; no hallucination, euthymic mood, not agitated        Procedures other than Imaging:  none     Imaging:  Results for orders placed or performed during the hospital encounter of 05/28/21   CT Head w/o Contrast    Narrative    CT SCAN OF THE HEAD WITHOUT CONTRAST   5/28/2021 11:41 AM     HISTORY: Trauma - head injury; fall, LOC, seizure.    TECHNIQUE:  Axial images of the head and coronal reformations without  IV contrast material.  Radiation dose for this scan was reduced using  automated exposure control, adjustment of the mA and/or kV according  to patient size, or iterative reconstruction technique.    COMPARISON: 3/19/2021    FINDINGS: There is some mild cerebral and cerebellar atrophy. The  brain parenchyma is otherwise normal. There is no evidence for  intracranial hemorrhage, acute infarct, mass effect, or skull  fracture. Visualized paranasal sinuses and mastoid air cells are  clear.      Impression    IMPRESSION:  1. Mild cerebral and cerebellar atrophy.  2. No evidence for intracranial hemorrhage or any acute process.    ISRA PHAM MD        Consultations:  Consultation during this admission received from chemical dependency  However patient declined treatment at this time.  Resources provided care of chemical dependency and        Recent Lab Results:  Recent Labs   Lab 06/07/21  1716   WBC 9.4   HGB 11.3*   HCT 34.4*   MCV 95   PLT  272     No results for input(s): CULT in the last 168 hours.  Recent Labs   Lab 06/08/21  0640 06/08/21  0143 06/07/21  1946 06/07/21  1716     --   --  139   POTASSIUM 4.3 3.2* 2.2* 2.1*   CHLORIDE 110*  --   --  105   CO2 24  --   --  25   ANIONGAP 4  --   --  9   *  --   --  90   BUN 2*  --   --  7   CR 0.54  --   --  0.47*   GFRESTIMATED >90  --   --  >90   GFRESTBLACK >90  --   --  >90   NISHI 7.4*  --   --  5.9*   MAG 2.7*  --  1.1* 0.8*   PHOS 2.2*  --  3.5  --    PROTTOTAL  --   --   --  5.0*   ALBUMIN  --   --   --  2.5*   BILITOTAL  --   --   --  0.7   ALKPHOS  --   --   --  177*   AST  --   --   --  158*   ALT  --   --   --  59*     Recent Labs   Lab 06/08/21  0640 06/07/21  1716   * 90     No results for input(s): LACT in the last 168 hours.  No results for input(s): TROPONIN, TROPI, TROPR in the last 168 hours.    Invalid input(s): TROP, TROPONINIES  No results for input(s): COLOR, APPEARANCE, URINEGLC, URINEBILI, URINEKETONE, SG, UBLD, URINEPH, PROTEIN, UROBILINOGEN, NITRITE, LEUKEST, RBCU, WBCU in the last 168 hours.       Pending Results:    Unresulted Labs Ordered in the Past 30 Days of this Admission     No orders found for last 31 day(s).           Discharge Instructions and Follow-Up:   Discharge diet: Orders Placed This Encounter      Combination Diet Regular Diet Adult      Diet     Discharge activity: Activity as tolerated   Discharge follow-up: 1-2 weeks with PCP  Alcohol cessation program if desired   Outpatient therapy: None    Other instructions: None      Hospital Course:  Maddie is a 31-year-old pleasant young lady with unfortunately longstanding history of alcoholism, alcohol abuse and dependence and has been drinking on a regular pattern and with last EtOH drink I believe was 1 day prior to this presentation.  She initially presented with increasing anxiety, nausea, vomiting and loose bowel movement.  Found to have alcohol withdrawals symptomatology and initial  work-up showed critical and severe hypokalemia with numerous electrolyte derangements such as hypomagnesemia and hypophosphatemia.  Fortunately no malignant arrhythmias seen.  Hemodynamics remained stable.  This was supplemented aggressively.  Started also on benzodiazepine triggered CIWA protocol with gabapentin pathway.  She mentioned that she had a very restful night and uneventful.  She also demonstrated good tolerance to oral diet and denies any further abdominal symptomatology of nausea, vomiting or diarrhea.  Currently afebrile, not hypoxic.  Initially recommended to stay at least another 24 hours to closely monitor earlier critical hypokalemia patient wanting to go home as she left her dog alone in her apartment.  Proceed with home discharge.  Counseled regarding importance of EtOH cessation.  Provided her with new prescriptions for KCl, magnesium.  I do not see any clear indication in continuation prior medications of atenolol and subsequently discontinued from her list.     Total time spent in face to face contact with the patient and coordinating discharge was:  > 30 Minutes.

## 2021-06-08 NOTE — PLAN OF CARE
A&Ox4. VSS on RA. CIWA max of 5 mainly for sweating that has improved.Calm. Up with SBA. Voiding. K after replacement was 3.2 and another PO replacement given, recheck this AM. Magnesium 1.1 and replacement given, recheck this AM . Denies nausea, tolerating regular diet. Plans to discharge home when stable, CD to see.

## 2021-06-16 ENCOUNTER — HOSPITAL ENCOUNTER (EMERGENCY)
Facility: CLINIC | Age: 31
Discharge: HOME | End: 2021-06-16
Attending: EMERGENCY MEDICINE | Admitting: EMERGENCY MEDICINE
Payer: COMMERCIAL

## 2021-06-16 ENCOUNTER — APPOINTMENT (OUTPATIENT)
Dept: GENERAL RADIOLOGY | Facility: CLINIC | Age: 31
End: 2021-06-16
Attending: EMERGENCY MEDICINE
Payer: COMMERCIAL

## 2021-06-16 VITALS
OXYGEN SATURATION: 99 % | RESPIRATION RATE: 18 BRPM | TEMPERATURE: 98.2 F | HEART RATE: 92 BPM | SYSTOLIC BLOOD PRESSURE: 147 MMHG | DIASTOLIC BLOOD PRESSURE: 107 MMHG

## 2021-06-16 DIAGNOSIS — F10.11 HISTORY OF ALCOHOL ABUSE: ICD-10-CM

## 2021-06-16 DIAGNOSIS — R07.9 CHEST PAIN, UNSPECIFIED TYPE: ICD-10-CM

## 2021-06-16 LAB
ALBUMIN SERPL-MCNC: 4.2 G/DL (ref 3.4–5)
ALP SERPL-CCNC: 186 U/L (ref 40–150)
ALT SERPL W P-5'-P-CCNC: 38 U/L (ref 0–50)
ANION GAP SERPL CALCULATED.3IONS-SCNC: 16 MMOL/L (ref 3–14)
AST SERPL W P-5'-P-CCNC: 53 U/L (ref 0–45)
B-HCG FREE SERPL-ACNC: <5 IU/L
BASOPHILS # BLD AUTO: 0.1 10E9/L (ref 0–0.2)
BASOPHILS NFR BLD AUTO: 0.5 %
BILIRUB SERPL-MCNC: 0.6 MG/DL (ref 0.2–1.3)
BUN SERPL-MCNC: 7 MG/DL (ref 7–30)
CALCIUM SERPL-MCNC: 10 MG/DL (ref 8.5–10.1)
CHLORIDE SERPL-SCNC: 96 MMOL/L (ref 94–109)
CO2 SERPL-SCNC: 25 MMOL/L (ref 20–32)
CREAT SERPL-MCNC: 0.53 MG/DL (ref 0.52–1.04)
DIFFERENTIAL METHOD BLD: NORMAL
EOSINOPHIL # BLD AUTO: 0 10E9/L (ref 0–0.7)
EOSINOPHIL NFR BLD AUTO: 0.1 %
ERYTHROCYTE [DISTWIDTH] IN BLOOD BY AUTOMATED COUNT: 14 % (ref 10–15)
ETHANOL SERPL-MCNC: <0.01 G/DL
GFR SERPL CREATININE-BSD FRML MDRD: >90 ML/MIN/{1.73_M2}
GLUCOSE SERPL-MCNC: 103 MG/DL (ref 70–99)
HCT VFR BLD AUTO: 41.1 % (ref 35–47)
HGB BLD-MCNC: 13.4 G/DL (ref 11.7–15.7)
IMM GRANULOCYTES # BLD: 0 10E9/L (ref 0–0.4)
IMM GRANULOCYTES NFR BLD: 0.3 %
LABORATORY COMMENT REPORT: NORMAL
LIPASE SERPL-CCNC: 24 U/L (ref 73–393)
LYMPHOCYTES # BLD AUTO: 1.2 10E9/L (ref 0.8–5.3)
LYMPHOCYTES NFR BLD AUTO: 12.1 %
MAGNESIUM SERPL-MCNC: 2 MG/DL (ref 1.6–2.3)
MCH RBC QN AUTO: 31.1 PG (ref 26.5–33)
MCHC RBC AUTO-ENTMCNC: 32.6 G/DL (ref 31.5–36.5)
MCV RBC AUTO: 95 FL (ref 78–100)
MONOCYTES # BLD AUTO: 0.9 10E9/L (ref 0–1.3)
MONOCYTES NFR BLD AUTO: 8.9 %
NEUTROPHILS # BLD AUTO: 8 10E9/L (ref 1.6–8.3)
NEUTROPHILS NFR BLD AUTO: 78.1 %
NRBC # BLD AUTO: 0 10*3/UL
NRBC BLD AUTO-RTO: 0 /100
PLATELET # BLD AUTO: 445 10E9/L (ref 150–450)
POTASSIUM SERPL-SCNC: 3.9 MMOL/L (ref 3.4–5.3)
PROT SERPL-MCNC: 8.2 G/DL (ref 6.8–8.8)
RBC # BLD AUTO: 4.31 10E12/L (ref 3.8–5.2)
SARS-COV-2 RNA RESP QL NAA+PROBE: NEGATIVE
SODIUM SERPL-SCNC: 137 MMOL/L (ref 133–144)
SPECIMEN SOURCE: NORMAL
TROPONIN I SERPL-MCNC: <0.015 UG/L (ref 0–0.04)
WBC # BLD AUTO: 10.2 10E9/L (ref 4–11)

## 2021-06-16 PROCEDURE — 84702 CHORIONIC GONADOTROPIN TEST: CPT

## 2021-06-16 PROCEDURE — 250N000009 HC RX 250: Performed by: EMERGENCY MEDICINE

## 2021-06-16 PROCEDURE — 96366 THER/PROPH/DIAG IV INF ADDON: CPT

## 2021-06-16 PROCEDURE — 84484 ASSAY OF TROPONIN QUANT: CPT | Performed by: EMERGENCY MEDICINE

## 2021-06-16 PROCEDURE — 96375 TX/PRO/DX INJ NEW DRUG ADDON: CPT

## 2021-06-16 PROCEDURE — 93005 ELECTROCARDIOGRAM TRACING: CPT

## 2021-06-16 PROCEDURE — 87635 SARS-COV-2 COVID-19 AMP PRB: CPT | Performed by: EMERGENCY MEDICINE

## 2021-06-16 PROCEDURE — 36415 COLL VENOUS BLD VENIPUNCTURE: CPT | Performed by: EMERGENCY MEDICINE

## 2021-06-16 PROCEDURE — 250N000011 HC RX IP 250 OP 636: Performed by: EMERGENCY MEDICINE

## 2021-06-16 PROCEDURE — C9803 HOPD COVID-19 SPEC COLLECT: HCPCS

## 2021-06-16 PROCEDURE — 82077 ASSAY SPEC XCP UR&BREATH IA: CPT | Performed by: EMERGENCY MEDICINE

## 2021-06-16 PROCEDURE — 96365 THER/PROPH/DIAG IV INF INIT: CPT

## 2021-06-16 PROCEDURE — 71045 X-RAY EXAM CHEST 1 VIEW: CPT

## 2021-06-16 PROCEDURE — 85025 COMPLETE CBC W/AUTO DIFF WBC: CPT | Performed by: EMERGENCY MEDICINE

## 2021-06-16 PROCEDURE — 83735 ASSAY OF MAGNESIUM: CPT | Performed by: EMERGENCY MEDICINE

## 2021-06-16 PROCEDURE — 258N000003 HC RX IP 258 OP 636: Performed by: EMERGENCY MEDICINE

## 2021-06-16 PROCEDURE — 80053 COMPREHEN METABOLIC PANEL: CPT | Performed by: EMERGENCY MEDICINE

## 2021-06-16 PROCEDURE — 99285 EMERGENCY DEPT VISIT HI MDM: CPT | Mod: 25

## 2021-06-16 PROCEDURE — 83690 ASSAY OF LIPASE: CPT | Performed by: EMERGENCY MEDICINE

## 2021-06-16 RX ORDER — ASPIRIN 81 MG/1
324 TABLET, CHEWABLE ORAL ONCE
Status: DISCONTINUED | OUTPATIENT
Start: 2021-06-16 | End: 2021-06-16

## 2021-06-16 RX ORDER — ONDANSETRON 4 MG/1
4 TABLET, ORALLY DISINTEGRATING ORAL EVERY 8 HOURS PRN
Qty: 10 TABLET | Refills: 0 | Status: SHIPPED | OUTPATIENT
Start: 2021-06-16 | End: 2021-06-19

## 2021-06-16 RX ORDER — CHLORDIAZEPOXIDE HYDROCHLORIDE 25 MG/1
25 CAPSULE, GELATIN COATED ORAL 3 TIMES DAILY PRN
Qty: 25 CAPSULE | Refills: 0 | Status: ON HOLD | OUTPATIENT
Start: 2021-06-16 | End: 2021-07-13

## 2021-06-16 RX ORDER — LORAZEPAM 2 MG/ML
2 INJECTION INTRAMUSCULAR ONCE
Status: COMPLETED | OUTPATIENT
Start: 2021-06-16 | End: 2021-06-16

## 2021-06-16 RX ADMIN — LORAZEPAM 2 MG: 2 INJECTION INTRAMUSCULAR; INTRAVENOUS at 15:21

## 2021-06-16 RX ADMIN — THIAMINE HYDROCHLORIDE: 100 INJECTION, SOLUTION INTRAMUSCULAR; INTRAVENOUS at 15:24

## 2021-06-16 ASSESSMENT — ENCOUNTER SYMPTOMS
WEAKNESS: 0
VOMITING: 1
NAUSEA: 1
FATIGUE: 0
ABDOMINAL PAIN: 0
COUGH: 0
SHORTNESS OF BREATH: 0
NUMBNESS: 1
NERVOUS/ANXIOUS: 1
FEVER: 0
HEADACHES: 0

## 2021-06-16 NOTE — ED TRIAGE NOTES
Patient arrives from home with complaint of chest pain since 0630 this morning. Reports this is an acute on chronic episode of chest pain and has been worked up for this in the past. History of hypokalemia. 12 lead en route was unremarkable. Reports having 6 shots of whiskey last night. A&O, ABCs intact.

## 2021-06-17 LAB — INTERPRETATION ECG - MUSE: NORMAL

## 2021-06-25 ENCOUNTER — HOSPITAL ENCOUNTER (EMERGENCY)
Facility: CLINIC | Age: 31
Discharge: LEFT BEFORE DISCHARGE | End: 2021-06-26
Attending: PHYSICIAN ASSISTANT | Admitting: PHYSICIAN ASSISTANT
Payer: COMMERCIAL

## 2021-06-25 DIAGNOSIS — E87.6 HYPOKALEMIA: ICD-10-CM

## 2021-06-25 DIAGNOSIS — E87.20 LACTIC ACIDOSIS: ICD-10-CM

## 2021-06-25 DIAGNOSIS — F10.10 ALCOHOL ABUSE: ICD-10-CM

## 2021-06-25 DIAGNOSIS — F10.929 ALCOHOL INTOXICATION (H): ICD-10-CM

## 2021-06-25 DIAGNOSIS — R00.0 SINUS TACHYCARDIA: ICD-10-CM

## 2021-06-25 LAB
ALBUMIN SERPL-MCNC: 4.1 G/DL (ref 3.4–5)
ALP SERPL-CCNC: 212 U/L (ref 40–150)
ALT SERPL W P-5'-P-CCNC: 43 U/L (ref 0–50)
ANION GAP SERPL CALCULATED.3IONS-SCNC: 12 MMOL/L (ref 3–14)
AST SERPL W P-5'-P-CCNC: 96 U/L (ref 0–45)
BASE EXCESS BLDV CALC-SCNC: 6.8 MMOL/L
BASOPHILS # BLD AUTO: 0 10E9/L (ref 0–0.2)
BASOPHILS NFR BLD AUTO: 0.4 %
BILIRUB SERPL-MCNC: 0.5 MG/DL (ref 0.2–1.3)
BUN SERPL-MCNC: 5 MG/DL (ref 7–30)
CALCIUM SERPL-MCNC: 9.2 MG/DL (ref 8.5–10.1)
CHLORIDE SERPL-SCNC: 96 MMOL/L (ref 94–109)
CO2 SERPL-SCNC: 27 MMOL/L (ref 20–32)
CREAT SERPL-MCNC: 0.52 MG/DL (ref 0.52–1.04)
D DIMER PPP FEU-MCNC: 1.1 UG/ML FEU (ref 0–0.5)
DIFFERENTIAL METHOD BLD: NORMAL
EOSINOPHIL # BLD AUTO: 0.1 10E9/L (ref 0–0.7)
EOSINOPHIL NFR BLD AUTO: 1 %
ERYTHROCYTE [DISTWIDTH] IN BLOOD BY AUTOMATED COUNT: 13.8 % (ref 10–15)
GFR SERPL CREATININE-BSD FRML MDRD: >90 ML/MIN/{1.73_M2}
GLUCOSE SERPL-MCNC: 148 MG/DL (ref 70–99)
HCO3 BLDV-SCNC: 30 MMOL/L (ref 21–28)
HCT VFR BLD AUTO: 42.3 % (ref 35–47)
HGB BLD-MCNC: 14.4 G/DL (ref 11.7–15.7)
IMM GRANULOCYTES # BLD: 0 10E9/L (ref 0–0.4)
IMM GRANULOCYTES NFR BLD: 0.3 %
INR PPP: 0.91 (ref 0.86–1.14)
KETONES BLD-SCNC: 0.1 MMOL/L (ref 0–0.6)
LACTATE BLD-SCNC: 6.7 MMOL/L (ref 0.7–2)
LYMPHOCYTES # BLD AUTO: 4.4 10E9/L (ref 0.8–5.3)
LYMPHOCYTES NFR BLD AUTO: 56.7 %
MAGNESIUM SERPL-MCNC: 2.1 MG/DL (ref 1.6–2.3)
MCH RBC QN AUTO: 30.8 PG (ref 26.5–33)
MCHC RBC AUTO-ENTMCNC: 34 G/DL (ref 31.5–36.5)
MCV RBC AUTO: 90 FL (ref 78–100)
MONOCYTES # BLD AUTO: 0.7 10E9/L (ref 0–1.3)
MONOCYTES NFR BLD AUTO: 8.9 %
NEUTROPHILS # BLD AUTO: 2.6 10E9/L (ref 1.6–8.3)
NEUTROPHILS NFR BLD AUTO: 32.7 %
NRBC # BLD AUTO: 0 10*3/UL
NRBC BLD AUTO-RTO: 0 /100
O2/TOTAL GAS SETTING VFR VENT: ABNORMAL %
OXYHGB MFR BLDV: 62 %
PCO2 BLDV: 36 MM HG (ref 40–50)
PH BLDV: 7.53 PH (ref 7.32–7.43)
PLATELET # BLD AUTO: 313 10E9/L (ref 150–450)
PO2 BLDV: 33 MM HG (ref 25–47)
POTASSIUM SERPL-SCNC: 2.7 MMOL/L (ref 3.4–5.3)
PROT SERPL-MCNC: 7.8 G/DL (ref 6.8–8.8)
RBC # BLD AUTO: 4.68 10E12/L (ref 3.8–5.2)
SODIUM SERPL-SCNC: 135 MMOL/L (ref 133–144)
WBC # BLD AUTO: 7.8 10E9/L (ref 4–11)

## 2021-06-25 PROCEDURE — 85610 PROTHROMBIN TIME: CPT | Performed by: PHYSICIAN ASSISTANT

## 2021-06-25 PROCEDURE — 84703 CHORIONIC GONADOTROPIN ASSAY: CPT | Performed by: PHYSICIAN ASSISTANT

## 2021-06-25 PROCEDURE — 83735 ASSAY OF MAGNESIUM: CPT | Performed by: PHYSICIAN ASSISTANT

## 2021-06-25 PROCEDURE — 99285 EMERGENCY DEPT VISIT HI MDM: CPT | Mod: 25

## 2021-06-25 PROCEDURE — 82805 BLOOD GASES W/O2 SATURATION: CPT | Performed by: PHYSICIAN ASSISTANT

## 2021-06-25 PROCEDURE — 82077 ASSAY SPEC XCP UR&BREATH IA: CPT | Performed by: PHYSICIAN ASSISTANT

## 2021-06-25 PROCEDURE — 85025 COMPLETE CBC W/AUTO DIFF WBC: CPT | Performed by: PHYSICIAN ASSISTANT

## 2021-06-25 PROCEDURE — 81001 URINALYSIS AUTO W/SCOPE: CPT | Performed by: PHYSICIAN ASSISTANT

## 2021-06-25 PROCEDURE — 85379 FIBRIN DEGRADATION QUANT: CPT | Performed by: PHYSICIAN ASSISTANT

## 2021-06-25 PROCEDURE — 82010 KETONE BODYS QUAN: CPT | Performed by: PHYSICIAN ASSISTANT

## 2021-06-25 PROCEDURE — 83605 ASSAY OF LACTIC ACID: CPT | Performed by: PHYSICIAN ASSISTANT

## 2021-06-25 PROCEDURE — 96361 HYDRATE IV INFUSION ADD-ON: CPT

## 2021-06-25 PROCEDURE — 80307 DRUG TEST PRSMV CHEM ANLYZR: CPT | Performed by: PHYSICIAN ASSISTANT

## 2021-06-25 PROCEDURE — 258N000003 HC RX IP 258 OP 636: Performed by: PHYSICIAN ASSISTANT

## 2021-06-25 PROCEDURE — 96375 TX/PRO/DX INJ NEW DRUG ADDON: CPT

## 2021-06-25 PROCEDURE — 80053 COMPREHEN METABOLIC PANEL: CPT | Performed by: PHYSICIAN ASSISTANT

## 2021-06-25 PROCEDURE — 84484 ASSAY OF TROPONIN QUANT: CPT | Performed by: PHYSICIAN ASSISTANT

## 2021-06-25 RX ORDER — SODIUM CHLORIDE 9 MG/ML
INJECTION, SOLUTION INTRAVENOUS CONTINUOUS
Status: DISCONTINUED | OUTPATIENT
Start: 2021-06-26 | End: 2021-06-26 | Stop reason: HOSPADM

## 2021-06-25 RX ADMIN — SODIUM CHLORIDE 1000 ML: 9 INJECTION, SOLUTION INTRAVENOUS at 23:39

## 2021-06-26 ENCOUNTER — APPOINTMENT (OUTPATIENT)
Dept: CT IMAGING | Facility: CLINIC | Age: 31
End: 2021-06-26
Attending: PHYSICIAN ASSISTANT
Payer: COMMERCIAL

## 2021-06-26 VITALS
RESPIRATION RATE: 16 BRPM | TEMPERATURE: 98.3 F | SYSTOLIC BLOOD PRESSURE: 138 MMHG | HEART RATE: 124 BPM | OXYGEN SATURATION: 96 % | DIASTOLIC BLOOD PRESSURE: 105 MMHG

## 2021-06-26 LAB
ALBUMIN UR-MCNC: 20 MG/DL
AMPHETAMINES UR QL SCN: NEGATIVE
APPEARANCE UR: CLEAR
BARBITURATES UR QL: NEGATIVE
BENZODIAZ UR QL: NEGATIVE
BILIRUB UR QL STRIP: NEGATIVE
CANNABINOIDS UR QL SCN: NEGATIVE
COCAINE UR QL: NEGATIVE
COLOR UR AUTO: ABNORMAL
ETHANOL SERPL-MCNC: 0.31 G/DL
GLUCOSE UR STRIP-MCNC: NEGATIVE MG/DL
HCG SERPL QL: NEGATIVE
HGB UR QL STRIP: NEGATIVE
KETONES UR STRIP-MCNC: ABNORMAL MG/DL
LACTATE BLD-SCNC: 3.7 MMOL/L (ref 0.7–2)
LACTATE BLD-SCNC: 5.2 MMOL/L (ref 0.7–2)
LACTATE BLD-SCNC: 6.9 MMOL/L (ref 0.7–2)
LEUKOCYTE ESTERASE UR QL STRIP: NEGATIVE
MUCOUS THREADS #/AREA URNS LPF: PRESENT /LPF
NITRATE UR QL: NEGATIVE
OPIATES UR QL SCN: NEGATIVE
PCP UR QL SCN: NEGATIVE
PH UR STRIP: 6 PH (ref 5–7)
RBC #/AREA URNS AUTO: 0 /HPF (ref 0–2)
SOURCE: ABNORMAL
SP GR UR STRIP: 1.03 (ref 1–1.03)
SQUAMOUS #/AREA URNS AUTO: 1 /HPF (ref 0–1)
TROPONIN I SERPL-MCNC: <0.015 UG/L (ref 0–0.04)
UROBILINOGEN UR STRIP-MCNC: NORMAL MG/DL (ref 0–2)
WBC #/AREA URNS AUTO: 1 /HPF (ref 0–5)

## 2021-06-26 PROCEDURE — 36415 COLL VENOUS BLD VENIPUNCTURE: CPT | Performed by: EMERGENCY MEDICINE

## 2021-06-26 PROCEDURE — 71275 CT ANGIOGRAPHY CHEST: CPT

## 2021-06-26 PROCEDURE — 96374 THER/PROPH/DIAG INJ IV PUSH: CPT

## 2021-06-26 PROCEDURE — 83605 ASSAY OF LACTIC ACID: CPT | Mod: 59 | Performed by: EMERGENCY MEDICINE

## 2021-06-26 PROCEDURE — 258N000003 HC RX IP 258 OP 636: Performed by: PHYSICIAN ASSISTANT

## 2021-06-26 PROCEDURE — 96361 HYDRATE IV INFUSION ADD-ON: CPT

## 2021-06-26 PROCEDURE — 70450 CT HEAD/BRAIN W/O DYE: CPT

## 2021-06-26 PROCEDURE — 250N000011 HC RX IP 250 OP 636: Performed by: PHYSICIAN ASSISTANT

## 2021-06-26 PROCEDURE — 250N000013 HC RX MED GY IP 250 OP 250 PS 637: Performed by: EMERGENCY MEDICINE

## 2021-06-26 PROCEDURE — 83605 ASSAY OF LACTIC ACID: CPT | Performed by: PHYSICIAN ASSISTANT

## 2021-06-26 PROCEDURE — 250N000011 HC RX IP 250 OP 636: Performed by: EMERGENCY MEDICINE

## 2021-06-26 PROCEDURE — 250N000013 HC RX MED GY IP 250 OP 250 PS 637: Performed by: PHYSICIAN ASSISTANT

## 2021-06-26 PROCEDURE — 258N000003 HC RX IP 258 OP 636: Performed by: EMERGENCY MEDICINE

## 2021-06-26 PROCEDURE — 250N000009 HC RX 250: Performed by: PHYSICIAN ASSISTANT

## 2021-06-26 RX ORDER — POTASSIUM CHLORIDE 1.5 G/1.58G
20 POWDER, FOR SOLUTION ORAL ONCE
Status: COMPLETED | OUTPATIENT
Start: 2021-06-26 | End: 2021-06-26

## 2021-06-26 RX ORDER — IOPAMIDOL 755 MG/ML
500 INJECTION, SOLUTION INTRAVASCULAR ONCE
Status: COMPLETED | OUTPATIENT
Start: 2021-06-26 | End: 2021-06-26

## 2021-06-26 RX ORDER — LORAZEPAM 1 MG/1
1 TABLET ORAL ONCE
Status: COMPLETED | OUTPATIENT
Start: 2021-06-26 | End: 2021-06-26

## 2021-06-26 RX ORDER — DIPHENHYDRAMINE HYDROCHLORIDE 50 MG/ML
25 INJECTION INTRAMUSCULAR; INTRAVENOUS ONCE
Status: COMPLETED | OUTPATIENT
Start: 2021-06-26 | End: 2021-06-26

## 2021-06-26 RX ORDER — KETOROLAC TROMETHAMINE 15 MG/ML
15 INJECTION, SOLUTION INTRAMUSCULAR; INTRAVENOUS ONCE
Status: COMPLETED | OUTPATIENT
Start: 2021-06-26 | End: 2021-06-26

## 2021-06-26 RX ORDER — LORAZEPAM 2 MG/ML
1 INJECTION INTRAMUSCULAR ONCE
Status: COMPLETED | OUTPATIENT
Start: 2021-06-26 | End: 2021-06-26

## 2021-06-26 RX ORDER — LORAZEPAM 2 MG/ML
1 INJECTION INTRAMUSCULAR ONCE
Status: DISCONTINUED | OUTPATIENT
Start: 2021-06-26 | End: 2021-06-26

## 2021-06-26 RX ADMIN — LORAZEPAM 1 MG: 1 TABLET ORAL at 05:00

## 2021-06-26 RX ADMIN — DIPHENHYDRAMINE HYDROCHLORIDE 25 MG: 50 INJECTION, SOLUTION INTRAMUSCULAR; INTRAVENOUS at 00:49

## 2021-06-26 RX ADMIN — IOPAMIDOL 52 ML: 755 INJECTION, SOLUTION INTRAVENOUS at 00:29

## 2021-06-26 RX ADMIN — POTASSIUM CHLORIDE 20 MEQ: 1.5 POWDER, FOR SOLUTION ORAL at 00:49

## 2021-06-26 RX ADMIN — KETOROLAC TROMETHAMINE 15 MG: 15 INJECTION, SOLUTION INTRAMUSCULAR; INTRAVENOUS at 00:49

## 2021-06-26 RX ADMIN — SODIUM CHLORIDE 1000 ML: 9 INJECTION, SOLUTION INTRAVENOUS at 05:53

## 2021-06-26 RX ADMIN — PROCHLORPERAZINE EDISYLATE 10 MG: 5 INJECTION INTRAMUSCULAR; INTRAVENOUS at 00:49

## 2021-06-26 RX ADMIN — SODIUM CHLORIDE 73 ML: 9 INJECTION, SOLUTION INTRAVENOUS at 00:29

## 2021-06-26 RX ADMIN — SODIUM CHLORIDE 1000 ML: 9 INJECTION, SOLUTION INTRAVENOUS at 00:50

## 2021-06-26 RX ADMIN — LORAZEPAM 1 MG: 2 INJECTION INTRAMUSCULAR; INTRAVENOUS at 05:53

## 2021-06-26 NOTE — ED NOTES
DATE:  6/25/2021   TIME OF RECEIPT FROM LAB:  1431  LAB TEST:  Lactate  LAB VALUE:  6.7  RESULTS GIVEN WITH READ-BACK TO (PROVIDER):  Bruno Perales PA-C  TIME LAB VALUE REPORTED TO PROVIDER:   3884

## 2021-06-26 NOTE — ED NOTES
ED VISIT ADDENDUM:    The care of this patient was signed out to me at shift change by Diaz SCHMIDT.  Briefly, the patient is well-known to this department for history of severe alcohol dependence and electrolyte abnormalities as well as multiple admissions for alcohol withdrawal, and was handed off with improving lactic and alcohol withdrawal symptoms.  Potassium has been replaced and on serial rechecks, the patient's symptoms of withdrawal continue to improve and she is clinically sober.  Unfortunately, her lactic acid has now increased despite fluid resuscitation and patient taking ample solid and liquid p.o.'s.  At this time, I recommended the patient be admitted for ongoing therapy for alcohol withdrawal and elevated lactic acid. At this point, the patient refused my recommended care and insisted upon discharge. I discussed with the patient my diagnostic impression, recommended treatment, and alternatives to treatment. I discussed with them what I anticipated could happen if the patient were discharged including potential fatal lactic acid elevation and withdrawal. The patient was able to understand this explanation and ask appropriate questions. The patient indicates he wants to leave because she doesn't want to be in the hospital. She is clinically sober, and I believe this patient does have capacity to decide to leave Against Medical Advice, and I have asked her to sign a form indicating that decision. I have given the patient thorough discharge instructions and have prescribed all appropriate treatment. I have reminded the patient to return here at any time if decides to have further evaluation or treatment.      Suleman Vann MD  [facility]  [facility] Emergency Department             Suleman Vann MD  06/26/21 0795

## 2021-06-26 NOTE — ED TRIAGE NOTES
Pt has hx of anxiety. Since lunch, started having headache and chest pain and increased anxiety. Started drinking whiskey and has drank approximately a total of 750mL since noon. EMS was contacted [facility]. Upon arrival, pt was hyperventilating and and having carpal pedal syndrome. ABCs intact. A&Ox2.

## 2021-06-26 NOTE — ED NOTES
DATE:  6/26/2021   TIME OF RECEIPT FROM LAB:  0002  LAB TEST:  etoh  LAB VALUE:  0.31  RESULTS GIVEN WITH READ-BACK TO (PROVIDER):  Bruno Perales*  TIME LAB VALUE REPORTED TO PROVIDER:   0003

## 2021-06-26 NOTE — ED NOTES
Patient checked on several times to inquire about ride home, pt states she has had no success finding a ride. Patient is resting more. Security/Safety check in place.

## 2021-06-26 NOTE — ED PROVIDER NOTES
History   Chief Complaint:  Anxiety and Headache     The history is provided by the patient.      Maddie Orourke is a 31 year old female with history of alcohol abuse, alcohol withdrawal, suicidal ideation who presents for evaluation of anxiety/chest pain/headache. She voices having the onset of a headache today, severe, her whole head, constant, improved by nothing. This caused anxiety and she also had the onset of chest pain. Her chest pain is retrosternal, constant, coinciding with a racing heart beat. She attempted to calm her anxiety with copious amounts of whisky (approximately 750mL). She was doing well before this all began. Denies fever, chills, sweats, congestion, rhinorrhea, sore throat, coughing, wheezing, vomiting, diarrhea. Denies change in vision, facial drooping/numbness, loss of function in arms or legs, trauma.     Review of Systems  See HPI    Allergies:  The patient has no known allergies.     Medications:  Librium  Folic Acid  Thiamine   Potassium Chloride ER    Past Medical History:    Hypomagnesemia  Hypophosphatemia  Alcohol intoxication  Prolonged QT interval  Hypokalemia  Alcohol Abuse   Suicidal ideation  Alcohol Withdrawl     Past Surgical History:    Repair of deviated septum     Social History:  The patient presents to the ED alone.    Physical Exam     Patient Vitals for the past 24 hrs:   BP Temp Temp src Pulse Resp SpO2   06/25/21 2330 (!) 131/99 -- -- 131 -- --   06/25/21 2315 (!) 119/101 -- -- 163 -- 100 %   06/25/21 2306 (!) 122/91 98.3  F (36.8  C) Oral 154 16 99 %     Physical Exam  Vitals signs and nursing note reviewed.   Constitutional:       General: She is not in acute distress.     Appearance: She is not diaphoretic.   HENT:      Head: Normocephalic and atraumatic.      Mouth/Throat:      Comments: Dry mucous membranes  Eyes:      General: No scleral icterus.     Extraocular Movements: Extraocular movements intact.      Pupils: Pupils are equal, round, and reactive to  light.   Cardiovascular:      Rate and Rhythm: Regular rhythm. Tachycardia present.      Pulses: Normal pulses.      Heart sounds: Normal heart sounds.   Pulmonary:      Effort: Pulmonary effort is normal. No respiratory distress.      Breath sounds: Normal breath sounds.   Abdominal:      General: Bowel sounds are normal.      Palpations: Abdomen is soft.      Tenderness: There is no abdominal tenderness.   Musculoskeletal:         General: No tenderness.   Skin:     General: Skin is warm.      Findings: No rash.   Neurological:      Mental Status: She is alert.       Emergency Department Course     Imaging:    Ct Chest Pulmonary Embolism W Contrast  IMPRESSION: 1.  Hepatic steatosis. 2.  Images considerably degraded from motion artifact. No pulmonary emboli identified although small and some medium-sized pulmonary arteries are not well visualized. If further evaluation is warranted, nuclear medicine V/Q scan would be helpful.    Head Ct W/o Contrast  IMPRESSION: 1.  No acute intracranial process. 2.  Mild atrophy for age.    No orders to display     Laboratory:  Labs Ordered and Resulted from Time of ED Arrival Up to the Time of Departure from the ED   BLOOD GAS VENOUS AND OXYHGB - Abnormal; Notable for the following components:       Result Value    Ph Venous 7.53 (*)     PCO2 Venous 36 (*)     Bicarbonate Venous 30 (*)     All other components within normal limits   D DIMER QUANTITATIVE - Abnormal; Notable for the following components:    D Dimer 1.1 (*)     All other components within normal limits   COMPREHENSIVE METABOLIC PANEL - Abnormal; Notable for the following components:    Potassium 2.7 (*)     Glucose 148 (*)     Urea Nitrogen 5 (*)     Alkaline Phosphatase 212 (*)     AST 96 (*)     All other components within normal limits   ALCOHOL ETHYL - Abnormal; Notable for the following components:    Ethanol g/dL 0.31 (*)     All other components within normal limits   UA MACROSCOPIC WITH REFLEX TO MICRO AND  CULTURE - Abnormal; Notable for the following components:    Ketones Urine Trace (*)     Protein Albumin Urine 20 (*)     Mucous Urine Present (*)     All other components within normal limits   LACTIC ACID WHOLE BLOOD - Abnormal; Notable for the following components:    Lactic Acid 6.7 (*)     All other components within normal limits   CBC WITH PLATELETS DIFFERENTIAL   TROPONIN I   KETONE BETA-HYDROXYBUTYRATE QUANTITATIVE   HCG QUALITATIVE   DRUG ABUSE SCREEN 77 URINE (FL, RH, SH)   INR   MAGNESIUM     Emergency Department Course:  Reviewed:  I reviewed nursing notes, vitals, past medical history and care everywhere    Assessments:  2313 I performed a physical exam of the patient. Findings as above.      Patient rechecked and updated. Plan of care discussed and questions answered.     Consults:       Interventions:  Medications   0.9% sodium chloride BOLUS (1,000 mLs Intravenous New Bag 6/25/21 2339)     Followed by   0.9% sodium chloride BOLUS (1,000 mLs Intravenous New Bag 6/26/21 0050)     Followed by   sodium chloride 0.9% infusion (has no administration in time range)   prochlorperazine (COMPAZINE) injection 10 mg (10 mg Intravenous Given 6/26/21 0049)   diphenhydrAMINE (BENADRYL) injection 25 mg (25 mg Intravenous Given 6/26/21 0049)   ketorolac (TORADOL) injection 15 mg (15 mg Intravenous Given 6/26/21 0049)   potassium chloride (KLOR-CON) Packet 20 mEq (20 mEq Oral Given 6/26/21 0049)   Sodium Chloride 0.9 % Bag 500mL for CT Scan Flush Use (73 mLs Intravenous Given 6/26/21 0029)   iopamidol (ISOVUE-370) solution 500 mL (52 mLs Intravenous Given 6/26/21 0029)     Disposition:  Care of the patient was transferred to my colleague Dr. Gayle pending disposition       Impression & Plan     Medical Decision Making:  She presents for evaluation and appears intoxicated. She was noticeably tachycardic at the time of initial encounter. She appears considerably dehydrated prompting volume resuscitation. This is  reinforced by her initial lactic acidosis which clears with normal saline. Her tachycardia also improves as well while in the ED.  She is not a trusted historian given her intoxication and CT imaging of the head obtained to evaluate for traumatic injury as well as spontaneous subarachnoid hemorrhage. This was unremarkable.   An evaluation into her chest pain/palpitations also did occur. Her d-dimer was elevated prompting CT imaging of the chest without acute cardiopulmonary findings presently warranting intervention. There was some degree of motion degradation mentioned by the radiologist and visible on my review as well. Given lack of hypoxemia, improvement of her vital signs with fluid resuscitation, continued evaluation for pulmonary embolism does not appear warranted.   Surprisingly aside from alcohol intoxication, lactic acidosis, and hypokalemia, her diagnostic labs are non-contributory. Electrolyte repletion was initiated in the ED.   Care transferred to Dr. Vann pending clinical improvement and disposition.        Critical Care Time: was 0 minutes for this patient excluding procedures    Diagnosis:    ICD-10-CM    1. Alcohol abuse  F10.10    2. Alcohol intoxication (H)  F10.929    3. Sinus tachycardia  R00.0    4. Lactic acidosis  E87.2    5. Hypokalemia  E87.6         Bruno Perales PA-C  06/26/21 0156

## 2021-06-26 NOTE — ED NOTES
DATE:  6/26/2021   TIME OF RECEIPT FROM LAB:  0558  LAB TEST:  Lactic Acid  LAB VALUE:  6.9  RESULTS GIVEN WITH READ-BACK TO (PROVIDER):  Dr Vann  TIME LAB VALUE REPORTED TO PROVIDER:   0558

## 2021-06-28 LAB — INTERPRETATION ECG - MUSE: NORMAL

## 2021-07-11 ENCOUNTER — HOSPITAL ENCOUNTER (INPATIENT)
Facility: CLINIC | Age: 31
LOS: 1 days | Discharge: HOME | DRG: 897 | End: 2021-07-13
Attending: EMERGENCY MEDICINE | Admitting: HOSPITALIST
Payer: COMMERCIAL

## 2021-07-11 DIAGNOSIS — E87.6 HYPOKALEMIA: ICD-10-CM

## 2021-07-11 DIAGNOSIS — E83.42 HYPOMAGNESEMIA: Primary | ICD-10-CM

## 2021-07-11 DIAGNOSIS — R94.31 PROLONGED QT INTERVAL: ICD-10-CM

## 2021-07-11 DIAGNOSIS — E83.39 HYPOPHOSPHATEMIA: ICD-10-CM

## 2021-07-11 DIAGNOSIS — F10.920 ALCOHOLIC INTOXICATION WITHOUT COMPLICATION (H): ICD-10-CM

## 2021-07-11 DIAGNOSIS — E86.1 HYPOVOLEMIA: ICD-10-CM

## 2021-07-11 LAB
ALBUMIN SERPL-MCNC: 4.2 G/DL (ref 3.4–5)
ALP SERPL-CCNC: 278 U/L (ref 40–150)
ALT SERPL W P-5'-P-CCNC: 48 U/L (ref 0–50)
ANION GAP SERPL CALCULATED.3IONS-SCNC: 17 MMOL/L (ref 3–14)
AST SERPL W P-5'-P-CCNC: 77 U/L (ref 0–45)
BASOPHILS # BLD AUTO: 0 10E3/UL (ref 0–0.2)
BASOPHILS NFR BLD AUTO: 0 %
BILIRUB SERPL-MCNC: 0.6 MG/DL (ref 0.2–1.3)
BUN SERPL-MCNC: 6 MG/DL (ref 7–30)
CALCIUM SERPL-MCNC: 9.4 MG/DL (ref 8.5–10.1)
CHLORIDE BLD-SCNC: 90 MMOL/L (ref 94–109)
CO2 SERPL-SCNC: 28 MMOL/L (ref 20–32)
CREAT SERPL-MCNC: 0.54 MG/DL (ref 0.52–1.04)
EOSINOPHIL # BLD AUTO: 0 10E3/UL (ref 0–0.7)
EOSINOPHIL NFR BLD AUTO: 0 %
ERYTHROCYTE [DISTWIDTH] IN BLOOD BY AUTOMATED COUNT: 15.2 % (ref 10–15)
ETHANOL SERPL-MCNC: 0.28 G/DL
GFR SERPL CREATININE-BSD FRML MDRD: >90 ML/MIN/1.73M2
GLUCOSE BLD-MCNC: 170 MG/DL (ref 70–99)
HCG SERPL QL: NEGATIVE
HCT VFR BLD AUTO: 43 % (ref 35–47)
HGB BLD-MCNC: 14.2 G/DL (ref 11.7–15.7)
HOLD SPECIMEN: NORMAL
IMM GRANULOCYTES # BLD: 0 10E3/UL
IMM GRANULOCYTES NFR BLD: 0 %
LIPASE SERPL-CCNC: 34 U/L (ref 73–393)
LYMPHOCYTES # BLD AUTO: 3.5 10E3/UL (ref 0.8–5.3)
LYMPHOCYTES NFR BLD AUTO: 35 %
MAGNESIUM SERPL-MCNC: 2.1 MG/DL (ref 1.6–2.3)
MCH RBC QN AUTO: 31.1 PG (ref 26.5–33)
MCHC RBC AUTO-ENTMCNC: 33 G/DL (ref 31.5–36.5)
MCV RBC AUTO: 94 FL (ref 78–100)
MONOCYTES # BLD AUTO: 0.5 10E3/UL (ref 0–1.3)
MONOCYTES NFR BLD AUTO: 5 %
NEUTROPHILS # BLD AUTO: 5.9 10E3/UL (ref 1.6–8.3)
NEUTROPHILS NFR BLD AUTO: 60 %
NRBC # BLD AUTO: 0 10E3/UL
NRBC BLD AUTO-RTO: 0 /100
PLATELET # BLD AUTO: 378 10E3/UL (ref 150–450)
POTASSIUM BLD-SCNC: 2.4 MMOL/L (ref 3.4–5.3)
PROT SERPL-MCNC: 8 G/DL (ref 6.8–8.8)
RBC # BLD AUTO: 4.57 10E6/UL (ref 3.8–5.2)
SODIUM SERPL-SCNC: 135 MMOL/L (ref 133–144)
TROPONIN I SERPL-MCNC: <0.015 UG/L (ref 0–0.04)
WBC # BLD AUTO: 10 10E3/UL (ref 4–11)

## 2021-07-11 PROCEDURE — 99285 EMERGENCY DEPT VISIT HI MDM: CPT | Mod: 25

## 2021-07-11 PROCEDURE — 83690 ASSAY OF LIPASE: CPT | Performed by: EMERGENCY MEDICINE

## 2021-07-11 PROCEDURE — 84703 CHORIONIC GONADOTROPIN ASSAY: CPT | Performed by: EMERGENCY MEDICINE

## 2021-07-11 PROCEDURE — 36592 COLLECT BLOOD FROM PICC: CPT | Performed by: EMERGENCY MEDICINE

## 2021-07-11 PROCEDURE — 84484 ASSAY OF TROPONIN QUANT: CPT | Performed by: EMERGENCY MEDICINE

## 2021-07-11 PROCEDURE — 82077 ASSAY SPEC XCP UR&BREATH IA: CPT | Performed by: EMERGENCY MEDICINE

## 2021-07-11 PROCEDURE — HZ2ZZZZ DETOXIFICATION SERVICES FOR SUBSTANCE ABUSE TREATMENT: ICD-10-PCS | Performed by: HOSPITALIST

## 2021-07-11 PROCEDURE — 96361 HYDRATE IV INFUSION ADD-ON: CPT

## 2021-07-11 PROCEDURE — 87635 SARS-COV-2 COVID-19 AMP PRB: CPT | Performed by: EMERGENCY MEDICINE

## 2021-07-11 PROCEDURE — 250N000013 HC RX MED GY IP 250 OP 250 PS 637: Performed by: EMERGENCY MEDICINE

## 2021-07-11 PROCEDURE — 80053 COMPREHEN METABOLIC PANEL: CPT | Performed by: EMERGENCY MEDICINE

## 2021-07-11 PROCEDURE — 83735 ASSAY OF MAGNESIUM: CPT | Performed by: EMERGENCY MEDICINE

## 2021-07-11 PROCEDURE — 96365 THER/PROPH/DIAG IV INF INIT: CPT

## 2021-07-11 PROCEDURE — 258N000003 HC RX IP 258 OP 636: Performed by: EMERGENCY MEDICINE

## 2021-07-11 PROCEDURE — 96368 THER/DIAG CONCURRENT INF: CPT

## 2021-07-11 PROCEDURE — C9803 HOPD COVID-19 SPEC COLLECT: HCPCS

## 2021-07-11 PROCEDURE — 93005 ELECTROCARDIOGRAM TRACING: CPT

## 2021-07-11 PROCEDURE — 85025 COMPLETE CBC W/AUTO DIFF WBC: CPT | Performed by: EMERGENCY MEDICINE

## 2021-07-11 PROCEDURE — 250N000011 HC RX IP 250 OP 636: Performed by: EMERGENCY MEDICINE

## 2021-07-11 PROCEDURE — 250N000009 HC RX 250: Performed by: EMERGENCY MEDICINE

## 2021-07-11 RX ORDER — POTASSIUM CHLORIDE 1.5 G/1.58G
80 POWDER, FOR SOLUTION ORAL ONCE
Status: COMPLETED | OUTPATIENT
Start: 2021-07-11 | End: 2021-07-11

## 2021-07-11 RX ORDER — POTASSIUM CHLORIDE 7.45 MG/ML
10 INJECTION INTRAVENOUS ONCE
Status: COMPLETED | OUTPATIENT
Start: 2021-07-11 | End: 2021-07-12

## 2021-07-11 RX ORDER — ONDANSETRON 2 MG/ML
4 INJECTION INTRAMUSCULAR; INTRAVENOUS ONCE
Status: COMPLETED | OUTPATIENT
Start: 2021-07-11 | End: 2021-07-12

## 2021-07-11 RX ORDER — POTASSIUM CHLORIDE 1.5 G/1.58G
40 POWDER, FOR SOLUTION ORAL ONCE
Status: DISCONTINUED | OUTPATIENT
Start: 2021-07-11 | End: 2021-07-11

## 2021-07-11 RX ORDER — SODIUM CHLORIDE 9 MG/ML
INJECTION, SOLUTION INTRAVENOUS CONTINUOUS
Status: DISCONTINUED | OUTPATIENT
Start: 2021-07-11 | End: 2021-07-12

## 2021-07-11 RX ADMIN — SODIUM CHLORIDE 1000 ML: 9 INJECTION, SOLUTION INTRAVENOUS at 21:07

## 2021-07-11 RX ADMIN — POTASSIUM CHLORIDE 10 MEQ: 7.46 INJECTION, SOLUTION INTRAVENOUS at 23:34

## 2021-07-11 RX ADMIN — POTASSIUM CHLORIDE 80 MEQ: 1.5 POWDER, FOR SOLUTION ORAL at 22:54

## 2021-07-11 RX ADMIN — SODIUM CHLORIDE 1000 ML: 9 INJECTION, SOLUTION INTRAVENOUS at 23:34

## 2021-07-11 RX ADMIN — FOLIC ACID: 5 INJECTION, SOLUTION INTRAMUSCULAR; INTRAVENOUS; SUBCUTANEOUS at 22:09

## 2021-07-11 ASSESSMENT — ENCOUNTER SYMPTOMS
VOMITING: 1
FEVER: 0
COUGH: 0
NERVOUS/ANXIOUS: 1

## 2021-07-12 PROBLEM — E86.1 HYPOVOLEMIA: Status: ACTIVE | Noted: 2021-07-12

## 2021-07-12 LAB
ANION GAP SERPL CALCULATED.3IONS-SCNC: 6 MMOL/L (ref 3–14)
BUN SERPL-MCNC: 4 MG/DL (ref 7–30)
CALCIUM SERPL-MCNC: 7.7 MG/DL (ref 8.5–10.1)
CHLORIDE BLD-SCNC: 105 MMOL/L (ref 94–109)
CO2 SERPL-SCNC: 29 MMOL/L (ref 20–32)
CREAT SERPL-MCNC: 0.49 MG/DL (ref 0.52–1.04)
GFR SERPL CREATININE-BSD FRML MDRD: >90 ML/MIN/1.73M2
GLUCOSE BLD-MCNC: 124 MG/DL (ref 70–99)
HOLD SPECIMEN: NORMAL
INTERPRETATION ECG - MUSE: NORMAL
MAGNESIUM SERPL-MCNC: 1.6 MG/DL (ref 1.6–2.3)
PHOSPHATE SERPL-MCNC: 1.4 MG/DL (ref 2.5–4.5)
PHOSPHATE SERPL-MCNC: 2.1 MG/DL (ref 2.5–4.5)
POTASSIUM BLD-SCNC: 3.1 MMOL/L (ref 3.4–5.3)
POTASSIUM BLD-SCNC: 3.2 MMOL/L (ref 3.4–5.3)
POTASSIUM BLD-SCNC: 3.2 MMOL/L (ref 3.4–5.3)
POTASSIUM BLD-SCNC: 3.3 MMOL/L (ref 3.4–5.3)
POTASSIUM BLD-SCNC: 3.3 MMOL/L (ref 3.4–5.3)
SARS-COV-2 RNA RESP QL NAA+PROBE: NEGATIVE
SODIUM SERPL-SCNC: 140 MMOL/L (ref 133–144)

## 2021-07-12 PROCEDURE — 250N000009 HC RX 250: Performed by: HOSPITALIST

## 2021-07-12 PROCEDURE — 250N000013 HC RX MED GY IP 250 OP 250 PS 637: Performed by: INTERNAL MEDICINE

## 2021-07-12 PROCEDURE — 80048 BASIC METABOLIC PNL TOTAL CA: CPT | Performed by: EMERGENCY MEDICINE

## 2021-07-12 PROCEDURE — 120N000001 HC R&B MED SURG/OB

## 2021-07-12 PROCEDURE — 96375 TX/PRO/DX INJ NEW DRUG ADDON: CPT

## 2021-07-12 PROCEDURE — 258N000003 HC RX IP 258 OP 636: Performed by: HOSPITALIST

## 2021-07-12 PROCEDURE — 250N000009 HC RX 250: Performed by: EMERGENCY MEDICINE

## 2021-07-12 PROCEDURE — 36415 COLL VENOUS BLD VENIPUNCTURE: CPT | Performed by: HOSPITALIST

## 2021-07-12 PROCEDURE — 99207 PR NO CHARGE LOS: CPT | Performed by: INTERNAL MEDICINE

## 2021-07-12 PROCEDURE — 99223 1ST HOSP IP/OBS HIGH 75: CPT | Mod: AI | Performed by: HOSPITALIST

## 2021-07-12 PROCEDURE — 84100 ASSAY OF PHOSPHORUS: CPT | Performed by: INTERNAL MEDICINE

## 2021-07-12 PROCEDURE — 258N000003 HC RX IP 258 OP 636: Performed by: INTERNAL MEDICINE

## 2021-07-12 PROCEDURE — 84132 ASSAY OF SERUM POTASSIUM: CPT | Performed by: INTERNAL MEDICINE

## 2021-07-12 PROCEDURE — 84132 ASSAY OF SERUM POTASSIUM: CPT | Performed by: HOSPITALIST

## 2021-07-12 PROCEDURE — 36415 COLL VENOUS BLD VENIPUNCTURE: CPT | Performed by: EMERGENCY MEDICINE

## 2021-07-12 PROCEDURE — 36415 COLL VENOUS BLD VENIPUNCTURE: CPT | Performed by: INTERNAL MEDICINE

## 2021-07-12 PROCEDURE — 36592 COLLECT BLOOD FROM PICC: CPT | Performed by: EMERGENCY MEDICINE

## 2021-07-12 PROCEDURE — 250N000011 HC RX IP 250 OP 636: Performed by: EMERGENCY MEDICINE

## 2021-07-12 PROCEDURE — 250N000011 HC RX IP 250 OP 636: Performed by: HOSPITALIST

## 2021-07-12 PROCEDURE — 250N000013 HC RX MED GY IP 250 OP 250 PS 637: Performed by: HOSPITALIST

## 2021-07-12 PROCEDURE — 83735 ASSAY OF MAGNESIUM: CPT | Performed by: HOSPITALIST

## 2021-07-12 PROCEDURE — 83735 ASSAY OF MAGNESIUM: CPT | Performed by: INTERNAL MEDICINE

## 2021-07-12 PROCEDURE — 250N000009 HC RX 250: Performed by: INTERNAL MEDICINE

## 2021-07-12 PROCEDURE — 84100 ASSAY OF PHOSPHORUS: CPT | Performed by: HOSPITALIST

## 2021-07-12 RX ORDER — BISACODYL 10 MG
10 SUPPOSITORY, RECTAL RECTAL DAILY PRN
Status: DISCONTINUED | OUTPATIENT
Start: 2021-07-12 | End: 2021-07-13 | Stop reason: HOSPADM

## 2021-07-12 RX ORDER — POTASSIUM CHLORIDE 1500 MG/1
20 TABLET, EXTENDED RELEASE ORAL ONCE
Status: DISCONTINUED | OUTPATIENT
Start: 2021-07-12 | End: 2021-07-12

## 2021-07-12 RX ORDER — AMOXICILLIN 250 MG
1 CAPSULE ORAL 2 TIMES DAILY PRN
Status: DISCONTINUED | OUTPATIENT
Start: 2021-07-12 | End: 2021-07-13 | Stop reason: HOSPADM

## 2021-07-12 RX ORDER — POTASSIUM CHLORIDE 1500 MG/1
20 TABLET, EXTENDED RELEASE ORAL ONCE
Status: COMPLETED | OUTPATIENT
Start: 2021-07-12 | End: 2021-07-12

## 2021-07-12 RX ORDER — CLONIDINE HYDROCHLORIDE 0.1 MG/1
0.1 TABLET ORAL EVERY 8 HOURS
Status: DISCONTINUED | OUTPATIENT
Start: 2021-07-12 | End: 2021-07-13 | Stop reason: HOSPADM

## 2021-07-12 RX ORDER — LORAZEPAM 1 MG/1
1-2 TABLET ORAL EVERY 30 MIN PRN
Status: DISCONTINUED | OUTPATIENT
Start: 2021-07-12 | End: 2021-07-13 | Stop reason: HOSPADM

## 2021-07-12 RX ORDER — GABAPENTIN 300 MG/1
900 CAPSULE ORAL EVERY 8 HOURS
Status: DISCONTINUED | OUTPATIENT
Start: 2021-07-12 | End: 2021-07-13 | Stop reason: HOSPADM

## 2021-07-12 RX ORDER — GABAPENTIN 600 MG/1
1200 TABLET ORAL ONCE
Status: COMPLETED | OUTPATIENT
Start: 2021-07-12 | End: 2021-07-12

## 2021-07-12 RX ORDER — GABAPENTIN 300 MG/1
300 CAPSULE ORAL EVERY 8 HOURS
Status: DISCONTINUED | OUTPATIENT
Start: 2021-07-17 | End: 2021-07-13 | Stop reason: HOSPADM

## 2021-07-12 RX ORDER — GABAPENTIN 300 MG/1
600 CAPSULE ORAL EVERY 8 HOURS
Status: DISCONTINUED | OUTPATIENT
Start: 2021-07-15 | End: 2021-07-13 | Stop reason: HOSPADM

## 2021-07-12 RX ORDER — OLANZAPINE 5 MG/1
5-10 TABLET, ORALLY DISINTEGRATING ORAL EVERY 6 HOURS PRN
Status: DISCONTINUED | OUTPATIENT
Start: 2021-07-12 | End: 2021-07-13 | Stop reason: HOSPADM

## 2021-07-12 RX ORDER — MAGNESIUM OXIDE 400 MG/1
400 TABLET ORAL 2 TIMES DAILY
Status: COMPLETED | OUTPATIENT
Start: 2021-07-12 | End: 2021-07-12

## 2021-07-12 RX ORDER — ACETAMINOPHEN 650 MG/1
650 SUPPOSITORY RECTAL EVERY 6 HOURS PRN
Status: DISCONTINUED | OUTPATIENT
Start: 2021-07-12 | End: 2021-07-13 | Stop reason: HOSPADM

## 2021-07-12 RX ORDER — FOLIC ACID 1 MG/1
1 TABLET ORAL DAILY
Status: DISCONTINUED | OUTPATIENT
Start: 2021-07-12 | End: 2021-07-13 | Stop reason: HOSPADM

## 2021-07-12 RX ORDER — FAMOTIDINE 20 MG/1
20 TABLET, FILM COATED ORAL 2 TIMES DAILY
Status: DISCONTINUED | OUTPATIENT
Start: 2021-07-12 | End: 2021-07-13 | Stop reason: HOSPADM

## 2021-07-12 RX ORDER — FLUMAZENIL 0.1 MG/ML
0.2 INJECTION, SOLUTION INTRAVENOUS
Status: DISCONTINUED | OUTPATIENT
Start: 2021-07-12 | End: 2021-07-13 | Stop reason: HOSPADM

## 2021-07-12 RX ORDER — MULTIPLE VITAMINS W/ MINERALS TAB 9MG-400MCG
1 TAB ORAL DAILY
Status: DISCONTINUED | OUTPATIENT
Start: 2021-07-12 | End: 2021-07-13 | Stop reason: HOSPADM

## 2021-07-12 RX ORDER — AMOXICILLIN 250 MG
2 CAPSULE ORAL 2 TIMES DAILY PRN
Status: DISCONTINUED | OUTPATIENT
Start: 2021-07-12 | End: 2021-07-13 | Stop reason: HOSPADM

## 2021-07-12 RX ORDER — DEXTROSE MONOHYDRATE, SODIUM CHLORIDE, AND POTASSIUM CHLORIDE 50; 1.49; 4.5 G/1000ML; G/1000ML; G/1000ML
INJECTION, SOLUTION INTRAVENOUS CONTINUOUS
Status: DISCONTINUED | OUTPATIENT
Start: 2021-07-12 | End: 2021-07-12

## 2021-07-12 RX ORDER — ACETAMINOPHEN 325 MG/1
650 TABLET ORAL EVERY 6 HOURS PRN
Status: DISCONTINUED | OUTPATIENT
Start: 2021-07-12 | End: 2021-07-13 | Stop reason: HOSPADM

## 2021-07-12 RX ORDER — LANOLIN ALCOHOL/MO/W.PET/CERES
3 CREAM (GRAM) TOPICAL
Status: DISCONTINUED | OUTPATIENT
Start: 2021-07-12 | End: 2021-07-12

## 2021-07-12 RX ORDER — ONDANSETRON 4 MG/1
4 TABLET, ORALLY DISINTEGRATING ORAL EVERY 6 HOURS PRN
Status: DISCONTINUED | OUTPATIENT
Start: 2021-07-12 | End: 2021-07-13 | Stop reason: HOSPADM

## 2021-07-12 RX ORDER — LIDOCAINE 40 MG/G
CREAM TOPICAL
Status: DISCONTINUED | OUTPATIENT
Start: 2021-07-12 | End: 2021-07-13 | Stop reason: HOSPADM

## 2021-07-12 RX ORDER — GABAPENTIN 100 MG/1
100 CAPSULE ORAL EVERY 8 HOURS
Status: DISCONTINUED | OUTPATIENT
Start: 2021-07-19 | End: 2021-07-13 | Stop reason: HOSPADM

## 2021-07-12 RX ORDER — POTASSIUM CHLORIDE 7.45 MG/ML
10 INJECTION INTRAVENOUS
Status: DISCONTINUED | OUTPATIENT
Start: 2021-07-12 | End: 2021-07-12 | Stop reason: CLARIF

## 2021-07-12 RX ORDER — ONDANSETRON 2 MG/ML
4 INJECTION INTRAMUSCULAR; INTRAVENOUS EVERY 6 HOURS PRN
Status: DISCONTINUED | OUTPATIENT
Start: 2021-07-12 | End: 2021-07-13 | Stop reason: HOSPADM

## 2021-07-12 RX ORDER — HALOPERIDOL 5 MG/ML
2.5-5 INJECTION INTRAMUSCULAR EVERY 6 HOURS PRN
Status: DISCONTINUED | OUTPATIENT
Start: 2021-07-12 | End: 2021-07-13 | Stop reason: HOSPADM

## 2021-07-12 RX ORDER — LANOLIN ALCOHOL/MO/W.PET/CERES
100 CREAM (GRAM) TOPICAL DAILY
Status: DISCONTINUED | OUTPATIENT
Start: 2021-07-12 | End: 2021-07-13 | Stop reason: HOSPADM

## 2021-07-12 RX ORDER — POLYETHYLENE GLYCOL 3350 17 G/17G
17 POWDER, FOR SOLUTION ORAL DAILY PRN
Status: DISCONTINUED | OUTPATIENT
Start: 2021-07-12 | End: 2021-07-13 | Stop reason: HOSPADM

## 2021-07-12 RX ORDER — LORAZEPAM 2 MG/ML
1-2 INJECTION INTRAMUSCULAR EVERY 30 MIN PRN
Status: DISCONTINUED | OUTPATIENT
Start: 2021-07-12 | End: 2021-07-13 | Stop reason: HOSPADM

## 2021-07-12 RX ADMIN — Medication 400 MG: at 09:36

## 2021-07-12 RX ADMIN — ONDANSETRON 4 MG: 2 INJECTION INTRAMUSCULAR; INTRAVENOUS at 02:27

## 2021-07-12 RX ADMIN — FOLIC ACID 1 MG: 1 TABLET ORAL at 09:36

## 2021-07-12 RX ADMIN — CLONIDINE HYDROCHLORIDE 0.1 MG: 0.1 TABLET ORAL at 02:55

## 2021-07-12 RX ADMIN — LORAZEPAM 1 MG: 1 TABLET ORAL at 03:36

## 2021-07-12 RX ADMIN — GABAPENTIN 1200 MG: 600 TABLET, FILM COATED ORAL at 02:55

## 2021-07-12 RX ADMIN — GABAPENTIN 900 MG: 300 CAPSULE ORAL at 15:58

## 2021-07-12 RX ADMIN — GABAPENTIN 900 MG: 300 CAPSULE ORAL at 09:36

## 2021-07-12 RX ADMIN — POTASSIUM CHLORIDE 20 MEQ: 1500 TABLET, EXTENDED RELEASE ORAL at 10:12

## 2021-07-12 RX ADMIN — POTASSIUM CHLORIDE 20 MEQ: 1500 TABLET, EXTENDED RELEASE ORAL at 21:48

## 2021-07-12 RX ADMIN — THIAMINE HCL TAB 100 MG 100 MG: 100 TAB at 09:36

## 2021-07-12 RX ADMIN — Medication 400 MG: at 20:35

## 2021-07-12 RX ADMIN — LORAZEPAM 2 MG: 1 TABLET ORAL at 02:55

## 2021-07-12 RX ADMIN — FAMOTIDINE 20 MG: 10 INJECTION, SOLUTION INTRAVENOUS at 00:33

## 2021-07-12 RX ADMIN — ONDANSETRON 4 MG: 2 INJECTION INTRAMUSCULAR; INTRAVENOUS at 00:33

## 2021-07-12 RX ADMIN — CLONIDINE HYDROCHLORIDE 0.1 MG: 0.1 TABLET ORAL at 10:12

## 2021-07-12 RX ADMIN — FAMOTIDINE 20 MG: 20 TABLET ORAL at 20:35

## 2021-07-12 RX ADMIN — POTASSIUM CHLORIDE, DEXTROSE MONOHYDRATE AND SODIUM CHLORIDE: 150; 5; 450 INJECTION, SOLUTION INTRAVENOUS at 02:29

## 2021-07-12 RX ADMIN — MULTIPLE VITAMINS W/ MINERALS TAB 1 TABLET: TAB at 09:36

## 2021-07-12 RX ADMIN — POTASSIUM & SODIUM PHOSPHATES POWDER PACK 280-160-250 MG 1 PACKET: 280-160-250 PACK at 22:07

## 2021-07-12 RX ADMIN — CLONIDINE HYDROCHLORIDE 0.1 MG: 0.1 TABLET ORAL at 17:59

## 2021-07-12 RX ADMIN — FAMOTIDINE 20 MG: 10 INJECTION, SOLUTION INTRAVENOUS at 02:56

## 2021-07-12 RX ADMIN — SODIUM PHOSPHATE, MONOBASIC, MONOHYDRATE 15 MMOL: 276; 142 INJECTION, SOLUTION INTRAVENOUS at 10:14

## 2021-07-12 RX ADMIN — POTASSIUM CHLORIDE 20 MEQ: 1500 TABLET, EXTENDED RELEASE ORAL at 16:47

## 2021-07-12 RX ADMIN — POTASSIUM CHLORIDE 20 MEQ: 1500 TABLET, EXTENDED RELEASE ORAL at 03:32

## 2021-07-12 ASSESSMENT — ACTIVITIES OF DAILY LIVING (ADL)
ADLS_ACUITY_SCORE: 19
DEPENDENT_IADLS:: INDEPENDENT
ADLS_ACUITY_SCORE: 19

## 2021-07-12 NOTE — ED PROVIDER NOTES
History   Chief Complaint:  Anxiety and Alcohol Intoxication      HPI   Maddie Orourke is a 31 year old female, with a history of anxiety, alcohol abuse, and alcohol withdrawal, who presents to the ED for evaluation of anxiety and alcohol intoxication. She comes in today via EMS after she called the ambulance on herself for increased anxiety. She drank 750 mL of fireball today. She has a history of withdrawal seizures. She is interested in detox. Upon eval, she states her anxiety is better. Also endorses she has been vomiting since yesterday and feels dehydrated. Denies recent fevers or cough.     CT Chest results from 6/26/2021:  1.  Hepatic steatosis.  2.  Images considerably degraded from motion artifact. No pulmonary emboli identified although small and some medium-sized pulmonary arteries are not well visualized. If further evaluation is warranted, nuclear medicine V/Q scan would be helpful.    Review of Systems   Constitutional: Negative for fever.   Respiratory: Negative for cough.    Gastrointestinal: Positive for vomiting.   Psychiatric/Behavioral: The patient is nervous/anxious.      Allergies:  The patient has no known allergies.      Medications:  Librium  Folic Acid  Thiamine   Potassium Chloride ER     Past Medical History:    Hypomagnesemia  Hypophosphatemia  Alcohol intoxication  Prolonged QT interval  Hypokalemia  Alcohol Abuse   Suicidal ideation  Alcohol Withdrawal      Past Surgical History:    Repair of deviated septum     Social History:  Marital Status: Single  PCP: [facility]  Presents to the ED with EMS    Physical Exam     Patient Vitals for the past 24 hrs:   BP Temp Pulse Resp SpO2   07/11/21 2115 (!) 134/96 -- 123 22 97 %   07/11/21 2100 (!) 124/92 -- 124 21 94 %   07/11/21 2045 (!) 139/104 98  F (36.7  C) 142 18 99 %       Physical Exam  VS: Reviewed per above  HENT: Mucous membranes dry  EYES: sclera anicteric  CV: Rate as noted, regular rhythm.   RESP: Effort  normal. Breath sounds are normal bilaterally.  GI: no tenderness/rebound/guarding, not distended.  NEURO: Alert, moving all extremities  MSK: No deformity of the extremities  SKIN: Warm and dry    Emergency Department Course   ECG  ECG taken at 2050, ECG read at 2052  Sinus tachycardia. Cannot rule out anterior infarct, age undetermined. ST and T wave abnormality, consider inferolateral ischemia. Abnormal ECG.   No significant changes as compared to prior, dated 6/25/21.  Rate 135 bpm. NC interval 148 ms. QRS duration 88 ms. QT/QTc 268/402 ms. P-R-T axes 49 5 106.     Laboratory:  Laboratory findings were communicated with the patient who voiced understanding of the findings.    CBC: WBC: 10.0, HGB: 14.2, PLT: 378    CMP: Glucose 170 (H), Potassium: 2.4 (L), Chloride: 90 (L), Anion Gap: 17 (H), Urea Nitrogen: 6 (L), Alkaline Phosphatase: 278 (H), AST: 77 (H), o/w WNL (Creatinine: 0.54)    BMP: Pending    Troponin (Collected 2047): <0.015    Lipase: 34 (L)    Magnesium: 2.1    HCG Qualitative Blood: Negative    Alcohol ethyl: 0.28 (H)    Asymptomatic COVID-19 PCR: Pending      Emergency Department Course:    Reviewed:  I reviewed the patient's nursing notes, vitals, past medical records, Care Everywhere.     Assessments:  2050 I first assessed the patient and performed an exam. Discussed plans for care.    2240 I rechecked the patient.    2337 Discussed plans for admission.    Consults:  0003 I spoke with Dr. Suleman Mccloud of the hospitalist service regarding patient's presentation, findings, and plan of care.    Interventions:  2107: NS 1L IV Bolus   2209: Infuvite IV Infusion  2254: Potassium chloride 80 mEq PO  2254: Potassium chloride 10 mEq in 100 mL intermittent infusion  Zofran 4 mg IV    Disposition:  The patient was admitted to the hospital under the care of Dr. Mccloud.       Impression & Plan      Covid-19  Maddie Orourke was evaluated during a global COVID-19 pandemic, which necessitated consideration that  the patient might be at risk for infection with the SARS-CoV-2 virus that causes COVID-19.   Applicable protocols for evaluation were followed during the patient's care.   COVID-19 was considered as part of the patient's evaluation. The plan for testing is:  a test was obtained during this visit.    Medical Decision Making:   Maddie Orourke is a 31 year old female who presents with concern for anxiety as well as vomiting and dehydration in the setting of ongoing alcohol abuse.  On arrival she has sinus tachycardia up to 140 bpm although feels that her anxiety is improving.  After IV fluids, heart rate improved.  Labs are notable for hypokalemia of 2.4, alcohol level of 0.28.  She has nonspecific transaminitis, which I suspect is due to alcohol abuse.  We attempted to p.o. challenge but patient started vomiting again.  In the setting of significant hypokalemia with inability to reliably replace orally, she was admitted for further rehydration, electrolyte replacement.    Diagnosis:     ICD-10-CM    1. Alcoholic intoxication without complication (H)  F10.920    2. Hypokalemia  E87.6    3. Hypovolemia  E86.1        Scribe Disclosure:  I, Claudia Lua, am serving as a scribe on 7/11/2021 at 8:50 PM to personally document services performed by Zafar Daly MD based on my observations and the provider's statements to me.         Zafar Daly MD  07/12/21 0016      RECEIVING UNIT ED HANDOFF REVIEW    Above ED Nurse Handoff Report was reviewed: Yes  Reviewed by: Gwen Cade RN on July 12, 2021 at 12:40 AM

## 2021-07-12 NOTE — PLAN OF CARE
Vss, no co pain/cp/sob.   CIWA 1, 1.  Mag 1.6 recheck ordered for am, K+ 3.2 replaced with recheck pending at this time and Phos 1.4 currently being replaced and will need to have orders placed for 4 hours post replacement. IVF dc'd, SZ precautions maintained, up SBA, peeling skin noted to feet, bed alarm on for safety.  Continue poc and monitoring.       Problem: Adult Inpatient Plan of Care  Goal: Plan of Care Review  Outcome: No Change  Goal: Patient-Specific Goal (Individualized)  Outcome: No Change  Goal: Absence of Hospital-Acquired Illness or Injury  Outcome: No Change  Intervention: Identify and Manage Fall Risk  Recent Flowsheet Documentation  Taken 7/12/2021 1312 by Yenifer Chang RN  Safety Promotion/Fall Prevention: safety round/check completed  Taken 7/12/2021 1200 by Yenifer Chang RN  Safety Promotion/Fall Prevention: safety round/check completed  Taken 7/12/2021 1130 by Yenifer Chang RN  Safety Promotion/Fall Prevention: safety round/check completed  Taken 7/12/2021 1018 by Yenifer Chang RN  Safety Promotion/Fall Prevention: safety round/check completed  Taken 7/12/2021 0928 by Yenifer Chang RN  Safety Promotion/Fall Prevention:   fall prevention program maintained   treat underlying cause   treat reversible contributory factors   supervised activity   safety round/check completed   room organization consistent   room near nurse's station   room door open   patient and family education   nonskid shoes/slippers when out of bed   lighting adjusted   increase visualization of patient   increased rounding and observation   clutter free environment maintained   activity supervised   assistive device/personal items within reach   bed alarm on  Taken 7/12/2021 0836 by Yenifer Chang, RN  Safety Promotion/Fall Prevention: safety round/check completed  Taken 7/12/2021 0751 by Yenifer Chang RN  Safety Promotion/Fall Prevention: safety round/check completed  Intervention: Prevent Skin  Injury  Recent Flowsheet Documentation  Taken 7/12/2021 0928 by Yenifer Chang, RN  Body Position: position changed independently  Intervention: Prevent and Manage VTE (Venous Thromboembolism) Risk  Recent Flowsheet Documentation  Taken 7/12/2021 0928 by Yenifer Chang, RN  VTE Prevention/Management:   ambulation promoted   pneumatic compression device   AROM (active range of motion) performed   fluids promoted   bleeding risk assessed   bleeding precautions maintained  Goal: Optimal Comfort and Wellbeing  Outcome: No Change  Goal: Readiness for Transition of Care  Outcome: No Change     Problem: Substance Misuse (Alcohol Withdrawal)  Goal: Readiness for Change Identified  Outcome: No Change     Problem: Alcohol Withdrawal  Goal: Alcohol Withdrawal Symptom Control  Outcome: Improving     Problem: Acute Neurologic Deterioration (Alcohol Withdrawal)  Goal: Optimal Neurologic Function  Outcome: Improving

## 2021-07-12 NOTE — CONSULTS
Care Management Initial Consult    General Information  Assessment completed with: Patient,    Type of CM/SW Visit: Initial Assessment    Primary Care Provider verified and updated as needed: Yes   Readmission within the last 30 days: lack of support   Return Category: Medically unsuccessful treatment plan  Reason for Consult: care coordination/care conference, discharge planning  Advance Care Planning: Advance Care Planning Reviewed: no concerns identified          Communication Assessment  Patient's communication style: spoken language (English or Bilingual)    Hearing Difficulty or Deaf: no   Wear Glasses or Blind: yes    Cognitive  Cognitive/Neuro/Behavioral: WDL  Level of Consciousness: alert  Arousal Level: opens eyes spontaneously  Orientation: oriented x 4     Best Language: 0 - No aphasia  Speech: clear, spontaneous    Living Environment:   People in home:       Current living Arrangements:        Able to return to prior arrangements:         Family/Social Support:  Care provided by:    Provides care for:    Marital Status: Single  Other (specify) (friends)          Description of Support System: Supportive, Involved         Current Resources:   Patient receiving home care services: No     Community Resources: None  Equipment currently used at home: none  Supplies currently used at home: None    Employment/Financial:  Employment Status: employed full-time        Financial Concerns: No concerns identified   Referral to Financial Counselor: No       Lifestyle & Psychosocial Needs:  Social Determinants of Health     Tobacco Use: High Risk     Smoking Tobacco Use: Current Every Day Smoker     Smokeless Tobacco Use: Current User   Alcohol Use: Unknown     Frequency of Alcohol Consumption: 4 or more times a week     Average Number of Drinks: 1 or 2     Frequency of Binge Drinking: Not asked   Financial Resource Strain: Low Risk      Difficulty of Paying Living Expenses: Not hard at all   Food Insecurity: No Food  Insecurity     Worried About Running Out of Food in the Last Year: Never true     Ran Out of Food in the Last Year: Never true   Transportation Needs: No Transportation Needs     Lack of Transportation (Medical): No     Lack of Transportation (Non-Medical): No   Physical Activity: Unknown     Days of Exercise per Week: 0 days     Minutes of Exercise per Session: Not asked   Stress:      Feeling of Stress :    Social Connections: Unknown     Frequency of Communication with Friends and Family: More than three times a week     Frequency of Social Gatherings with Friends and Family: More than three times a week     Attends Yarsani Services: Not asked     Active Member of Clubs or Organizations: Not asked     Attends Club or Organization Meetings: Not asked     Marital Status: Not asked   Intimate Partner Violence: Unknown     Fear of Current or Ex-Partner: Not asked     Emotionally Abused: Not asked     Physically Abused: Not asked     Sexually Abused: Not asked   Depression:      PHQ-2 Score:    Housing Stability:      Unable to Pay for Housing in the Last Year:      Number of Places Lived in the Last Year:      Unstable Housing in the Last Year:        Functional Status:  Prior to admission patient needed assistance:   Dependent ADLs:: Independent  Dependent IADLs:: Independent       Mental Health Status:  Mental Health Status: Current Concern       Chemical Dependency Status:  Chemical Dependency Status: Current Concern             Values/Beliefs:  Spiritual, Cultural Beliefs, Yarsani Practices, Values that affect care:                 Additional Information:  Pt admitted with alcohol intoxication.  This is her 4 admission and 8 ED visit in the past 6 months.    Care Management consulted for PCP scheduling,  and Chem dep resources. Met with pt at bedside to discuss discharge planning.  Pt acknowledged that she drinks because of her anxiety. Pt broke up with her boyfriend at the start of Covid and lives alone in  an apt with her dog.  She report support system are friends.  When asked how she manages her anxiety she said she drinks water and takes deep breaths. She did gurwinder to drinking alcohol to help manage anxiety.  After last ED visit she was prescribed anxiety meds but did not fill because she said the pharmacy said it was back ordered. We discussed non pharmacological anxiety strategies.  Provided her BH resources as well as CD resources.  She was grateful for PCP support.  [facility] referral made to establish care at [facility].  Pt will call Uber or friend for ride home at discharge.      Sonal Ruby RN, BSN, PHN, CTS  Care Coordinator  [facility]  [phone]

## 2021-07-12 NOTE — PLAN OF CARE
Pt admitted from the ED at 0230 with ETOH abuse at 0.28, nausea and vomiting, and anxiety. Pt A/O. VSS with HTN. LS clear. BS positive. Has dry peeling toes and feet. CIWA 21 on admission to the floor due to severe tremors, nausea, anxiety. Other CIWA 11 and 1. Symptoms improve rapidly with medication regime. Potassium replaced. Does better with oral tablets. Did vomit the power in the ED. Regular diet. Had sips of ginger ale and bites of saltines. D5 1/2 with 20k running in LPIV. SBA with transfers.

## 2021-07-12 NOTE — ED TRIAGE NOTES
Anxiety for several months, seen in ER for similar.  Pt has not been able to fill anxiety medications so has been coping by drinking alcohol.  Speech slurred.  Had 750 ml of Fireball today.

## 2021-07-12 NOTE — ED NOTES
Nurse answered call light and patient was vomiting. Dr. Daly aware and zofran ordered. Vomit did appear orange in color like the oral potassium that was administered at 2254.

## 2021-07-12 NOTE — ED NOTES
[facility]  ED Nurse Handoff Report    Maddie Orourke is a 31 year old female   ED Chief complaint: Anxiety and Alcohol Intoxication  . ED Diagnosis:   Final diagnoses:   Alcoholic intoxication without complication (H)   Hypokalemia   Hypovolemia     Allergies: No Known Allergies    Code Status: Full Code  Activity level - Baseline/Home:  Independent. Activity Level - Current:   Assist X 1. Lift room needed: No. Bariatric: No   Needed: No   Isolation: No. Infection: Not Applicable.     Vital Signs:   Vitals:    07/11/21 2045 07/11/21 2100 07/11/21 2115   BP: (!) 139/104 (!) 124/92 (!) 134/96   Pulse: 142 124 123   Resp: 18 21 22   Temp: 98  F (36.7  C)     SpO2: 99% 94% 97%       Cardiac Rhythm:  ,      Pain level:    Patient confused: No. Patient Falls Risk: Yes.   Elimination Status: Has voided   Patient Report - Initial Complaint: Alcohol intoxication. Focused Assessment:  Maddie Orourke is a 31 year old female, with a history of anxiety, alcohol abuse, and alcohol withdrawal, who presents to the ED for evaluation of anxiety and alcohol intoxication. She comes in today via EMS after she called the ambulance on herself for increased anxiety. She drank 750 mL of fireball today. She has a history of withdrawal seizures. She is interested in detox. Upon eval, she states her anxiety is better. Also endorses she has been vomiting since yesterday and feels dehydrated. Denies recent fevers or cough.   Tests Performed: EKG, labs. Abnormal Results:   Labs Ordered and Resulted from Time of ED Arrival Up to the Time of Departure from the ED   COMPREHENSIVE METABOLIC PANEL - Abnormal; Notable for the following components:       Result Value    Potassium 2.4 (*)     Chloride 90 (*)     Anion Gap 17 (*)     Urea Nitrogen 6 (*)     Glucose 170 (*)     Alkaline Phosphatase 278 (*)     AST 77 (*)     All other components within normal limits   ETHYL ALCOHOL LEVEL - Abnormal; Notable for the following  components:    Alcohol ethyl 0.28 (*)     All other components within normal limits   LIPASE - Abnormal; Notable for the following components:    Lipase 34 (*)     All other components within normal limits   CBC WITH PLATELETS AND DIFFERENTIAL - Abnormal; Notable for the following components:    RDW 15.2 (*)     All other components within normal limits   TROPONIN I - Normal   HCG QUALITATIVE PREGNANCY - Normal   MAGNESIUM - Normal   EXTRA BLUE TOP TUBE   EXTRA RED TOP TUBE   EXTRA GREEN TOP (LITHIUM HEPARIN) TUBE   EXTRA PURPLE TOP TUBE   EXTRA GREEN TOP (LITHIUM HEPARIN) ON ICE   SARS-COV2 (COVID-19) VIRUS RT-PCR   BASIC METABOLIC PANEL   CARDIAC CONTINUOUS MONITORING   COVID-19 VIRUS (CORONAVIRUS) BY PCR    Narrative:     The following orders were created for panel order Asymptomatic COVID-19 Virus (Coronavirus) by PCR Nasopharyngeal.  Procedure                               Abnormality         Status                     ---------                               -----------         ------                     SARS-COV2 (COVID-19) Vir...[[id number]]                      In process                   Please view results for these tests on the individual orders.   EXTRA TUBE    Narrative:     The following orders were created for panel order Extra Tube (Temple Draw).  Procedure                               Abnormality         Status                     ---------                               -----------         ------                     Extra Blue Top Tube[[id number]]                              Final result               Extra Red Top Tube[[id number]]                               Final result               Extra Green Top (Lithium...[[id number]]                      Final result               Extra Purple Top Tube[[id number]]                            Final result               Extra Green Top (Lithium...[[id number]]                      Final result                 Please view results for these tests on the individual  orders.   CBC WITH PLATELETS & DIFFERENTIAL    Narrative:     The following orders were created for panel order CBC with platelets differential.  Procedure                               Abnormality         Status                     ---------                               -----------         ------                     CBC with platelets and d...[[id number]]  Abnormal            Final result                 Please view results for these tests on the individual orders.      Treatments provided: see MAR  Family Comments: N/A  OBS brochure/video discussed/provided to patient:  No  ED Medications:   Medications   0.9% sodium chloride BOLUS (1,000 mLs Intravenous New Bag 7/11/21 2107)     Followed by   0.9% sodium chloride BOLUS (1,000 mLs Intravenous New Bag 7/11/21 2334)     Followed by   sodium chloride 0.9% infusion (has no administration in time range)   potassium chloride 10 mEq in 100 mL sterile water intermittent infusion (premix) (10 mEq Intravenous New Bag 7/11/21 2334)   ondansetron (ZOFRAN) injection 4 mg (has no administration in time range)   famotidine (PEPCID) injection 20 mg (has no administration in time range)   sodium chloride 0.9 % 1,000 mL with Infuvite Adult 10 mL, thiamine 100 mg, folic acid 1 mg infusion ( Intravenous New Bag 7/11/21 2209)   potassium chloride (KLOR-CON) Packet 80 mEq (80 mEq Oral Given 7/11/21 2254)     Drips infusing:  No  For the majority of the shift, the patient's behavior Green. Interventions performed were N/A.    Sepsis treatment initiated: No     Patient tested for COVID 19 prior to admission: YES    ED Nurse Name/Phone Number: Veronica Barragan RN,   12:24 AM    RECEIVING UNIT ED HANDOFF REVIEW    Above ED Nurse Handoff Report was reviewed: Yes  Reviewed by: Gwen Cade RN on July 12, 2021 at 1:33 AM

## 2021-07-12 NOTE — H&P
HISTORY AND PHYSICAL    Admitted: 07/11/2021    PRIMARY CARE PHYSICIAN:  [facility]    CHIEF COMPLAINT:  Anxiety.    HISTORY OF PRESENT ILLNESS:  This is a 31-year-old female with a history of anxiety, alcohol use disorder, alcohol withdrawal, and suicidal ideation, presenting to [facility] for evaluation of anxiety.  History is obtained from my discussion with the patient at the bedside.  I also discussed the case with the ED physician, Dr. Daly.  The electronic medical record was also reviewed.    The  patient reports drinking essentially every day.  Earlier today, she drank a 750 mL Fireball.  She reports doing this for her anxiety.  Her last drink was about 6:00 p.m.  She had worsening of her anxiety, so she called EMS who brought her to the Emergency Department for evaluation.    Here in the hospital, her temperature was 98, heart rate 130, blood pressure 139/104, respiratory rate 18, and oxygen saturation 99% on room air.  CBC was unremarkable.  Blood sugar is 170.  BMP shows potassium of 2.4 with BUN 6.  Liver function tests show AST of 77.  Urine pregnancy is negative.  Troponin is undetectable.  Blood alcohol level of 0.28.  COVID swab is pending.  EKG shows sinus tachycardia with a QTc of 402 with no ischemic findings.  She was given IV fluids as well as famotidine, Zofran, potassium replacement and IV fluids.  She currently feels nauseated, but otherwise somewhat improved.  She does report some persisting anxiety and possible withdrawal symptoms.  She is being admitted for further evaluation.    PAST MEDICAL HISTORY:    1.  Hypomagnesemia.  2.  Hypophosphatemia.  3.  Alcohol use disorder.  4.  Prolonged QT interval.  5.  Hypokalemia.  6.  Suicidal ideation.  7.  Anxiety.    Prior to Admission medications    Medication Sig Last Dose Taking? Auth Provider   chlordiazePOXIDE (LIBRIUM) 25 MG capsule Take 1 capsule (25 mg) by mouth 3 times daily as needed for anxiety or withdrawal    Uyen Santos E, DO   folic acid (FOLVITE) 1 MG tablet Take 1 tablet (1 mg) by mouth daily   Jeanette Chinchilla MD   magnesium 250 MG tablet Take 1 tablet (250 mg) by mouth daily   Constantino Kellogg MD   multivitamin w/minerals (THERA-VIT-M) tablet Take 1 tablet by mouth daily   Jeanette Chinchilla MD   potassium chloride ER (KLOR-CON M) 20 MEQ CR tablet Take 1 tablet (20 mEq) by mouth 2 times daily   Constantino Kellogg MD   thiamine (B-1) 100 MG tablet Take 1 tablet (100 mg) by mouth daily   Jeanette Chinchilla MD        SOCIAL HISTORY:  The patient admits to drinking as noted above.  Denies drug or tobacco use.  She is single.    FAMILY HISTORY:  Assessed and noncontributory.    ALLERGIES:  No known drug allergies.    REVIEW OF SYSTEMS:  A 12-point review of systems was performed and the pertinent positive findings are presented in the history of present illness.  The remainder of the review of systems negative.    PHYSICAL EXAMINATION:    VITAL SIGNS:  Temperature 98, heart rate 114, blood pressure 134/118, respiratory rate 18, and oxygen saturation 90% on room air.    GENERAL:  No apparent distress, awake, alert and oriented x3.  HEENT:  Normocephalic, atraumatic.  Extraocular movements are intact.  NECK:  Supple, without JVD.    CARDIOVASCULAR:  Tachycardic, but regular.  No murmurs.  LUNGS:  Clear to auscultation bilaterally.  ABDOMEN:  Soft, nontender, nondistended.  Bowel sounds are present.  EXTREMITIES:  No cyanosis, clubbing, or edema.  SKIN:  No rashes, ulcers or jaundice.  NEUROLOGIC:  Cranial nerves II-XII are grossly intact.  No focal neurological deficits.  PSYCHIATRIC:  Mood and affect are appropriate but patient appears anxious.    LABORATORY AND EKG:  Personally discussed pertinent findings in the HPI.    IMPRESSION AND PLAN:  This is a 31-year-old female with a history of alcohol use disorder and anxiety, presented to [facility] for evaluation of  worsening anxiety, now with impending alcohol withdrawal.  1.  Alcohol use disorder with impending alcohol withdrawal:  She is tachycardic and already tremulous despite a blood alcohol level of 0.28 on arrival.  She does report drinking daily and fairly heavy amounts.  She is at high risk for severe alcohol withdrawal.  We will start the gabapentin protocol and CIWA scoring with trigger dose lorazepam.  We will start vitamin replacement.  Could also consider CD and Social Work consultations although she has had 2 recent admissions this year already for similar issues.  2.  Hypokalemia:  Likely due to vomiting and alcohol use.  Potassium and magnesium replacement protocols have been ordered.  3.  Transaminitis:  Likely mild alcoholic hepatitis.  This can be rechecked at a later time.  No indication for steroids.  4.  Anxiety:  She will be on gabapentin and as needed lorazepam for now.  Given her chemical dependency, she should not be discharged with benzodiazepines.  She did meet with Psychiatry during her most recent hospitalization.  5.  Intractable nausea:  Could have some gastritis.  We will continue Pepcid.  Antiemetics are also available.  6.  The patient admitted to inpatient status with an expected greater than 2-midnight hospitalization.  7.  Code status:  Full code.    Suleman Mccloud MD        D: 2021   T: 2021   MT: [id number]    Name:     ALFONZO MARTIN  MRN:      [id number]        Account:     [id number]   :      1990           Admitted:    2021       Document: [id number]

## 2021-07-12 NOTE — ED NOTES
DATE:  7/11/2021   TIME OF RECEIPT FROM LAB:  2205  LAB TEST:  potassium  LAB VALUE:  2.4  RESULTS GIVEN WITH READ-BACK TO (PROVIDER):  Zafar Daly MD  TIME LAB VALUE REPORTED TO PROVIDER:   2206

## 2021-07-12 NOTE — PHARMACY-ADMISSION MEDICATION HISTORY
Admission medication history interview status for this patient is complete. See [facility] admission navigator for allergy information, prior to admission medications and immunization status.     Medication history interview done, indicate source(s): Patient  Medication history resources (including written lists, pill bottles, clinic record):None    Changes made to PTA medication list:  Added: none  Deleted: none  Changed: none    Actions taken by pharmacist (provider contacted, etc):None     Additional medication history information:None    Medication reconciliation/reorder completed by provider prior to medication history?  N   (Y/N)     Prior to Admission medications    Medication Sig Last Dose Taking? Auth Provider   folic acid (FOLVITE) 1 MG tablet Take 1 tablet (1 mg) by mouth daily 7/10/2021 at Unknown time Yes Jeanette Chinchilla MD   magnesium 250 MG tablet Take 1 tablet (250 mg) by mouth daily 7/10/2021 at Unknown time Yes Constantino Kellogg MD   multivitamin w/minerals (THERA-VIT-M) tablet Take 1 tablet by mouth daily 7/10/2021 at Unknown time Yes Jeanette Chinchilla MD   potassium chloride ER (KLOR-CON M) 20 MEQ CR tablet Take 1 tablet (20 mEq) by mouth 2 times daily 7/10/2021 at Unknown time Yes Constantino Kellogg MD   thiamine (B-1) 100 MG tablet Take 1 tablet (100 mg) by mouth daily 7/10/2021 at Unknown time Yes Jeanette Chinchilla MD   chlordiazePOXIDE (LIBRIUM) 25 MG capsule Take 1 capsule (25 mg) by mouth 3 times daily as needed for anxiety or withdrawal   Uyen Santos, DO

## 2021-07-12 NOTE — PROGRESS NOTES
[facility]  Hospitalist Progress Note    Assessment & Plan   This is a 31-year-old female with a history of anxiety, alcohol use disorder, alcohol withdrawal, and suicidal ideation, presenting to [facility] for evaluation of anxiety.     Acute alcohol intoxication with alcohol abuse disorder with history of alcohol withdrawal seizures  -CIWA protocol. Ativan CIWA >7  -Gabapentin withdrawal protocol  -Clonidine 0.1 mg q 8 hours   -Famotidine 20 mg BID  -Seizure precautions  -Thiamine/folate/MV  -CD offered and declined. SW to provide outpatient resources. Patient reports not taking previously prescribed medications or establishing with counselor following discharge 3 weeks ago.     Electrolyte derangements: hypokalemia, hypomagnesemia, hypophosphatemia: Replacement protocol. Mag oxide 400 mg BID - reassess in AM.     Anxiety: Complicates cares. Provided counseling and PCP resources per PCP. No home medications.     Hx of suicidal ideations: Complicates cares.     Goals of care: Patient moved to [place] roughly 1 year ago.  Due to anxiety and COVID-19 she has minimal support resources.  However does report a friend recently stopped drinking and is encouraging her to stop drinking.  Of note, patient is a distant family friend.  I offered to provide alternative Hospitalist for her care patient declined. SW consulted for outpatient resources.     FEN: oral hydration; monitor; regular  Activity: As tolerated  DVT Prophylaxis: Pneumatic Compression Devices  Family update: Patient to update family     Code Status: Full Code    Expected discharge: 1-2 days recommended to prior living arrangement once no longer withdrawaing from alcohol. .    Jacinta Nguyen, DO    Text Page (7am - 6pm, M-F)    Interval History   Triggered CIWA overnight.  No seizure activity.  Prodromal symptoms of seizures resolved.  No nausea or vomiting.  Tolerating diet.  No respiratory symptoms.  No GI symptoms.   No trauma.  Overall feeling much better.  Discussed with nursing.    -Data reviewed today: I reviewed all new labs and imaging results over the last 24 hours. I personally reviewed     Physical Exam   Temp: 97.6  F (36.4  C) Temp src: Oral BP: 126/86 Pulse: 76   Resp: 20 SpO2: 100 % O2 Device: None (Room air)    Vitals:    07/12/21 0210   Weight: 56.5 kg (124 lb 8 oz)     Vital Signs with Ranges  Temp:  [97.6  F (36.4  C)-99.4  F (37.4  C)] 97.6  F (36.4  C)  Pulse:  [] 76  Resp:  [15-41] 20  BP: (114-154)/() 126/86  SpO2:  [91 %-100 %] 100 %  I/O last 3 completed shifts:  In: 840 [P.O.:840]  Out: -     Constitutional: Awake, alert, cooperative, no apparent distress. Non-toxic. Appears stated age.   HEENT: Atraumatic. Normocephalic. Conjunctiva non-injected. Sclera anicteric. MMM.   Respiratory: Moves air bilaterally. Clear to auscultation bilaterally, no crackles or wheezing  Cardiovascular: Regular rate and rhythm, normal S1 and S2, and no murmur noted  GI: Normal bowel sounds, soft, non-distended, non-tender  Skin/Integumen: No rashes, no cyanosis, no edema    Medications       cloNIDine  0.1 mg Oral Q8H     famotidine  20 mg Oral BID     folic acid  1 mg Oral Daily     [START ON 7/19/2021] gabapentin  100 mg Oral Q8H     [START ON 7/17/2021] gabapentin  300 mg Oral Q8H     [START ON 7/15/2021] gabapentin  600 mg Oral Q8H     gabapentin  900 mg Oral Q8H     magnesium oxide  400 mg Oral BID     multivitamin w/minerals  1 tablet Oral Daily     sodium chloride (PF)  3 mL Intracatheter Q8H     sodium phosphate  15 mmol Intravenous Once     thiamine  100 mg Oral Daily       Data   Recent Labs   Lab 07/12/21  0635 07/12/21  0229 07/12/21  0150 07/11/21 2047   WBC  --   --   --  10.0   HGB  --   --   --  14.2   MCV  --   --   --  94   PLT  --   --   --  378   NA  --   --  140 135   POTASSIUM 3.2* 3.2* 3.1* 2.4*   CHLORIDE  --   --  105 90*   CO2  --   --  29 28   BUN  --   --  4* 6*   CR  --   --  0.49*  0.54   ANIONGAP  --   --  6 17*   NISHI  --   --  7.7* 9.4   GLC  --   --  124* 170*   ALBUMIN  --   --   --  4.2   PROTTOTAL  --   --   --  8.0   BILITOTAL  --   --   --  0.6   ALKPHOS  --   --   --  278*   ALT  --   --   --  48   AST  --   --   --  77*   LIPASE  --   --   --  34*   TROPONIN  --   --   --  <0.015       No results found for this or any previous visit (from the past 24 hour(s)).

## 2021-07-13 VITALS
OXYGEN SATURATION: 99 % | RESPIRATION RATE: 18 BRPM | WEIGHT: 127.5 LBS | SYSTOLIC BLOOD PRESSURE: 122 MMHG | TEMPERATURE: 98 F | HEIGHT: 67 IN | HEART RATE: 74 BPM | BODY MASS INDEX: 20.01 KG/M2 | DIASTOLIC BLOOD PRESSURE: 83 MMHG

## 2021-07-13 LAB
ANION GAP SERPL CALCULATED.3IONS-SCNC: 5 MMOL/L (ref 3–14)
BUN SERPL-MCNC: 3 MG/DL (ref 7–30)
CALCIUM SERPL-MCNC: 8.1 MG/DL (ref 8.5–10.1)
CHLORIDE BLD-SCNC: 106 MMOL/L (ref 94–109)
CO2 SERPL-SCNC: 27 MMOL/L (ref 20–32)
CREAT SERPL-MCNC: 0.64 MG/DL (ref 0.52–1.04)
ERYTHROCYTE [DISTWIDTH] IN BLOOD BY AUTOMATED COUNT: 14.5 % (ref 10–15)
GFR SERPL CREATININE-BSD FRML MDRD: >90 ML/MIN/1.73M2
GLUCOSE BLD-MCNC: 114 MG/DL (ref 70–99)
HCT VFR BLD AUTO: 31.9 % (ref 35–47)
HGB BLD-MCNC: 10.1 G/DL (ref 11.7–15.7)
MAGNESIUM SERPL-MCNC: 1.3 MG/DL (ref 1.6–2.3)
MAGNESIUM SERPL-MCNC: 1.4 MG/DL (ref 1.6–2.3)
MCH RBC QN AUTO: 31.5 PG (ref 26.5–33)
MCHC RBC AUTO-ENTMCNC: 31.7 G/DL (ref 31.5–36.5)
MCV RBC AUTO: 99 FL (ref 78–100)
PHOSPHATE SERPL-MCNC: 1.8 MG/DL (ref 2.5–4.5)
PHOSPHATE SERPL-MCNC: 2.8 MG/DL (ref 2.5–4.5)
PLATELET # BLD AUTO: 173 10E3/UL (ref 150–450)
POTASSIUM BLD-SCNC: 3.3 MMOL/L (ref 3.4–5.3)
POTASSIUM BLD-SCNC: 3.3 MMOL/L (ref 3.4–5.3)
RBC # BLD AUTO: 3.21 10E6/UL (ref 3.8–5.2)
SODIUM SERPL-SCNC: 138 MMOL/L (ref 133–144)
WBC # BLD AUTO: 5.9 10E3/UL (ref 4–11)

## 2021-07-13 PROCEDURE — 250N000013 HC RX MED GY IP 250 OP 250 PS 637: Performed by: INTERNAL MEDICINE

## 2021-07-13 PROCEDURE — 84100 ASSAY OF PHOSPHORUS: CPT | Performed by: INTERNAL MEDICINE

## 2021-07-13 PROCEDURE — 250N000013 HC RX MED GY IP 250 OP 250 PS 637: Performed by: HOSPITALIST

## 2021-07-13 PROCEDURE — 84132 ASSAY OF SERUM POTASSIUM: CPT | Performed by: HOSPITALIST

## 2021-07-13 PROCEDURE — 85027 COMPLETE CBC AUTOMATED: CPT | Performed by: HOSPITALIST

## 2021-07-13 PROCEDURE — 99239 HOSP IP/OBS DSCHRG MGMT >30: CPT | Performed by: INTERNAL MEDICINE

## 2021-07-13 PROCEDURE — 250N000011 HC RX IP 250 OP 636: Performed by: INTERNAL MEDICINE

## 2021-07-13 PROCEDURE — 80048 BASIC METABOLIC PNL TOTAL CA: CPT | Performed by: HOSPITALIST

## 2021-07-13 PROCEDURE — 258N000003 HC RX IP 258 OP 636: Performed by: INTERNAL MEDICINE

## 2021-07-13 PROCEDURE — 83735 ASSAY OF MAGNESIUM: CPT | Performed by: HOSPITALIST

## 2021-07-13 PROCEDURE — 36415 COLL VENOUS BLD VENIPUNCTURE: CPT | Performed by: HOSPITALIST

## 2021-07-13 RX ORDER — MULTIVITAMIN WITH IRON
1 TABLET ORAL DAILY
Qty: 30 TABLET | Refills: 1 | Status: ON HOLD | OUTPATIENT
Start: 2021-07-13 | End: 2021-01-01

## 2021-07-13 RX ORDER — POTASSIUM CHLORIDE 1500 MG/1
20 TABLET, EXTENDED RELEASE ORAL ONCE
Status: COMPLETED | OUTPATIENT
Start: 2021-07-13 | End: 2021-07-13

## 2021-07-13 RX ORDER — MAGNESIUM OXIDE 400 MG/1
400 TABLET ORAL 2 TIMES DAILY
Status: DISCONTINUED | OUTPATIENT
Start: 2021-07-13 | End: 2021-07-13

## 2021-07-13 RX ORDER — CHLORDIAZEPOXIDE HYDROCHLORIDE 25 MG/1
25 CAPSULE, GELATIN COATED ORAL 3 TIMES DAILY PRN
Qty: 25 CAPSULE | Refills: 0 | Status: SHIPPED | OUTPATIENT
Start: 2021-07-13 | End: 2021-01-01

## 2021-07-13 RX ORDER — POTASSIUM CHLORIDE 1500 MG/1
20 TABLET, EXTENDED RELEASE ORAL 2 TIMES DAILY
Qty: 60 TABLET | Refills: 1 | Status: ON HOLD | OUTPATIENT
Start: 2021-07-13 | End: 2021-01-01

## 2021-07-13 RX ADMIN — FOLIC ACID 1 MG: 1 TABLET ORAL at 08:00

## 2021-07-13 RX ADMIN — MULTIPLE VITAMINS W/ MINERALS TAB 1 TABLET: TAB at 08:00

## 2021-07-13 RX ADMIN — GABAPENTIN 900 MG: 300 CAPSULE ORAL at 00:12

## 2021-07-13 RX ADMIN — POTASSIUM CHLORIDE 20 MEQ: 1500 TABLET, EXTENDED RELEASE ORAL at 05:36

## 2021-07-13 RX ADMIN — CLONIDINE HYDROCHLORIDE 0.1 MG: 0.1 TABLET ORAL at 09:36

## 2021-07-13 RX ADMIN — CLONIDINE HYDROCHLORIDE 0.1 MG: 0.1 TABLET ORAL at 02:38

## 2021-07-13 RX ADMIN — POTASSIUM CHLORIDE 20 MEQ: 1500 TABLET, EXTENDED RELEASE ORAL at 09:36

## 2021-07-13 RX ADMIN — POTASSIUM & SODIUM PHOSPHATES POWDER PACK 280-160-250 MG 1 PACKET: 280-160-250 PACK at 08:00

## 2021-07-13 RX ADMIN — GABAPENTIN 900 MG: 300 CAPSULE ORAL at 08:00

## 2021-07-13 RX ADMIN — Medication 400 MG: at 08:00

## 2021-07-13 RX ADMIN — THIAMINE HCL TAB 100 MG 100 MG: 100 TAB at 08:00

## 2021-07-13 RX ADMIN — FAMOTIDINE 20 MG: 20 TABLET ORAL at 08:01

## 2021-07-13 RX ADMIN — MAGNESIUM SULFATE HEPTAHYDRATE 3 G: 500 INJECTION, SOLUTION INTRAMUSCULAR; INTRAVENOUS at 09:37

## 2021-07-13 ASSESSMENT — ACTIVITIES OF DAILY LIVING (ADL)
ADLS_ACUITY_SCORE: 19

## 2021-07-13 ASSESSMENT — MIFFLIN-ST. JEOR: SCORE: 1320.97

## 2021-07-13 NOTE — PLAN OF CARE
VS stable, CIWA score 1, tolerated meal. Potassium replaced, recheck at 0200. Phosphorus replacement started at 2200, two more dose tomorrow. Continue to monitor.

## 2021-07-13 NOTE — PLAN OF CARE
Patient's After Visit Summary was reviewed with patient  Patient verbalized understanding of After Visit Summary, recommended follow up and was given an opportunity to ask questions.   Discharge medications sent home with patient/family: YES, potassium, magnesium, and chlordiazepoxide filled at pharmacy here.  Discharged with other: Uber ride

## 2021-07-13 NOTE — DISCHARGE SUMMARY
[facility]  Discharge Summary Hospitalist    Date of Admission:  7/11/2021  Date of Discharge:  7/13/2021  Discharging Provider: Jacinta Nguyen DO  Date of Service (when I saw the patient): 07/13/21    Discharge Diagnoses   Acute alcohol intoxication  Alcohol dependence  Alcohol withdrawal  Anxiety  Hx of suicidal ideations  Electrolyte abn - hypomag/hypokalemia/hypophosph    History of Present Illness   This is a 31-year-old female with a history of anxiety, alcohol use disorder, alcohol withdrawal, and suicidal ideation, presenting to [facility] for evaluation of anxiety.     Hospital Course   Maddie Orourke was admitted on 7/11/2021.  The following problems were addressed during her hospitalization:    Acute alcohol intoxication with alcohol abuse disorder with history of alcohol withdrawal seizures: Acutely intoxicated with ethanol lvl 0.28. Patient concerned about withdrawal seizure. Admitted and started on alcohol withdrawal protocol. 24 hours ativan free prior to discharge. Electrolytes replaced. Chemical dependency offered and declined. Provided resources for outpatient PCP, counseling, and chemical dependency. Refilled home medications with [facility].     Jacinta Nguyen DO    Significant Results and Procedures  None    Pending Results  None    Code Status   Full Code       Primary Care Physician   [facility]    Physical Exam   Temp: 98.3  F (36.8  C) Temp src: Oral BP: 121/86 Pulse: 72   Resp: 18 SpO2: 98 % O2 Device: None (Room air)    Vitals:    07/12/21 0210 07/13/21 0628   Weight: 56.5 kg (124 lb 8 oz) 57.8 kg (127 lb 8 oz)     Vital Signs with Ranges  Temp:  [97.6  F (36.4  C)-98.3  F (36.8  C)] 98.3  F (36.8  C)  Pulse:  [68-82] 72  Resp:  [18-20] 18  BP: (110-133)/(72-90) 121/86  SpO2:  [98 %-100 %] 98 %  I/O last 3 completed shifts:  In: 1577 [P.O.:720; I.V.:857]  Out: 600 [Urine:600]     Constitutional:  Awake, alert, cooperative, no  apparent distress. Non-toxic. Appears stated age.   HEENT: Atraumatic. Normocephalic. Conjunctiva non-injected. Sclera anicteric. MMM. No erythema or exudates of posterior oral pharynx.   Respiratory: Moves air bilaterally. Clear to auscultation bilaterally, no crackles or wheezing  Cardiovascular: Regular rate and rhythm, normal S1 and S2, and no murmur noted  GI: Normal bowel sounds, soft, non-distended, non-tender  Skin/Integumen: No rashes, no cyanosis, no edema    Discharge Disposition   Discharged to home  Condition at discharge: Stable    Consultations This Hospital Stay   SOCIAL WORK IP CONSULT    Time Spent on this Encounter   IJacinta DO, personally saw the patient today and spent greater than 30 minutes discharging this patient.    Discharge Orders      Basic metabolic panel     Magnesium     Phosphorus     Reason for your hospital stay    Acute alcohol intoxication with withdrawal     Follow-up and recommended labs and tests     Follow up with primary care provider, [facility], within 7 days for hospital follow- up and to follow up on results.  The following labs/tests are recommended: Mag/Phosph/CMP.     Activity    Your activity upon discharge: activity as tolerated     When to contact your care team    Call your primary doctor if you have any of the following: temperature greater than 100.4,  increased shortness of breath, increased swelling, increased pain, or increased anxiety and/or suicidal ideations. You should establish with a primary care physician to help you manage your anxiety and assist with alcohol cessation.     Discharge Instructions    Chemical dependency counseling while hospitalized was offered and declined. You were provided resources to establish care with a primary care provider and counseling/dependency team. It is essential to establish care this week for ongoing treatment. Labs were ordered for 7/19/21 to check your electrolytes. You should continue to  abstain from alcohol.     Diet    Follow this diet upon discharge: Orders Placed This Encounter      Combination Diet Regular Diet Adult. Add 8 oz of skim milk with meals to replace phosphorus     Discharge Medications   Current Discharge Medication List      CONTINUE these medications which have NOT CHANGED    Details   folic acid (FOLVITE) 1 MG tablet Take 1 tablet (1 mg) by mouth daily  Qty: 30 tablet, Refills: 0    Associated Diagnoses: Failure to thrive in adult      magnesium 250 MG tablet Take 1 tablet (250 mg) by mouth daily  Qty: 30 tablet, Refills: 0    Associated Diagnoses: Hypomagnesemia      multivitamin w/minerals (THERA-VIT-M) tablet Take 1 tablet by mouth daily  Qty: 30 tablet, Refills: 0    Associated Diagnoses: Failure to thrive in adult      potassium chloride ER (KLOR-CON M) 20 MEQ CR tablet Take 1 tablet (20 mEq) by mouth 2 times daily  Qty: 60 tablet, Refills: 0    Associated Diagnoses: Hypokalemia      thiamine (B-1) 100 MG tablet Take 1 tablet (100 mg) by mouth daily  Qty: 30 tablet, Refills: 0    Associated Diagnoses: Failure to thrive in adult      chlordiazePOXIDE (LIBRIUM) 25 MG capsule Take 1 capsule (25 mg) by mouth 3 times daily as needed for anxiety or withdrawal  Qty: 25 capsule, Refills: 0           Allergies   No Known Allergies  Data   Most Recent 3 CBC's:Recent Labs   Lab Test 07/13/21 0436 07/11/21 2047 06/25/21  2319   WBC 5.9 10.0 7.8   HGB 10.1* 14.2 14.4   MCV 99 94 90    378 313      Most Recent 3 BMP's:  Recent Labs   Lab Test 07/13/21  0436 07/12/21 2057 07/12/21  1552 07/12/21  0150 07/11/21 2047     --   --  140 135   POTASSIUM 3.3*  3.3* 3.3* 3.3* 3.1* 2.4*   CHLORIDE 106  --   --  105 90*   CO2 27  --   --  29 28   BUN 3*  --   --  4* 6*   CR 0.64  --   --  0.49* 0.54   ANIONGAP 5  --   --  6 17*   NISHI 8.1*  --   --  7.7* 9.4   *  --   --  124* 170*     Most Recent 2 LFT's:  Recent Labs   Lab Test 07/11/21 2047 06/25/21  2319   AST 77* 96*    ALT 48 43   ALKPHOS 278* 212*   BILITOTAL 0.6 0.5     Most Recent INR's and Anticoagulation Dosing History:  Anticoagulation Dose History     Recent Dosing and Labs Latest Ref Rng & Units 3/19/2021 6/25/2021    INR 0.86 - 1.14 0.90 0.91        Most Recent 3 Troponin's:  Recent Labs   Lab Test 07/11/21  2047 06/25/21  2319 06/16/21  1454 03/19/21  1900   TROPI  --  <0.015 <0.015 <0.015   TROPONIN <0.015  --   --   --      Most Recent Cholesterol Panel:No lab results found.  Most Recent 6 Bacteria Isolates From Any Culture (See EPIC Reports for Culture Details):No lab results found.  Most Recent TSH, T4 and A1c Labs:  Recent Labs   Lab Test 05/28/21  0958   TSH 6.50*   T4 0.93     Results for orders placed or performed during the hospital encounter of 06/25/21   Head CT w/o contrast    Narrative    EXAM: CT HEAD W/O CONTRAST  LOCATION: [facility]  DATE/TIME: 6/26/2021 12:27 AM    INDICATION: Headache, intracranial hemorrhage suspected  COMPARISON: 5/28/2021  TECHNIQUE: Routine CT Head without IV contrast. Multiplanar reformats. Dose reduction techniques were used.    FINDINGS:  INTRACRANIAL CONTENTS: No intracranial hemorrhage, extraaxial collection, or mass effect.  No CT evidence of acute infarct. Normal parenchymal attenuation. Mild generalized volume loss. No hydrocephalus.     VISUALIZED ORBITS/SINUSES/MASTOIDS: No intraorbital abnormality. No paranasal sinus mucosal disease. No middle ear or mastoid effusion.    BONES/SOFT TISSUES: No acute abnormality.      Impression    IMPRESSION:  1.  No acute intracranial process.  2.  Mild atrophy for age.   CT Chest Pulmonary Embolism w Contrast    Narrative    EXAM: CT CHEST PULMONARY EMBOLISM W CONTRAST  LOCATION: [facility]  DATE/TIME: 6/26/2021 12:28 AM    INDICATION: PE suspected, low/intermediate prob, positive d-dimer, chest pain, hyperventilating, EtOH   COMPARISON: None.  TECHNIQUE: CT chest pulmonary angiogram during arterial phase  injection of IV contrast. Multiplanar reformats and MIP reconstructions were performed. Dose reduction techniques were used.   CONTRAST: 52mL Isovue-370    FINDINGS:  ANGIOGRAM CHEST: Smaller and some medium-sized pulmonary arteries obscured from considerable patient motion artifact. No pulmonary emboli identified. Thoracic aorta is negative for dissection. No CT evidence of right heart strain.    LUNGS AND PLEURA: Lung detail obscured from considerable motion artifact. Right apical lung destruction, bulla or blebs. No definite infiltrate or effusion.    MEDIASTINUM/AXILLAE: No definite mass or adenopathy, degraded from motion artifact.    CORONARY ARTERY CALCIFICATION: None.    UPPER ABDOMEN: Hepatic steatosis.    MUSCULOSKELETAL: No definite abnormality although considerably degraded from motion artifact.      Impression    IMPRESSION:  1.  Hepatic steatosis.  2.  Images considerably degraded from motion artifact. No pulmonary emboli identified although small and some medium-sized pulmonary arteries are not well visualized. If further evaluation is warranted, nuclear medicine V/Q scan would be helpful.

## 2021-07-13 NOTE — PROGRESS NOTES
"Patient alert and oriented.  Up independently.  CIWA score 0. Lung sounds clear.  Bowel sounds audible.  Potassium re-placed this morning with a re-check scheduled.  /72 (BP Location: Left arm)   Pulse 82   Temp 97.8  F (36.6  C) (Oral)   Resp 18   Ht 1.702 m (5' 7\")   Wt 57.8 kg (127 lb 8 oz)   SpO2 98%   BMI 19.97 kg/m    Will continue to monitor per plan of care.   "

## 2021-07-14 ENCOUNTER — PATIENT OUTREACH (OUTPATIENT)
Dept: CARE COORDINATION | Facility: CLINIC | Age: 31
End: 2021-07-14

## 2021-07-14 DIAGNOSIS — Z71.89 OTHER SPECIFIED COUNSELING: ICD-10-CM

## 2021-07-15 NOTE — PROGRESS NOTES
Clinic Care Coordination Contact  [facility]/Voicemail       Clinical Data: Care Coordinator Outreach  Outreach attempted x 2.  Left message on patient's voicemail with call back information and requested return call.  Plan: Care Coordinator will do no further outreaches at this time.

## 2021-08-11 ENCOUNTER — HOSPITAL ENCOUNTER (EMERGENCY)
Facility: CLINIC | Age: 31
Discharge: HOME | End: 2021-08-12
Attending: EMERGENCY MEDICINE | Admitting: EMERGENCY MEDICINE
Payer: COMMERCIAL

## 2021-08-11 ENCOUNTER — APPOINTMENT (OUTPATIENT)
Dept: GENERAL RADIOLOGY | Facility: CLINIC | Age: 31
End: 2021-08-11
Attending: EMERGENCY MEDICINE
Payer: COMMERCIAL

## 2021-08-11 DIAGNOSIS — F10.220 ACUTE ALCOHOLIC INTOXICATION IN ALCOHOLISM WITHOUT COMPLICATION (H): ICD-10-CM

## 2021-08-11 DIAGNOSIS — J06.9 ACUTE URI: ICD-10-CM

## 2021-08-11 DIAGNOSIS — R11.2 NON-INTRACTABLE VOMITING WITH NAUSEA, UNSPECIFIED VOMITING TYPE: ICD-10-CM

## 2021-08-11 LAB
ALBUMIN SERPL-MCNC: 4.2 G/DL (ref 3.4–5)
ALP SERPL-CCNC: 240 U/L (ref 40–150)
ALT SERPL W P-5'-P-CCNC: 48 U/L (ref 0–50)
ANION GAP SERPL CALCULATED.3IONS-SCNC: 11 MMOL/L (ref 3–14)
AST SERPL W P-5'-P-CCNC: 116 U/L (ref 0–45)
BASOPHILS # BLD AUTO: 0 10E3/UL (ref 0–0.2)
BASOPHILS NFR BLD AUTO: 1 %
BILIRUB SERPL-MCNC: 0.5 MG/DL (ref 0.2–1.3)
BUN SERPL-MCNC: 6 MG/DL (ref 7–30)
CALCIUM SERPL-MCNC: 8.6 MG/DL (ref 8.5–10.1)
CHLORIDE BLD-SCNC: 103 MMOL/L (ref 94–109)
CO2 SERPL-SCNC: 29 MMOL/L (ref 20–32)
CREAT SERPL-MCNC: 0.64 MG/DL (ref 0.52–1.04)
D DIMER PPP FEU-MCNC: <0.27 UG/ML FEU (ref 0–0.5)
EOSINOPHIL # BLD AUTO: 0 10E3/UL (ref 0–0.7)
EOSINOPHIL NFR BLD AUTO: 0 %
ERYTHROCYTE [DISTWIDTH] IN BLOOD BY AUTOMATED COUNT: 14.5 % (ref 10–15)
ETHANOL SERPL-MCNC: 0.32 G/DL
GFR SERPL CREATININE-BSD FRML MDRD: >90 ML/MIN/1.73M2
GLUCOSE BLD-MCNC: 132 MG/DL (ref 70–99)
HCG SERPL QL: NEGATIVE
HCT VFR BLD AUTO: 46.5 % (ref 35–47)
HGB BLD-MCNC: 15.5 G/DL (ref 11.7–15.7)
HOLD SPECIMEN: NORMAL
IMM GRANULOCYTES # BLD: 0 10E3/UL
IMM GRANULOCYTES NFR BLD: 0 %
LYMPHOCYTES # BLD AUTO: 3.5 10E3/UL (ref 0.8–5.3)
LYMPHOCYTES NFR BLD AUTO: 47 %
MAGNESIUM SERPL-MCNC: 2 MG/DL (ref 1.6–2.3)
MCH RBC QN AUTO: 32.3 PG (ref 26.5–33)
MCHC RBC AUTO-ENTMCNC: 33.3 G/DL (ref 31.5–36.5)
MCV RBC AUTO: 97 FL (ref 78–100)
MONOCYTES # BLD AUTO: 0.3 10E3/UL (ref 0–1.3)
MONOCYTES NFR BLD AUTO: 5 %
NEUTROPHILS # BLD AUTO: 3.5 10E3/UL (ref 1.6–8.3)
NEUTROPHILS NFR BLD AUTO: 47 %
NRBC # BLD AUTO: 0 10E3/UL
NRBC BLD AUTO-RTO: 0 /100
PLATELET # BLD AUTO: 214 10E3/UL (ref 150–450)
POTASSIUM BLD-SCNC: 3.1 MMOL/L (ref 3.4–5.3)
PROT SERPL-MCNC: 7.8 G/DL (ref 6.8–8.8)
RBC # BLD AUTO: 4.8 10E6/UL (ref 3.8–5.2)
SODIUM SERPL-SCNC: 143 MMOL/L (ref 133–144)
TROPONIN I SERPL-MCNC: <0.015 UG/L (ref 0–0.04)
WBC # BLD AUTO: 7.4 10E3/UL (ref 4–11)

## 2021-08-11 PROCEDURE — 82077 ASSAY SPEC XCP UR&BREATH IA: CPT | Performed by: EMERGENCY MEDICINE

## 2021-08-11 PROCEDURE — 96361 HYDRATE IV INFUSION ADD-ON: CPT

## 2021-08-11 PROCEDURE — 96374 THER/PROPH/DIAG INJ IV PUSH: CPT

## 2021-08-11 PROCEDURE — C9803 HOPD COVID-19 SPEC COLLECT: HCPCS

## 2021-08-11 PROCEDURE — 96375 TX/PRO/DX INJ NEW DRUG ADDON: CPT

## 2021-08-11 PROCEDURE — 99285 EMERGENCY DEPT VISIT HI MDM: CPT | Mod: 25

## 2021-08-11 PROCEDURE — 85379 FIBRIN DEGRADATION QUANT: CPT | Performed by: EMERGENCY MEDICINE

## 2021-08-11 PROCEDURE — 250N000013 HC RX MED GY IP 250 OP 250 PS 637: Performed by: EMERGENCY MEDICINE

## 2021-08-11 PROCEDURE — 71045 X-RAY EXAM CHEST 1 VIEW: CPT

## 2021-08-11 PROCEDURE — 87635 SARS-COV-2 COVID-19 AMP PRB: CPT | Performed by: EMERGENCY MEDICINE

## 2021-08-11 PROCEDURE — 85025 COMPLETE CBC W/AUTO DIFF WBC: CPT | Performed by: EMERGENCY MEDICINE

## 2021-08-11 PROCEDURE — 84484 ASSAY OF TROPONIN QUANT: CPT | Performed by: EMERGENCY MEDICINE

## 2021-08-11 PROCEDURE — 36415 COLL VENOUS BLD VENIPUNCTURE: CPT | Performed by: EMERGENCY MEDICINE

## 2021-08-11 PROCEDURE — 83735 ASSAY OF MAGNESIUM: CPT | Performed by: EMERGENCY MEDICINE

## 2021-08-11 PROCEDURE — 80053 COMPREHEN METABOLIC PANEL: CPT | Performed by: EMERGENCY MEDICINE

## 2021-08-11 PROCEDURE — 93005 ELECTROCARDIOGRAM TRACING: CPT

## 2021-08-11 PROCEDURE — 250N000011 HC RX IP 250 OP 636: Performed by: EMERGENCY MEDICINE

## 2021-08-11 PROCEDURE — 258N000003 HC RX IP 258 OP 636: Performed by: EMERGENCY MEDICINE

## 2021-08-11 PROCEDURE — 84703 CHORIONIC GONADOTROPIN ASSAY: CPT | Performed by: EMERGENCY MEDICINE

## 2021-08-11 RX ORDER — POTASSIUM CHLORIDE 1500 MG/1
40 TABLET, EXTENDED RELEASE ORAL ONCE
Status: COMPLETED | OUTPATIENT
Start: 2021-08-11 | End: 2021-08-11

## 2021-08-11 RX ORDER — LORAZEPAM 2 MG/ML
1 INJECTION INTRAMUSCULAR ONCE
Status: COMPLETED | OUTPATIENT
Start: 2021-08-11 | End: 2021-08-11

## 2021-08-11 RX ORDER — LORAZEPAM 2 MG/ML
1 INJECTION INTRAMUSCULAR
Status: COMPLETED | OUTPATIENT
Start: 2021-08-11 | End: 2021-08-12

## 2021-08-11 RX ORDER — FAMOTIDINE 20 MG/1
20 TABLET, FILM COATED ORAL ONCE
Status: COMPLETED | OUTPATIENT
Start: 2021-08-11 | End: 2021-08-11

## 2021-08-11 RX ORDER — KETOROLAC TROMETHAMINE 15 MG/ML
15 INJECTION, SOLUTION INTRAMUSCULAR; INTRAVENOUS ONCE
Status: COMPLETED | OUTPATIENT
Start: 2021-08-11 | End: 2021-08-11

## 2021-08-11 RX ADMIN — KETOROLAC TROMETHAMINE 15 MG: 15 INJECTION, SOLUTION INTRAMUSCULAR; INTRAVENOUS at 23:25

## 2021-08-11 RX ADMIN — SODIUM CHLORIDE 1000 ML: 9 INJECTION, SOLUTION INTRAVENOUS at 22:49

## 2021-08-11 RX ADMIN — POTASSIUM CHLORIDE 40 MEQ: 1500 TABLET, EXTENDED RELEASE ORAL at 23:25

## 2021-08-11 RX ADMIN — FAMOTIDINE 20 MG: 20 TABLET ORAL at 23:25

## 2021-08-11 RX ADMIN — LORAZEPAM 1 MG: 2 INJECTION INTRAMUSCULAR; INTRAVENOUS at 22:49

## 2021-08-11 ASSESSMENT — ENCOUNTER SYMPTOMS
FEVER: 0
TREMORS: 1
ABDOMINAL PAIN: 0
NAUSEA: 1
COUGH: 1
VOMITING: 1
SORE THROAT: 1
WEAKNESS: 1

## 2021-08-12 VITALS
DIASTOLIC BLOOD PRESSURE: 95 MMHG | HEART RATE: 96 BPM | OXYGEN SATURATION: 99 % | TEMPERATURE: 98 F | RESPIRATION RATE: 16 BRPM | SYSTOLIC BLOOD PRESSURE: 131 MMHG

## 2021-08-12 LAB
ATRIAL RATE - MUSE: 87 BPM
DIASTOLIC BLOOD PRESSURE - MUSE: NORMAL MMHG
INTERPRETATION ECG - MUSE: NORMAL
P AXIS - MUSE: 68 DEGREES
PR INTERVAL - MUSE: 144 MS
QRS DURATION - MUSE: 82 MS
QT - MUSE: 372 MS
QTC - MUSE: 447 MS
R AXIS - MUSE: -11 DEGREES
SARS-COV-2 RNA RESP QL NAA+PROBE: NEGATIVE
SYSTOLIC BLOOD PRESSURE - MUSE: NORMAL MMHG
T AXIS - MUSE: 32 DEGREES
VENTRICULAR RATE- MUSE: 87 BPM

## 2021-08-12 PROCEDURE — 250N000011 HC RX IP 250 OP 636: Performed by: EMERGENCY MEDICINE

## 2021-08-12 PROCEDURE — 96376 TX/PRO/DX INJ SAME DRUG ADON: CPT

## 2021-08-12 RX ORDER — ONDANSETRON 4 MG/1
4 TABLET, ORALLY DISINTEGRATING ORAL EVERY 8 HOURS PRN
Qty: 10 TABLET | Refills: 0 | Status: SHIPPED | OUTPATIENT
Start: 2021-08-12 | End: 2021-08-15

## 2021-08-12 RX ADMIN — LORAZEPAM 1 MG: 2 INJECTION INTRAMUSCULAR; INTRAVENOUS at 00:01

## 2021-08-12 NOTE — DISCHARGE INSTRUCTIONS

## 2021-08-12 NOTE — ED TRIAGE NOTES
Pt presents with chest pain x1 week, pain with inspiration, and congestion. Hx of anxiety. A+Ox4, no acute signs of dsitress
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
## 2021-09-09 ENCOUNTER — APPOINTMENT (OUTPATIENT)
Dept: GENERAL RADIOLOGY | Facility: CLINIC | Age: 31
End: 2021-09-09
Attending: EMERGENCY MEDICINE
Payer: COMMERCIAL

## 2021-09-09 ENCOUNTER — HOSPITAL ENCOUNTER (EMERGENCY)
Facility: CLINIC | Age: 31
Discharge: HOME | End: 2021-09-09
Attending: EMERGENCY MEDICINE | Admitting: EMERGENCY MEDICINE
Payer: COMMERCIAL

## 2021-09-09 VITALS
SYSTOLIC BLOOD PRESSURE: 121 MMHG | HEART RATE: 85 BPM | RESPIRATION RATE: 16 BRPM | WEIGHT: 125 LBS | DIASTOLIC BLOOD PRESSURE: 89 MMHG | BODY MASS INDEX: 19.58 KG/M2 | TEMPERATURE: 97.6 F | OXYGEN SATURATION: 96 %

## 2021-09-09 DIAGNOSIS — E87.6 HYPOKALEMIA: ICD-10-CM

## 2021-09-09 DIAGNOSIS — F10.920 ALCOHOLIC INTOXICATION WITHOUT COMPLICATION (H): ICD-10-CM

## 2021-09-09 LAB
ALBUMIN SERPL-MCNC: 3.8 G/DL (ref 3.4–5)
ALP SERPL-CCNC: 304 U/L (ref 40–150)
ALT SERPL W P-5'-P-CCNC: 65 U/L (ref 0–50)
ANION GAP SERPL CALCULATED.3IONS-SCNC: 15 MMOL/L (ref 3–14)
AST SERPL W P-5'-P-CCNC: 136 U/L (ref 0–45)
ATRIAL RATE - MUSE: 60 BPM
BASOPHILS # BLD MANUAL: 0.1 10E3/UL (ref 0–0.2)
BASOPHILS NFR BLD MANUAL: 1 %
BILIRUB SERPL-MCNC: 0.4 MG/DL (ref 0.2–1.3)
BUN SERPL-MCNC: 5 MG/DL (ref 7–30)
CALCIUM SERPL-MCNC: 8.1 MG/DL (ref 8.5–10.1)
CHLORIDE BLD-SCNC: 102 MMOL/L (ref 94–109)
CO2 SERPL-SCNC: 22 MMOL/L (ref 20–32)
CREAT SERPL-MCNC: 0.5 MG/DL (ref 0.52–1.04)
DIASTOLIC BLOOD PRESSURE - MUSE: NORMAL MMHG
EOSINOPHIL # BLD MANUAL: 0 10E3/UL (ref 0–0.7)
EOSINOPHIL NFR BLD MANUAL: 0 %
ERYTHROCYTE [DISTWIDTH] IN BLOOD BY AUTOMATED COUNT: 15.2 % (ref 10–15)
ETHANOL SERPL-MCNC: 0.49 G/DL
GFR SERPL CREATININE-BSD FRML MDRD: >90 ML/MIN/1.73M2
GLUCOSE BLD-MCNC: 175 MG/DL (ref 70–99)
HCG SERPL QL: NEGATIVE
HCT VFR BLD AUTO: 40.6 % (ref 35–47)
HGB BLD-MCNC: 13.7 G/DL (ref 11.7–15.7)
HOLD SPECIMEN: NORMAL
INTERPRETATION ECG - MUSE: NORMAL
LYMPHOCYTES # BLD MANUAL: 5.1 10E3/UL (ref 0.8–5.3)
LYMPHOCYTES NFR BLD MANUAL: 69 %
MAGNESIUM SERPL-MCNC: 2.1 MG/DL (ref 1.6–2.3)
MCH RBC QN AUTO: 32 PG (ref 26.5–33)
MCHC RBC AUTO-ENTMCNC: 33.7 G/DL (ref 31.5–36.5)
MCV RBC AUTO: 95 FL (ref 78–100)
MONOCYTES # BLD MANUAL: 0.4 10E3/UL (ref 0–1.3)
MONOCYTES NFR BLD MANUAL: 5 %
NEUTROPHILS # BLD MANUAL: 1.9 10E3/UL (ref 1.6–8.3)
NEUTROPHILS NFR BLD MANUAL: 25 %
P AXIS - MUSE: NORMAL DEGREES
PLAT MORPH BLD: ABNORMAL
PLATELET # BLD AUTO: 244 10E3/UL (ref 150–450)
POTASSIUM BLD-SCNC: 2.6 MMOL/L (ref 3.4–5.3)
POTASSIUM BLD-SCNC: 3.9 MMOL/L (ref 3.4–5.3)
PR INTERVAL - MUSE: NORMAL MS
PROT SERPL-MCNC: 7.2 G/DL (ref 6.8–8.8)
QRS DURATION - MUSE: 94 MS
QT - MUSE: 314 MS
QTC - MUSE: 467 MS
R AXIS - MUSE: 3 DEGREES
RBC # BLD AUTO: 4.28 10E6/UL (ref 3.8–5.2)
RBC MORPH BLD: ABNORMAL
SMUDGE CELLS BLD QL SMEAR: PRESENT
SODIUM SERPL-SCNC: 139 MMOL/L (ref 133–144)
SYSTOLIC BLOOD PRESSURE - MUSE: NORMAL MMHG
T AXIS - MUSE: 48 DEGREES
TROPONIN I SERPL-MCNC: <0.015 UG/L (ref 0–0.04)
VENTRICULAR RATE- MUSE: 133 BPM
WBC # BLD AUTO: 7.4 10E3/UL (ref 4–11)

## 2021-09-09 PROCEDURE — 250N000011 HC RX IP 250 OP 636: Performed by: EMERGENCY MEDICINE

## 2021-09-09 PROCEDURE — 84132 ASSAY OF SERUM POTASSIUM: CPT | Performed by: EMERGENCY MEDICINE

## 2021-09-09 PROCEDURE — 93005 ELECTROCARDIOGRAM TRACING: CPT

## 2021-09-09 PROCEDURE — 250N000013 HC RX MED GY IP 250 OP 250 PS 637: Performed by: EMERGENCY MEDICINE

## 2021-09-09 PROCEDURE — 258N000003 HC RX IP 258 OP 636: Performed by: EMERGENCY MEDICINE

## 2021-09-09 PROCEDURE — 36415 COLL VENOUS BLD VENIPUNCTURE: CPT | Performed by: EMERGENCY MEDICINE

## 2021-09-09 PROCEDURE — 82077 ASSAY SPEC XCP UR&BREATH IA: CPT | Performed by: EMERGENCY MEDICINE

## 2021-09-09 PROCEDURE — 71046 X-RAY EXAM CHEST 2 VIEWS: CPT

## 2021-09-09 PROCEDURE — 83735 ASSAY OF MAGNESIUM: CPT | Performed by: EMERGENCY MEDICINE

## 2021-09-09 PROCEDURE — 85027 COMPLETE CBC AUTOMATED: CPT | Performed by: EMERGENCY MEDICINE

## 2021-09-09 PROCEDURE — 99285 EMERGENCY DEPT VISIT HI MDM: CPT | Mod: 25

## 2021-09-09 PROCEDURE — 84484 ASSAY OF TROPONIN QUANT: CPT | Performed by: EMERGENCY MEDICINE

## 2021-09-09 PROCEDURE — 84703 CHORIONIC GONADOTROPIN ASSAY: CPT | Performed by: EMERGENCY MEDICINE

## 2021-09-09 PROCEDURE — 250N000009 HC RX 250: Performed by: EMERGENCY MEDICINE

## 2021-09-09 PROCEDURE — 82040 ASSAY OF SERUM ALBUMIN: CPT | Performed by: EMERGENCY MEDICINE

## 2021-09-09 RX ORDER — POTASSIUM CHLORIDE 1500 MG/1
40 TABLET, EXTENDED RELEASE ORAL ONCE
Status: COMPLETED | OUTPATIENT
Start: 2021-09-09 | End: 2021-09-09

## 2021-09-09 RX ORDER — ONDANSETRON 2 MG/ML
4 INJECTION INTRAMUSCULAR; INTRAVENOUS ONCE
Status: COMPLETED | OUTPATIENT
Start: 2021-09-09 | End: 2021-09-09

## 2021-09-09 RX ORDER — POTASSIUM CHLORIDE 7.45 MG/ML
10 INJECTION INTRAVENOUS
Status: COMPLETED | OUTPATIENT
Start: 2021-09-09 | End: 2021-09-09

## 2021-09-09 RX ADMIN — POTASSIUM CHLORIDE 10 MEQ: 7.46 INJECTION, SOLUTION INTRAVENOUS at 08:49

## 2021-09-09 RX ADMIN — POTASSIUM CHLORIDE 10 MEQ: 7.46 INJECTION, SOLUTION INTRAVENOUS at 08:26

## 2021-09-09 RX ADMIN — POTASSIUM CHLORIDE 10 MEQ: 7.46 INJECTION, SOLUTION INTRAVENOUS at 09:53

## 2021-09-09 RX ADMIN — POTASSIUM CHLORIDE 40 MEQ: 1500 TABLET, EXTENDED RELEASE ORAL at 08:22

## 2021-09-09 RX ADMIN — POTASSIUM CHLORIDE 10 MEQ: 7.46 INJECTION, SOLUTION INTRAVENOUS at 09:34

## 2021-09-09 RX ADMIN — ONDANSETRON 4 MG: 2 INJECTION INTRAMUSCULAR; INTRAVENOUS at 05:41

## 2021-09-09 RX ADMIN — FOLIC ACID: 5 INJECTION, SOLUTION INTRAMUSCULAR; INTRAVENOUS; SUBCUTANEOUS at 06:19

## 2021-09-09 ASSESSMENT — ENCOUNTER SYMPTOMS
COUGH: 0
FREQUENCY: 0
RHINORRHEA: 1
HEMATURIA: 0
NAUSEA: 1
BLOOD IN STOOL: 0
VOMITING: 0
DIAPHORESIS: 1
SORE THROAT: 0
SHORTNESS OF BREATH: 1
CONSTIPATION: 0
ABDOMINAL PAIN: 0
CHEST TIGHTNESS: 1
DIFFICULTY URINATING: 0
DYSURIA: 0
FEVER: 0

## 2021-09-09 NOTE — ED PROVIDER NOTES
History   Chief Complaint:  Chest Pain and Anxiety      HPI   Maddie Orourke is a 31 year old female, with a history of alcohol abuse, who presents to the ED for evaluation of chest pain and anxiety. The patient arrives via EMS after developing chest tightness around 1500 yesterday afternoon. Also endorses rhinorrhea, diaphoresis, nausea, and shortness of breath. She was watching TV when the pain began. Nothing makes the pain better or worse. Pain is non-radiating. At time of eval, denies any chest discomfort. She does report drinking 50 mL of whiskey last night and smokes 2-3 cigarettes/day. Denies abdominal pain, urinary symptoms, bowel problems, fever, cough, sore throat, or vomiting.     Review of Systems   Constitutional: Positive for diaphoresis. Negative for fever.   HENT: Positive for rhinorrhea. Negative for sore throat.    Respiratory: Positive for chest tightness and shortness of breath. Negative for cough.    Gastrointestinal: Positive for nausea. Negative for abdominal pain, blood in stool, constipation and vomiting.   Genitourinary: Negative for difficulty urinating, dysuria, frequency and hematuria.   All other systems reviewed and are negative.    Allergies:  No known drug allergies     Medications:  Chlordiazepoxide   Folic Acid   Magnesium   Multivitamins   Klor Con   Thiamine      Past Medical History:    Alcohol Abuse  Hypovolemia  Hypomagnesemia  Hypophosphatemia  Failure to Thrive in Adult  ALEX  Alcohol Withdrawal      Past Surgical History:    Repair of Deviated Septum     Social History:  Marital Status: Single  PCP: [facility]    Physical Exam     Patient Vitals for the past 24 hrs:   BP Temp Temp src Pulse Resp SpO2 Weight   09/09/21 1249 -- -- -- -- 16 -- --   09/09/21 1200 121/89 -- -- 85 20 -- --   09/09/21 1100 115/84 -- -- 84 22 96 % --   09/09/21 1000 (!) 127/91 -- -- 91 (!) 0 96 % --   09/09/21 0800 121/84 -- -- 105 16 98 % --   09/09/21 0645 -- -- -- -- -- 98 %  --   09/09/21 0630 (!) 127/93 -- -- 96 -- 95 % --   09/09/21 0615 121/85 -- -- -- -- 94 % --   09/09/21 0600 -- -- -- -- -- 97 % --   09/09/21 0545 -- -- -- -- -- 94 % --   09/09/21 0530 (!) 139/108 97.6  F (36.4  C) Oral 114 20 97 % 56.7 kg (125 lb)       Physical Exam  Constitutional: Well developed, nontox appearance, sleeping although intoxicated, tearful upon awakening  Head: Atraumatic.   Mouth/Throat: Oropharynx is clear and moist.   Neck:  no stridor  Eyes: no scleral icterus  Cardiovascular: RRR, 2+ bilat radial pulses  Pulmonary/Chest: nml resp effort, Clear BS bilat  Abdominal: ND, soft, NT, no rebound or guarding   Ext: Warm, well perfused, no edema  Neurological: A&O, symmetric facies, moves ext x4  Skin: Skin is warm and dry.   Psychiatric: Anxious  Nursing note and vitals reviewed.    Emergency Department Course   ECG  ECG taken at 0534, ECG read at 0600  Sinus tachycardia. Cannot rule out Anterior infarct, age undetermined. Abnormal ECG.  No significant changes as compared to prior, dated 8/11/2021.  Rate 133 bpm. NE interval * ms. QRS duration 94 ms. QT/QTc 314/467 ms. P-R-T axes * 3 48.     Imaging:    XR Chest, 2 views:  Portable chest. Lungs are clear. Heart is normal in size. No pneumothorax. No definite pleural effusions.    Reading per radiology    Laboratory:    CBC: WBC: 7.4, HGB: 13.7, PLT: 244    CMP: Glucose 175 (H), Potassium: 2.6 (LL), Anion Gap: 15 (H), Urea Nitrogen: 5 (L), Calcium: 8.1 (L), Alkaline Phosphatase: 304 (H), ALT: 65 (H), AST: 136 (H), o/w WNL (Creatinine: 0.50 (L))    Troponin (Collected 0537): <0.015    Magnesium: 2.1    HCG Qualitative Blood: Negative    Alcohol ethyl: 0.49 (HH)     Potassium: 3.9    Emergency Department Course:    Reviewed:  I reviewed the patient's nursing notes, vitals, past medical records, Care Everywhere.     Assessments:  0643 I obtained history and examined them as noted above.   6119 I rechecked her and explained findings.      Interventions:  0541: Zofran 4 mg IV  0619: Infuvite IV Infusion  0822: Potassium chloride 40 mEq PO  0826: Potassium chloride 10 mEq in 100 mL intermittent infusion  0846: Potassium chloride 10 mEq in 100 mL intermittent infusion  0934: Potassium chloride 10 mEq in 100 mL intermittent infusion  0953: Potassium chloride 10 mEq in 100 mL intermittent infusion    Disposition:  The patient was discharged to home.     Impression & Plan    CMS Diagnosis: none    Medical Decision Makin year old female presenting w/ chest discomfort, intoxication     DDx includes ACS, unstable angina, chest wall pain, GERD, pneumothorax, pneumonia, alcohol intoxication, anxiety.  Heart normal while sleeping although patient becomes tachycardic upon awakening.  EKG interpretation as noted above sig for no acute ischemic findings.  Labs and imaging ordered as noted above.  Labs significant for elevated alcohol level, hypokalemia, neg troponin after >12 hours of symptoms.  Imaging sig for no acute cardiopulmonary disease.  Interventions as noted above with mild improvement in symptoms.  Plan to monitor the patient until clinically sober.  Patient has had episodes of chest pain in the past; doubt ACS, PE, dissection given history and physical exam, work-up in ED.  Potassium repleted and rechecked in the ED with improvement to normal range.  Patient is requesting discharge and given she is not in acute danger to herself or others, plan for discharge when she can obtain a sober ride.  She was encouraged to follow-up with her alcohol counselor. At this time I feel the patient is safe for discharge.  Recommendations given regarding follow up with PCP and return to the emergency department as needed for new or worsening symptoms.  Pt counseled on all results, diagnosis and disposition.  They are understanding and agreeable to plan. Patient discharged in stable condition.    Diagnosis:     ICD-10-CM    1. Alcoholic intoxication without  complication (H)  F10.920    2. Hypokalemia  E87.6        Scribe Disclosure:  I, Claudia Lua, am serving as a scribe on 9/9/2021 at 6:24 AM to personally document services performed by Robson Farrell MD based on my observations and the provider's statements to me.         Robson Farrell MD  09/09/21 1965

## 2021-09-09 NOTE — ED NOTES
DATE:  9/9/2021   TIME OF RECEIPT FROM LAB:  0700  LAB TEST:  potassium  LAB VALUE:  2.6  RESULTS GIVEN WITH READ-BACK TO (PROVIDER):  Robson Farrell MD  TIME LAB VALUE REPORTED TO PROVIDER:   0700    Awaiting orders

## 2021-09-09 NOTE — ED NOTES
DATE:  9/9/2021   TIME OF RECEIPT FROM LAB:  0745  LAB TEST:  etoh  LAB VALUE:  0.49  RESULTS GIVEN WITH READ-BACK TO (PROVIDER):  Data Unavailable  TIME LAB VALUE REPORTED TO PROVIDER:   0745

## 2021-09-09 NOTE — ED TRIAGE NOTES
Patient arrives via EMS from home. Patient is intoxicated, last drink one hour ago. Complaining of chest pain and anxiety. Patient is tearful and crying. States this happens when she drinks. States she doesn't drink every day. Hasn't eaten in 3 days. Lives alone.

## 2021-09-22 ENCOUNTER — APPOINTMENT (OUTPATIENT)
Dept: INTERNAL MEDICINE | Facility: CLINIC | Age: 31
End: 2021-09-22
Payer: COMMERCIAL

## 2021-10-10 ENCOUNTER — HEALTH MAINTENANCE LETTER (OUTPATIENT)
Age: 31
End: 2021-10-10

## 2021-11-02 PROBLEM — R56.9 ALCOHOL WITHDRAWAL SEIZURE WITH COMPLICATION (H): Status: ACTIVE | Noted: 2021-01-01

## 2021-11-02 PROBLEM — E87.3 METABOLIC ALKALOSIS: Status: ACTIVE | Noted: 2021-01-01

## 2021-11-02 PROBLEM — F10.939 ALCOHOL WITHDRAWAL SEIZURE WITH COMPLICATION (H): Status: ACTIVE | Noted: 2021-01-01

## 2021-11-02 PROBLEM — E87.1 HYPONATREMIA: Status: ACTIVE | Noted: 2021-01-01

## 2021-11-02 PROBLEM — B17.9 ACUTE HEPATITIS: Status: ACTIVE | Noted: 2021-01-01

## 2021-11-02 NOTE — ED NOTES
[facility]  ED Nurse Handoff Report    Maddie Orourke is a 31 year old female   ED Chief complaint: Seizures  . ED Diagnosis:   Final diagnoses:   None     Allergies: No Known Allergies    Code Status: Full Code  Activity level - Baseline/Home:  Independent. Activity Level - Current:   Stand by Assist. Lift room needed: No. Bariatric: No   Needed: No   Isolation: No. Infection: Not Applicable.     Vital Signs:   Vitals:    11/02/21 0115 11/02/21 0130 11/02/21 0145 11/02/21 0200   BP:       Pulse: (!) 133 (!) 133 117 114   Resp:       Temp:       TempSrc:       SpO2: 97% 99%         Cardiac Rhythm:  ,      Pain level:    Patient confused: No. Patient Falls Risk: Yes.   Elimination Status: Has voided   Patient Report - Initial Complaint: Seiures. Focused Assessment: Presents via EMS after a seizure at home, pt vomiting all day and claims to have low potassium and mag which cause her seizures, ABCs intact A&Ox4   Tests Performed: US, labs, EKG. Abnormal Results:   Labs Ordered and Resulted from Time of ED Arrival to Time of ED Departure   COMPREHENSIVE METABOLIC PANEL - Abnormal       Result Value    Sodium 127 (*)     Potassium 2.1 (*)     Chloride 76 (*)     Carbon Dioxide (CO2) 29      Anion Gap 22 (*)     Urea Nitrogen 5 (*)     Creatinine 0.46 (*)     Calcium 7.1 (*)     Glucose 130 (*)     Alkaline Phosphatase 738 (*)     AST 1,472 (*)      (*)     Protein Total 6.3 (*)     Albumin 3.2 (*)     Bilirubin Total 4.6 (*)     GFR Estimate >90     MAGNESIUM - Abnormal    Magnesium 0.9 (*)    CBC WITH PLATELETS AND DIFFERENTIAL - Abnormal    WBC Count 9.4      RBC Count 3.56 (*)     Hemoglobin 11.6 (*)     Hematocrit 35.0      MCV 98      MCH 32.6      MCHC 33.1      RDW 14.9      Platelet Count 122 (*)     % Neutrophils 85      % Lymphocytes 11      % Monocytes 4      % Eosinophils 0      % Basophils 0      % Immature Granulocytes 0      NRBCs per 100 WBC 0      Absolute Neutrophils  7.9      Absolute Lymphocytes 1.0      Absolute Monocytes 0.4      Absolute Eosinophils 0.0      Absolute Basophils 0.0      Absolute Immature Granulocytes 0.0      Absolute NRBCs 0.0     ISTAT GASES LACTATE VENOUS POCT - Abnormal    Lactic Acid POCT 5.0 (*)     Bicarbonate Venous POCT 43 (*)     O2 Sat, Venous POCT 72 (*)     pCO2V Venous POCT 44      pH Venous POCT 7.61 (*)     pO2 Venous POCT 32     ETHYL ALCOHOL LEVEL - Normal    Alcohol ethyl <0.01     HCG QUALITATIVE PREGNANCY - Normal    hCG Serum Qualitative Negative     LIPASE - Normal    Lipase 266     INR - Normal    INR 1.06     PHOSPHORUS   CK TOTAL   COVID-19 VIRUS (CORONAVIRUS) BY PCR       Treatments provided: IV fluids, Mag replacement, Potassium replacement, zofran  Family Comments: No one at bedside  OBS brochure/video discussed/provided to patient:  No  ED Medications:   Medications   magnesium sulfate 2 g in water intermittent infusion (2 g Intravenous New Bag 11/2/21 0121)   potassium chloride 10 mEq in 100 mL sterile water intermittent infusion (premix) (has no administration in time range)   potassium chloride 10 mEq in 100 mL sterile water intermittent infusion (premix) (10 mEq Intravenous New Bag 11/2/21 0121)   lactated ringers BOLUS 1,701 mL (1,000 mLs Intravenous New Bag 11/2/21 0136)   0.9% sodium chloride BOLUS (0 mLs Intravenous Stopped 11/2/21 0026)   thiamine (B-1) tablet 100 mg (100 mg Oral Given 11/2/21 0025)   multivitamin w/minerals (THERA-VIT-M) tablet 1 tablet (1 tablet Oral Given 11/2/21 0025)   LORazepam (ATIVAN) injection 2 mg (2 mg Intravenous Given 11/1/21 2257)   ondansetron (ZOFRAN) injection 4 mg (4 mg Intravenous Given 11/1/21 2256)   potassium chloride (KLOR-CON) Packet 40 mEq (40 mEq Oral Given 11/2/21 0125)     Drips infusing:  Yes  For the majority of the shift, the patient's behavior Green. Interventions performed were NA.    Sepsis treatment initiated:   No     Patient tested for COVID 19 prior to admission:  yes    ED Nurse Name/Phone Number: Ciara Beck RN,   2:20 AM    RECEIVING UNIT ED HANDOFF REVIEW    Above ED Nurse Handoff Report was reviewed: Yes  Reviewed by: Nedra Lerma RN on November 2, 2021 at 1:34 PM

## 2021-11-02 NOTE — PLAN OF CARE
End of Shift Summary  For vital signs and complete assessments, please see documentation flowsheets.     Pertinent assessments: Pt admitted to floor, SBA and A&Ox4. No c/o pain. Potassium and phosphorus replaced, checking electrolytes q8 hrs. Seizure precautions. CIWA scale.     Major Shift Events: admitted to floor    Treatment Plan: CIWA, electrolyte replacement, tele, monitor for withdrawal

## 2021-11-02 NOTE — H&P
[facility]  History and Physical Hospitalist       Date of Admission:  11/1/2021    Assessment & Plan   Maddie Orourke is a 31 year old female with PMHx of alcohol abuse with acute alcohol withdrawal, alcohol withdrawal seizure, and electrolyte derangements who presents to [facility] ED on 11/2 following a seizure.     VS: Temp 99.5, heart rate 130 improved 114, blood pressure 137/101, respiratory 22, pulse ox 99% on room air.  Sodium 127, potassium 2.1, chloride 76, CO2 29, BUN 5, creatinine 0.46, glucose 130, calcium 7.1, anion gap 22, magnesium 0.9, T bili 4.6, alkaline phosphatase of 238, , AST 1472, lactic acid 5.  VBG: pH 7.61, PCO2 44.  WBC 9.4, hemoglobin 11.6, platelet count 122.  Ethanol level negative.    Patient was treated with IV Zofran, Ativan, normal saline, thiamine, potassium, magnesium, and multivitamin. US of appendix per ED provider - denied any abdominal pain.     Acute alcohol withdrawal with alcohol withdrawal seizure complicated by acute alcohol abuse/dependence  Advanced diffuse hepatic steatosis   -Seizure precautions  -Check INR  -Gabapentin alcohol withdrawal protocol; clonidine 0.1 mg q 8 hours, CIWA protocol, ativan prn   -Thiamine/Folate/MV replacement   -Supportive cares: antipsychotics prn for hallucinations, antiemetics, IVF  -Multiple hospitalizations for alcohol abuse complications. Discuss CD in AM. Patient previously provided resources but never established care.     Mild hyponatremia associated with alcohol abuse and dehydration: Sodium 127 on admission. IVF bolus followed by IV infusion. Recheck lactic acid in AM. Goal 6-8 meq/24 hours.     Lactic acidosis; HAGMA: Lactic acid 5.0. Anion gap 22. Associated with acute alcohol withdrawal and recent seizure. Complicated by poor oral intake and reduced food intake. IVF bolus and infusion overnight. Recheck in AM.     Metabolic alkalosis: Associated with nausea/vomiting and diarrhea. Monitor.      Hypokalemia: Potassium 2.1 on admission. Treated with Potassium chloride 10 meq IV x2 and Potassium chloride 40 meq Oral once. Recheck potassium now. Replacement protocol. Tele monitoring.     Hypomagnesemia: Magnesium 0.9. Magnesium sulfate 2 gm IV x2 given. Magnesium replacement protocol. Recheck in AM. Magnesium oxide 400 mg BID.     Hypophosphatemia: Phosphorus 1.8. Replacement protocol.      Hypocalcemia: Calcium 7.1. Corrected calcium 7.7. Check ionized calcium.     Mild thrombocytopenia: Plts 122. Monitor.     Nicotine dependence: Encouraged smoking cessation. Declined NRT. Nicotine patch prn.     Normocytic anemia: Hemoglobin 11.6. Denies acute blood loss. Most likely associated with alcohol abuse. Monitor.     COVID-19: Negative    **Patient boarding in ED. Patient will need frequent labs/electrolyte replacement and monitoring. Discussed care plan with nursing. Admission orders should be released for electrolyte replacement.     **Patient is the child of family friends. No personal relationship to patient. Confirmed patient was ok with me providing cares. Agreed.     FEN: LR fluids; monitor electrolytes; regular  Activity: As tolerated  DVT Prophylaxis: Pneumatic Compression Devices  Code Status: Full Code    Expected discharge: 2-3 days pending electrolyte replacement and alcohol withdrawal symptoms. Anticipate discharge home.     Jacinta Nguyen DO    Primary Care Physician   [facility]    Chief Complaint   Seizure  History is obtained from the patient, electronic health record and emergency department physician (Dr. Paevl Cade)     History of Present Illness   Maddie Orourke is a 31 year old female with PMHx of alcohol abuse with acute alcohol withdrawal, alcohol withdrawal seizure, and electrolyte derangements who presents to [facility] ED on 11/2 following a seizure.     Symptoms began two days ago. Patient began to increase alcohol consumption. She had been drinking 1.75 ml of Whiskey  on Sunday - last drink. She acutely developed nausea with vomiting. Unable to eat for two days. She ran out of electrolyte replacement medications. She has not established with a PCP. No respiratory symptoms. She reports throat tightness during seizure activity. Hot/cold flashes. Unknown if febrile. No abdominal. Diarrhea with 4 episodes/day. Reported chest discomfort and tingling earlier in the day. Around 2200 she notified her boyfriend of not feeling well who witnessed the seizure. It lasted roughly 10 min. EMS arrived. Cramping/tightness in arms/legs for a few hours following event which has now improved. No head trauma noted.     Past Medical History    I have reviewed this patient's medical history and updated it with pertinent information if needed.   Past Medical History:   Diagnosis Date     Alcohol abuse      Alcohol withdrawal seizure (H)      Anxiety      Hypokalemia      Hypomagnesemia      Nicotine dependence      Past Surgical History   I have reviewed this patient's surgical history and updated it with pertinent information if needed.  Past Surgical History:   Procedure Laterality Date     HEAD & NECK SURGERY      repair of deviated septum     Prior to Admission Medications   Prior to Admission Medications   Prescriptions Last Dose Informant Patient Reported? Taking?   chlordiazePOXIDE (LIBRIUM) 25 MG capsule   No No   Sig: Take 1 capsule (25 mg) by mouth 3 times daily as needed for anxiety or withdrawal   folic acid (FOLVITE) 1 MG tablet   No No   Sig: Take 1 tablet (1 mg) by mouth daily   magnesium 250 MG tablet   No No   Sig: Take 1 tablet (250 mg) by mouth daily   multivitamin w/minerals (THERA-VIT-M) tablet   No No   Sig: Take 1 tablet by mouth daily   potassium chloride ER (KLOR-CON M) 20 MEQ CR tablet   No No   Sig: Take 1 tablet (20 mEq) by mouth 2 times daily   thiamine (B-1) 100 MG tablet   No No   Sig: Take 1 tablet (100 mg) by mouth daily      Facility-Administered Medications: None      Allergies   No Known Allergies    Social History   I have reviewed this patient's social history and updated it with pertinent information if needed. Maddie Orourke  reports that she has been smoking. She has been smoking about 0.25 packs per day. She uses smokeless tobacco. She reports current alcohol use. She reports previous drug use.    Family History   Family history reviewed with patient and is noncontributory.    Review of Systems   The 10 point Review of Systems is negative other than noted in the HPI     Physical Exam   Temp: 99.5  F (37.5  C) Temp src: Oral BP: (!) 137/101 Pulse: 114   Resp: 22 SpO2: 99 %      Vital Signs with Ranges  Temp:  [99.5  F (37.5  C)] 99.5  F (37.5  C)  Pulse:  [114-138] 114  Resp:  [22] 22  BP: (137)/(101) 137/101  SpO2:  [94 %-100 %] 99 %  0 lbs 0 oz    Constitutional: Awake, alert, cooperative, no apparent distress.  HEENT: AT. NC. Conjunctiva non-injected. Sclera anicteric. Pupils examined and normal.  Respiratory: No use of accessory respiratory muscles. Clear to auscultation bilaterally, no crackles or wheezing.  Cardiovascular: Tachycardic, regular rhythm. Normal S1 and S2, and no murmur noted.  GI: Soft, non-distended, non-tender, normal bowel sounds. No organomegaly.   Lymph/Hematologic: No anterior cervical or supraclavicular adenopathy.  Skin: No rashes, no cyanosis, no edema.  Musculoskeletal: No joint swelling, erythema or tenderness.  Neurologic: Cranial nerves 2-12 intact, normal strength and sensation. Slight tremor.   Psychiatric: Alert, oriented to person, place and time, no obvious anxiety or depression.    Data   Data reviewed today:  I personally reviewed     Recent Labs   Lab 11/02/21  0149 11/01/21  2256   WBC  --  9.4   HGB  --  11.6*   MCV  --  98   PLT  --  122*   INR 1.06  --    NA  --  127*   POTASSIUM  --  2.1*   CHLORIDE  --  76*   CO2  --  29   BUN  --  5*   CR  --  0.46*   ANIONGAP  --  22*   NISHI  --  7.1*   GLC  --  130*   ALBUMIN  --  3.2*    PROTTOTAL  --  6.3*   BILITOTAL  --  4.6*   ALKPHOS  --  738*   ALT  --  186*   AST  --  1,472*   LIPASE  --  266     Recent Results (from the past 24 hour(s))   US Abdomen Limited    Narrative    EXAM: US ABDOMEN LIMITED  LOCATION: [facility]  DATE/TIME: 11/2/2021 1:22 AM    INDICATION: vomiting, hepatitis  COMPARISON: None.  TECHNIQUE: Limited abdominal ultrasound.    FINDINGS:    GALLBLADDER: Cholelithiasis. No gallbladder wall thickening or pericholecystic fluid. Negative ultrasound Rucker sign.    BILE DUCTS: No biliary dilatation. The common duct measures 5 mm.    LIVER: Markedly echogenic parenchyma. No focal mass.    RIGHT KIDNEY: No hydronephrosis.    PANCREAS: The visualized portions are normal.    No ascites.      Impression    IMPRESSION:  1.  Cholelithiasis. No specific evidence for cholecystitis.  2.  Advanced diffuse hepatic steatosis.

## 2021-11-02 NOTE — PHARMACY-ADMISSION MEDICATION HISTORY
Admission medication history interview status for this patient is complete. See [facility] admission navigator for allergy information, prior to admission medications and immunization status.     Medication history interview done, indicate source(s): Patient  Medication history resources (including written lists, pill bottles, clinic record):[employer]  Pharmacy: [id number] [address] [employer] [place]    Changes made to PTA medication list:  Added: none  Changed: none  Reported as Not Taking: none  Removed: Librium 25 mg TID PRN (finished)    Actions taken by pharmacist (provider contacted, etc):left sticky note for provider     Additional medication history information:None    Medication reconciliation/reorder completed by provider prior to medication history?  N    Prior to Admission medications    Medication Sig Last Dose Taking? Auth Provider   folic acid (FOLVITE) 1 MG tablet Take 1 tablet (1 mg) by mouth daily 11/1/2021 at Unknown time Yes Jeanette Chinchilla MD   magnesium 250 MG tablet Take 1 tablet (250 mg) by mouth daily 11/1/2021 at Unknown time Yes Jacinta Nguyen DO   multivitamin w/minerals (THERA-VIT-M) tablet Take 1 tablet by mouth daily 11/1/2021 at Unknown time Yes Jeanette Chinchilla MD   potassium chloride ER (KLOR-CON M) 20 MEQ CR tablet Take 1 tablet (20 mEq) by mouth 2 times daily 11/1/2021 at Unknown time Yes Jacinta Nguyen DO   thiamine (B-1) 100 MG tablet Take 1 tablet (100 mg) by mouth daily 11/1/2021 at Unknown time Yes Jeanette Chinchilla MD

## 2021-11-02 NOTE — ED TRIAGE NOTES
Pt presents via EMS for a seizure of unknown length of time, pt found hyperventalating but A&Ox4 complains of numbness and tingling in extremities, Hx of seizures and magnesium and potassium deficiencies. Pt states she has seizures when she runs out of her potassium prescription. ABCs intact

## 2021-11-02 NOTE — ED PROVIDER NOTES
History   Chief Complaint:  Seizures     The history is provided by the patient.      Maddie Orourke is a 31 year old female with history of seizures who presents after her boyfriend found her seizing today. The patient's boyfriend is not in the ED currently so history is limited due to this, as the patient states that she does not remember the event. Of note, she has a history of seizures, which is typically triggered by her other histories of magnesium and potassium deficiency. She notably ran out of her potassium supplements recently and suspects this as a cause of her seizure today. She does not take seizure medication and notes that she doesn't have seizures often. The patient notably drank alcohol 1 hour ago and reports possible alcohol withdrawal. Of note, she reports having emesis all day today, suspecting this as a result of not drinking enough water.    Review of Systems   Gastrointestinal: Positive for vomiting.   Neurological: Positive for seizures.   All other systems reviewed and are negative.        Allergies:  No Known Drug Allergies    Medications:  Chlordiazepoxide  Folic acid  Magnesium  Potassium chloride ER  Thiamine    Past Medical History:     Alb abuse  Alcohol withdrawal  Suicidal ideation  Hypokalemia  Acute kidney injury  Hypophosphatemia  Hypomagnesemia  Hypovolemia    Past Surgical History:    Repair of deviated septum    Social History:  The patient presented alone.  The patient arrived via EMS.    Physical Exam     Patient Vitals for the past 24 hrs:   BP Temp Temp src Pulse Resp SpO2   11/02/21 0500 126/89 -- -- 105 -- --   11/02/21 0400 126/88 -- -- 107 -- 99 %   11/02/21 0345 (!) 122/94 -- -- 99 -- 100 %   11/02/21 0330 121/86 -- -- 98 -- 98 %   11/02/21 0315 -- -- -- 118 -- 99 %   11/02/21 0300 -- -- -- 116 -- 99 %   11/02/21 0245 -- -- -- (!) 122 -- 100 %   11/02/21 0230 -- -- -- (!) 125 -- --   11/02/21 0215 -- -- -- 120 -- 92 %   11/02/21 0200 -- -- -- 114 -- --   11/02/21  0145 -- -- -- 117 -- --   11/02/21 0130 -- -- -- (!) 133 -- 99 %   11/02/21 0115 -- -- -- (!) 133 -- 97 %   11/02/21 0100 -- -- -- (!) 126 -- 100 %   11/02/21 0045 -- -- -- (!) 125 -- 98 %   11/02/21 0030 -- -- -- 115 -- 100 %   11/01/21 2300 -- -- -- -- -- 98 %   11/01/21 2245 -- -- -- -- -- 94 %   11/01/21 2235 (!) 137/101 99.5  F (37.5  C) Oral (!) 138 22 96 %     Physical Exam    Constitutional:  Oriented to person, place, and time. Mild distress.  HENT:   Head:    Normocephalic.   Mouth/Throat:   Oropharynx is clear. Dry mucus membranes.  Eyes:    EOM are normal. Pupils are equal, round, and reactive to light.   Neck:    Neck supple.   Cardiovascular:  Tachycardic rate, regular rhythm and normal heart sounds.      Exam reveals no gallop and no friction rub.       No murmur heard.  Pulmonary/Chest:  Effort normal and breath sounds normal.      No respiratory distress. No wheezes. No rales.      No reproducible chest wall pain.  Abdominal:   Soft. No distension. No tenderness. No rebound and no guarding.   Musculoskeletal:  Normal range of motion.   Neurological:   Alert and oriented to person, place, and time.           Moves all 4 extremities spontaneously. Cranial nerves intact. Strength and sensation equal in all 4 extremities. No slurred speech. Clinically sober.  Skin:    No rash noted. No pallor.    Emergency Department Course     ECG  ECG obtained at 2317, ECG read at 2319  Sinus tachycardia.  Cannot rule out anterior infarct, age undetermined.  ST & T wave abnormality, consider inferolateral ischemia.  Abnormal ECG.   Rate 115 bpm. OH interval 154 ms. QRS duration 90 ms. QT/QTc 354/489 ms. P-R-T axes 76 6 -30.     Imaging:  US Abdomen Limited   Final Result   IMPRESSION:   1.  Cholelithiasis. No specific evidence for cholecystitis.   2.  Advanced diffuse hepatic steatosis.              Report per radiology    Laboratory:  Labs Ordered and Resulted from Time of ED Arrival to Time of ED Departure    COMPREHENSIVE METABOLIC PANEL - Abnormal       Result Value    Sodium 127 (*)     Potassium 2.1 (*)     Chloride 76 (*)     Carbon Dioxide (CO2) 29      Anion Gap 22 (*)     Urea Nitrogen 5 (*)     Creatinine 0.46 (*)     Calcium 7.1 (*)     Glucose 130 (*)     Alkaline Phosphatase 738 (*)     AST 1,472 (*)      (*)     Protein Total 6.3 (*)     Albumin 3.2 (*)     Bilirubin Total 4.6 (*)     GFR Estimate >90     MAGNESIUM - Abnormal    Magnesium 0.9 (*)    CBC WITH PLATELETS AND DIFFERENTIAL - Abnormal    WBC Count 9.4      RBC Count 3.56 (*)     Hemoglobin 11.6 (*)     Hematocrit 35.0      MCV 98      MCH 32.6      MCHC 33.1      RDW 14.9      Platelet Count 122 (*)     % Neutrophils 85      % Lymphocytes 11      % Monocytes 4      % Eosinophils 0      % Basophils 0      % Immature Granulocytes 0      NRBCs per 100 WBC 0      Absolute Neutrophils 7.9      Absolute Lymphocytes 1.0      Absolute Monocytes 0.4      Absolute Eosinophils 0.0      Absolute Basophils 0.0      Absolute Immature Granulocytes 0.0      Absolute NRBCs 0.0     ISTAT GASES LACTATE VENOUS POCT - Abnormal    Lactic Acid POCT 5.0 (*)     Bicarbonate Venous POCT 43 (*)     O2 Sat, Venous POCT 72 (*)     pCO2V Venous POCT 44      pH Venous POCT 7.61 (*)     pO2 Venous POCT 32     PHOSPHORUS - Abnormal    Phosphorus 1.8 (*)    POTASSIUM - Abnormal    Potassium 2.2 (*)    PHOSPHORUS - Abnormal    Phosphorus 1.9 (*)    ETHYL ALCOHOL LEVEL - Normal    Alcohol ethyl <0.01     HCG QUALITATIVE PREGNANCY - Normal    hCG Serum Qualitative Negative     LIPASE - Normal    Lipase 266     INR - Normal    INR 1.06     CK TOTAL - Normal         COVID-19 VIRUS (CORONAVIRUS) BY PCR - Normal    SARS CoV2 PCR Negative     CBC WITH PLATELETS   MAGNESIUM   IONIZED CALCIUM   COMPREHENSIVE METABOLIC PANEL   CBC WITH PLATELETS   MAGNESIUM   PHOSPHORUS   LACTIC ACID WHOLE BLOOD   BLOOD CULTURE     Emergency Department Course:    Reviewed:  I reviewed nursing  notes, vitals and past medical history.    Assessments:  2235 I obtained history and examined the patient as noted above.   0002 I rechecked the patient.   0120 I rechecked the patient and explained findings. I discussed my plan for her admission and she is in agreement with this.    Consults:  0218 I consulted with Dr. Nguyen, hospitalist, regarding the patient's history and presentation here in the emergency department who accepted the patient for admission.    Interventions:  2256 Zofran 4 mg IV  2257 Ativan 2 mg IV  2257 NS 1 L IV  0025 Thiamine 100 mg PO  0025 Thera-Vit-M 1 tablet PO  0121 Magnesium sulfate 2 g IV  0121 Potassium chloride in sterile water 10 mEq IV  0125 Klor-Con 40 mEq PO  0136 Lactated ringers BOLUS 1,701 mL IV  0226 Potassium chloride in sterile water 10 mEq IV    Disposition:  The patient was admitted to the hospital under the care of Dr. Nguyen.     Impression & Plan     Medical Decision Making:    Maddie Orourke is a 31 year old female that came in status post seizure with history of alcohol abuse and alcohol withdrawal seizures in the past. Differential includes epilepsy, alcohol withdrawal seizures, or other causes. Workup thus far here shows what appears to be hypokalemic hypochloremic metabolic alkalosis secondary due to her vomiting prior to symptoms starting. With her alcohol level that is undetectable, this is likely consistent with her alcohol withdrawal seizures. She did respond quite well to Ativan, and between Ativan and fluids this has brought down her heart rate which was tachycardic. Her elevated lactic acid is likely secondary to underlying liver disease as well as dehydration and possible seizures. I would highly doubt sepsis or bacterial source. She does have quite elevated liver enzymes consistent with hepatis, likely secondary to alcohol abuse. I did obtain a RUQ ultrasound to evaluate for cholangitis, which is quite unlikely with no enlarged common bile duct. She is  currently getting her potassium and magnesium replaced and will need further fluids monitoring. Disposition of admission to medicine. Diagnosis is metabolic alkalosis, alcohol withdrawal seizure with complication, hypokalemia, hypomagnesemia, hyponatremia, acute hepatitis, and nausea and vomiting.    Critical Care Time: was 45 minutes for this patient excluding procedures.    Diagnosis:    ICD-10-CM    1. Metabolic alkalosis  E87.3    2. Alcohol withdrawal seizure with complication (H)  F10.239     R56.9    3. Hypokalemia  E87.6    4. Hypomagnesemia  E83.42    5. Hyponatremia  E87.1    6. Acute hepatitis  B17.9    7. Nausea and vomiting, intractability of vomiting not specified, unspecified vomiting type  R11.2      Scribe Disclosure:  I, Lizy Naranjo, am serving as a scribe at 10:35 PM on 11/1/2021 to document services personally performed by Pavel Cade MD based on my observations and the provider's statements to me.       Pavel Cade MD  11/02/21 0666

## 2021-11-02 NOTE — ED NOTES
DATE:  11/2/2021   TIME OF RECEIPT FROM LAB:  0100  LAB TEST:  Mag, Pot, AST  LAB VALUE:  Mag 0.9, Pot 2.1, AST 1472  RESULTS GIVEN WITH READ-BACK TO (PROVIDER):  Pavel Cade  TIME LAB VALUE REPORTED TO PROVIDER:  0103

## 2021-11-02 NOTE — PROGRESS NOTES
[facility]    Medicine Progress Note - Hospitalist Service       Date of Admission:  11/1/2021    Assessment & Plan           Maddie Orourke is a 31-year-old woman who presented to the emergency department with seizure activity of unknown duration.    Past medical history includes seizures, alcohol abuse and withdrawal, recurrent electrolyte deficiencies related to alcohol abuse.    In the emergency department, the patient was able to give a history.  VS: , /101, RR 22, afebrile, no hypoxia.  Creatinine 0.46, sodium 127, potassium 2.1, chloride 76, CO2 29, glucose 130, anion gap 22, magnesium 0.9.  Total bilirubin 4.6, alkaline phosphatase 238, AST 1472/, lactic acid 5.  VPH: pH 7.61, PCO2 44.  WBC 9.4, Hgb 11.6, .  She was given Ativan, Zofran, NS, thiamine, potassium, magnesium and multivitamin.    Diagnoses:  1.  Acute alcohol dependence, now in severe withdrawal complicated by seizure.  2.  Alcoholic hepatitis.  3.  Anemia and evolving thrombocytopenia associated with alcohol toxicity.  Bone marrow suppression related to alcohol toxicity.  4.  Mixed severe metabolic alkalosis and acidosis due to vomiting, volume contraction, lactic acidosis, severe malnutrition.  5.  Multiple electrolyte disturbances including hypovolemic hyponatremia, hypokalemia, hypomagnesemia, hypophosphatemia, hypocalcemia.  6.  Severe vitamin D deficiency.  7.  Nicotine dependence.    PLAN:  1.  Continues monitoring as well as replacement of electrolytes.  2.  CIWA protocol.  3.  Start vitamin D empirically 50,000 units weekly.       Diet: Combination Diet Regular Diet Adult    DVT Prophylaxis: Pneumatic Compression Devices  Escamilla Catheter: Not present  Central Lines: None  Code Status: Full Code      Disposition Plan   Expected discharge: TBD      Joe Rice MD  Hospitalist Service  [facility]  Securely message with the Vocera Web Console (learn more  here)  Text page via [facility] Paging/Directory        Clinically Significant Risk Factors Present on Admission        # Hypokalemia: K = 2.7 mmol/L (Ref range: 3.4 - 5.3 mmol/L) on admission, will replace as needed       # Thrombocytopenia: Plts = 122 10e3/uL (Ref range: 150 - 450 10e3/uL) on admission, will monitor for bleeding      ______________________________________________________________________    Interval History   Pt not arousable in ED when I went to check on her.    Data reviewed today: I reviewed all medications, new labs and imaging results over the last 24 hours. I personally reviewed **    Physical Exam   Vital Signs: Temp: 99.5  F (37.5  C) Temp src: Oral BP: 104/72 Pulse: 90   Resp: 22 SpO2: 97 %      Weight: 0 lbs 0 oz  General Appearance: Sleeping deeply.  Unresponsive to non-noxious stimulus.  Respiratory: unlabored. CTA  Cardiovascular: RRR without murmur  GI: soft, NTND  Skin:   Other:     Data   Recent Labs   Lab 11/02/21  1309 11/02/21  0605 11/02/21  0418 11/02/21  0149 11/01/21  2256 11/01/21  2256   WBC  --  5.0  --   --   --  9.4   HGB  --  9.3*  --   --   --  11.6*   MCV  --  98  --   --   --  98   PLT  --  73*  --   --   --  122*   INR  --   --   --  1.06  --   --    * 133  --   --   --  127*   POTASSIUM 3.3* 2.7* 2.2*  --    < > 2.1*   CHLORIDE 94 90*  --   --   --  76*   CO2 31 33*  --   --   --  29   BUN 2* 2*  --   --   --  5*   CR 0.32* 0.40*  --   --   --  0.46*   ANIONGAP 7 10  --   --   --  22*   NISHI 7.7* 6.7*  --   --   --  7.1*   * 125*  --   --   --  130*   ALBUMIN  --  2.4*  --   --   --  3.2*   PROTTOTAL  --  5.0*  --   --   --  6.3*   BILITOTAL  --  4.5*  --   --   --  4.6*   ALKPHOS  --  576*  --   --   --  738*   ALT  --  146*  --   --   --  186*   AST  --  1,212*  --   --   --  1,472*   LIPASE  --   --   --   --   --  266    < > = values in this interval not displayed.     Recent Results (from the past 24 hour(s))   US Abdomen Limited    Narrative    EXAM:  US ABDOMEN LIMITED  LOCATION: [facility]  DATE/TIME: 11/2/2021 1:22 AM    INDICATION: vomiting, hepatitis  COMPARISON: None.  TECHNIQUE: Limited abdominal ultrasound.    FINDINGS:    GALLBLADDER: Cholelithiasis. No gallbladder wall thickening or pericholecystic fluid. Negative ultrasound Rucker sign.    BILE DUCTS: No biliary dilatation. The common duct measures 5 mm.    LIVER: Markedly echogenic parenchyma. No focal mass.    RIGHT KIDNEY: No hydronephrosis.    PANCREAS: The visualized portions are normal.    No ascites.      Impression    IMPRESSION:  1.  Cholelithiasis. No specific evidence for cholecystitis.  2.  Advanced diffuse hepatic steatosis.         Medications     lactated ringers 100 mL/hr at 11/02/21 1932       cloNIDine  0.1 mg Oral Q8H     [START ON 11/3/2021] folic acid  1 mg Oral Daily     [START ON 11/9/2021] gabapentin  100 mg Oral Q8H     [START ON 11/7/2021] gabapentin  300 mg Oral Q8H     [START ON 11/5/2021] gabapentin  600 mg Oral Q8H     gabapentin  900 mg Oral Q8H     magnesium oxide  400 mg Oral BID     [START ON 11/3/2021] multivitamin w/minerals  1 tablet Oral Daily     nicotine   Transdermal Q8H     sodium chloride (PF)  3 mL Intracatheter Q8H     [START ON 11/3/2021] thiamine  100 mg Oral Daily     vitamin D2  50,000 Units Oral Q7 Days

## 2021-11-03 PROBLEM — E55.9 VITAMIN D DEFICIENCY: Status: ACTIVE | Noted: 2021-01-01

## 2021-11-03 NOTE — CONSULTS
Care Management Initial Consult    General Information  Assessment completed with: Patient,    Type of CM/SW Visit: Offer D/C Planning    Primary Care Provider verified and updated as needed: Yes   Readmission within the last 30 days: no previous admission in last 30 days      Reason for Consult: care coordination/care conference, discharge planning    Communication Assessment  Patient's communication style: spoken language (English or Bilingual)    Hearing Difficulty or Deaf: no   Wear Glasses or Blind: yes    Cognitive  Cognitive/Neuro/Behavioral: WDL  Level of Consciousness: alert  Arousal Level: opens eyes spontaneously  Orientation: oriented x 4  Mood/Behavior: calm, cooperative  Best Language: 0 - No aphasia  Speech: clear, spontaneous, logical    Living Environment:   People in home: alone     Current living Arrangements: apartment      Able to return to prior arrangements: yes     Family/Social Support:  Care provided by: self  Provides care for: no one  Marital Status: Single  Significant Other          Description of Support System: Supportive, Involved    Support Assessment: Adequate family and caregiver support    Current Resources:   Patient receiving home care services: No  Community Resources: None  Equipment currently used at home: none  Supplies currently used at home: None    Lifestyle & Psychosocial Needs:  Social Determinants of Health     Tobacco Use: High Risk     Smoking Tobacco Use: Current Every Day Smoker     Smokeless Tobacco Use: Current User   Alcohol Use: Unknown     Frequency of Alcohol Consumption: 4 or more times a week     Average Number of Drinks: 1 or 2     Frequency of Binge Drinking: Not asked   Financial Resource Strain: Low Risk      Difficulty of Paying Living Expenses: Not hard at all   Food Insecurity: No Food Insecurity     Worried About Running Out of Food in the Last Year: Never true     Ran Out of Food in the Last Year: Never true   Transportation Needs: No  "Transportation Needs     Lack of Transportation (Medical): No     Lack of Transportation (Non-Medical): No   Physical Activity: Unknown     Days of Exercise per Week: 0 days     Minutes of Exercise per Session: Not asked   Stress:      Feeling of Stress :    Social Connections: Unknown     Frequency of Communication with Friends and Family: More than three times a week     Frequency of Social Gatherings with Friends and Family: More than three times a week     Attends Denominational Services: Not asked     Active Member of Clubs or Organizations: Not asked     Attends Club or Organization Meetings: Not asked     Marital Status: Not asked   Intimate Partner Violence: Unknown     Fear of Current or Ex-Partner: Not asked     Emotionally Abused: Not asked     Physically Abused: Not asked     Sexually Abused: Not asked   Depression:      PHQ-2 Score:    Housing Stability:      Unable to Pay for Housing in the Last Year:      Number of Places Lived in the Last Year:      Unstable Housing in the Last Year:      Additional Information:  Pt admitted with acute alcohol dependence, seizure, alcoholic hepatitis, noted to have unplanned readmission risk of 36%. Met with pt at bedside to assess for needs. Pt reports that she lives independently in her apartment, she reports having a new boyfriend who lives in the same apartment building as her but they do not live together. He is supportive of her, he was helping her and did call EMS prior to admission.     Pt declines any CD resources or the need for any additional support at discharge. She is not established in primary care but is agreeable to CM assisting with this. Unable to get appointment without provider approval, clinic will call back to pt to coordinate this. AVS updated.     \"You should be seen by primary care within the next 5-7 days. We tried to get you scheduled for a hospital follow-up appointment at the [facility] [phone], this request was " "sent along to their providers and they will call you to schedule this appointment. If you have not heard from them within 1-2 days, please call them to follow-up. \"    Pt reports running out of medication prior to admission, will need medications filled and sent with at discharge and pt is aware further refills should be addressed in primary care. She is understanding of the benefit of having a PCP and close OP follow-up. OP care coordinator referral placed for ongoing follow-up after discharge.    Minna Caraballo RN BSN   Inpatient Care Coordination  [facility]   Phone [phone]      "

## 2021-11-03 NOTE — PLAN OF CARE
Assessments: VSS. A&O x4. SBA-independent in room. Denies any pain. No nausea/vomiting. Good appetite. Seizure precautions. CIWA score zero. Incontinent at times. Per Tele tech - SR HR 80s    Treatment Plan: CIWA, electrolyte replacement, tele    Bedside Nurse: Jadon Puente RN

## 2021-11-03 NOTE — TELEPHONE ENCOUNTER
Patient is in the hospital to be discharged tomorrow. Patient needs to follow up. She has not been seen here but wants to be a patient here. Please advise. No openings this week. Ok to call and gina [phone]

## 2021-11-03 NOTE — PLAN OF CARE
To Do:  End of Shift Summary  For vital signs and complete assessments, please see documentation flowsheets.     Pertinent assessments: Alert to self only. Tele. Tachycardic most of night, hypertensive as well but other VS stable. BP at 0000 was 175/121, metoprolol given per MD orders. Recheck 171/103. Patient very restless/anxious throughout night. Patient unable to state whether or not she has pain, pain observed via PAINAD scale. Oxycodone given x 1. Purwick in place for incontinence. BS normoactive, last BM 11/2. Prior to 11/2, patient was having multiple episodes of diarrhea on 10/30 per . Liver enzymes elevated via lab results at 0800. Repositioned Q 2.    Major Shift Events: 175/121 BP at 0000    Treatment Plan: Continue decadron, Neuro consult, hold xarelto for LP tomorrow    Bedside Nurse: Aileen Garcia RN

## 2021-11-03 NOTE — PLAN OF CARE
Pt scoring 0 on CIWA all shift, up with SB assist, electrolytes low despite replacements MD notified. Seizure precautions in use, no seizures during shift, SR on telemetry. Will keep monitoring.

## 2021-11-03 NOTE — PROGRESS NOTES
[facility]    Medicine Progress Note - Hospitalist Service       Date of Admission:  11/1/2021    Assessment & Plan             Maddie Orourke is a 31-year-old woman who presented to the emergency department with seizure activity of unknown duration.    Past medical history includes seizures, alcohol abuse and withdrawal, recurrent electrolyte deficiencies related to alcohol abuse.    In the emergency department, the patient was able to give a history.  VS: , /101, RR 22, afebrile, no hypoxia.  Creatinine 0.46, sodium 127, potassium 2.1, chloride 76, CO2 29, glucose 130, anion gap 22, magnesium 0.9.  Total bilirubin 4.6, alkaline phosphatase 238, AST 1472/, lactic acid 5.  VPH: pH 7.61, PCO2 44.  WBC 9.4, Hgb 11.6, .  She was given Ativan, Zofran, NS, thiamine, potassium, magnesium and multivitamin.    Diagnoses:  1.  Acute alcohol dependence, now in severe withdrawal complicated by seizure.  2.  Alcoholic hepatitis.  3.  Anemia and evolving thrombocytopenia associated with alcohol toxicity.  Bone marrow suppression related to alcohol toxicity.  4.  Mixed severe metabolic alkalosis and acidosis due to vomiting, volume contraction, lactic acidosis, severe malnutrition. Resolved.  5.  Multiple electrolyte disturbances including hypovolemic hyponatremia, hypokalemia, hypomagnesemia, hypophosphatemia, hypocalcemia.  6.  Severe vitamin D deficiency.  7.  Nicotine dependence.    PLAN:  1.  Continues monitoring as well as replacement of electrolytes.  2.  CIWA protocol.  3.  Start vitamin D empirically 50,000 units weekly.  4.  Chem dep requested.     Although the patient appears more appropriate, her electrolyte abnormalities persist, partly corrected. I spoke with her about the significance of her evident alcoholic hepatitis as well as her need to avoid further injuring herself with excessive alcohol intake. I asked her to plan fr sobriety and requested a Chem dep  consultation.      Diet: Combination Diet Regular Diet Adult    DVT Prophylaxis: Pneumatic Compression Devices  Escamilla Catheter: Not present  Central Lines: None  Code Status: Full Code      Disposition Plan   Expected discharge: TBD      Joe Rice MD  Hospitalist Service  [facility]  Securely message with the Vocera Web Console (learn more here)  Text page via [email or web address] Paging/Directory        Clinically Significant Risk Factors Present on Admission                ______________________________________________________________________    Interval History   Alert and conversant. Reportedly lives with boyfriend.  He also drinks, but less than she does.      I talked with her at some length about the importance of establishing a plan for sobriety. She is anxious to leave.      She recently moved here from [place]    Data reviewed today: I reviewed all medications, new labs and imaging results over the last 24 hours.     Physical Exam   Vital Signs: Temp: 98.1  F (36.7  C) Temp src: Axillary BP: 123/84 Pulse: 76   Resp: 20 SpO2: 98 % O2 Device: None (Room air)    Weight: 127 lbs 0 oz  General Appearance: Awake, alert and coherent.  NAD.  Respiratory: unlabored. CTA  Cardiovascular: RRR without murmur  GI: soft, NTND.  Full appearing. Ascites?  Skin: no obvious spider hemangiomata  Other:     Data   Recent Labs   Lab 11/02/21  2200 11/02/21  1309 11/02/21  0605 11/02/21  0418 11/02/21  0149 11/01/21  2256   WBC  --   --  5.0  --   --  9.4   HGB  --   --  9.3*  --   --  11.6*   MCV  --   --  98  --   --  98   PLT  --   --  73*  --   --  122*   INR  --   --   --   --  1.06  --    NA  --  132* 133  --   --  127*   POTASSIUM 2.7* 3.3* 2.7*   < >  --  2.1*   CHLORIDE  --  94 90*  --   --  76*   CO2  --  31 33*  --   --  29   BUN  --  2* 2*  --   --  5*   CR  --  0.32* 0.40*  --   --  0.46*   ANIONGAP  --  7 10  --   --  22*   NISHI  --  7.7* 6.7*  --   --  7.1*   GLC  --  110* 125*  --   --  130*    ALBUMIN  --   --  2.4*  --   --  3.2*   PROTTOTAL  --   --  5.0*  --   --  6.3*   BILITOTAL  --   --  4.5*  --   --  4.6*   ALKPHOS  --   --  576*  --   --  738*   ALT  --   --  146*  --   --  186*   AST  --   --  1,212*  --   --  1,472*   LIPASE  --   --   --   --   --  266    < > = values in this interval not displayed.     No results found for this or any previous visit (from the past 24 hour(s)).  Medications     lactated ringers 100 mL/hr at 11/03/21 0822       cloNIDine  0.1 mg Oral Q8H     folic acid  1 mg Oral Daily     [START ON 11/9/2021] gabapentin  100 mg Oral Q8H     [START ON 11/7/2021] gabapentin  300 mg Oral Q8H     [START ON 11/5/2021] gabapentin  600 mg Oral Q8H     gabapentin  900 mg Oral Q8H     magnesium oxide  400 mg Oral BID     multivitamin w/minerals  1 tablet Oral Daily     nicotine   Transdermal Q8H     potassium & sodium phosphates  2 packet Oral or Feeding Tube TID     sodium chloride (PF)  3 mL Intracatheter Q8H     thiamine  100 mg Oral Daily     vitamin D2  50,000 Units Oral Q7 Days

## 2021-11-04 NOTE — PLAN OF CARE
Patient verbalizes understanding of discharge instructions  IV removed, tip intact  Coban in place for moderate bleeding  Discharge meds given to patient  Assisted safely via wheelchair to 's car.

## 2021-11-04 NOTE — TELEPHONE ENCOUNTER
Patient scheduled for appointment next week with Twial Leonard.   Appointments in Next Year    Nov 11, 2021  1:30 PM  Office Visit with Twila Leonard NP  [facility] ([facility] ) [phone]           Vanesa Kelsey RN  [facility]

## 2021-11-04 NOTE — PLAN OF CARE
End of Shift Summary  For vital signs and complete assessments, please see documentation flowsheets.     Pertinent assessments: VSS. Denies pain. CIWA score 0.  Lip swelling from where patient bit lip during seizure, cold applied.   Major Shift Events uneventful    Treatment Plan: IV fluids, CIWA /Seizure precautions, monitor electrolytes,   start vitamin D empirically 50,000 units weekly

## 2021-11-04 NOTE — PLAN OF CARE
Pertinent assessments: VSS. Denies pain. CIWA scores 0. Up independently in room. Good appetite.    Major Shift Events none    Treatment Plan: IV fluids, CIWA /Seizure precautions, monitor electrolytes,   start vitamin D empirically 50,000 units weekly

## 2021-11-04 NOTE — DISCHARGE SUMMARY
[facility] Discharge Summary    Maddie Orourke MRN# [id number]   Age: 31 year old YOB: 1990     Date of Admission:  11/1/2021  Date of Discharge::  11/4/2021  Admitting Physician:  Jacinta Nguyen DO  Discharge Physician:  Joe Rice MD     Home clinic: [facility]          Admission Diagnoses:   Hypokalemia [E87.6]  Metabolic alkalosis [E87.3]  Hypomagnesemia [E83.42]  Hyponatremia [E87.1]  Acute hepatitis [B17.9]  Alcohol withdrawal seizure with complication (H) [F10.239, R56.9]  Nausea and vomiting, intractability of vomiting not specified, unspecified vomiting type [R11.2]          Discharge Diagnosis:   Principal Problem:    Alcohol withdrawal seizure with complication (H)  Active Problems:    Hypokalemia    Hypomagnesemia    Metabolic alkalosis    Hyponatremia    Acute hepatitis    Nausea and vomiting, intractability of vomiting not specified, unspecified vomiting type    Vitamin D deficiency            Procedures:   Abdominal US          Discharge Medications:     Current Discharge Medication List      START taking these medications    Details   gabapentin (NEURONTIN) 300 MG capsule Take 3 capsules (900 mg) by mouth 3 times daily  Qty: 270 capsule, Refills: 1    Associated Diagnoses: Alcohol withdrawal seizure with complication (H)      magnesium oxide (MAG-OX) 400 (241.3 Mg) MG tablet Take 1 tablet (400 mg) by mouth daily  Qty: 100 tablet, Refills: 1    Associated Diagnoses: Alcohol withdrawal seizure with complication (H)      vitamin D2 (ERGOCALCIFEROL) 05644 units (1250 mcg) capsule Take 1 capsule (50,000 Units) by mouth every 7 days  Qty: 8 capsule, Refills: 0    Associated Diagnoses: Vitamin D deficiency         CONTINUE these medications which have CHANGED    Details   folic acid (FOLVITE) 1 MG tablet Take 1 tablet (1 mg) by mouth daily  Qty: 100 tablet, Refills: 0    Associated Diagnoses: Alcohol withdrawal seizure with complication (H)       multivitamin w/minerals (THERA-VIT-M) tablet Take 1 tablet by mouth daily  Qty: 100 tablet, Refills: 1    Associated Diagnoses: Failure to thrive in adult      potassium chloride ER (KLOR-CON M) 20 MEQ CR tablet Take 1 tablet (20 mEq) by mouth 2 times daily  Qty: 180 tablet, Refills: 1    Associated Diagnoses: Hypokalemia      thiamine (B-1) 100 MG tablet Take 1 tablet (100 mg) by mouth daily  Qty: 100 tablet, Refills: 1    Associated Diagnoses: Alcohol withdrawal seizure with complication (H)         STOP taking these medications       magnesium 250 MG tablet Comments:   Reason for Stopping:                     Consultations:   No consultations were requested during this admission          Hospital Course:   Maddie Orourke is a 31-year-old woman who presented to the emergency department with seizure activity of unknown duration.     Past medical history includes seizures, alcohol abuse and withdrawal, recurrent electrolyte deficiencies related to alcohol abuse.     In the emergency department, the patient was able to give a history.  VS: , /101, RR 22, afebrile, no hypoxia.  Creatinine 0.46, sodium 127, potassium 2.1, chloride 76, CO2 29, glucose 130, anion gap 22, magnesium 0.9.  Total bilirubin 4.6, alkaline phosphatase 238, AST 1472/, lactic acid 5.  VPH: pH 7.61, PCO2 44.  WBC 9.4, Hgb 11.6, .  She was given Ativan, Zofran, NS, thiamine, potassium, magnesium and multivitamin.     Diagnoses:  1.  Acute alcohol dependence, now in severe withdrawal complicated by seizure.  2.  Alcoholic hepatitis.  3.  Anemia and evolving thrombocytopenia associated with alcohol toxicity.  Bone marrow suppression related to alcohol toxicity.  4.  Mixed severe metabolic alkalosis and acidosis due to vomiting, volume contraction, lactic acidosis, severe malnutrition. The suspected protein-calorie malnutrition is part of the very low protein and (deficienct) vitamin levels as well as multiple  "electrolyte disturbances are strongly suggestive, though not conclusively diagnostic of malnutrition .  I believe it does reflect the severity of her alcohol abuse and suggests deficient intake of nutrient-rich foods.   5.  Multiple electrolyte disturbances including hypovolemic hyponatremia, hypokalemia, hypomagnesemia, hypophosphatemia, hypocalcemia.    6.  Severe vitamin D deficiency.  7.  Nicotine dependence.    Maddie Orourke was started on the [facility] protocol for alcohol withdrawal that included gabapentin, clonidine and lorazepam as needed. She also was fluid resuscitated and we monitored and corrected electrolyte abnormalities as needed. Ms. Orourke rapidly improved and was inclined to go home on the second full hospital day. However, as she had had seizures prior to the admission, I felt it was important to continue to monitor her through about 72 hours following her last drink. Fortunately, the patient did not have further seizures and remained stable on the night prior to discharge.    Due to the patient's obvious chronicity of drinking and presumed intake of low nutrition foods that went along with that, I noted that her calcium was low. I checked the vitamin D level for clarity and it was indeed profoundly low for which reason vitamin D supplements were ordered at 50,000 units weekly for 8 weeks followed by 2000 units daily.     /72 (BP Location: Right arm)   Pulse 76   Temp 98.6  F (37  C) (Oral)   Resp 16   Ht 1.702 m (5' 7\")   Wt 57.6 kg (127 lb)   SpO2 99%   BMI 19.89 kg/m    At the time of discharge, Ms. Orourke is alert, coherent and in no apparent distress. I talked with her at some length about the importance of mental health management along with substance abuse issues.  HEENT: No focal muscular asymmetry.  Chest: Clear to auscultation all fields.  Heart: Regular rate and rhythm without rubs, murmurs or gallops.  Abdomen: Soft, mildly tender in the epigastric area, no masses " noted.  Extremities muscle tone and bulk appears to be within normal limits. No tremor.          Discharge Instructions and Follow-Up:   Discharge diet: Regular   Discharge activity: Activity as tolerated   Discharge follow-up:  I encouraged patient to follow-up with her primary physician as soon as is available.  Even more importantly, I suggested the patient make a plan for sobriety. I note that she was offered consultation with CD services but deferred on any inpatient treatment at this time. She was given the information that was available about entering chemical dependency treatment on her own.           Discharge Disposition:   Discharged to home      Attestation:  I have reviewed today's vital signs, notes, medications, labs and imaging.  Total time: 35 minutes    Joe Rice MD

## 2021-11-04 NOTE — CONSULTS
11/4/2021      Spoke with pt via telephone to screen for CD services. Pt reports she would not be interested in an evaluation or referrals to inpatient CD treatment at this time. Pt declined CD resources. Pt is able to call  Mental Health and Addiction Services Line: [phone] and make an appt through [employer]  https://www.[employer]    Supportive Services   SMART Recovery  Https://www.smartrecovery.org    Alcoholics Anonymous  http://www.aa.org  Community Drug & Alcohol Support Resources   Alcoholics Anonymous   24/7 Phone Line: [phone]   https://[place].org/   https://[place].org/   For additional list of online meetings: http://aa-intergroup.org     GEOVANY Dolan  Phone: [phone]  Email: [email or web address]

## 2021-11-05 NOTE — PROGRESS NOTES
Clinic Care Coordination Contact  [facility]/Voicemail       Clinical Data: Care Coordinator Outreach  Outreach attempted x 1.  Left message on patient's voicemail with call back information and requested return call.  Plan: Care Coordinator will try to reach patient again in 1-2 business days.      BRINDA Jolly  Clinic Care Coordinator  Ph. [phone]  [email or web address]

## 2021-11-05 NOTE — LETTER
[facility] CARE COORDINATION  [address] [place] [address]  November 5, 2021    Maddie Orourke  [address]   [place] [address]      Dear Maddie,    I am a clinic care coordinator who works at the [facility]. I wanted to thank you for spending the time to talk with me.  Below is a description of clinic care coordination and how I can further assist you.      The clinic care coordination team is made up of a registered nurse,  and community health worker who understand the health care system. The goal of clinic care coordination is to help you manage your health and improve access to the health care system in the most efficient manner. The team can assist you in meeting your health care goals by providing education, coordinating services, strengthening the communication among your providers and supporting you with any resource needs.    Please feel free to contact me at [phone] with any questions or concerns. We are focused on providing you with the highest-quality healthcare experience possible and that all starts with you.     Sincerely,       BRINDA Jolly  Clinic Care Coordinator  Ph. [phone]  [email or web address]

## 2021-11-05 NOTE — PROGRESS NOTES
Clinic Care Coordination Contact    Clinic Care Coordination Contact  OUTREACH    Referral Information:  Referral Source: IP Handoff  Primary Diagnosis: CD    Chief Complaint   Patient presents with     Clinic Care Coordination - Post Hospital        Universal Utilization: Reviewed.   Difficulty keeping appointments: Yes  No PCP office visit in Past Year: Yes  Utilization    Hospital Admissions  5             ED Visits  11             No Show Count (past year)  0                Current as of: 11/4/2021 10:48 PM            Clinical Concerns:   Current Medical Concerns:  Patient was hospitalized at [facility] from 11.1.2021 to 11.4.2021 with Alcohol withdrawal seizure with complication (H). Patient was discharged to home.  Current Behavioral Concerns: Pt declines CD resources.    Education Provided to patient: Role of SW CC and reason for outreach.       Health Maintenance Reviewed: Due/Overdue   Clinical Pathway: None    Medication Management:  Medication review status: Medications reviewed and no changes reported per patient.             Functional Status:  Dependent ADLs: Independent  Dependent IADLs: Independent  Mobility Status: Independent    Living Situation:  Current living arrangement: I live alone  Type of residence: Apartment    Lifestyle & Psychosocial Needs:    Social Determinants of Health     Tobacco Use: High Risk     Smoking Tobacco Use: Current Every Day Smoker     Smokeless Tobacco Use: Current User   Alcohol Use: Unknown     Frequency of Alcohol Consumption: 4 or more times a week     Average Number of Drinks: 1 or 2     Frequency of Binge Drinking: Not asked   Financial Resource Strain: Low Risk      Difficulty of Paying Living Expenses: Not hard at all   Food Insecurity: No Food Insecurity     Worried About Running Out of Food in the Last Year: Never true     Ran Out of Food in the Last Year: Never true   Transportation Needs: No Transportation Needs     Lack of  Transportation (Medical): No     Lack of Transportation (Non-Medical): No   Physical Activity: Unknown     Days of Exercise per Week: 0 days     Minutes of Exercise per Session: Not asked   Stress:      Feeling of Stress :    Social Connections: Unknown     Frequency of Communication with Friends and Family: More than three times a week     Frequency of Social Gatherings with Friends and Family: More than three times a week     Attends Mandaen Services: Not asked     Active Member of Clubs or Organizations: Not asked     Attends Club or Organization Meetings: Not asked     Marital Status: Not asked   Intimate Partner Violence: Unknown     Fear of Current or Ex-Partner: Not asked     Emotionally Abused: Not asked     Physically Abused: Not asked     Sexually Abused: Not asked   Depression:      PHQ-2 Score:    Housing Stability:      Unable to Pay for Housing in the Last Year:      Number of Places Lived in the Last Year:      Unstable Housing in the Last Year:        Mandaen or spiritual beliefs that impact treatment: No  Mental health DX: Yes  Chemical Dependency Status: Current Concern      Resources and Interventions:  Current Resources:      Community Resources: None  Supplies used at home: None  Equipment Currently Used at Home: none     Advance Care Plan/Directive  Advanced Care Plans/Directives on file: No  Advanced Care Plan/Directive Status: Considering Options    Referrals Placed: None    Patient/Caregiver understanding: Pt reports understanding and denies any additional questions or concerns at this times. SW CC engaged in [employer] communication during encounter.       Future Appointments              In 6 days Twila Leonard NP [place], [place]          Plan: Reviewed date of follow-up scheduled with PCP. No concerns noted by Patient at this time that cannot wait until appointment. Introduction letter sent via [employer]. No further outreaches will be made at this time unless  a new referral is made or a change in the patient's status occurs. Patient was provided with this writer's contact information and encouraged to call with any questions or concerns.    BRINDA Jolly  Clinic Care Coordinator  Ph. [phone]  [email or web address]

## 2022-01-01 ENCOUNTER — APPOINTMENT (OUTPATIENT)
Dept: CT IMAGING | Facility: CLINIC | Age: 32
DRG: 369 | End: 2022-01-01
Attending: PHYSICIAN ASSISTANT
Payer: COMMERCIAL

## 2022-01-01 ENCOUNTER — PATIENT OUTREACH (OUTPATIENT)
Dept: CARE COORDINATION | Facility: CLINIC | Age: 32
End: 2022-01-01

## 2022-01-01 ENCOUNTER — HOSPITAL ENCOUNTER (EMERGENCY)
Facility: CLINIC | Age: 32
Discharge: HOME | End: 2022-06-14
Attending: EMERGENCY MEDICINE | Admitting: EMERGENCY MEDICINE
Payer: COMMERCIAL

## 2022-01-01 ENCOUNTER — HOSPITAL ENCOUNTER (INPATIENT)
Facility: CLINIC | Age: 32
LOS: 3 days | Discharge: HOME | DRG: 369 | End: 2022-09-23
Attending: EMERGENCY MEDICINE | Admitting: INTERNAL MEDICINE
Payer: COMMERCIAL

## 2022-01-01 ENCOUNTER — HOSPITAL ENCOUNTER (EMERGENCY)
Facility: CLINIC | Age: 32
Discharge: LEFT BEFORE DISCHARGE | End: 2022-09-19
Payer: COMMERCIAL

## 2022-01-01 ENCOUNTER — HEALTH MAINTENANCE LETTER (OUTPATIENT)
Age: 32
End: 2022-01-01

## 2022-01-01 ENCOUNTER — APPOINTMENT (OUTPATIENT)
Dept: ULTRASOUND IMAGING | Facility: CLINIC | Age: 32
DRG: 369 | End: 2022-01-01
Attending: INTERNAL MEDICINE
Payer: COMMERCIAL

## 2022-01-01 VITALS
WEIGHT: 130 LBS | HEART RATE: 98 BPM | DIASTOLIC BLOOD PRESSURE: 72 MMHG | RESPIRATION RATE: 25 BRPM | TEMPERATURE: 97.8 F | BODY MASS INDEX: 20.4 KG/M2 | OXYGEN SATURATION: 99 % | HEIGHT: 67 IN | SYSTOLIC BLOOD PRESSURE: 122 MMHG

## 2022-01-01 VITALS
SYSTOLIC BLOOD PRESSURE: 114 MMHG | RESPIRATION RATE: 18 BRPM | TEMPERATURE: 99.2 F | WEIGHT: 133.1 LBS | HEART RATE: 99 BPM | OXYGEN SATURATION: 100 % | HEIGHT: 67 IN | BODY MASS INDEX: 20.89 KG/M2 | DIASTOLIC BLOOD PRESSURE: 74 MMHG

## 2022-01-01 VITALS
OXYGEN SATURATION: 100 % | DIASTOLIC BLOOD PRESSURE: 70 MMHG | TEMPERATURE: 98.4 F | RESPIRATION RATE: 18 BRPM | SYSTOLIC BLOOD PRESSURE: 115 MMHG | HEART RATE: 116 BPM

## 2022-01-01 DIAGNOSIS — R65.10 SIRS (SYSTEMIC INFLAMMATORY RESPONSE SYNDROME) (H): ICD-10-CM

## 2022-01-01 DIAGNOSIS — D64.9 ANEMIA, UNSPECIFIED TYPE: ICD-10-CM

## 2022-01-01 DIAGNOSIS — K92.2 GASTROINTESTINAL HEMORRHAGE, UNSPECIFIED GASTROINTESTINAL HEMORRHAGE TYPE: ICD-10-CM

## 2022-01-01 DIAGNOSIS — E87.6 HYPOKALEMIA: ICD-10-CM

## 2022-01-01 DIAGNOSIS — R94.31 PROLONGED QT INTERVAL: ICD-10-CM

## 2022-01-01 DIAGNOSIS — D62 ANEMIA DUE TO BLOOD LOSS, ACUTE: ICD-10-CM

## 2022-01-01 DIAGNOSIS — E83.42 HYPOMAGNESEMIA: ICD-10-CM

## 2022-01-01 LAB
ABO/RH(D): NORMAL
ALBUMIN SERPL BCG-MCNC: 2.4 G/DL (ref 3.5–5.2)
ALBUMIN SERPL BCG-MCNC: 2.6 G/DL (ref 3.5–5.2)
ALBUMIN SERPL BCG-MCNC: 2.6 G/DL (ref 3.5–5.2)
ALBUMIN SERPL BCG-MCNC: 2.7 G/DL (ref 3.5–5.2)
ALBUMIN UR-MCNC: 30 MG/DL
ALP SERPL-CCNC: 305 U/L (ref 35–104)
ALP SERPL-CCNC: 312 U/L (ref 35–104)
ALP SERPL-CCNC: 345 U/L (ref 35–104)
ALP SERPL-CCNC: 350 U/L (ref 35–104)
ALT SERPL W P-5'-P-CCNC: 15 U/L (ref 10–35)
ALT SERPL W P-5'-P-CCNC: 18 U/L (ref 10–35)
ALT SERPL W P-5'-P-CCNC: 20 U/L (ref 10–35)
ALT SERPL W P-5'-P-CCNC: 21 U/L (ref 10–35)
AMMONIA PLAS-SCNC: 48 UMOL/L (ref 11–51)
ANION GAP SERPL CALCULATED.3IONS-SCNC: 10 MMOL/L (ref 7–15)
ANION GAP SERPL CALCULATED.3IONS-SCNC: 13 MMOL/L (ref 7–15)
ANION GAP SERPL CALCULATED.3IONS-SCNC: 17 MMOL/L (ref 7–15)
ANION GAP SERPL CALCULATED.3IONS-SCNC: 3 MMOL/L (ref 3–14)
ANTIBODY SCREEN: NEGATIVE
APPEARANCE UR: ABNORMAL
APTT PPP: 40 SECONDS (ref 22–38)
AST SERPL W P-5'-P-CCNC: 107 U/L (ref 10–35)
AST SERPL W P-5'-P-CCNC: 111 U/L (ref 10–35)
AST SERPL W P-5'-P-CCNC: 145 U/L (ref 10–35)
AST SERPL W P-5'-P-CCNC: 95 U/L (ref 10–35)
ATRIAL RATE - MUSE: 109 BPM
ATRIAL RATE - MUSE: 91 BPM
BACTERIA #/AREA URNS HPF: ABNORMAL /HPF
BACTERIA BLD CULT: NO GROWTH
BACTERIA BLD CULT: NO GROWTH
BASOPHILS # BLD AUTO: 0 10E3/UL (ref 0–0.2)
BASOPHILS # BLD AUTO: 0 10E3/UL (ref 0–0.2)
BASOPHILS # BLD AUTO: 0.1 10E3/UL (ref 0–0.2)
BASOPHILS NFR BLD AUTO: 0 %
BILIRUB DIRECT SERPL-MCNC: 1.84 MG/DL (ref 0–0.3)
BILIRUB DIRECT SERPL-MCNC: 2.33 MG/DL (ref 0–0.3)
BILIRUB DIRECT SERPL-MCNC: 3.13 MG/DL (ref 0–0.3)
BILIRUB SERPL-MCNC: 2.9 MG/DL
BILIRUB SERPL-MCNC: 3.1 MG/DL
BILIRUB SERPL-MCNC: 3.7 MG/DL
BILIRUB SERPL-MCNC: 4.8 MG/DL
BILIRUB UR QL STRIP: ABNORMAL
BLD PROD TYP BPU: NORMAL
BLOOD COMPONENT TYPE: NORMAL
BUN SERPL-MCNC: 2.3 MG/DL (ref 6–20)
BUN SERPL-MCNC: 3.6 MG/DL (ref 6–20)
BUN SERPL-MCNC: 5 MG/DL (ref 6–20)
BUN SERPL-MCNC: 5 MG/DL (ref 7–30)
CALCIUM SERPL-MCNC: 6.8 MG/DL (ref 8.6–10)
CALCIUM SERPL-MCNC: 7.2 MG/DL (ref 8.6–10)
CALCIUM SERPL-MCNC: 7.7 MG/DL (ref 8.6–10)
CALCIUM SERPL-MCNC: 8.8 MG/DL (ref 8.5–10.1)
CHLORIDE BLD-SCNC: 93 MMOL/L (ref 94–109)
CHLORIDE SERPL-SCNC: 105 MMOL/L (ref 98–107)
CHLORIDE SERPL-SCNC: 96 MMOL/L (ref 98–107)
CHLORIDE SERPL-SCNC: 96 MMOL/L (ref 98–107)
CO2 SERPL-SCNC: 37 MMOL/L (ref 20–32)
CODING SYSTEM: NORMAL
COLOR UR AUTO: ABNORMAL
CREAT SERPL-MCNC: 0.33 MG/DL (ref 0.52–1.04)
CREAT SERPL-MCNC: 0.42 MG/DL (ref 0.51–0.95)
CREAT SERPL-MCNC: 0.43 MG/DL (ref 0.51–0.95)
CREAT SERPL-MCNC: 0.45 MG/DL (ref 0.51–0.95)
CROSSMATCH: NORMAL
CRP SERPL-MCNC: 36.67 MG/L
DEPRECATED HCO3 PLAS-SCNC: 20 MMOL/L (ref 22–29)
DEPRECATED HCO3 PLAS-SCNC: 23 MMOL/L (ref 22–29)
DEPRECATED HCO3 PLAS-SCNC: 25 MMOL/L (ref 22–29)
DIASTOLIC BLOOD PRESSURE - MUSE: NORMAL MMHG
DIASTOLIC BLOOD PRESSURE - MUSE: NORMAL MMHG
EOSINOPHIL # BLD AUTO: 0 10E3/UL (ref 0–0.7)
EOSINOPHIL NFR BLD AUTO: 0 %
ERYTHROCYTE [DISTWIDTH] IN BLOOD BY AUTOMATED COUNT: 17.9 % (ref 10–15)
ERYTHROCYTE [DISTWIDTH] IN BLOOD BY AUTOMATED COUNT: 18.6 % (ref 10–15)
ERYTHROCYTE [DISTWIDTH] IN BLOOD BY AUTOMATED COUNT: 19 % (ref 10–15)
ERYTHROCYTE [DISTWIDTH] IN BLOOD BY AUTOMATED COUNT: 20.3 % (ref 10–15)
ERYTHROCYTE [DISTWIDTH] IN BLOOD BY AUTOMATED COUNT: 21.1 % (ref 10–15)
ETHANOL SERPL-MCNC: <0.01 G/DL
GFR SERPL CREATININE-BSD FRML MDRD: >90 ML/MIN/1.73M2
GLUCOSE BLD-MCNC: 105 MG/DL (ref 70–99)
GLUCOSE SERPL-MCNC: 101 MG/DL (ref 70–99)
GLUCOSE SERPL-MCNC: 141 MG/DL (ref 70–99)
GLUCOSE SERPL-MCNC: 98 MG/DL (ref 70–99)
GLUCOSE UR STRIP-MCNC: NEGATIVE MG/DL
HCG SERPL QL: NEGATIVE
HCT VFR BLD AUTO: 20.9 % (ref 35–47)
HCT VFR BLD AUTO: 21 % (ref 35–47)
HCT VFR BLD AUTO: 27.5 % (ref 35–47)
HCT VFR BLD AUTO: 29.4 % (ref 35–47)
HCT VFR BLD AUTO: 30.2 % (ref 35–47)
HEMOCCULT STL QL: NEGATIVE
HGB BLD-MCNC: 6.4 G/DL (ref 11.7–15.7)
HGB BLD-MCNC: 6.4 G/DL (ref 11.7–15.7)
HGB BLD-MCNC: 6.5 G/DL (ref 11.7–15.7)
HGB BLD-MCNC: 7 G/DL (ref 11.7–15.7)
HGB BLD-MCNC: 7.8 G/DL (ref 11.7–15.7)
HGB BLD-MCNC: 7.9 G/DL (ref 11.7–15.7)
HGB BLD-MCNC: 8.3 G/DL (ref 11.7–15.7)
HGB BLD-MCNC: 8.5 G/DL (ref 11.7–15.7)
HGB BLD-MCNC: 9.3 G/DL (ref 11.7–15.7)
HGB BLD-MCNC: 9.7 G/DL (ref 11.7–15.7)
HGB UR QL STRIP: NEGATIVE
HOLD SPECIMEN: NORMAL
HYALINE CASTS: 4 /LPF
IMM GRANULOCYTES # BLD: 0 10E3/UL
IMM GRANULOCYTES # BLD: 0.2 10E3/UL
IMM GRANULOCYTES # BLD: 0.2 10E3/UL
IMM GRANULOCYTES NFR BLD: 1 %
IMM GRANULOCYTES NFR BLD: 1 %
IMM GRANULOCYTES NFR BLD: 2 %
INR PPP: 1.25 (ref 0.85–1.15)
INR PPP: 1.35 (ref 0.85–1.15)
INTERPRETATION ECG - MUSE: NORMAL
INTERPRETATION ECG - MUSE: NORMAL
ISSUE DATE AND TIME: NORMAL
ISSUE DATE AND TIME: NORMAL
KETONES UR STRIP-MCNC: NEGATIVE MG/DL
LACTATE SERPL-SCNC: 1.3 MMOL/L (ref 0.7–2)
LACTATE SERPL-SCNC: 2 MMOL/L (ref 0.7–2)
LEUKOCYTE ESTERASE UR QL STRIP: ABNORMAL
LYMPHOCYTES # BLD AUTO: 1.9 10E3/UL (ref 0.8–5.3)
LYMPHOCYTES # BLD AUTO: 2.2 10E3/UL (ref 0.8–5.3)
LYMPHOCYTES # BLD AUTO: 2.5 10E3/UL (ref 0.8–5.3)
LYMPHOCYTES NFR BLD AUTO: 13 %
LYMPHOCYTES NFR BLD AUTO: 14 %
LYMPHOCYTES NFR BLD AUTO: 29 %
MAGNESIUM SERPL-MCNC: 1.3 MG/DL (ref 1.7–2.3)
MAGNESIUM SERPL-MCNC: 1.5 MG/DL (ref 1.7–2.3)
MAGNESIUM SERPL-MCNC: 1.7 MG/DL (ref 1.7–2.3)
MAGNESIUM SERPL-MCNC: 2.1 MG/DL (ref 1.6–2.3)
MAGNESIUM SERPL-MCNC: 2.5 MG/DL (ref 1.7–2.3)
MCH RBC QN AUTO: 32.7 PG (ref 26.5–33)
MCH RBC QN AUTO: 33.2 PG (ref 26.5–33)
MCH RBC QN AUTO: 33.5 PG (ref 26.5–33)
MCH RBC QN AUTO: 34.6 PG (ref 26.5–33)
MCH RBC QN AUTO: 36 PG (ref 26.5–33)
MCHC RBC AUTO-ENTMCNC: 30.6 G/DL (ref 31.5–36.5)
MCHC RBC AUTO-ENTMCNC: 30.8 G/DL (ref 31.5–36.5)
MCHC RBC AUTO-ENTMCNC: 30.9 G/DL (ref 31.5–36.5)
MCHC RBC AUTO-ENTMCNC: 31 G/DL (ref 31.5–36.5)
MCHC RBC AUTO-ENTMCNC: 33 G/DL (ref 31.5–36.5)
MCV RBC AUTO: 108 FL (ref 78–100)
MCV RBC AUTO: 108 FL (ref 78–100)
MCV RBC AUTO: 112 FL (ref 78–100)
MCV RBC AUTO: 117 FL (ref 78–100)
MCV RBC AUTO: 99 FL (ref 78–100)
MONOCYTES # BLD AUTO: 0.5 10E3/UL (ref 0–1.3)
MONOCYTES # BLD AUTO: 1.3 10E3/UL (ref 0–1.3)
MONOCYTES # BLD AUTO: 1.3 10E3/UL (ref 0–1.3)
MONOCYTES NFR BLD AUTO: 7 %
MONOCYTES NFR BLD AUTO: 8 %
MONOCYTES NFR BLD AUTO: 8 %
MUCOUS THREADS #/AREA URNS LPF: PRESENT /LPF
NEUTROPHILS # BLD AUTO: 12.7 10E3/UL (ref 1.6–8.3)
NEUTROPHILS # BLD AUTO: 13.4 10E3/UL (ref 1.6–8.3)
NEUTROPHILS # BLD AUTO: 4 10E3/UL (ref 1.6–8.3)
NEUTROPHILS NFR BLD AUTO: 62 %
NEUTROPHILS NFR BLD AUTO: 77 %
NEUTROPHILS NFR BLD AUTO: 78 %
NITRATE UR QL: NEGATIVE
NRBC # BLD AUTO: 0 10E3/UL
NRBC BLD AUTO-RTO: 0 /100
P AXIS - MUSE: 21 DEGREES
P AXIS - MUSE: 46 DEGREES
PH UR STRIP: 6 [PH] (ref 5–7)
PHOSPHATE SERPL-MCNC: 1.5 MG/DL (ref 2.5–4.5)
PHOSPHATE SERPL-MCNC: 2.2 MG/DL (ref 2.5–4.5)
PHOSPHATE SERPL-MCNC: 2.2 MG/DL (ref 2.5–4.5)
PHOSPHATE SERPL-MCNC: 2.6 MG/DL (ref 2.5–4.5)
PHOSPHATE SERPL-MCNC: 2.9 MG/DL (ref 2.5–4.5)
PLATELET # BLD AUTO: 244 10E3/UL (ref 150–450)
PLATELET # BLD AUTO: 255 10E3/UL (ref 150–450)
PLATELET # BLD AUTO: 281 10E3/UL (ref 150–450)
PLATELET # BLD AUTO: 352 10E3/UL (ref 150–450)
PLATELET # BLD AUTO: 95 10E3/UL (ref 150–450)
POTASSIUM BLD-SCNC: 2.1 MMOL/L (ref 3.4–5.3)
POTASSIUM BLD-SCNC: 2.8 MMOL/L (ref 3.4–5.3)
POTASSIUM SERPL-SCNC: 2.4 MMOL/L (ref 3.4–5.3)
POTASSIUM SERPL-SCNC: 2.7 MMOL/L (ref 3.4–5.3)
POTASSIUM SERPL-SCNC: 3.3 MMOL/L (ref 3.4–5.3)
POTASSIUM SERPL-SCNC: 3.3 MMOL/L (ref 3.4–5.3)
POTASSIUM SERPL-SCNC: 3.6 MMOL/L (ref 3.4–5.3)
POTASSIUM SERPL-SCNC: 3.7 MMOL/L (ref 3.4–5.3)
POTASSIUM SERPL-SCNC: 4.1 MMOL/L (ref 3.4–5.3)
PR INTERVAL - MUSE: 156 MS
PR INTERVAL - MUSE: 206 MS
PROT SERPL-MCNC: 4.5 G/DL (ref 6.4–8.3)
PROT SERPL-MCNC: 4.8 G/DL (ref 6.4–8.3)
PROT SERPL-MCNC: 5 G/DL (ref 6.4–8.3)
PROT SERPL-MCNC: 5 G/DL (ref 6.4–8.3)
QRS DURATION - MUSE: 86 MS
QRS DURATION - MUSE: 90 MS
QT - MUSE: 344 MS
QT - MUSE: 434 MS
QTC - MUSE: 463 MS
QTC - MUSE: 533 MS
R AXIS - MUSE: -14 DEGREES
R AXIS - MUSE: 68 DEGREES
RBC # BLD AUTO: 1.78 10E6/UL (ref 3.8–5.2)
RBC # BLD AUTO: 1.88 10E6/UL (ref 3.8–5.2)
RBC # BLD AUTO: 2.54 10E6/UL (ref 3.8–5.2)
RBC # BLD AUTO: 2.8 10E6/UL (ref 3.8–5.2)
RBC # BLD AUTO: 2.97 10E6/UL (ref 3.8–5.2)
RBC URINE: 1 /HPF
SARS-COV-2 RNA RESP QL NAA+PROBE: NEGATIVE
SODIUM SERPL-SCNC: 133 MMOL/L (ref 133–144)
SODIUM SERPL-SCNC: 134 MMOL/L (ref 136–145)
SODIUM SERPL-SCNC: 135 MMOL/L (ref 136–145)
SODIUM SERPL-SCNC: 136 MMOL/L (ref 136–145)
SP GR UR STRIP: 1.02 (ref 1–1.03)
SPECIMEN EXPIRATION DATE: NORMAL
SQUAMOUS EPITHELIAL: 10 /HPF
SYSTOLIC BLOOD PRESSURE - MUSE: NORMAL MMHG
SYSTOLIC BLOOD PRESSURE - MUSE: NORMAL MMHG
T AXIS - MUSE: 16 DEGREES
T AXIS - MUSE: 225 DEGREES
UNIT ABO/RH: NORMAL
UNIT NUMBER: NORMAL
UNIT STATUS: NORMAL
UNIT TYPE ISBT: 5100
UPPER GI ENDOSCOPY: NORMAL
UROBILINOGEN UR STRIP-MCNC: >12 MG/DL
VENTRICULAR RATE- MUSE: 109 BPM
VENTRICULAR RATE- MUSE: 91 BPM
WBC # BLD AUTO: 12.6 10E3/UL (ref 4–11)
WBC # BLD AUTO: 14.5 10E3/UL (ref 4–11)
WBC # BLD AUTO: 16.5 10E3/UL (ref 4–11)
WBC # BLD AUTO: 17.5 10E3/UL (ref 4–11)
WBC # BLD AUTO: 6.4 10E3/UL (ref 4–11)
WBC URINE: 4 /HPF

## 2022-01-01 PROCEDURE — 85610 PROTHROMBIN TIME: CPT | Performed by: INTERNAL MEDICINE

## 2022-01-01 PROCEDURE — 120N000001 HC R&B MED SURG/OB

## 2022-01-01 PROCEDURE — 86923 COMPATIBILITY TEST ELECTRIC: CPT

## 2022-01-01 PROCEDURE — 36416 COLLJ CAPILLARY BLOOD SPEC: CPT | Performed by: STUDENT IN AN ORGANIZED HEALTH CARE EDUCATION/TRAINING PROGRAM

## 2022-01-01 PROCEDURE — 36430 TRANSFUSION BLD/BLD COMPNT: CPT

## 2022-01-01 PROCEDURE — C9803 HOPD COVID-19 SPEC COLLECT: HCPCS

## 2022-01-01 PROCEDURE — 83735 ASSAY OF MAGNESIUM: CPT | Performed by: INTERNAL MEDICINE

## 2022-01-01 PROCEDURE — 86901 BLOOD TYPING SEROLOGIC RH(D): CPT | Performed by: EMERGENCY MEDICINE

## 2022-01-01 PROCEDURE — 80053 COMPREHEN METABOLIC PANEL: CPT | Performed by: INTERNAL MEDICINE

## 2022-01-01 PROCEDURE — 85027 COMPLETE CBC AUTOMATED: CPT | Performed by: INTERNAL MEDICINE

## 2022-01-01 PROCEDURE — 82140 ASSAY OF AMMONIA: CPT | Performed by: EMERGENCY MEDICINE

## 2022-01-01 PROCEDURE — 36415 COLL VENOUS BLD VENIPUNCTURE: CPT | Performed by: EMERGENCY MEDICINE

## 2022-01-01 PROCEDURE — 83735 ASSAY OF MAGNESIUM: CPT | Performed by: EMERGENCY MEDICINE

## 2022-01-01 PROCEDURE — 85025 COMPLETE CBC W/AUTO DIFF WBC: CPT | Performed by: EMERGENCY MEDICINE

## 2022-01-01 PROCEDURE — 250N000009 HC RX 250: Performed by: INTERNAL MEDICINE

## 2022-01-01 PROCEDURE — 83605 ASSAY OF LACTIC ACID: CPT | Performed by: EMERGENCY MEDICINE

## 2022-01-01 PROCEDURE — P9016 RBC LEUKOCYTES REDUCED: HCPCS | Performed by: INTERNAL MEDICINE

## 2022-01-01 PROCEDURE — 85610 PROTHROMBIN TIME: CPT | Performed by: EMERGENCY MEDICINE

## 2022-01-01 PROCEDURE — 82272 OCCULT BLD FECES 1-3 TESTS: CPT | Performed by: EMERGENCY MEDICINE

## 2022-01-01 PROCEDURE — 250N000011 HC RX IP 250 OP 636: Performed by: PHYSICIAN ASSISTANT

## 2022-01-01 PROCEDURE — 250N000013 HC RX MED GY IP 250 OP 250 PS 637: Performed by: INTERNAL MEDICINE

## 2022-01-01 PROCEDURE — 84132 ASSAY OF SERUM POTASSIUM: CPT | Performed by: INTERNAL MEDICINE

## 2022-01-01 PROCEDURE — 99222 1ST HOSP IP/OBS MODERATE 55: CPT | Performed by: SURGERY

## 2022-01-01 PROCEDURE — 250N000011 HC RX IP 250 OP 636: Performed by: EMERGENCY MEDICINE

## 2022-01-01 PROCEDURE — 93005 ELECTROCARDIOGRAM TRACING: CPT

## 2022-01-01 PROCEDURE — 80076 HEPATIC FUNCTION PANEL: CPT | Performed by: INTERNAL MEDICINE

## 2022-01-01 PROCEDURE — 96365 THER/PROPH/DIAG IV INF INIT: CPT

## 2022-01-01 PROCEDURE — C9113 INJ PANTOPRAZOLE SODIUM, VIA: HCPCS | Performed by: INTERNAL MEDICINE

## 2022-01-01 PROCEDURE — 36415 COLL VENOUS BLD VENIPUNCTURE: CPT | Performed by: INTERNAL MEDICINE

## 2022-01-01 PROCEDURE — 96367 TX/PROPH/DG ADDL SEQ IV INF: CPT

## 2022-01-01 PROCEDURE — 80048 BASIC METABOLIC PNL TOTAL CA: CPT | Performed by: EMERGENCY MEDICINE

## 2022-01-01 PROCEDURE — 84132 ASSAY OF SERUM POTASSIUM: CPT | Performed by: EMERGENCY MEDICINE

## 2022-01-01 PROCEDURE — 85018 HEMOGLOBIN: CPT | Performed by: INTERNAL MEDICINE

## 2022-01-01 PROCEDURE — 84100 ASSAY OF PHOSPHORUS: CPT | Performed by: INTERNAL MEDICINE

## 2022-01-01 PROCEDURE — 80053 COMPREHEN METABOLIC PANEL: CPT | Performed by: EMERGENCY MEDICINE

## 2022-01-01 PROCEDURE — 76705 ECHO EXAM OF ABDOMEN: CPT

## 2022-01-01 PROCEDURE — 82077 ASSAY SPEC XCP UR&BREATH IA: CPT | Performed by: EMERGENCY MEDICINE

## 2022-01-01 PROCEDURE — 99291 CRITICAL CARE FIRST HOUR: CPT | Mod: CS,25

## 2022-01-01 PROCEDURE — 99223 1ST HOSP IP/OBS HIGH 75: CPT | Mod: AI | Performed by: INTERNAL MEDICINE

## 2022-01-01 PROCEDURE — C9113 INJ PANTOPRAZOLE SODIUM, VIA: HCPCS | Performed by: EMERGENCY MEDICINE

## 2022-01-01 PROCEDURE — 999N000099 HC STATISTIC MODERATE SEDATION < 10 MIN: Performed by: INTERNAL MEDICINE

## 2022-01-01 PROCEDURE — 74177 CT ABD & PELVIS W/CONTRAST: CPT

## 2022-01-01 PROCEDURE — 250N000011 HC RX IP 250 OP 636: Performed by: INTERNAL MEDICINE

## 2022-01-01 PROCEDURE — 86923 COMPATIBILITY TEST ELECTRIC: CPT | Performed by: EMERGENCY MEDICINE

## 2022-01-01 PROCEDURE — 82248 BILIRUBIN DIRECT: CPT | Performed by: INTERNAL MEDICINE

## 2022-01-01 PROCEDURE — 96375 TX/PRO/DX INJ NEW DRUG ADDON: CPT

## 2022-01-01 PROCEDURE — 43235 EGD DIAGNOSTIC BRUSH WASH: CPT | Performed by: INTERNAL MEDICINE

## 2022-01-01 PROCEDURE — 85004 AUTOMATED DIFF WBC COUNT: CPT | Performed by: EMERGENCY MEDICINE

## 2022-01-01 PROCEDURE — 99232 SBSQ HOSP IP/OBS MODERATE 35: CPT | Performed by: INTERNAL MEDICINE

## 2022-01-01 PROCEDURE — 258N000003 HC RX IP 258 OP 636: Performed by: INTERNAL MEDICINE

## 2022-01-01 PROCEDURE — 85730 THROMBOPLASTIN TIME PARTIAL: CPT | Performed by: EMERGENCY MEDICINE

## 2022-01-01 PROCEDURE — 99284 EMERGENCY DEPT VISIT MOD MDM: CPT | Mod: 25

## 2022-01-01 PROCEDURE — 250N000013 HC RX MED GY IP 250 OP 250 PS 637: Performed by: EMERGENCY MEDICINE

## 2022-01-01 PROCEDURE — 0DJ08ZZ INSPECTION OF UPPER INTESTINAL TRACT, VIA NATURAL OR ARTIFICIAL OPENING ENDOSCOPIC: ICD-10-PCS | Performed by: INTERNAL MEDICINE

## 2022-01-01 PROCEDURE — 36416 COLLJ CAPILLARY BLOOD SPEC: CPT | Performed by: EMERGENCY MEDICINE

## 2022-01-01 PROCEDURE — 85014 HEMATOCRIT: CPT | Performed by: STUDENT IN AN ORGANIZED HEALTH CARE EDUCATION/TRAINING PROGRAM

## 2022-01-01 PROCEDURE — 82040 ASSAY OF SERUM ALBUMIN: CPT | Performed by: INTERNAL MEDICINE

## 2022-01-01 PROCEDURE — 84703 CHORIONIC GONADOTROPIN ASSAY: CPT | Performed by: EMERGENCY MEDICINE

## 2022-01-01 PROCEDURE — 86850 RBC ANTIBODY SCREEN: CPT | Performed by: EMERGENCY MEDICINE

## 2022-01-01 PROCEDURE — 81001 URINALYSIS AUTO W/SCOPE: CPT | Performed by: EMERGENCY MEDICINE

## 2022-01-01 PROCEDURE — 87040 BLOOD CULTURE FOR BACTERIA: CPT | Performed by: EMERGENCY MEDICINE

## 2022-01-01 PROCEDURE — 96366 THER/PROPH/DIAG IV INF ADDON: CPT

## 2022-01-01 PROCEDURE — 250N000011 HC RX IP 250 OP 636: Mod: JA | Performed by: INTERNAL MEDICINE

## 2022-01-01 PROCEDURE — 83605 ASSAY OF LACTIC ACID: CPT | Performed by: INTERNAL MEDICINE

## 2022-01-01 PROCEDURE — 80048 BASIC METABOLIC PNL TOTAL CA: CPT | Performed by: INTERNAL MEDICINE

## 2022-01-01 PROCEDURE — P9016 RBC LEUKOCYTES REDUCED: HCPCS

## 2022-01-01 PROCEDURE — 99292 CRITICAL CARE ADDL 30 MIN: CPT | Mod: CS,25

## 2022-01-01 PROCEDURE — 86140 C-REACTIVE PROTEIN: CPT | Performed by: EMERGENCY MEDICINE

## 2022-01-01 PROCEDURE — 99239 HOSP IP/OBS DSCHRG MGMT >30: CPT | Performed by: STUDENT IN AN ORGANIZED HEALTH CARE EDUCATION/TRAINING PROGRAM

## 2022-01-01 PROCEDURE — 258N000003 HC RX IP 258 OP 636: Performed by: EMERGENCY MEDICINE

## 2022-01-01 PROCEDURE — U0003 INFECTIOUS AGENT DETECTION BY NUCLEIC ACID (DNA OR RNA); SEVERE ACUTE RESPIRATORY SYNDROME CORONAVIRUS 2 (SARS-COV-2) (CORONAVIRUS DISEASE [COVID-19]), AMPLIFIED PROBE TECHNIQUE, MAKING USE OF HIGH THROUGHPUT TECHNOLOGIES AS DESCRIBED BY CMS-2020-01-R: HCPCS | Performed by: EMERGENCY MEDICINE

## 2022-01-01 PROCEDURE — 86923 COMPATIBILITY TEST ELECTRIC: CPT | Performed by: INTERNAL MEDICINE

## 2022-01-01 RX ORDER — CEFTRIAXONE 1 G/1
1 INJECTION, POWDER, FOR SOLUTION INTRAMUSCULAR; INTRAVENOUS EVERY 24 HOURS
Status: DISCONTINUED | OUTPATIENT
Start: 2022-01-01 | End: 2022-01-01

## 2022-01-01 RX ORDER — FOLIC ACID 1 MG/1
1 TABLET ORAL DAILY
Status: DISCONTINUED | OUTPATIENT
Start: 2022-01-01 | End: 2022-01-01 | Stop reason: HOSPADM

## 2022-01-01 RX ORDER — GABAPENTIN 300 MG/1
600 CAPSULE ORAL 3 TIMES DAILY
COMMUNITY

## 2022-01-01 RX ORDER — POLYETHYLENE GLYCOL 3350 17 G/17G
17 POWDER, FOR SOLUTION ORAL DAILY PRN
Status: DISCONTINUED | OUTPATIENT
Start: 2022-01-01 | End: 2022-01-01 | Stop reason: HOSPADM

## 2022-01-01 RX ORDER — MAGNESIUM SULFATE HEPTAHYDRATE 40 MG/ML
4 INJECTION, SOLUTION INTRAVENOUS ONCE
Status: COMPLETED | OUTPATIENT
Start: 2022-01-01 | End: 2022-01-01

## 2022-01-01 RX ORDER — EPINEPHRINE 1 MG/ML
0.1 INJECTION, SOLUTION INTRAMUSCULAR; SUBCUTANEOUS
Status: DISCONTINUED | OUTPATIENT
Start: 2022-01-01 | End: 2022-01-01 | Stop reason: HOSPADM

## 2022-01-01 RX ORDER — NALOXONE HYDROCHLORIDE 0.4 MG/ML
0.2 INJECTION, SOLUTION INTRAMUSCULAR; INTRAVENOUS; SUBCUTANEOUS
Status: DISCONTINUED | OUTPATIENT
Start: 2022-01-01 | End: 2022-01-01 | Stop reason: HOSPADM

## 2022-01-01 RX ORDER — POTASSIUM CHLORIDE 1.5 G/1.58G
40 POWDER, FOR SOLUTION ORAL ONCE
Status: COMPLETED | OUTPATIENT
Start: 2022-01-01 | End: 2022-01-01

## 2022-01-01 RX ORDER — FLUMAZENIL 0.1 MG/ML
0.2 INJECTION, SOLUTION INTRAVENOUS
Status: ACTIVE | OUTPATIENT
Start: 2022-01-01 | End: 2022-01-01

## 2022-01-01 RX ORDER — NALOXONE HYDROCHLORIDE 0.4 MG/ML
0.4 INJECTION, SOLUTION INTRAMUSCULAR; INTRAVENOUS; SUBCUTANEOUS
Status: DISCONTINUED | OUTPATIENT
Start: 2022-01-01 | End: 2022-01-01 | Stop reason: HOSPADM

## 2022-01-01 RX ORDER — ONDANSETRON 2 MG/ML
4 INJECTION INTRAMUSCULAR; INTRAVENOUS EVERY 6 HOURS PRN
Status: DISCONTINUED | OUTPATIENT
Start: 2022-01-01 | End: 2022-01-01 | Stop reason: HOSPADM

## 2022-01-01 RX ORDER — POTASSIUM CHLORIDE 7.45 MG/ML
10 INJECTION INTRAVENOUS ONCE
Status: COMPLETED | OUTPATIENT
Start: 2022-01-01 | End: 2022-01-01

## 2022-01-01 RX ORDER — CEFTRIAXONE 1 G/1
1 INJECTION, POWDER, FOR SOLUTION INTRAMUSCULAR; INTRAVENOUS EVERY 24 HOURS
Status: DISCONTINUED | OUTPATIENT
Start: 2022-01-01 | End: 2022-01-01 | Stop reason: HOSPADM

## 2022-01-01 RX ORDER — IOPAMIDOL 755 MG/ML
500 INJECTION, SOLUTION INTRAVASCULAR ONCE
Status: COMPLETED | OUTPATIENT
Start: 2022-01-01 | End: 2022-01-01

## 2022-01-01 RX ORDER — MAGNESIUM SULFATE HEPTAHYDRATE 40 MG/ML
2 INJECTION, SOLUTION INTRAVENOUS ONCE
Status: COMPLETED | OUTPATIENT
Start: 2022-01-01 | End: 2022-01-01

## 2022-01-01 RX ORDER — SIMETHICONE 40MG/0.6ML
133 SUSPENSION, DROPS(FINAL DOSAGE FORM)(ML) ORAL
Status: DISCONTINUED | OUTPATIENT
Start: 2022-01-01 | End: 2022-01-01 | Stop reason: HOSPADM

## 2022-01-01 RX ORDER — AMOXICILLIN 250 MG
2 CAPSULE ORAL 2 TIMES DAILY PRN
Status: DISCONTINUED | OUTPATIENT
Start: 2022-01-01 | End: 2022-01-01 | Stop reason: HOSPADM

## 2022-01-01 RX ORDER — LIDOCAINE 40 MG/G
CREAM TOPICAL
Status: DISCONTINUED | OUTPATIENT
Start: 2022-01-01 | End: 2022-01-01 | Stop reason: HOSPADM

## 2022-01-01 RX ORDER — DIPHENHYDRAMINE HYDROCHLORIDE 50 MG/ML
25-50 INJECTION INTRAMUSCULAR; INTRAVENOUS
Status: COMPLETED | OUTPATIENT
Start: 2022-01-01 | End: 2022-01-01

## 2022-01-01 RX ORDER — CEFTRIAXONE 1 G/1
1 INJECTION, POWDER, FOR SOLUTION INTRAMUSCULAR; INTRAVENOUS ONCE
Status: COMPLETED | OUTPATIENT
Start: 2022-01-01 | End: 2022-01-01

## 2022-01-01 RX ORDER — POTASSIUM CHLORIDE 1500 MG/1
20 TABLET, EXTENDED RELEASE ORAL ONCE
Status: COMPLETED | OUTPATIENT
Start: 2022-01-01 | End: 2022-01-01

## 2022-01-01 RX ORDER — ATROPINE SULFATE 0.1 MG/ML
1 INJECTION INTRAVENOUS
Status: DISCONTINUED | OUTPATIENT
Start: 2022-01-01 | End: 2022-01-01 | Stop reason: HOSPADM

## 2022-01-01 RX ORDER — SODIUM CHLORIDE 9 MG/ML
INJECTION, SOLUTION INTRAVENOUS CONTINUOUS
Status: DISCONTINUED | OUTPATIENT
Start: 2022-01-01 | End: 2022-01-01

## 2022-01-01 RX ORDER — POTASSIUM CHLORIDE 1500 MG/1
40 TABLET, EXTENDED RELEASE ORAL ONCE
Status: COMPLETED | OUTPATIENT
Start: 2022-01-01 | End: 2022-01-01

## 2022-01-01 RX ORDER — AMOXICILLIN 250 MG
1 CAPSULE ORAL 2 TIMES DAILY PRN
Status: DISCONTINUED | OUTPATIENT
Start: 2022-01-01 | End: 2022-01-01 | Stop reason: HOSPADM

## 2022-01-01 RX ORDER — FENTANYL CITRATE 0.05 MG/ML
50-100 INJECTION, SOLUTION INTRAMUSCULAR; INTRAVENOUS EVERY 5 MIN PRN
Status: DISCONTINUED | OUTPATIENT
Start: 2022-01-01 | End: 2022-01-01 | Stop reason: HOSPADM

## 2022-01-01 RX ORDER — ONDANSETRON 4 MG/1
4 TABLET, ORALLY DISINTEGRATING ORAL EVERY 6 HOURS PRN
Status: DISCONTINUED | OUTPATIENT
Start: 2022-01-01 | End: 2022-01-01 | Stop reason: HOSPADM

## 2022-01-01 RX ORDER — PANTOPRAZOLE SODIUM 40 MG/1
40 TABLET, DELAYED RELEASE ORAL 2 TIMES DAILY
Qty: 20 TABLET | Refills: 0 | Status: SHIPPED | OUTPATIENT
Start: 2022-01-01

## 2022-01-01 RX ORDER — FLUMAZENIL 0.1 MG/ML
0.2 INJECTION, SOLUTION INTRAVENOUS
Status: DISCONTINUED | OUTPATIENT
Start: 2022-01-01 | End: 2022-01-01 | Stop reason: HOSPADM

## 2022-01-01 RX ORDER — POTASSIUM CHLORIDE 1500 MG/1
20 TABLET, EXTENDED RELEASE ORAL
Qty: 30 TABLET | Refills: 0 | Status: SHIPPED | OUTPATIENT
Start: 2022-01-01

## 2022-01-01 RX ADMIN — PANTOPRAZOLE SODIUM 40 MG: 40 INJECTION, POWDER, FOR SOLUTION INTRAVENOUS at 08:11

## 2022-01-01 RX ADMIN — POTASSIUM CHLORIDE 40 MEQ: 1500 TABLET, EXTENDED RELEASE ORAL at 16:00

## 2022-01-01 RX ADMIN — POTASSIUM & SODIUM PHOSPHATES POWDER PACK 280-160-250 MG 2 PACKET: 280-160-250 PACK at 13:15

## 2022-01-01 RX ADMIN — MAGNESIUM SULFATE HEPTAHYDRATE 2 G: 40 INJECTION, SOLUTION INTRAVENOUS at 20:33

## 2022-01-01 RX ADMIN — SODIUM CHLORIDE: 900 INJECTION INTRAVENOUS at 14:17

## 2022-01-01 RX ADMIN — FOLIC ACID 1 MG: 1 TABLET ORAL at 19:51

## 2022-01-01 RX ADMIN — POTASSIUM CHLORIDE 20 MEQ: 1500 TABLET, EXTENDED RELEASE ORAL at 17:46

## 2022-01-01 RX ADMIN — OCTREOTIDE ACETATE 50 MCG/HR: 200 INJECTION, SOLUTION INTRAVENOUS; SUBCUTANEOUS at 02:46

## 2022-01-01 RX ADMIN — POTASSIUM & SODIUM PHOSPHATES POWDER PACK 280-160-250 MG 2 PACKET: 280-160-250 PACK at 16:28

## 2022-01-01 RX ADMIN — SODIUM CHLORIDE 1000 ML: 9 INJECTION, SOLUTION INTRAVENOUS at 20:06

## 2022-01-01 RX ADMIN — POTASSIUM CHLORIDE 40 MEQ: 1500 TABLET, EXTENDED RELEASE ORAL at 05:41

## 2022-01-01 RX ADMIN — POTASSIUM CHLORIDE 10 MEQ: 7.46 INJECTION, SOLUTION INTRAVENOUS at 19:53

## 2022-01-01 RX ADMIN — SODIUM CHLORIDE: 900 INJECTION INTRAVENOUS at 01:41

## 2022-01-01 RX ADMIN — POTASSIUM CHLORIDE 40 MEQ: 1.5 POWDER, FOR SOLUTION ORAL at 19:48

## 2022-01-01 RX ADMIN — MIDAZOLAM HYDROCHLORIDE 2 MG: 1 INJECTION, SOLUTION INTRAMUSCULAR; INTRAVENOUS at 12:15

## 2022-01-01 RX ADMIN — POTASSIUM CHLORIDE 10 MEQ: 7.46 INJECTION, SOLUTION INTRAVENOUS at 21:42

## 2022-01-01 RX ADMIN — TOPICAL ANESTHETIC 0.5 ML: 200 SPRAY DENTAL; PERIODONTAL at 12:15

## 2022-01-01 RX ADMIN — DIPHENHYDRAMINE HYDROCHLORIDE 50 MG: 50 INJECTION, SOLUTION INTRAMUSCULAR; INTRAVENOUS at 12:15

## 2022-01-01 RX ADMIN — MAGNESIUM SULFATE HEPTAHYDRATE 4 G: 4 INJECTION, SOLUTION INTRAVENOUS at 13:16

## 2022-01-01 RX ADMIN — CEFTRIAXONE 1 G: 1 INJECTION, POWDER, FOR SOLUTION INTRAMUSCULAR; INTRAVENOUS at 22:25

## 2022-01-01 RX ADMIN — PANTOPRAZOLE SODIUM 40 MG: 40 INJECTION, POWDER, FOR SOLUTION INTRAVENOUS at 09:00

## 2022-01-01 RX ADMIN — POTASSIUM & SODIUM PHOSPHATES POWDER PACK 280-160-250 MG 1 PACKET: 280-160-250 PACK at 06:48

## 2022-01-01 RX ADMIN — IOPAMIDOL 60 ML: 755 INJECTION, SOLUTION INTRAVENOUS at 21:22

## 2022-01-01 RX ADMIN — POTASSIUM CHLORIDE 40 MEQ: 1500 TABLET, EXTENDED RELEASE ORAL at 23:14

## 2022-01-01 RX ADMIN — THIAMINE HCL TAB 100 MG 100 MG: 100 TAB at 19:50

## 2022-01-01 RX ADMIN — FENTANYL CITRATE 100 MCG: 50 INJECTION INTRAMUSCULAR; INTRAVENOUS at 12:15

## 2022-01-01 RX ADMIN — CEFTRIAXONE 1 G: 1 INJECTION, POWDER, FOR SOLUTION INTRAMUSCULAR; INTRAVENOUS at 01:00

## 2022-01-01 RX ADMIN — PANTOPRAZOLE SODIUM 40 MG: 40 INJECTION, POWDER, FOR SOLUTION INTRAVENOUS at 20:24

## 2022-01-01 RX ADMIN — POTASSIUM & SODIUM PHOSPHATES POWDER PACK 280-160-250 MG 2 PACKET: 280-160-250 PACK at 20:24

## 2022-01-01 RX ADMIN — PANTOPRAZOLE SODIUM 40 MG: 40 INJECTION, POWDER, FOR SOLUTION INTRAVENOUS at 08:29

## 2022-01-01 RX ADMIN — SODIUM CHLORIDE: 900 INJECTION INTRAVENOUS at 04:11

## 2022-01-01 RX ADMIN — PANTOPRAZOLE SODIUM 40 MG: 40 INJECTION, POWDER, FOR SOLUTION INTRAVENOUS at 20:26

## 2022-01-01 RX ADMIN — PANTOPRAZOLE SODIUM 40 MG: 40 INJECTION, POWDER, FOR SOLUTION INTRAVENOUS at 20:06

## 2022-01-01 RX ADMIN — POTASSIUM & SODIUM PHOSPHATES POWDER PACK 280-160-250 MG 1 PACKET: 280-160-250 PACK at 23:09

## 2022-01-01 RX ADMIN — POTASSIUM & SODIUM PHOSPHATES POWDER PACK 280-160-250 MG 1 PACKET: 280-160-250 PACK at 02:58

## 2022-01-01 RX ADMIN — CEFTRIAXONE 1 G: 1 INJECTION, POWDER, FOR SOLUTION INTRAMUSCULAR; INTRAVENOUS at 18:36

## 2022-01-01 ASSESSMENT — ENCOUNTER SYMPTOMS
VOMITING: 1
DYSURIA: 0
ACTIVITY CHANGE: 1
COLOR CHANGE: 0
ABDOMINAL DISTENTION: 0
FATIGUE: 1
SHORTNESS OF BREATH: 1
BLOOD IN STOOL: 1
VOMITING: 0
FEVER: 0
NAUSEA: 1
DIZZINESS: 1
ABDOMINAL PAIN: 0
CONFUSION: 0
SHORTNESS OF BREATH: 0
FEVER: 0
NERVOUS/ANXIOUS: 1

## 2022-01-01 ASSESSMENT — ACTIVITIES OF DAILY LIVING (ADL)
ADLS_ACUITY_SCORE: 20
FALL_HISTORY_WITHIN_LAST_SIX_MONTHS: NO
ADLS_ACUITY_SCORE: 20
ADLS_ACUITY_SCORE: 35
ADLS_ACUITY_SCORE: 20
ADLS_ACUITY_SCORE: 35
ADLS_ACUITY_SCORE: 20
ADLS_ACUITY_SCORE: 20
ADLS_ACUITY_SCORE: 33
ADLS_ACUITY_SCORE: 35
ADLS_ACUITY_SCORE: 35
ADLS_ACUITY_SCORE: 20
ADLS_ACUITY_SCORE: 35
ADLS_ACUITY_SCORE: 20
ADLS_ACUITY_SCORE: 35
ADLS_ACUITY_SCORE: 20
ADLS_ACUITY_SCORE: 35
ADLS_ACUITY_SCORE: 20
ADLS_ACUITY_SCORE: 20
ADLS_ACUITY_SCORE: 35
ADLS_ACUITY_SCORE: 20
ADLS_ACUITY_SCORE: 20
ADLS_ACUITY_SCORE: 35
ADLS_ACUITY_SCORE: 20

## 2022-06-13 NOTE — ED TRIAGE NOTES
Pt presents for concern of low potassium. Call received from the ED after being discharged today for a low potassium. Pt denies palpitations, but does occasionally get a racing heart sensation. ABC intact, A&Ox4.     Triage Assessment     Row Name 06/13/22 1824       Triage Assessment (Adult)    Airway WDL WDL       Respiratory WDL    Respiratory WDL WDL       Skin Circulation/Temperature WDL    Skin Circulation/Temperature WDL WDL       Cardiac WDL    Cardiac WDL WDL       Peripheral/Neurovascular WDL    Peripheral Neurovascular WDL WDL       Cognitive/Neuro/Behavioral WDL    Cognitive/Neuro/Behavioral WDL WDL

## 2022-06-13 NOTE — ED PROVIDER NOTES
History     Chief Complaint:  Abnormal Labs       HPI   Maddie Orourke is a 32 year old female with a history of alcohol use disorder with recent relapse  who presents with abnormal labs. She presented to her PCP today for evaluation of her electrolytes, and they were found to be low and she was referred here. The patient is a recovering alcoholic who gets regular check ups for her electrolytes as she is trying to stop drinking, but she had a relapse 3 days ago and has been vomiting since then. She has no abdominal pain, nausea, fevers, cough, or other complaints. No syncope, dizziness, lightheadedness. No shortness of breath. Patient currently takes 20 mEq Potassium BID at home. She denies any other symptoms.    ROS:  Review of Systems   Constitutional: Positive for activity change and fatigue. Negative for fever.   Respiratory: Negative for shortness of breath.    Cardiovascular: Negative for chest pain.   Gastrointestinal: Positive for nausea and vomiting.   Genitourinary: Negative for dysuria.   Skin: Negative for rash.   Neurological: Negative for syncope.   Psychiatric/Behavioral: Negative for confusion. The patient is nervous/anxious.    All other systems reviewed and are negative.       Allergies:  No Known Allergies     Medications:    potassium chloride ER (KLOR-CON M) 20 MEQ CR tablet  folic acid (FOLVITE) 1 MG tablet  gabapentin (NEURONTIN) 300 MG capsule  magnesium oxide (MAG-OX) 400 (241.3 Mg) MG tablet  multivitamin w/minerals (THERA-VIT-M) tablet  thiamine (B-1) 100 MG tablet  vitamin D2 (ERGOCALCIFEROL) 22747 units (1250 mcg) capsule        Past Medical History:    Past Medical History:   Diagnosis Date     Alcohol abuse      Alcohol withdrawal seizure (H)      Anxiety      Hypokalemia      Hypomagnesemia      Nicotine dependence        Past Surgical History:    Past Surgical History:   Procedure Laterality Date     HEAD & NECK SURGERY      repair of deviated septum        Family History:   "  Noncontributory    Social History:   reports that she has been smoking. She has been smoking about 0.25 packs per day. She uses smokeless tobacco. She reports current alcohol use. She reports previous drug use.  [facility]     Physical Exam     Patient Vitals for the past 24 hrs:   BP Temp Pulse Resp SpO2 Height Weight   06/14/22 0105 122/72 -- 98 -- 99 % -- --   06/14/22 0000 -- -- 92 25 -- -- --   06/13/22 2100 125/80 -- 98 22 98 % -- --   06/13/22 1930 -- -- 79 13 96 % -- --   06/13/22 1900 -- -- 86 18 95 % -- --   06/13/22 1840 (!) 125/91 -- 105 14 97 % -- --   06/13/22 1826 (!) 115/95 -- -- -- -- -- --   06/13/22 1824 -- 97.8  F (36.6  C) 116 16 99 % 1.702 m (5' 7\") 59 kg (130 lb)        Physical Exam  General: Well appearing, nontoxic. Resting comfortably  Head:  Scalp, face, and head appear normal  Eyes:  Pupils are equal, round    Conjunctivae non-injected and sclerae white  ENT:    The external nose is normal    Pinnae are normal  Neck:  Normal range of motion    There is no rigidity noted    Trachea is in the midline  CV:  Regular rate and rhythm     Normal S1/S2, no S3/S4    No murmur or rub. Radial pulses 2+ bilaterally.  Resp:  Lungs are clear and equal bilaterally  There is no tachypnea    No increased work of breathing    No rales, wheezing, or rhonchi  GI:  Abdomen is soft, no rigidity or guarding    Mild distension. No palpable mass    No tenderness or rebound tenderness   MS:  Normal muscular tone    Symmetric motor strength    No lower extremity edema  Skin:  No rash or acute skin lesions noted  Neuro: Awake and alert  Speech is normal and fluent  Moves all extremities spontaneously  Psych:  Normal affect. Appropriate interactions.      Emergency Department Course   ECG:  ECG (20:14:24):  Indication: Hypokalemia   Rate 91 bpm. MI interval 156. QRS duration 90. QT/QTc 434/533. P-R-T axes 46 -14 16.   Interpretation: Normal sinus rhythm with sinus arrhythmia.  Minimal voltage " criteria for LVH, may be normal variant.  Prolonged QTC.  T wave inversions V1 through V3 unchanged from prior EKG on 6/13/2022.  Interpreted at 2027 by Pavel Jeter MD.        Laboratory:  Labs Ordered and Resulted from Time of ED Arrival to Time of ED Departure   BASIC METABOLIC PANEL - Abnormal       Result Value    Sodium 133      Potassium 2.1 (*)     Chloride 93 (*)     Carbon Dioxide (CO2) 37 (*)     Anion Gap 3      Urea Nitrogen 5 (*)     Creatinine 0.33 (*)     Calcium 8.8      Glucose 105 (*)     GFR Estimate >90     CBC WITH PLATELETS AND DIFFERENTIAL - Abnormal    WBC Count 6.4      RBC Count 2.97 (*)     Hemoglobin 9.7 (*)     Hematocrit 29.4 (*)     MCV 99      MCH 32.7      MCHC 33.0      RDW 18.6 (*)     Platelet Count 95 (*)     % Neutrophils 62      % Lymphocytes 29      % Monocytes 8      % Eosinophils 0      % Basophils 0      % Immature Granulocytes 1      NRBCs per 100 WBC 0      Absolute Neutrophils 4.0      Absolute Lymphocytes 1.9      Absolute Monocytes 0.5      Absolute Eosinophils 0.0      Absolute Basophils 0.0      Absolute Immature Granulocytes 0.0      Absolute NRBCs 0.0     POTASSIUM - Abnormal    Potassium 2.8 (*)    MAGNESIUM - Normal    Magnesium 2.1          Procedures       Emergency Department Course:             Reviewed:  I reviewed nursing notes, vitals and past medical history    Assessments:   I obtained history and examined the patient as noted above.    I rechecked the patient and explained findings.         Consults:   None    Interventions:  Medications   potassium chloride (KLOR-CON) Packet 40 mEq (40 mEq Oral Given 6/13/22 1948)   potassium chloride 10 mEq in 100 mL sterile water intermittent infusion (premix) (0 mEq Intravenous Stopped 6/13/22 2142)   thiamine (B-1) tablet 100 mg (100 mg Oral Given 6/13/22 1950)   potassium chloride 10 mEq in 100 mL sterile water intermittent infusion (premix) (0 mEq Intravenous Stopped 6/14/22 0008)        Disposition:  The  patient was discharged to home.     Impression & Plan      Medical Decision Making:  Maddie Orourke is a 32 year old female who presents for evaluation of hypokalemia in the setting of relapse of alcohol use with vomiting over the last several days. She now reports feeling much improved and is otherwise asymptomatic. On my evaluation the patient is well appearing, hemodynamically stable and afebrile. EKG with prolonged QTc. No ischemia or dysrhythmia. No syncope or other cardiac symptoms. Repeat potassium in the ED 2.1. Magnesium WNL. CBC stable from baseline. Chronic anemia slightly improved. Patient treated with 40 mEq PO and 20 mEq IV potassium. Recheck improved to 2.8. Patient remained asymptomatic during her entire ed course. No further vomiting or diarrhea to suggest ongoing GI losses. Will increase home potassium supplementation to 20 mEq TID with meals. Stressed the importance of avoidance of further alcohol use and close PCP follow up in 2-3 days for recheck of her electrolytes. Return precautions were discussed with patient. The patient's questions were answered and the patient was agreeable with discharge.       Diagnosis:    ICD-10-CM    1. Hypokalemia  E87.6 potassium chloride ER (KLOR-CON M) 20 MEQ CR tablet   2. Prolonged QT interval  R94.31    3. Anemia, unspecified type  D64.9         Discharge Medications:  Increase potassium KDUR to 20mEq TID until PCP follow up     6/13/2022   Pavel Jeter MD Daro, Ryan Clay, MD  06/14/22 0516

## 2022-09-19 NOTE — ED TRIAGE NOTES
Patient presents to the ED reporting a hemoglobin of 6.1 when she had blood work done today. States has noticed blood in stools for the past 4 days. Reports both bright and dark red blood. Denies abdominal pain.

## 2022-09-20 PROBLEM — R65.10 SIRS (SYSTEMIC INFLAMMATORY RESPONSE SYNDROME) (H): Status: ACTIVE | Noted: 2022-01-01

## 2022-09-20 PROBLEM — D62 ANEMIA DUE TO BLOOD LOSS, ACUTE: Status: ACTIVE | Noted: 2022-01-01

## 2022-09-20 PROBLEM — K92.2 GASTROINTESTINAL HEMORRHAGE, UNSPECIFIED GASTROINTESTINAL HEMORRHAGE TYPE: Status: ACTIVE | Noted: 2022-01-01

## 2022-09-20 NOTE — ED PROVIDER NOTES
History   Chief Complaint:  Abnormal Labs       The history is provided by the patient and a friend.      Maddie Orourke is a 32 year old female with history of alcohol abuse, hypokalemia, hypophosphatemia, hypomagnesemia, and metabolic alkalosis who presents with low hemoglobin. She regularly get her blood checked and yesterday when she had it checked her hemoglobin was low. She has noticed over the past 4-5 days that she has bloody stools that are darkened. Yesterday she had 6-8 bowel movements. Today she has had less bowel movements and none that were bloody. She also complains of dizziness and shortness of breath especially with standing and with exertion. She states that she did have alcohol abuse problems that messed with her electrolytes. She states that her legs are swollen and yesterday she felt febrile but does not have any recorded temp. She denies vomiting, abdominal pain, fever, chest pain, jaundice, drug use, abdomen being bloated and confusion.     Review of Systems   Constitutional: Negative for fever.   Respiratory: Positive for shortness of breath.    Cardiovascular: Positive for leg swelling. Negative for chest pain.   Gastrointestinal: Positive for blood in stool. Negative for abdominal distention, abdominal pain and vomiting.   Skin: Negative for color change.   Neurological: Positive for dizziness.   All other systems reviewed and are negative.        Allergies:  The patient denies any known allergies.    Medications:  Neurontin  Zoloft  Thiamine  Folic acid  Potassium  Magnesium    Past Medical History:     Alcohol withdrawal  Suicidal ideation  Sinus tachycardia  Hypokalemia  Alcohol abuse  Elevated troponin  Acute kidney injury  Prolonged QT interval  Hypophosphatemia  Hypomagnesemia  Metabolic alkalosis  Hyponatremia  Acute hepatitis  Alcohol withdrawal seizure with complication  Vitamin D deficiency     Past Surgical History:    Septoplasty  Repair of deviated septum  Tympanostomy  tube placement     Family History:    Alcohol abuse    Social History:  PCP: [facility]   Patient came from home.  Patient is accompanied in the ED with boyfriend.  Patient denies tobacco use.  Patient confirms alcohol use. Two weeks ago was her last drink.       Physical Exam     Patient Vitals for the past 24 hrs:   BP Temp Temp src Pulse Resp SpO2   09/21/22 0037 -- 98.7  F (37.1  C) -- -- 18 --   09/21/22 0030 108/59 -- -- 110 -- 97 %   09/21/22 0015 109/57 -- -- 112 -- 97 %   09/21/22 0000 109/59 -- -- 108 -- 98 %   09/20/22 2345 110/59 -- -- 108 -- 98 %   09/20/22 2330 127/73 -- -- 113 -- 100 %   09/20/22 2315 107/56 -- -- 110 -- 100 %   09/20/22 2300 112/53 -- -- 110 -- --   09/20/22 2245 104/51 -- -- 111 -- 98 %   09/20/22 2230 116/68 -- -- 113 -- 96 %   09/20/22 2224 112/61 98.2  F (36.8  C) -- 105 18 100 %   09/20/22 2220 (!) 117/91 98.2  F (36.8  C) Oral 109 18 100 %   09/20/22 2215 112/61 -- -- 116 -- --   09/20/22 2200 112/65 -- -- 109 -- --   09/20/22 2145 98/66 -- -- 104 -- --   09/20/22 2130 116/66 -- -- 109 -- --   09/20/22 2115 119/76 -- -- 110 -- --   09/20/22 2100 118/73 -- -- 108 -- --   09/20/22 2030 118/62 -- -- 107 -- --   09/20/22 2015 100/65 -- -- 108 -- --   09/20/22 2000 101/59 -- -- 112 -- --   09/20/22 1915 104/73 -- -- 117 -- --   09/20/22 1805 105/61 97  F (36.1  C) Temporal (!) 123 18 99 %       Physical Exam  Vitals and nursing note reviewed. Exam conducted with a chaperone present.   Constitutional:       General: She is not in acute distress.     Appearance: She is ill-appearing. She is not toxic-appearing.   HENT:      Head: Normocephalic and atraumatic.      Right Ear: External ear normal.      Left Ear: External ear normal.   Eyes:      Extraocular Movements: Extraocular movements intact.      Conjunctiva/sclera: Conjunctivae normal.   Cardiovascular:      Rate and Rhythm: Regular rhythm. Tachycardia present.      Heart sounds: No murmur  heard.  Pulmonary:      Effort: Pulmonary effort is normal. No respiratory distress.      Breath sounds: Normal breath sounds. No wheezing, rhonchi or rales.   Abdominal:      General: Abdomen is flat. Bowel sounds are normal. There is distension (mild).      Palpations: Abdomen is soft.      Tenderness: There is no abdominal tenderness. There is no guarding or rebound.   Genitourinary:     Rectum: Guaiac result negative (light brown stool, no gross blood).      Comments: Rectal exam chaperoned by female RN Mary  Musculoskeletal:         General: No deformity or signs of injury.      Cervical back: Normal range of motion and neck supple.   Skin:     General: Skin is warm and dry.      Coloration: Skin is jaundiced and pale.      Findings: No rash.   Neurological:      Mental Status: She is alert and oriented to person, place, and time.   Psychiatric:         Mood and Affect: Mood is anxious.         Behavior: Behavior normal.           Emergency Department Course   ECG  ECG obtained at 1958, ECG read at 2002  Sinus tachycardia  Possible anterior infarct, age undetermined  ST&T wave abnormality, consider lateral ischemia   Abnormal ECG  No significant changes from prior ECG dated 06/13/2022.  Rate 1009 bpm. MS interval 206 ms. QRS duration 86 ms. QT/QTc 344/463 ms. P-R-T axes 21 68 225.    Laboratory:  Labs Ordered and Resulted from Time of ED Arrival to Time of ED Departure   INR - Abnormal       Result Value    INR 1.35 (*)    PARTIAL THROMBOPLASTIN TIME - Abnormal    aPTT 40 (*)    COMPREHENSIVE METABOLIC PANEL - Abnormal    Sodium 136      Potassium 3.6      Chloride 96 (*)     Carbon Dioxide (CO2) 23      Anion Gap 17 (*)     Urea Nitrogen 5.0 (*)     Creatinine 0.42 (*)     Calcium 7.2 (*)     Glucose 101 (*)     Alkaline Phosphatase 345 (*)      (*)     ALT 21      Protein Total 5.0 (*)     Albumin 2.6 (*)     Bilirubin Total 4.8 (*)     GFR Estimate >90     MAGNESIUM - Abnormal    Magnesium 1.3  (*)    CRP INFLAMMATION - Abnormal    CRP Inflammation 36.67 (*)    ETHYL ALCOHOL LEVEL - Abnormal    Alcohol ethyl <0.01 (*)    CBC WITH PLATELETS AND DIFFERENTIAL - Abnormal    WBC Count 17.5 (*)     RBC Count 1.78 (*)     Hemoglobin 6.4 (*)     Hematocrit 20.9 (*)      (*)     MCH 36.0 (*)     MCHC 30.6 (*)     RDW 17.9 (*)     Platelet Count 352      % Neutrophils 78      % Lymphocytes 14      % Monocytes 7      % Eosinophils 0      % Basophils 0      % Immature Granulocytes 1      NRBCs per 100 WBC 0      Absolute Neutrophils 13.4 (*)     Absolute Lymphocytes 2.5      Absolute Monocytes 1.3      Absolute Eosinophils 0.0      Absolute Basophils 0.0      Absolute Immature Granulocytes 0.2      Absolute NRBCs 0.0     AMMONIA - Normal    Ammonia 48     HCG QUALITATIVE PREGNANCY - Normal    hCG Serum Qualitative Negative     COVID-19 VIRUS (CORONAVIRUS) BY PCR - Normal    SARS CoV2 PCR Negative     OCCULT BLOOD STOOL - Normal    Occult Blood Negative     LACTIC ACID WHOLE BLOOD - Normal    Lactic Acid 2.0     ROUTINE UA WITH MICROSCOPIC REFLEX TO CULTURE   TYPE AND SCREEN, ADULT    ABO/RH(D) O POS      Antibody Screen Negative      SPECIMEN EXPIRATION DATE [id number]     PREPARE RED BLOOD CELLS (UNIT)    Blood Component Type Red Blood Cells      Product Code W2230Z34      Unit Status Released      Unit Number [id number]      UNIT ABO/RH O+      CROSSMATCH Compatible      CODING SYSTEM [id number]      UNIT TYPE ISBT 5100     PREPARE RED BLOOD CELLS (UNIT)    Blood Component Type Red Blood Cells      Product Code [id number]      Unit Status Transfused      Unit Number [id number]      CROSSMATCH Compatible      CODING SYSTEM [id number]      ISSUE DATE AND TIME [id number]      UNIT ABO/RH O+      UNIT TYPE ISBT 5100     PREPARE RED BLOOD CELLS (UNIT)   BLOOD CULTURE   BLOOD CULTURE   TRANSFUSE RED BLOOD CELLS (UNIT)   ABO/RH TYPE AND SCREEN            Emergency Department Course:     Reviewed:  I reviewed  nursing notes, vitals, past medical history and Care Everywhere    Assessments:   I obtained history and examined the patient as noted above.    I rechecked the patient and explained findings.       Consults:   I consulted with Dr. Mccloud of the hospitalist service and discussed patient admission. They accepted care of the patient.      Interventions:   NS 1000 mL   IV   Protonix 40 mg IV    Disposition:  The patient was admitted to the hospital under the care of Dr. Mccloud.     Impression & Plan   Medical Decision Makin-year-old female with anemia on blood work done yesterday.  She admits that she has had bloody stools over the past several days.  She has history of significant alcohol abuse in the past and recurrent electrolyte abnormalities.  She does appear both pale and slightly jaundiced today.  I suspect that she has some underlying liver cirrhosis related to her alcohol abuse.  The GI bleeding may have been from varices or possibly peptic ulcer or other etiology.  Her rectal exam today is negative and she notes that she has not noticed any bloody stools today.  Fortunately it appears that her bleeding may have stopped for the time being.  Her labs are also significant for leukocytosis which may be a stress reaction rather than due to sepsis.  She does not have any infectious symptoms at this time and her lactic acid is normal.  We will send blood cultures and check a UA and cover with ceftriaxone.  She was given IV fluid bolus and transfused 1 unit of packed red blood cells after being consented.  She was given 2 g of magnesium due to her hypomagnesemia.  She was also given an IV dose of Protonix.  I discussed with Dr. Mccloud with hospitalist service and he agrees to admit the patient for further care.    Critical Care Time: was 35 minutes for this patient excluding separately billable procedures    Diagnosis:    ICD-10-CM    1. Anemia due to blood loss, acute  D62    2. Gastrointestinal  hemorrhage, unspecified gastrointestinal hemorrhage type  K92.2    3. Hypomagnesemia  E83.42    4. SIRS (systemic inflammatory response syndrome) (H)  R65.10        Discharge Medications:  New Prescriptions    No medications on file       Scribe Disclosure:  I, Jos Loomis, am serving as a scribe at 6:43 PM on 9/20/2022 to document services personally performed by Rios Sanchez MD based on my observations and the provider's statements to me.           Rios Sanchez MD  09/21/22 0050

## 2022-09-20 NOTE — ED TRIAGE NOTES
"Had blood drawn yesterday, sent to ED for \"low hemoglobin.\" Jaundiced. Tachycardic. Blood in stool x4 days. SOB. Fatigue. ABC's intact.       "

## 2022-09-21 NOTE — CONSULTS
Care Management Discharge Note    Discharge Date: 09/23/2022     Additional Information:  SW was consulted for discharge planning. Patient has a history of alcohol abuse and quit 2.5 weeks ago. There are no identified SW needs while patient is boarding in the ED. If consult is for CD resources, please consult CD, not SW after patient is out of the ED if patient is agreeable.     Patient has a URR of 15% and has a [facility] PCP.     PETER Alcala, [facility]  Emergency Room   [phone]-Please contact the SW on the floor in which the patient is staying for any questions or concerns

## 2022-09-21 NOTE — PHARMACY-ADMISSION MEDICATION HISTORY
Admission medication history interview status for this patient is complete. See [facility] admission navigator for allergy information, prior to admission medications and immunization status.     Medication history interview done, indicate source(s): Patient  Medication history resources (including written lists, pill bottles, clinic record): [employer] dispense records  Pharmacy: [facility]    Changes made to PTA medication list:  Added: none  Changed: gabapentin 900mg TID --> 600mg TID, ergocalciferol --> daily cholecalciferol  Reported as Not Taking: none  Removed: none    Actions taken by pharmacist (provider contacted, etc):None     Additional medication history information:None    Medication reconciliation/reorder completed by provider prior to medication history?  N   (Y/N)         Prior to Admission medications    Medication Sig Last Dose Taking? Auth Provider Long Term End Date   cholecalciferol 25 MCG (1000 UT) TABS Take by mouth daily Past Week at Unknown time Yes Unknown, Entered By History     folic acid (FOLVITE) 1 MG tablet Take 1 tablet (1 mg) by mouth daily Past Week at Unknown time Yes Joe Rice MD No    gabapentin (NEURONTIN) 300 MG capsule Take 600 mg by mouth 3 times daily Past Week at Unknown time Yes Unknown, Entered By History No    magnesium oxide (MAG-OX) 400 (241.3 Mg) MG tablet Take 1 tablet (400 mg) by mouth daily Past Week at Unknown time Yes Joe Rice MD     multivitamin w/minerals (THERA-VIT-M) tablet Take 1 tablet by mouth daily Past Week at Unknown time Yes Joe Rice MD     potassium chloride ER (KLOR-CON M) 20 MEQ CR tablet Take 1 tablet (20 mEq) by mouth 3 times daily (with meals) Past Week at Unknown time Yes Pavel Jeter MD     thiamine (B-1) 100 MG tablet Take 1 tablet (100 mg) by mouth daily Past Week at Unknown time Yes Joe Rice MD No

## 2022-09-21 NOTE — PROGRESS NOTES
DATE:  9/21/2022   TIME OF RECEIPT FROM LAB:  1509  LAB TEST:  Potassium  LAB VALUE:  2.4  RESULTS GIVEN WITH READ-BACK TO (PROVIDER):  Dr. Jackson  TIME LAB VALUE REPORTED TO PROVIDER:   3:12 PM

## 2022-09-21 NOTE — OR NURSING
EGD in endoscopy dept. Tolerated well.  See provation MD note.  Will observe pt in Endo for 30 mins and then transfer back to ED.  Report given to ED nurse.

## 2022-09-21 NOTE — UTILIZATION REVIEW
Admission Status; Secondary Review Determination     Under the authority of the Utilization Management Commitee, the utilization review process indicated a secondary review on the above patient. The review outcome is based on review of the medical records, discussions with staff, and applying clinical experience noted on the date of the review.     (x) Inpatient Status Appropriate - This patient's medical care is consistent with medical management for inpatient care and reasonable inpatient medical practice.     RATIONALE FOR DETERMINATION:  32 year old female with history of alcohol dependency and abuse who was instructed to come to the emergency room by her primary care provider after outpatient labs revealed a low hemoglobin.    Vital signs notable for intermittent tachycardia.  No fever.  No hypoxia    Labs notable for hgb 6.5, , Tbili 4.8, potassium 2.7, magnesium 1.3, WBC count of 17.5, INR near 1.4.      The patient was transfuse 1 unit PRBC this AM with follow up hgb of 7.    On admission, the patient was started on an octreotide infusion given concern about possible variceal bleeding in the setting of ETOH use.    GI was consulted and are planning on EGD today.      The patient is receiving IV PPI, octreotide infusion, and IV Rocephin (SBP ppx in the setting of UGIB)    Given her anemia requiring blood transfusion, concern for possible variceal bleed requiring octreotide infusion, multiple lab abnormalities indicative of liver disease, and increased bleeding risk in the setting of her alcoholic liver disease with anticipation of needing at least another night in the hospital inpatient status is appropriate.  This recommendation has been communicated to Dr. Jackson via text message today.      At the time of admission with the information available to the attending physician more than 2 nights Hospital complex care was anticipated, based on patient risk of adverse outcome if treated as outpatient and  complex care required. Inpatient admission is appropriate based on the Medicare guidelines.    The information on this document is developed by the utilization review team in order for the business office to ensure compliance. This only denotes the appropriateness of proper admission status and does not reflect the quality of care rendered.   The definitions of Inpatient Status and Observation Status used in making the determination above are those provided in the CMS Coverage Manual, Chapter 1 and Chapter 6, section 70.4.     Sincerely,     Gus Garsia MD  Utilization Review   Physician Advisor   [facility]

## 2022-09-21 NOTE — ED NOTES
[facility]  ED Nurse Handoff Report    Maddie Orourke is a 32 year old female   ED Chief complaint: Abnormal Labs  . ED Diagnosis:   Final diagnoses:   None     Allergies: No Known Allergies    Code Status: Full Code  Activity level - Baseline/Home:  Independent. Activity Level - Current:   Independent. Lift room needed: No. Bariatric: No   Needed: No   Isolation: No. Infection: Not Applicable.     Vital Signs:   Vitals:    09/20/22 1805 09/20/22 1915   BP: 105/61 104/73   Pulse: (!) 123 117   Resp: 18    Temp: 97  F (36.1  C)    TempSrc: Temporal    SpO2: 99%        Cardiac Rhythm:  ,      Pain level:    Patient confused: No. Patient Falls Risk: Yes.   Elimination Status: Has voided   Patient Report - Initial Complaint:Maddie Orourke is a 32 year old female with history of alcohol abuse, hypokalemia, hypophosphatemia, hypomagnesemia, and metabolic alkalosis who presents with low hemoglobin. She regularly get her blood checked and yesterday when she had it checked her hemoglobin was low. She has noticed over the past 4-5 days that she has bloody stools that are darkened. Yesterday she had 6-8 bowel movements. Today she has had less bowel movements. She also complains of dizziness and shortness of breath. She states that she did have alcohol abuse problems that messed with her electrolytes. She states that her legs are swollen and yesterday she felt febrile but does not have any recorded temp. She denies vomiting, abdominal pain, fever, chest pain, jaundice, drug use, abdomen being bloated and confusion.  . Focused Assessment: A&Ox4. Pale in color, scelera jaundice.    Tests Performed:   Labs Ordered and Resulted from Time of ED Arrival to Time of ED Departure   INR - Abnormal       Result Value    INR 1.35 (*)    PARTIAL THROMBOPLASTIN TIME - Abnormal    aPTT 40 (*)    MAGNESIUM - Abnormal    Magnesium 1.3 (*)    CRP INFLAMMATION - Abnormal    CRP Inflammation 36.67 (*)    ETHYL  ALCOHOL LEVEL - Abnormal    Alcohol ethyl <0.01 (*)    CBC WITH PLATELETS AND DIFFERENTIAL - Abnormal    WBC Count 17.5 (*)     RBC Count 1.78 (*)     Hemoglobin 6.4 (*)     Hematocrit 20.9 (*)      (*)     MCH 36.0 (*)     MCHC 30.6 (*)     RDW 17.9 (*)     Platelet Count 352      % Neutrophils 78      % Lymphocytes 14      % Monocytes 7      % Eosinophils 0      % Basophils 0      % Immature Granulocytes 1      NRBCs per 100 WBC 0      Absolute Neutrophils 13.4 (*)     Absolute Lymphocytes 2.5      Absolute Monocytes 1.3      Absolute Eosinophils 0.0      Absolute Basophils 0.0      Absolute Immature Granulocytes 0.2      Absolute NRBCs 0.0     AMMONIA - Normal    Ammonia 48     HCG QUALITATIVE PREGNANCY - Normal    hCG Serum Qualitative Negative     COVID-19 VIRUS (CORONAVIRUS) BY PCR - Normal    SARS CoV2 PCR Negative     COMPREHENSIVE METABOLIC PANEL   OCCULT BLOOD STOOL   TYPE AND SCREEN, ADULT    ABO/RH(D) O POS      Antibody Screen Negative      SPECIMEN EXPIRATION DATE [id number]     PREPARE RED BLOOD CELLS (UNIT)   ABO/RH TYPE AND SCREEN     . Abnormal Results:    No orders to display       Treatments provided: See MAR  Family Comments: none  OBS brochure/video discussed/provided to patient:  N/A  ED Medications:   Medications   0.9% sodium chloride BOLUS (1,000 mLs Intravenous New Bag 9/20/22 2006)   0.9% sodium chloride BOLUS (has no administration in time range)   magnesium sulfate 2 g in water intermittent infusion (2 g Intravenous New Bag 9/20/22 2033)   pantoprazole (PROTONIX) IV push injection 40 mg (40 mg Intravenous Given 9/20/22 2006)     Drips infusing:  No  For the majority of the shift, the patient's behavior Green. Interventions performed were none.    Sepsis treatment initiated: No     Patient tested for COVID 19 prior to admission: YES    ED Nurse Name/Phone Number: Mary William RN,   8:59 PM    RECEIVING UNIT ED HANDOFF REVIEW    Above ED Nurse Handoff Report was  reviewed: Yes  Reviewed by: RAMON LOPEZ RN on September 21, 2022 at 1:51 PM

## 2022-09-21 NOTE — CONSULTS
GASTROENTEROLOGY CONSULTATION      Maddie Orourke  [address] [facility] [address]  32 year old female     Admission Date/Time: 9/20/2022  Primary Care Provider: CRUZ Anthony [place]  Referring / Attending Physician:  Dr. Garry Mccloud     We were asked to see the patient in consultation by Dr. Garry Mccloud for evaluation of melena        HPI:  Maddie Orourke is a 32 year old female with history of alcohol dependency and abuse who was instructed to come to the emergency room by her primary care provider after outpatient labs revealed a low hemoglobin.    Patient reports she went to see her primary because she was not feeling well.  She notes she was feeling rundown and fatigued since stopping alcohol use about 2-1/2 weeks ago.  Her hemoglobin was found to be 6.4.  .  Platelets were normal at 350 2K.  INR was 1.35.  , ALT 21, total bilirubin 4.8.  She had additional electrolyte derangements including low potassium, magnesium, and phosphate.     Upon further interview, patient has noted for the past 4 days black stool.  She would average 1-2 bowel movements per day.  These were black and tarry like.  2 days ago she passed multiple 5+ black loose stools.  She denies any abdominal pain.  No nausea or vomiting.  No diarrhea.  She has never had black stool before.  No hematochezia.  She does note some swelling in her lower extremities.  She does smoke cigarettes daily.  She notes she had been drinking alcohol daily since age 17.  She has never been to any chemical dependency treatment.  She has not been using any NSAIDs.  Hemoglobin this morning 6.5.    She denies any abdominal surgeries.  Per medical history, she has had alcohol withdrawal seizures.    No family history of liver disease.       PAST MEDICAL HISTORY:  Patient Active Problem List    Diagnosis Date Noted     SIRS (systemic inflammatory response syndrome) (H) 09/20/2022     Priority: Medium     Anemia due to blood loss,  acute 09/20/2022     Priority: Medium     Gastrointestinal hemorrhage, unspecified gastrointestinal hemorrhage type 09/20/2022     Priority: Medium     Vitamin D deficiency 11/03/2021     Priority: Medium     Metabolic alkalosis 11/02/2021     Priority: Medium     Hyponatremia 11/02/2021     Priority: Medium     Acute hepatitis 11/02/2021     Priority: Medium     Alcohol withdrawal seizure with complication (H) 11/02/2021     Priority: Medium     Nausea and vomiting, intractability of vomiting not specified, unspecified vomiting type 11/02/2021     Priority: Medium     Hypovolemia 07/12/2021     Priority: Medium     Hypomagnesemia 06/07/2021     Priority: Medium     Loss of consciousness (H) 05/28/2021     Priority: Medium     Hypophosphatemia 04/08/2021     Priority: Medium     Alcohol intoxication (H) 04/07/2021     Priority: Medium     Prolonged QT interval 04/07/2021     Priority: Medium     Sinus tachycardia 01/16/2021     Priority: Medium     Hypokalemia 01/16/2021     Priority: Medium     Alcohol abuse 01/16/2021     Priority: Medium     Elevated troponin 01/16/2021     Priority: Medium     Acute kidney injury (H) 01/16/2021     Priority: Medium     Failure to thrive in adult 08/22/2020     Priority: Medium     Suicidal ideation 06/23/2020     Priority: Medium     Alcohol withdrawal (H) 05/08/2019     Priority: Medium          ROS: A comprehensive ten point review of systems was negative aside from those in mentioned in the HPI.       MEDICATIONS:   Prior to Admission medications    Medication Sig Start Date End Date Taking? Authorizing Provider   folic acid (FOLVITE) 1 MG tablet Take 1 tablet (1 mg) by mouth daily 11/4/21   Joe Rice MD   gabapentin (NEURONTIN) 300 MG capsule Take 3 capsules (900 mg) by mouth 3 times daily 11/4/21 12/4/21  Joe Rice MD   magnesium oxide (MAG-OX) 400 (241.3 Mg) MG tablet Take 1 tablet (400 mg) by mouth daily 11/4/21   Joe Rice MD   multivitamin w/minerals  (THERA-VIT-M) tablet Take 1 tablet by mouth daily 11/3/21   Joe Rice MD   potassium chloride ER (KLOR-CON M) 20 MEQ CR tablet Take 1 tablet (20 mEq) by mouth 3 times daily (with meals) 6/14/22   Pavel Jeter MD   thiamine (B-1) 100 MG tablet Take 1 tablet (100 mg) by mouth daily 11/4/21   Joe Rice MD   vitamin D2 (ERGOCALCIFEROL) 32025 units (1250 mcg) capsule Take 1 capsule (50,000 Units) by mouth every 7 days 11/9/21   Joe Rice MD        ALLERGIES: No Known Allergies     SOCIAL HISTORY:  Social History     Tobacco Use     Smoking status: Current Every Day Smoker     Packs/day: 0.25     Smokeless tobacco: Current User   Substance Use Topics     Alcohol use: Yes     Drug use: Not Currently        FAMILY HISTORY: Noncontributory       PHYSICAL EXAM:     /62   Pulse 113   Temp 98.7  F (37.1  C)   Resp 18   SpO2 97%      PHYSICAL EXAM:  GENERAL:  NAD  SKIN: no suspicious lesions, rashes, jaundice  HEAD: Normocephalic. Atraumatic.  NECK: Neck supple. No adenopathy.   EYES: No scleral icterus  GASTROINTESTINAL: soft, non tender, non distended, no guarding/rebound  JOINT/EXTREMITIES:  no gross deformities noted, normal muscle tone  NEURO: CN 2-12 grossly intact, no focal deficits  PSYCH: Normal affect     ADDITIONAL COMMENTS:   I reviewed the patient's new clinical lab test results.     Recent Labs   Lab 09/21/22  0730 09/21/22  0201 09/20/22  1921   WBC 12.6*  --  17.5*   RBC 1.88*  --  1.78*   HGB 6.5*   < > 6.4*   HCT 21.0*  --  20.9*   *  --  117*   MCH 34.6*  --  36.0*   MCHC 31.0*  --  30.6*   RDW 19.0*  --  17.9*     --  352    < > = values in this interval not displayed.     Recent Labs   Lab Test 09/21/22  0730 09/20/22 2050 06/14/22  0003 06/13/22  1940   POTASSIUM 2.7* 3.6 2.8* 2.1*   CHLORIDE 96* 96*  --  93*   CO2 25 23  --  37*   BUN 3.6* 5.0*  --  5*   ANIONGAP 13 17*  --  3     Recent Labs   Lab Test 09/21/22  0730 09/21/22  0101 09/20/22 2050  11/04/21  0713 11/02/21  0605 11/01/21  2256 08/11/21  2202 07/11/21  2047 06/25/21  2319 06/25/21  0023 06/16/21  1454 06/07/21  1716 05/28/21  1233   ALBUMIN 2.4*  --  2.6* 2.2*   < > 3.2*   < > 4.2   < >  --  4.2   < >  --    BILITOTAL 3.7*  --  4.8* 3.2*   < > 4.6*   < > 0.6   < >  --  0.6   < >  --    ALT 15  --  21 106*   < > 186*   < > 48   < >  --  38   < >  --    AST 95*  --  111* 350*   < > 1,472*   < > 77*   < >  --  53*   < >  --    PROTEIN  --  30 *  --   --   --   --   --   --   --  20*  --   --  Negative   LIPASE  --   --   --   --   --  266  --  34*  --   --  24*   < >  --     < > = values in this interval not displayed.       Recent Labs   Lab 09/20/22 2001   INR 1.35*       MELD-Na score: 17 at 9/21/2022  7:30 AM  MELD score: 15 at 9/21/2022  7:30 AM  Calculated from:  Serum Creatinine: 0.45 mg/dL (Using min of 1 mg/dL) at 9/21/2022  7:30 AM  Serum Sodium: 134 mmol/L at 9/21/2022  7:30 AM  Total Bilirubin: 3.7 mg/dL at 9/21/2022  7:30 AM  INR(ratio): 1.35 at 9/20/2022  8:01 PM  Age: 32 years     IMAGING / ENDOSCOPY     Recent Results (from the past 24 hour(s))   US Abdomen Limited    Narrative    EXAM: US ABDOMEN LIMITED  LOCATION: [facility]  DATE/TIME: 9/21/2022 5:03 AM    INDICATION: Elevated alkphos, ast, h o alcohol abuse  COMPARISON: None.  TECHNIQUE: Limited abdominal ultrasound.    FINDINGS:    GALLBLADDER: Sludge and cholelithiasis within the gallbladder and mild gallbladder distention.. Gallbladder wall thickening, 7 mm. Sonographic Rucker's sign negative.    BILE DUCTS: No biliary dilatation. The common duct was not visualized.    LIVER: Hepatic steatosis. Nodular contour the liver. Hepatomegaly. Heterogeneous color flow in the main portal vein. Uncertain if this is artifactual. Difficult to exclude filling defect.    RIGHT KIDNEY: No hydronephrosis.    PANCREAS: Partial observation by bowel gas.    Small amount of ascites.      Impression    IMPRESSION:  1.   Sludge and cholelithiasis within the gallbladder. The gallbladder is distended and the wall is thickened. Sonographic Rucker's sign negative.  2.  Irregular contour of the liver suggesting cirrhosis. Hepatomegaly.  3.  Heterogeneous color in the main portal vein. Filling defect in the portal vein not excluded sonographically. Contrast CT correlation recommended to exclude portal vein thrombus.  4.  Small amount of ascites.             CONSULTATION ASSESSMENT AND PLAN:    Maddie Orourke is a 32 year old with long history of alcohol dependency and abuse who was instructed to come to the emergency room by her primary care provider after outpatient labs revealed a low hemoglobin.    1.  Melena: Concern for upper GI bleeding given heavy alcohol use and cigarette smoking.  Platelets are normal at 240 4K.  INR is mildly elevated at 1.5.  Suspicion for alcoholic esophagitis, gastritis, peptic ulcer disease, duodenitis.  Less likely any variceal bleeding given clinical stability.   -- Continue IV PPI  -- Serial hemoglobin, transfusions per medicine team.  -- Hemoglobin this morning is 6.5.  Patient is to receive 1 unit of packed red cells.  -- Plan for upper endoscopy today with Dr. Milligan.    2.  Acute anemia, macrocytic: Unclear baseline, in 2021 hemoglobin appears to be between 10-11.  Suspect component of bone marrow suppression due to heavy alcohol use.  Plan and monitoring as above.    3.  Alcoholic hepatitis: LFTs on admission suggestive of mild alcoholic hepatitis.  LFTs are improving.  Total bilirubin down to 3.7.  Ultrasound of the liver revealing hepatomegaly but also irregular contour of the liver.  There are stones in the gallbladder with gallbladder wall thickening.  The patient has no abdominal pain.    -- Meld NA score of 17  -- Some ultrasound evidence suggestive of cirrhosis.  Patient will need outpatient hepatology follow-up for further evaluation.  -- Follow LFTs and INR daily.  -- Recommend abstinence  from alcohol.    I discussed the patient plan with Dr. Milligan, GI staff physician. Thank you for asking us to participate in the care of this patient.    Approximately 35 min of total time was spent providing patient care, including patient evaluation, reviewing documentation/ test results, and .     Sherri Ferrara PA-C  [facility] ( [facility])

## 2022-09-21 NOTE — ED NOTES
Bed: ED31  Expected date:   Expected time:   Means of arrival:   Comments:  Room 16 (currently in endo)

## 2022-09-21 NOTE — ED NOTES
This RN spoke with hospitalist regarding repeat hemoglobin level, hospitalist advised he would order another unit of blood

## 2022-09-21 NOTE — PROCEDURES
INPATIENT PRE-PROCEDURE NOTE    CHIEF COMPLAINT / REASON FOR PROCEDURE: melena    Admission History and Physical Reviewed, including past medical history, social history family history, ROS, and interval progress notes: on chart-<30 days old; updated within 7 days.    PRE-SEDATION ASSESSMENT:  Lung Exam: normal  Heart Exam: normal  Airway Exam: Normal  Previous reaction to anesthesia/sedation: NO  Sedation plan based on assessment:Moderate (conscious) sedation  ASA Classification: 2 - Mild systemic disease       IMPRESSION: melena    PLAN: MARICHUY Milligan MD

## 2022-09-21 NOTE — PLAN OF CARE
Goal Outcome Evaluation:  Pertinent assessments: Assumed care of pt from 0259-4019. VSS, BPs soft. A&Ox4. Apical pulse regular. Lungs CTA. Bowel sounds active in all quadrants. Tolerating full liquid diet. Voiding spontaneously with adequate output. MIVF running at 75cc/hr. Denies pain. K+ replaced after critical value 2.4.    Major Shift Events: Up to floor from ED, K+ replaced  Treatment Plan: monitor for stools/bleeding, electrolyte replacement

## 2022-09-21 NOTE — H&P
[facility]    Hospitalist History and Physical    Name: Maddie Orourke    MRN: [id number]  YOB: 1990    Age: 32 year old  Date of Admission:  9/20/2022  Date of Service (when I saw the patient): 09/21/22    Assessment & Plan   Maddie Orourke is a 32 year old female with past medical history significant for anxiety disorder, alcohol abuse (quit 2 weeks ago), nicotine dependence came to emergency room for abnormal labs showing low hemoglobin.  Also gives history of melanotic stools for last 4 to 5 days.  Hemoglobin was down to 6.4 admitted for symptomatic anemia.    Symptomatic anemia  Acute blood loss anemia: Gives history of darker melanotic  stool  -Hemoglobin 6.4 tachycardic associated dyspnea on exertion and shortness of  -Patient with history of alcohol dependence cannot exclude upper GI bleed secondary to varices  -N.p.o.  -Serial hemoglobin  -Monitor on telemetry  -IV fluids  -Type cross and transfuse  -IV ceftriaxone   -IV PPI Protonix ordered twice daily  -IV octreotide infusion  -GI consult    Abnormal LFTs  -Elevated total bili, alk phos and AST  -ALT within normal limit  -Check for direct bili  -No right upper quadrant pain no nausea no vomiting  -Right upper quadrant ultrasound  -GI consult     Tobacco dependance  -Offered nicotine patch patient declined      DVT Prophylaxis: INR 1.5.    Code Status: Full Code    Disposition: Admitted as inpt    Primary Care Physician   [facility]    Chief Complaint   Hemoglobin when checked in clinic advised to come to the emergency room    History is obtained from the patient    History of Present Illness   Maddie Orourke is a 32 year old female with past medical history significant for anxiety, alcohol abuse and dependence quit 2-1/2 weeks ago, tobacco dependence presented to the emergency room as advised after abnormal labs showing low hemoglobin    Patient also gives history of darker stools about  3 to 4/day for the last 4 days.  No bright red blood per rectum no associated nausea vomiting.  No epigastric pain no abdominal pain.  Denies any chest pain but does have some dyspnea on exertion and lightheadedness with activity.  Complains of generalized malaise and weakness.  No cough fever or chills.  Abdomen is chronically distended but no abdominal pain or discomfort.  No dysuria.    More than 10 point review of systems was carried out was otherwise negative.    In the emergency room patient's hemoglobin was 6.4.  Transfusion was ordered    Past Medical History    Past Medical History:   Diagnosis Date     Alcohol abuse      Alcohol withdrawal seizure (H)      Anxiety      Hypokalemia      Hypomagnesemia      Nicotine dependence          Past Surgical History   Past Surgical History:   Procedure Laterality Date     HEAD & NECK SURGERY      repair of deviated septum       Prior to Admission Medications   Prior to Admission Medications   Prescriptions Last Dose Informant Patient Reported? Taking?   folic acid (FOLVITE) 1 MG tablet   No No   Sig: Take 1 tablet (1 mg) by mouth daily   gabapentin (NEURONTIN) 300 MG capsule   No No   Sig: Take 3 capsules (900 mg) by mouth 3 times daily   magnesium oxide (MAG-OX) 400 (241.3 Mg) MG tablet   No No   Sig: Take 1 tablet (400 mg) by mouth daily   multivitamin w/minerals (THERA-VIT-M) tablet   No No   Sig: Take 1 tablet by mouth daily   potassium chloride ER (KLOR-CON M) 20 MEQ CR tablet   No No   Sig: Take 1 tablet (20 mEq) by mouth 3 times daily (with meals)   thiamine (B-1) 100 MG tablet   No No   Sig: Take 1 tablet (100 mg) by mouth daily   vitamin D2 (ERGOCALCIFEROL) 17500 units (1250 mcg) capsule   No No   Sig: Take 1 capsule (50,000 Units) by mouth every 7 days      Facility-Administered Medications: None     Allergies   No Known Allergies    Social History   Social History     Tobacco Use     Smoking status: Current Every Day Smoker     Packs/day: 0.25      Smokeless tobacco: Current User   Substance Use Topics     Alcohol use: Yes     Social History     Social History Narrative     Not on file     Smokes about half a pack per day.  Quit alcohol use about 2-1/2 weeks ago    Family History   I have reviewed this patient's family history and updated it with pertinent information if needed.   No family history on file.      Review of Systems   A Comprehensive greater than 10 system review of systems was carried out.  Pertinent positives and negatives are noted above.  Otherwise negative for contributory information.    Physical Exam   Temp: 98.7  F (37.1  C) Temp src: Oral BP: 112/64 Pulse: (!) 128   Resp: 18 SpO2: 97 % O2 Device: None (Room air)    Vital Signs with Ranges  Temp:  [97  F (36.1  C)-98.7  F (37.1  C)] 98.7  F (37.1  C)  Pulse:  [104-128] 128  Resp:  [18] 18  BP: ()/(51-91) 112/64  SpO2:  [96 %-100 %] 97 %  0 lbs 0 oz    GEN:  Alert, oriented x 3, appears pale, anicteric,  HEENT:  Normocephalic/atraumatic, scleral icterus, no nasal discharge, mouth dry  CV: Tachycardia no murmur or JVD.  S1 + S2 noted, no S3 or S4.  LUNGS:  Clear to auscultation bilaterally without rales/rhonchi/wheezing/retractions.  Symmetric chest rise on inhalation noted.  ABD:  Active bowel sounds, soft, distended .  No rebound/guarding/rigidity.  EXT: Trace edema.  No cyanosis.  No joint synovitis noted.  SKIN:  Dry to touch, jaundice   NEURO:  Symmetric muscle strength,  No new focal deficits appreciated.    Data   Data reviewed today:  I personally reviewed the EKG tracing showing Sinus tachycardia with T inverted in 3 aVF V3 to V6 not new when compared to prior.    No results for input(s): PHV, PO2V, PCO2V, HCO3V in the last 168 hours.  No results for input(s): PH, PHV, PO2, PO2V, SAT, PCO2, PCO2V, HCO3, HCO3V in the last 168 hours.  Recent Labs   Lab 09/20/22 1921   WBC 17.5*   HGB 6.4*   HCT 20.9*   *        Recent Labs   Lab 09/20/22 2050       POTASSIUM 3.6   CHLORIDE 96*   CO2 23   ANIONGAP 17*   *   BUN 5.0*   CR 0.42*   GFRESTIMATED >90   NISHI 7.2*     7-Day Micro Results     Collected Updated Procedure Result Status      09/20/2022 2328 09/21/2022 0000 Blood Culture Hand, Right [[id number]]   Blood from Hand, Right    In process Component Value   No component results               09/20/2022 2317 09/20/2022 2327 Blood Culture Arm, Right [[id number]]   Blood from Arm, Right    In process Component Value   No component results               09/20/2022 2002 09/20/2022 2051 Asymptomatic COVID-19 Virus (Coronavirus) by PCR Nose [[id number]]    Swab from Nose    Final result Component Value   SARS CoV2 PCR Negative   NEGATIVE: SARS-CoV-2 (COVID-19) RNA not detected, presumed negative.                Recent Labs   Lab 09/20/22 1921   HGB 6.4*     Recent Labs   Lab 09/20/22 2050 09/20/22 2001   *  --    ALT 21  --    ALKPHOS 345*  --    BILITOTAL 4.8*  --    SAVANA  --  48     Recent Labs   Lab 09/20/22 2001   INR 1.35*     Recent Labs   Lab 09/20/22 2317   LACT 2.0     No results for input(s): LIPASE in the last 168 hours.  No results for input(s): CHOL, HDL, LDL, TRIG, CHOLHDLRATIO in the last 168 hours.  Recent Labs   Lab 09/20/22 2050   BUN 5.0*   CR 0.42*     No results for input(s): TSH in the last 168 hours.  No results for input(s): TROPONIN, TROPI, TROPR, TROPONINIS in the last 168 hours.    Invalid input(s): TROP, TROPONINIES, TNIH  Recent Labs   Lab 09/21/22  0101   COLOR Geovanna*   APPEARANCE Slightly Cloudy*   URINEGLC Negative   URINEBILI Moderate*   URINEKETONE Negative   SG 1.022   UBLD Negative   URINEPH 6.0   PROTEIN 30 *   NITRITE Negative   LEUKEST Trace*   RBCU 1   WBCU 4       No results found for this or any previous visit (from the past 24 hour(s)).

## 2022-09-22 NOTE — PROGRESS NOTES
Hospitalist Medicine Progress Note   [facility]       Maddie Orourke is a 32 year old G with past r anxiety disorder, alcohol abuse (quit 2 weeks ago), nicotine dependence who came to [facility] 9/20/2022 for abnormal labs showing low hemoglobin.  With history of melanotic stools for last 4 to 5 days.  Hemoglobin was down to 6.4 and she was given blood transfusion and admitted for symptomatic anemia.  She underwent upper GI endoscopy on 9/21/2022 by Dr. Valentino Milligan MD gastroenterology with no signs seen of ulceration but esophagitis without any bleeding was diagnosed.  Her hemoglobin remained stable after second blood transfusion in the hospital was also started on ceftriaxone and IV octreotide which was discontinued.  Patient had abnormal LFTs which probably are indicative of alcoholic liver disease.   Ultrasound of the right upper quadrant revealed sludge and cholelithiasis within the gallbladder which was distended and wall was thickened with sonographic negative Rucker sign irregular contour of the liver suggesting cirrhosis with hepatomegaly filling defect in the portal vein was 101 but differential for heterogeneous color in the main portal vein.  CT scan of the abdomen was recommended which has been ordered        Date of Admission:  9/20/2022  Assessment & Plan     Symptomatic anemia  Acute esophagitis   for which biopsies were done  ?  Earlier bleed with might have stopped  Hemoglobin was 6.4 at admission but has progressively increased to 8 point 2:05 blood transfusions  Protonix will be continued    Abnormal liver functions  Probably on the basis of alcoholic liver disease as well as acute cholecystitis    Cholelithiasis  Probable acute cholecystitis  We will continue with ceftriaxone that was started earlier  Consult general surgery    Tobacco abuse  Patient declined the patch that was offered to her    Recent alcohol abuse  Patient states that she has not drank  any alcohol for 3 to 4 weeks        Plan:   Replace electrolytes  Encourage to walk   CMP, Magnesium and Phosphorus in am   CBC in am   Consult general surgery    Diet: Advance Diet as Tolerated: Regular Diet Adult    DVT Prophylaxis: Low Risk/Ambulatory with no VTE prophylaxis indicated  Escamilla Catheter: Not present  Code Status: Full Code               The patient's care was discussed with the Patient      Uriel Jackson MD  Hospitalist Service  [facility]    ______________________________________________________________________    Interval History     Symptoms   Denies any Melena     Review of Systems:   No Abdominal pain, Nausea or Vomiting     Says that she has not had a menstrual period for 11-12 years (and still has a Uterus) - Dis discuss the need to follow up with a Gynecologist as outpatient     Data reviewed today: I reviewed all medications, new labs and imaging results over the last 24 hours.     Physical Exam   Vital Signs: Temp: 98.5  F (36.9  C) Temp src: Oral BP: 107/76 Pulse: 93   Resp: 16 SpO2: 97 % O2 Device: None (Room air)    Weight: 133 lbs 1.6 oz      GENERAL: Patient is not in acute distress  HEENT: EOM+,Conjunctiva looks pale   NECK:  no Jugular Venous distention  HEART: S1 S2 regular Rate and Rhythm, there is  no murmur,   LUNGS: Respirations are  not laboured, Lungs are clear to auscultation without Crepitations or Wheezing   ABDOMEN: Soft , there is no tenderness ,Bowel Sounds are  Positive   LOWER LIMBS: no  Pedal Edema  Bilaterally   CNS:  Alert,  Oriented x 3, Moving all the Four Limbs     Data   Recent Labs   Lab 09/22/22  0736 09/22/22  0402 09/21/22  2204 09/21/22  1423 09/21/22  1000 09/21/22  0730 09/21/22  0201 09/20/22  2050 09/20/22 2001 09/20/22  1921   0000   WBC 14.5*  --   --   --   --  12.6*  --   --   --  17.5*  --    HGB 8.5*  --  7.8* 8.3*   < > 6.5*   < >  --   --  6.4*  --    *  --   --   --   --  112*  --   --   --  117*  --       --   --   --   --  244  --   --   --  352  --    INR  --   --   --   --   --   --   --   --  1.35*  --   --    NA  --   --   --   --   --  134*  --  136  --   --   --    POTASSIUM 3.7 3.3* 3.3*  --    < > 2.7*  --  3.6  --   --    < >   CHLORIDE  --   --   --   --   --  96*  --  96*  --   --   --    CO2  --   --   --   --   --  25  --  23  --   --   --    BUN  --   --   --   --   --  3.6*  --  5.0*  --   --   --    CR  --   --   --   --   --  0.45*  --  0.42*  --   --   --    ANIONGAP  --   --   --   --   --  13  --  17*  --   --   --    NISHI  --   --   --   --   --  6.8*  --  7.2*  --   --   --    GLC  --   --   --   --   --  141*  --  101*  --   --   --    ALBUMIN 2.7*  --   --   --   --  2.4*  --  2.6*  --   --    < >   PROTTOTAL 4.8*  --   --   --   --  4.5*  --  5.0*  --   --    < >   BILITOTAL 2.9*  --   --   --   --  3.7*  --  4.8*  --   --    < >   ALKPHOS 312*  --   --   --   --  305*  --  345*  --   --    < >   ALT 18  --   --   --   --  15  --  21  --   --    < >   *  --   --   --   --  95*  --  111*  --   --    < >    < > = values in this interval not displayed.         No results found for this or any previous visit (from the past 24 hour(s)).

## 2022-09-22 NOTE — PLAN OF CARE
Pertinent assessments: VSS on room air. A&Ox4. Up ind in the room. No pain reported.  Phos 1.52 replaced 2 doses needing one more dose at 2000 then recheck 2-4 hours after.  Mag 1.5 and K 3.7 recheck tomorrow.  Hgb improved at 8.5.  US done today see results.  Surg consult ordered based on those results. On tele sinus tach 111.    Major Shift Events: Uneventful     Treatment Plan: Symptom management, IVF, IV rocephin, monitor labs tele    Bedside Nurse: Lesa Menard RN

## 2022-09-22 NOTE — PROGRESS NOTES
"GASTROENTEROLOGY PROGRESS NOTE        SUBJECTIVE:  No abdominal pain, passed brown bowel movement this morning.  Tolerating diet.  No trouble swallowing and no heartburn.     OBJECTIVE:    /76 (BP Location: Right arm)   Pulse 93   Temp 98.5  F (36.9  C) (Oral)   Resp 16   Ht 1.702 m (5' 7\")   Wt 60.4 kg (133 lb 1.6 oz)   SpO2 97%   BMI 20.85 kg/m    Temp (24hrs), Av.4  F (36.9  C), Min:97.9  F (36.6  C), Max:98.6  F (37  C)    Patient Vitals for the past 72 hrs:   Weight   22 1407 60.4 kg (133 lb 1.6 oz)        PHYSICAL EXAM     Constitutional: NAD    Abdomen: soft, NTTP         Additional Comments:  ROS, FH, SH: See initial GI consult for details.     I have reviewed the patient's new clinical lab results:     Recent Labs   Lab Test 22  0736 224 22  1423 22  1000 22  0730 22  0201 22  2001 22  1921 21  0605 21  0149 21  2333   WBC 14.5*  --   --   --  12.6*  --   --  17.5*   < >  --    < >  --    HGB 8.5* 7.8* 8.3*   < > 6.5*   < >  --  6.4*   < >  --    < >  --    *  --   --   --  112*  --   --  117*   < >  --    < >  --      --   --   --  244  --   --  352   < >  --    < >  --    INR  --   --   --   --   --   --  1.35*  --   --  1.06  --  0.91    < > = values in this interval not displayed.     Recent Labs   Lab Test 22  0736 22  0402 22  2204 22  1354 22  0730 22  0003 06/13/22  1940   POTASSIUM 3.7 3.3* 3.3*   < > 2.7* 3.6   < > 2.1*   CHLORIDE  --   --   --   --  96* 96*  --  93*   CO2  --   --   --   --  25 23  --  37*   BUN  --   --   --   --  3.6* 5.0*  --  5*   ANIONGAP  --   --   --   --  13 17*  --  3    < > = values in this interval not displayed.     Recent Labs   Lab Test 22  0736 22  0730 22  0101 22  0605 21  2256 21  23121  0023 " 06/16/21  1454 06/07/21  1716 05/28/21  1233   ALBUMIN 2.7* 2.4*  --  2.6*   < > 3.2*   < > 4.2   < >  --  4.2   < >  --    BILITOTAL 2.9* 3.7*  --  4.8*   < > 4.6*   < > 0.6   < >  --  0.6   < >  --    ALT 18 15  --  21   < > 186*   < > 48   < >  --  38   < >  --    * 95*  --  111*   < > 1,472*   < > 77*   < >  --  53*   < >  --    PROTEIN  --   --  30 *  --   --   --   --   --   --  20*  --   --  Negative   LIPASE  --   --   --   --   --  266  --  34*  --   --  24*   < >  --     < > = values in this interval not displayed.     ENDOSCOPY    9/22/2022 EGD :   Impression:         - LA Grade A esophagitis with no bleeding.                             - Normal stomach.                             - Normal examined duodenum.                             - No specimens collected.       ASSESSMENT/ PLAN  Maddie Orourke is a 32 year old with long history of alcohol dependency and abuse who was instructed to come to the emergency room by her primary care provider after outpatient labs revealed a low hemoglobin.     1.  Melena: Resolved, EGD with mild esophagitis as likely cause, no esophageal varices seen ( no octreotide or ceftriaxone needed).      -- EGD 9/21 with mild esophagitis.  -- Passing brown BMs this morning.  -- PPI daily      2.  Acute anemia, macrocytic: Unclear baseline, in 2021 hemoglobin appears to be between 10-11.  Suspect bone marrow suppression due to heavy alcohol use.       3.  Alcoholic hepatitis: LFTs on admission suggestive of mild alcoholic hepatitis.  LFTs are improving.  Ultrasound of the liver revealing hepatomegaly but also irregular contour of the liver.  There are stones in the gallbladder with gallbladder wall thickening.  The patient has no abdominal pain.     -- Will check contrast CT given ultrasound finding of possible filling defect in the portal vein.  -- Some ultrasound evidence suggestive of cirrhosis.  Patient will need outpatient hepatology follow-up for further  evaluation.  -- Follow LFTs and INR daily.  -- Recommend abstinence from alcohol.    Discussed with Dr. Srini Ferrara PA-C  [facility] ( [facility])

## 2022-09-22 NOTE — PLAN OF CARE
End of Shift Summary  For vital signs and complete assessments, please see documentation flowsheets.     Pertinent assessments: VSS on room air. A&Ox4. Up ind in the room. No pain or nausea overnight. Advanced to regular diet, tolerating well. Potassium replaced x2 overnight, next recheck at 1045.     Major Shift Events: Uneventful     Treatment Plan: Symptom management, IVF, IV rocephin, monitor labs

## 2022-09-23 NOTE — DISCHARGE SUMMARY
[facility]  Hospitalist Discharge Summary      Date of Admission:  9/20/2022  Date of Discharge:  9/23/2022  Discharging Provider: Walt Urrutia MD  Discharge Service: Hospitalist Service    Discharge Diagnoses       Symptomatic anemia    Acute esophagitis    Abnormal LFTs due to alcoholic liver disease    Cholelithiasis    Tobacco use    Follow-ups Needed After Discharge   Follow-up Appointments     Follow-up and recommended labs and tests       Follow up with primary care provider, [facility],   within 7 days for hospital follow- up.  The following labs/tests are   recommended: CBC and BMP.           Unresulted Labs Ordered in the Past 30 Days of this Admission     Date and Time Order Name Status Description    9/23/2022 12:37 PM CBC with platelets and differential In process     9/20/2022 10:35 PM Blood Culture Hand, Right Preliminary     9/20/2022 10:35 PM Blood Culture Arm, Right Preliminary     9/20/2022  8:04 PM Prepare red blood cells (unit) Preliminary       These results will be followed up by Walt Urrutia      Discharge Disposition   Discharged to home  Condition at discharge: Stable    Hospital Course   32-year-old female with history of anxiety disorder, alcohol abuse (quit 2 weeks ago), nicotine dependence who came to [facility] on 9/20.  She had melanotic stools for 4 to 5 days and her hemoglobin was 6.4.  She was given a unit of blood and underwent upper GI endoscopy 9/21 which showed esophagitis without obvious bleeding.  Her hemoglobin remained stable after second unit of blood and her octreotide was discontinued.  She was also given IV ceftriaxone will be discontinued on discharge.  Patient's hemoglobin stabilized at about 8, last hemoglobin is pending but patient was adamant on refusing AMA and was therefore discharged with a prescription of Protonix.  She is not interested in talking to alcohol abuse counselor.    CT scan showed  cholelithiasis with question of cholecystitis but she did not have any abdominal pain.  General surgery was consulted and did not think she needed any intervention for that.    Consultations This Hospital Stay   GASTROENTEROLOGY IP CONSULT  CARE MANAGEMENT / SOCIAL WORK IP CONSULT  SURGERY GENERAL IP CONSULT    Code Status   Full Code    Time Spent on this Encounter   I, Walt Urrutia MD, personally saw the patient today and spent greater than 30 minutes discharging this patient.       Walt Urrutia MD  [facility] MEDICAL SURGICAL  [address]  [facility] [address]  Phone: [phone]  Fax: [phone]  ______________________________________________________________________    Physical Exam   Vital Signs: Temp: 99.2  F (37.3  C) Temp src: Axillary BP: 114/74 Pulse: 99   Resp: 18 SpO2: 100 % O2 Device: None (Room air)    Weight: 133 lbs 1.6 oz  General Appearance: Alert awake oriented x3.  Respiratory: Clear to auscultation  Cardiovascular: S1-S2 normal  GI: Abdomen is nontender but slightly distended  Skin: Has superficial abrasion on the abdominal wall  Other: Trace edema       Primary Care Physician   [facility]    Discharge Orders      Primary Care - Care Coordination Referral      Reason for your hospital stay    Esophagitis     Follow-up and recommended labs and tests     Follow up with primary care provider, [facility], within 7 days for hospital follow- up.  The following labs/tests are recommended: CBC and BMP.     Activity    Your activity upon discharge: activity as tolerated     Diet    Follow this diet upon discharge: Orders Placed This Encounter      Advance Diet as Tolerated: Regular Diet Adult       Significant Results and Procedures   Most Recent 3 CBC's:Recent Labs   Lab Test 09/22/22  0736 09/21/22  2204 09/21/22  1423 09/21/22  1000 09/21/22  0730 09/21/22  0201 09/20/22  1921   WBC 14.5*  --   --   --  12.6*  --  17.5*   HGB  8.5* 7.8* 8.3*   < > 6.5*   < > 6.4*   *  --   --   --  112*  --  117*     --   --   --  244  --  352    < > = values in this interval not displayed.     Most Recent 3 BMP's:Recent Labs   Lab Test 09/23/22  0659 09/22/22  0736 09/22/22  0402 09/21/22  1354 09/21/22  0730 09/20/22  2050   *  --   --   --  134* 136   POTASSIUM 4.1 3.7 3.3*   < > 2.7* 3.6   CHLORIDE 105  --   --   --  96* 96*   CO2 20*  --   --   --  25 23   BUN 2.3*  --   --   --  3.6* 5.0*   CR 0.43*  --   --   --  0.45* 0.42*   ANIONGAP 10  --   --   --  13 17*   NISHI 7.7*  --   --   --  6.8* 7.2*   GLC 98  --   --   --  141* 101*    < > = values in this interval not displayed.     Most Recent 2 LFT's:Recent Labs   Lab Test 09/23/22  0659 09/22/22  0736   * 107*   ALT 20 18   ALKPHOS 350* 312*   BILITOTAL 3.1* 2.9*   ,   Results for orders placed or performed during the hospital encounter of 09/20/22   US Abdomen Limited    Narrative    EXAM: US ABDOMEN LIMITED  LOCATION: [facility]  DATE/TIME: 9/21/2022 5:03 AM    INDICATION: Elevated alkphos, ast, h o alcohol abuse  COMPARISON: None.  TECHNIQUE: Limited abdominal ultrasound.    FINDINGS:    GALLBLADDER: Sludge and cholelithiasis within the gallbladder and mild gallbladder distention.. Gallbladder wall thickening, 7 mm. Sonographic Rucker's sign negative.    BILE DUCTS: No biliary dilatation. The common duct was not visualized.    LIVER: Hepatic steatosis. Nodular contour the liver. Hepatomegaly. Heterogeneous color flow in the main portal vein. Uncertain if this is artifactual. Difficult to exclude filling defect.    RIGHT KIDNEY: No hydronephrosis.    PANCREAS: Partial observation by bowel gas.    Small amount of ascites.      Impression    IMPRESSION:  1.  Sludge and cholelithiasis within the gallbladder. The gallbladder is distended and the wall is thickened. Sonographic Rucker's sign negative.  2.  Irregular contour of the liver suggesting  cirrhosis. Hepatomegaly.  3.  Heterogeneous color in the main portal vein. Filling defect in the portal vein not excluded sonographically. Contrast CT correlation recommended to exclude portal vein thrombus.  4.  Small amount of ascites.       CT Abdomen Pelvis w Contrast    Narrative    EXAM: CT ABDOMEN PELVIS W CONTRAST  LOCATION: [facility]  DATE/TIME: 9/22/2022 9:29 PM    INDICATION: Evaluate portal vein thrombus.  COMPARISON: Abdominal ultrasound 09/21/2022.  TECHNIQUE: CT scan of the abdomen and pelvis was performed following injection of IV contrast. Multiplanar reformats were obtained. Dose reduction techniques were used.  CONTRAST: 60mL Isovue 370    FINDINGS:   LOWER CHEST: Small left greater than right pleural effusions, with adjacent atelectasis.    HEPATOBILIARY: Steatotic liver, with morphologic changes of chronic hepatocellular disease and slight surface nodularity suggesting possible cirrhosis. No focal liver lesions. Cholelithiasis. No biliary ductal dilation.    PANCREAS: Focal area of hypoenhancement and parenchymal loss within the pancreatic head which contains a punctate calcification (3/#94). No pancreatic ductal dilation. No acute peripancreatic inflammation.    SPLEEN: Normal. No splenomegaly.    ADRENAL GLANDS: Normal.    KIDNEYS/BLADDER: Normal.    BOWEL: No bowel obstruction or active inflammation. Intramural fat deposition within the majority of the colon, particularly the ascending colon, likely related to remote inflammation.    LYMPH NODES: No enlarged lymph node.    VASCULATURE: Patent hepatic, portal, splenic, and superior mesenteric veins. Normal caliber abdominal aorta. Tiny gastroesophageal varices.    PERITONEUM/RETROPERITONEUM: Moderate volume simple ascites throughout the abdomen.    PELVIC ORGANS: Normal.    MUSCULOSKELETAL: Mild body wall edema.       Impression    IMPRESSION:     1.  Portal and superior mesenteric veins are patent, as queried.    2.   Steatotic liver, with morphologic changes of chronic hepatocellular disease and possible cirrhosis. Features of portal hypertension include tiny gastroesophageal varices and moderate volume ascites. No splenomegaly.    3.  Focal area of hypoenhancement and parenchymal loss within the pancreatic head, probably sequela of a remote episode of pancreatitis. No ductal dilation or other suspicious features.    4.  Mild body wall edema. Small pleural effusions, greater on the left.       Discharge Medications   Current Discharge Medication List      START taking these medications    Details   pantoprazole (PROTONIX) 40 MG EC tablet Take 1 tablet (40 mg) by mouth 2 times daily  Qty: 20 tablet, Refills: 0    Associated Diagnoses: Gastrointestinal hemorrhage, unspecified gastrointestinal hemorrhage type         CONTINUE these medications which have NOT CHANGED    Details   cholecalciferol 25 MCG (1000 UT) TABS Take by mouth daily      folic acid (FOLVITE) 1 MG tablet Take 1 tablet (1 mg) by mouth daily  Qty: 100 tablet, Refills: 0    Associated Diagnoses: Alcohol withdrawal seizure with complication (H)      gabapentin (NEURONTIN) 300 MG capsule Take 600 mg by mouth 3 times daily      magnesium oxide (MAG-OX) 400 (241.3 Mg) MG tablet Take 1 tablet (400 mg) by mouth daily  Qty: 100 tablet, Refills: 1    Associated Diagnoses: Alcohol withdrawal seizure with complication (H)      multivitamin w/minerals (THERA-VIT-M) tablet Take 1 tablet by mouth daily  Qty: 100 tablet, Refills: 1    Associated Diagnoses: Failure to thrive in adult      potassium chloride ER (KLOR-CON M) 20 MEQ CR tablet Take 1 tablet (20 mEq) by mouth 3 times daily (with meals)  Qty: 30 tablet, Refills: 0    Associated Diagnoses: Hypokalemia      thiamine (B-1) 100 MG tablet Take 1 tablet (100 mg) by mouth daily  Qty: 100 tablet, Refills: 1    Associated Diagnoses: Alcohol withdrawal seizure with complication (H)           Allergies   No Known Allergies

## 2022-09-23 NOTE — PROGRESS NOTES
End of Shift Summary  For vital signs and complete assessments, please see documentation flowsheets.     Pertinent assessments: Pt A&O, HR tachy, on RA, denies any pain/nausea. BS hypo, abdomen rounded, firm. Voiding without difficulty. On tele monitoring, ST 's    Major Shift Events: pt went down for abdominal/pelvis CT, Phos replaced x 3 recheck @ 0800    Treatment Plan: pain/nausea management, monitor labs, IV Rocephin, IV Protonix

## 2022-09-23 NOTE — PROGRESS NOTES
"GASTROENTEROLOGY PROGRESS NOTE        SUBJECTIVE:  No abdominal pain, nausea or vomiting, no melena.      OBJECTIVE:    /74 (BP Location: Right arm)   Pulse 99   Temp 99.2  F (37.3  C) (Axillary)   Resp 18   Ht 1.702 m (5' 7\")   Wt 60.4 kg (133 lb 1.6 oz)   SpO2 100%   BMI 20.85 kg/m    Temp (24hrs), Av.6  F (37  C), Min:98.2  F (36.8  C), Max:99.2  F (37.3  C)    Patient Vitals for the past 72 hrs:   Weight   22 1407 60.4 kg (133 lb 1.6 oz)        PHYSICAL EXAM     Constitutional: NAD    Abdomen: soft, NTTP         Additional Comments:  ROS, FH, SH: See initial GI consult for details.     I have reviewed the patient's new clinical lab results:     Recent Labs   Lab Test 22  1903 22  0736 22  2204 22  1423 22  1000 22  0730 22  0201 22  1921 21  0605 21  0149   WBC  --  14.5*  --   --   --  12.6*  --   --  17.5*   < >  --    HGB  --  8.5* 7.8* 8.3*   < > 6.5*   < >  --  6.4*   < >  --    MCV  --  108*  --   --   --  112*  --   --  117*   < >  --    PLT  --  281  --   --   --  244  --   --  352   < >  --    INR 1.25*  --   --   --   --   --   --  1.35*  --   --  1.06    < > = values in this interval not displayed.     Recent Labs   Lab Test 22  0659 22  0736 22  0402 22  1354 22  0730 22  2050   POTASSIUM 4.1 3.7 3.3*   < > 2.7* 3.6   CHLORIDE 105  --   --   --  96* 96*   CO2 20*  --   --   --     BUN 2.3*  --   --   --  3.6* 5.0*   ANIONGAP 10  --   --   --  13 17*    < > = values in this interval not displayed.     Recent Labs   Lab Test 22  0659 22  0736 22  0730 22  0101 21  0605 21  2256 21  2202 21  2047 21  2319 21  0023 21  1454 21  1716 21  1233   ALBUMIN 2.6* 2.7* 2.4*  --    < > 3.2*   < > 4.2   < >  --  4.2   < >  --    BILITOTAL 3.1* 2.9* 3.7*  --    < > 4.6*   < > 0.6   < >  --  0.6   < >  " --    ALT 20 18 15  --    < > 186*   < > 48   < >  --  38   < >  --    * 107* 95*  --    < > 1,472*   < > 77*   < >  --  53*   < >  --    PROTEIN  --   --   --  30 *  --   --   --   --   --  20*  --   --  Negative   LIPASE  --   --   --   --   --  266  --  34*  --   --  24*   < >  --     < > = values in this interval not displayed.       CT ABDOMEN PELVIS W CONTRAST  LOCATION: [facility]  DATE/TIME: 9/22/2022 9:29 PM  IMPRESSION:      1.  Portal and superior mesenteric veins are patent, as queried.     2.  Steatotic liver, with morphologic changes of chronic hepatocellular disease and possible cirrhosis. Features of portal hypertension include tiny gastroesophageal varices and moderate volume ascites. No splenomegaly.     3.  Focal area of hypoenhancement and parenchymal loss within the pancreatic head, probably sequela of a remote episode of pancreatitis. No ductal dilation or other suspicious features.     4.  Mild body wall edema. Small pleural effusions, greater on the left.    ASSESSMENT/ PLAN    Maddie Orourke is a 32 year old with long history of alcohol dependency and abuse who was instructed to come to the emergency room by her primary care provider after outpatient labs revealed a low hemoglobin.     1.  Melena: Resolved, EGD with mild esophagitis as likely cause, no esophageal varices seen ( no octreotide or ceftriaxone necessary).       -- EGD 9/21 with mild esophagitis.  -- Passing brown BMs this morning.  -- PPI daily      2.  Acute anemia, macrocytic: Unclear baseline, HgB 8.5 yesterday, in 2021 hemoglobin appears to be between 10-11.  Suspect bone marrow suppression due to heavy alcohol use.       3.  Alcoholic hepatitis: LFTs on admission suggestive of mild alcoholic hepatitis.  LFTs are stable ( mild elevation in bili and Alk phos). Ultrasound of the liver revealing hepatomegaly but also irregular contour of the liver.  There are stones in the gallbladder with  gallbladder wall thickening.  The patient has no abdominal pain.     -- CT a/p with patent portal and superior mesenteric veins.  -- Some ultrasound evidence suggestive of cirrhosis.  Patient will need outpatient hepatology follow-up for further evaluation. [facility] to contact patient to schedule.   -- Follow LFTs and INR daily.  -- Recommend abstinence from alcohol.      Discussed with Dr. Milligan. Will no longer follow.  Sherri Ferrara PA-C  [facility] ( [facility])

## 2022-09-23 NOTE — CONSULTS
General Surgery Consultation    Maddie Orourke MRN# [id number]   Age: 32 year old YOB: 1990     Date of Admission:  9/20/2022    Reason for consult:            Cholecystitis       Requesting physician:            Manuel                Assessment and Plan:   Assessment:   Maddie Orourke is a 32 year old female with elevated liver function tests, likely alcoholic hepatitis, likely cirrhosis, ascites, and some inflammation seen surrounding the gallbladder.     Comorbidities:   has a past medical history of Alcohol abuse, Alcohol withdrawal seizure (H), Anxiety, Hypokalemia, Hypomagnesemia, and Nicotine dependence.      Plan:   I had a lengthy discussion with the patient regarding her current situation. She has imaging finding concerning for cholecystitis however her clinical picture, physical exam with no pain do not support that finding.  She has significant hx of liver disease and this along with the ascites can often cause inflammation around the gallbladder.  With her significant liver disease I would not recommend an operation at this time as she is currently asymptomatic from GB standpoint.  Patient was in agreeance and would not like to have any surgery unless absolutely necessary. She was encouraged to continue to abstain from alcohol.                   Chief Complaint:   Anemia     History is obtained from the patient and from review of the medical chart.         History of Present Illness:   Maddie Orourke is a 32 year old who initially presented to the hospital with anemia. Improved with transfusion. She underwent EGD showing esophagitis, possible cause, and further workup showing likely alcoholic hepatitis.  She has a strong past history of alcohol use but stopped two weeks ago per her.  She has no complaints of upper abdominal pain. Occasionally she will get discomfort in her RLQ but that is currently not there. No nasuea or emesis. Denies fever/chills. Baseline mild jaundice. No  prior hx of gallbladder issues or RUQ pain with meals.        Past Medical History:     Past Medical History:   Diagnosis Date     Alcohol abuse      Alcohol withdrawal seizure (H)      Anxiety      Hypokalemia      Hypomagnesemia      Nicotine dependence              Past Surgical History:     Past Surgical History:   Procedure Laterality Date     ESOPHAGOSCOPY, GASTROSCOPY, DUODENOSCOPY (EGD), COMBINED N/A 9/21/2022    Procedure: ESOPHAGOGASTRODUODENOSCOPY (EGD);  Surgeon: Valentino Milligan MD;  Location:  GI     HEAD & NECK SURGERY      repair of deviated septum             Social History:     Social History     Tobacco Use     Smoking status: Current Every Day Smoker     Packs/day: 0.25     Smokeless tobacco: Current User   Substance Use Topics     Alcohol use: Yes             Family History:   No family history on file.  No family history of bleeding or clotting disorders.         Allergies:   No Known Allergies          Medications:   No current facility-administered medications on file prior to encounter.  cholecalciferol 25 MCG (1000 UT) TABS, Take by mouth daily  folic acid (FOLVITE) 1 MG tablet, Take 1 tablet (1 mg) by mouth daily  gabapentin (NEURONTIN) 300 MG capsule, Take 600 mg by mouth 3 times daily  magnesium oxide (MAG-OX) 400 (241.3 Mg) MG tablet, Take 1 tablet (400 mg) by mouth daily  multivitamin w/minerals (THERA-VIT-M) tablet, Take 1 tablet by mouth daily  potassium chloride ER (KLOR-CON M) 20 MEQ CR tablet, Take 1 tablet (20 mEq) by mouth 3 times daily (with meals)  thiamine (B-1) 100 MG tablet, Take 1 tablet (100 mg) by mouth daily        cefTRIAXone  1 g Intravenous Q24H     pantoprazole  40 mg Intravenous BID     sodium chloride (PF)  3 mL Intracatheter Q8H            Review of Systems:   The 10 point review of systems is negative other than noted in the HPI.          Physical Exam:   /74 (BP Location: Right arm)   Pulse 99   Temp 99.2  F (37.3  C) (Axillary)   Resp 18   Ht 1.702 m  "(5' 7\")   Wt 60.4 kg (133 lb 1.6 oz)   SpO2 100%   BMI 20.85 kg/m    General - Well developed, well nourished female in no apparent distress  HEENT:  Head normocephalic and atraumatic, pupils equal and round, conjunctivae clear,  scleral icterus present, external ears and nose normal  Neck: Supple without thyromegaly or masses  Lymphatic: No cervical, or supraclavicular lymphadenopathy  Lungs: Clear to auscultation bilaterally  Heart: regular rate and rhythm, no murmurs  Abdomen: soft, rounded, mild distention with no tenderness noted diffusely.  Extremities: Warm without edema  Neurologic: nonfocal  Psychiatric: Mood and affect appropriate  Skin: Without lesions, rashes         Data:     WBC -   Lab Results   Component Value Date    WBC 14.5 (H) 09/22/2022       HgB -   Lab Results   Component Value Date    HGB 8.5 (L) 09/22/2022       Plt-   Lab Results   Component Value Date     09/22/2022       Liver Function Studies -   Recent Labs   Lab Test 09/23/22  0659   PROTTOTAL 5.0*   ALBUMIN 2.6*   BILITOTAL 3.1*   ALKPHOS 350*   *   ALT 20       Lipase-   Lab Results   Component Value Date    LIPASE 266 11/01/2021         Imaging:  All imaging studies reviewed by me.    Results for orders placed or performed during the hospital encounter of 09/20/22   US Abdomen Limited    Narrative    EXAM: US ABDOMEN LIMITED  LOCATION: [facility]  DATE/TIME: 9/21/2022 5:03 AM    INDICATION: Elevated alkphos, ast, h o alcohol abuse  COMPARISON: None.  TECHNIQUE: Limited abdominal ultrasound.    FINDINGS:    GALLBLADDER: Sludge and cholelithiasis within the gallbladder and mild gallbladder distention.. Gallbladder wall thickening, 7 mm. Sonographic Rucker's sign negative.    BILE DUCTS: No biliary dilatation. The common duct was not visualized.    LIVER: Hepatic steatosis. Nodular contour the liver. Hepatomegaly. Heterogeneous color flow in the main portal vein. Uncertain if this is artifactual. " Difficult to exclude filling defect.    RIGHT KIDNEY: No hydronephrosis.    PANCREAS: Partial observation by bowel gas.    Small amount of ascites.      Impression    IMPRESSION:  1.  Sludge and cholelithiasis within the gallbladder. The gallbladder is distended and the wall is thickened. Sonographic Rucker's sign negative.  2.  Irregular contour of the liver suggesting cirrhosis. Hepatomegaly.  3.  Heterogeneous color in the main portal vein. Filling defect in the portal vein not excluded sonographically. Contrast CT correlation recommended to exclude portal vein thrombus.  4.  Small amount of ascites.       CT Abdomen Pelvis w Contrast    Narrative    EXAM: CT ABDOMEN PELVIS W CONTRAST  LOCATION: [facility]  DATE/TIME: 9/22/2022 9:29 PM    INDICATION: Evaluate portal vein thrombus.  COMPARISON: Abdominal ultrasound 09/21/2022.  TECHNIQUE: CT scan of the abdomen and pelvis was performed following injection of IV contrast. Multiplanar reformats were obtained. Dose reduction techniques were used.  CONTRAST: 60mL Isovue 370    FINDINGS:   LOWER CHEST: Small left greater than right pleural effusions, with adjacent atelectasis.    HEPATOBILIARY: Steatotic liver, with morphologic changes of chronic hepatocellular disease and slight surface nodularity suggesting possible cirrhosis. No focal liver lesions. Cholelithiasis. No biliary ductal dilation.    PANCREAS: Focal area of hypoenhancement and parenchymal loss within the pancreatic head which contains a punctate calcification (3/#94). No pancreatic ductal dilation. No acute peripancreatic inflammation.    SPLEEN: Normal. No splenomegaly.    ADRENAL GLANDS: Normal.    KIDNEYS/BLADDER: Normal.    BOWEL: No bowel obstruction or active inflammation. Intramural fat deposition within the majority of the colon, particularly the ascending colon, likely related to remote inflammation.    LYMPH NODES: No enlarged lymph node.    VASCULATURE: Patent hepatic,  portal, splenic, and superior mesenteric veins. Normal caliber abdominal aorta. Tiny gastroesophageal varices.    PERITONEUM/RETROPERITONEUM: Moderate volume simple ascites throughout the abdomen.    PELVIC ORGANS: Normal.    MUSCULOSKELETAL: Mild body wall edema.       Impression    IMPRESSION:     1.  Portal and superior mesenteric veins are patent, as queried.    2.  Steatotic liver, with morphologic changes of chronic hepatocellular disease and possible cirrhosis. Features of portal hypertension include tiny gastroesophageal varices and moderate volume ascites. No splenomegaly.    3.  Focal area of hypoenhancement and parenchymal loss within the pancreatic head, probably sequela of a remote episode of pancreatitis. No ductal dilation or other suspicious features.    4.  Mild body wall edema. Small pleural effusions, greater on the left.         Time spent with the patient, reviewing the EMR, reviewing laboratory and imaging studies, more than 50% of which was counseling and coordinating care:  58 minutes.     Girma Gleason MD

## 2022-09-26 NOTE — PROGRESS NOTES
Clinic Care Coordination Contact  [facility]/Voicemail       Clinical Data: Care Coordinator Outreach  Outreach attempted x 2. Unable to leave a message on patient's voicemail with call back information.    Plan: Care Coordinator will do no further outreaches at this time.    LIZZ Tolbert  [facility]  [facility]     *Connected Care Resource Team does NOT follow patient ongoing. Referrals are identified based on internal discharge reports and the outreach is to ensure patient has an understanding of their discharge instructions.

## 2022-10-04 PROBLEM — R11.2 NAUSEA AND VOMITING: Status: ACTIVE | Noted: 2021-01-01

## 2023-11-01 NOTE — ED TRIAGE NOTES
A&O x4.  ABC's intact.      Pt arrives with c/o low potassium feeling like heart is going to stop.  On 3/19/2021 potassium 2.1   Poncho mata

## 2024-05-16 NOTE — PLAN OF CARE
Dischged after instructions reviewed  With understanding and acceptance of plan acknowledged   Microalbumin / Creatinine Urine Ratio    empagliflozin (JARDIANCE) 25 MG tablet    Ambulatory Referral to Care Management      3. Vitamin D deficiency  vitamin D (ERGOCALCIFEROL) 1.25 MG (09459 UT) CAPS capsule      4. Morbidly obese (HCC)  Ambulatory Referral to Care Management      5. ALEXANDRE (generalized anxiety disorder)  Ambulatory Referral to Care Management      6. Mood disorder (HCC)  Ambulatory Referral to Care Management      7. ROM (obstructive sleep apnea)  Ambulatory Referral to Care Management      8. Gastroesophageal reflux disease, unspecified whether esophagitis present  Ambulatory Referral to Care Management      9. Elevated liver enzymes  Jake Oropeza DO, Gastroenterology, [place]    Ambulatory Referral to Care Management      10. History of cholecystectomy        11. Chronic obstructive pulmonary disease, unspecified COPD type (HCC)  Ambulatory Referral to Care Management      12. Essential hypertension  Ambulatory Referral to Care Management      13. Elevated LFTs  Ambulatory Referral to Care Management      14. Takotsubo cardiomyopathy  Ambulatory Referral to Care Management      15. Dysuria  POCT Urinalysis No Micro (Auto)    Urinalysis             Plan:     Orders Placed This Encounter   Procedures    Comprehensive Metabolic Panel    Hemoglobin A1C    Lipid Panel    Microalbumin, Ur    Microalbumin / Creatinine Urine Ratio    Urinalysis    Jake Oropeza DO, Gastroenterology, [place]    Ambulatory Referral to Care Management    POCT Urinalysis No Micro (Auto)       Outpatient Encounter Medications as of 5/16/2024   Medication Sig Dispense Refill    rosuvastatin (CRESTOR) 10 MG tablet Take 1 tablet by mouth daily 90 tablet 1    vitamin D (ERGOCALCIFEROL) 1.25 MG (32128 UT) CAPS capsule Take 1 capsule by mouth once a week 12 capsule 2    empagliflozin (JARDIANCE) 25 MG tablet Take 1 tablet by mouth daily 90 tablet 1    hydrOXYzine HCl (ATARAX) 10 MG tablet Take 1 tablet by mouth nightly as

## (undated) DEVICE — KIT ENDO TURNOVER/PROCEDURE W/CLEAN A SCOPE LINERS 103888

## (undated) RX ORDER — FENTANYL CITRATE 0.05 MG/ML
INJECTION, SOLUTION INTRAMUSCULAR; INTRAVENOUS
Status: DISPENSED
Start: 2022-01-01

## (undated) RX ORDER — DIPHENHYDRAMINE HYDROCHLORIDE 50 MG/ML
INJECTION INTRAMUSCULAR; INTRAVENOUS
Status: DISPENSED
Start: 2022-01-01